# Patient Record
Sex: FEMALE | Race: WHITE | NOT HISPANIC OR LATINO | Employment: UNEMPLOYED | URBAN - NONMETROPOLITAN AREA
[De-identification: names, ages, dates, MRNs, and addresses within clinical notes are randomized per-mention and may not be internally consistent; named-entity substitution may affect disease eponyms.]

---

## 2017-01-08 ENCOUNTER — HOSPITAL ENCOUNTER (EMERGENCY)
Facility: HOSPITAL | Age: 47
Discharge: HOME | End: 2017-01-08
Attending: EMERGENCY MEDICINE | Admitting: EMERGENCY MEDICINE

## 2017-01-08 VITALS
TEMPERATURE: 98.2 F | DIASTOLIC BLOOD PRESSURE: 83 MMHG | RESPIRATION RATE: 16 BRPM | SYSTOLIC BLOOD PRESSURE: 126 MMHG | OXYGEN SATURATION: 96 % | WEIGHT: 160 LBS | HEART RATE: 90 BPM | HEIGHT: 62 IN | BODY MASS INDEX: 29.44 KG/M2

## 2017-01-08 DIAGNOSIS — Z76.0 MEDICATION REFILL: ICD-10-CM

## 2017-01-08 DIAGNOSIS — I10 ESSENTIAL HYPERTENSION: Primary | ICD-10-CM

## 2017-01-08 LAB
ALBUMIN SERPL-MCNC: 4.9 G/DL (ref 3.5–5)
ALBUMIN/GLOB SERPL: 2 G/DL (ref 1.5–2.5)
ALP SERPL-CCNC: 78 U/L (ref 46–116)
ALT SERPL W P-5'-P-CCNC: 41 U/L (ref 10–36)
ANION GAP SERPL CALCULATED.3IONS-SCNC: 10.9 MMOL/L (ref 3.6–11.2)
APTT PPP: 24.4 SECONDS (ref 24.4–31)
AST SERPL-CCNC: 31 U/L (ref 10–30)
BASOPHILS # BLD AUTO: 0.03 10*3/MM3 (ref 0–0.3)
BASOPHILS NFR BLD AUTO: 0.3 % (ref 0–2)
BILIRUB SERPL-MCNC: 0.3 MG/DL (ref 0.2–1.8)
BILIRUB UR QL STRIP: NEGATIVE
BUN BLD-MCNC: 11 MG/DL (ref 7–21)
BUN/CREAT SERPL: 16.2 (ref 7–25)
CALCIUM SPEC-SCNC: 9.9 MG/DL (ref 7.7–10)
CHLORIDE SERPL-SCNC: 107 MMOL/L (ref 99–112)
CK MB SERPL-CCNC: 0.58 NG/ML (ref 0–5)
CK MB SERPL-RTO: 0.9 % (ref 0–3)
CK SERPL-CCNC: 67 U/L (ref 24–173)
CLARITY UR: CLEAR
CO2 SERPL-SCNC: 22.1 MMOL/L (ref 24.3–31.9)
COLOR UR: YELLOW
CREAT BLD-MCNC: 0.68 MG/DL (ref 0.43–1.29)
DEPRECATED RDW RBC AUTO: 41.1 FL (ref 37–54)
EOSINOPHIL # BLD AUTO: 0.23 10*3/MM3 (ref 0–0.7)
EOSINOPHIL NFR BLD AUTO: 2.2 % (ref 0–5)
ERYTHROCYTE [DISTWIDTH] IN BLOOD BY AUTOMATED COUNT: 13.5 % (ref 11.5–14.5)
GFR SERPL CREATININE-BSD FRML MDRD: 93 ML/MIN/1.73
GLOBULIN UR ELPH-MCNC: 2.5 GM/DL
GLUCOSE BLD-MCNC: 258 MG/DL (ref 70–110)
GLUCOSE UR STRIP-MCNC: ABNORMAL MG/DL
HCT VFR BLD AUTO: 43.2 % (ref 37–47)
HGB BLD-MCNC: 14.8 G/DL (ref 12–16)
HGB UR QL STRIP.AUTO: NEGATIVE
IMM GRANULOCYTES # BLD: 0.03 10*3/MM3 (ref 0–0.03)
IMM GRANULOCYTES NFR BLD: 0.3 % (ref 0–0.5)
INR PPP: 0.92 (ref 0.8–1.1)
KETONES UR QL STRIP: ABNORMAL
LEUKOCYTE ESTERASE UR QL STRIP.AUTO: NEGATIVE
LYMPHOCYTES # BLD AUTO: 3.35 10*3/MM3 (ref 1–3)
LYMPHOCYTES NFR BLD AUTO: 32.3 % (ref 21–51)
MCH RBC QN AUTO: 28.8 PG (ref 27–33)
MCHC RBC AUTO-ENTMCNC: 34.3 G/DL (ref 33–37)
MCV RBC AUTO: 84.2 FL (ref 80–94)
MONOCYTES # BLD AUTO: 0.51 10*3/MM3 (ref 0.1–0.9)
MONOCYTES NFR BLD AUTO: 4.9 % (ref 0–10)
MYOGLOBIN SERPL-MCNC: 24 NG/ML (ref 0–109)
NEUTROPHILS # BLD AUTO: 6.23 10*3/MM3 (ref 1.4–6.5)
NEUTROPHILS NFR BLD AUTO: 60 % (ref 30–70)
NITRITE UR QL STRIP: NEGATIVE
OSMOLALITY SERPL CALC.SUM OF ELEC: 287.7 MOSM/KG (ref 273–305)
PH UR STRIP.AUTO: <=5 [PH] (ref 5–8)
PLATELET # BLD AUTO: 288 10*3/MM3 (ref 130–400)
PMV BLD AUTO: 10.4 FL (ref 6–10)
POTASSIUM BLD-SCNC: 3.8 MMOL/L (ref 3.5–5.3)
PROT SERPL-MCNC: 7.4 G/DL (ref 6–8)
PROT UR QL STRIP: NEGATIVE
PROTHROMBIN TIME: 10.4 SECONDS (ref 9.8–11.9)
RBC # BLD AUTO: 5.13 10*6/MM3 (ref 4.2–5.4)
SODIUM BLD-SCNC: 140 MMOL/L (ref 135–153)
SP GR UR STRIP: 1.02 (ref 1–1.03)
TROPONIN I SERPL-MCNC: <0.006 NG/ML
UROBILINOGEN UR QL STRIP: ABNORMAL
WBC NRBC COR # BLD: 10.38 10*3/MM3 (ref 4.5–12.5)

## 2017-01-08 PROCEDURE — 82550 ASSAY OF CK (CPK): CPT | Performed by: EMERGENCY MEDICINE

## 2017-01-08 PROCEDURE — 96375 TX/PRO/DX INJ NEW DRUG ADDON: CPT

## 2017-01-08 PROCEDURE — 85025 COMPLETE CBC W/AUTO DIFF WBC: CPT | Performed by: EMERGENCY MEDICINE

## 2017-01-08 PROCEDURE — 82553 CREATINE MB FRACTION: CPT | Performed by: EMERGENCY MEDICINE

## 2017-01-08 PROCEDURE — 85610 PROTHROMBIN TIME: CPT | Performed by: EMERGENCY MEDICINE

## 2017-01-08 PROCEDURE — 81003 URINALYSIS AUTO W/O SCOPE: CPT | Performed by: EMERGENCY MEDICINE

## 2017-01-08 PROCEDURE — 93005 ELECTROCARDIOGRAM TRACING: CPT | Performed by: EMERGENCY MEDICINE

## 2017-01-08 PROCEDURE — 85730 THROMBOPLASTIN TIME PARTIAL: CPT | Performed by: EMERGENCY MEDICINE

## 2017-01-08 PROCEDURE — 99284 EMERGENCY DEPT VISIT MOD MDM: CPT

## 2017-01-08 PROCEDURE — 96374 THER/PROPH/DIAG INJ IV PUSH: CPT

## 2017-01-08 PROCEDURE — 80053 COMPREHEN METABOLIC PANEL: CPT | Performed by: EMERGENCY MEDICINE

## 2017-01-08 PROCEDURE — 84484 ASSAY OF TROPONIN QUANT: CPT | Performed by: EMERGENCY MEDICINE

## 2017-01-08 PROCEDURE — 83874 ASSAY OF MYOGLOBIN: CPT | Performed by: EMERGENCY MEDICINE

## 2017-01-08 RX ORDER — SODIUM CHLORIDE 0.9 % (FLUSH) 0.9 %
10 SYRINGE (ML) INJECTION AS NEEDED
Status: DISCONTINUED | OUTPATIENT
Start: 2017-01-08 | End: 2017-01-08 | Stop reason: HOSPADM

## 2017-01-08 RX ORDER — METOPROLOL TARTRATE 5 MG/5ML
5 INJECTION INTRAVENOUS ONCE
Status: COMPLETED | OUTPATIENT
Start: 2017-01-08 | End: 2017-01-08

## 2017-01-08 RX ORDER — AMLODIPINE BESYLATE AND BENAZEPRIL HYDROCHLORIDE 10; 20 MG/1; MG/1
1 CAPSULE ORAL DAILY
Qty: 14 CAPSULE | Refills: 0 | Status: ON HOLD | OUTPATIENT
Start: 2017-01-08 | End: 2017-09-23

## 2017-01-08 RX ORDER — METOPROLOL TARTRATE 100 MG/1
100 TABLET ORAL 2 TIMES DAILY
Qty: 28 TABLET | Refills: 0 | Status: SHIPPED | OUTPATIENT
Start: 2017-01-08 | End: 2017-01-22

## 2017-01-08 RX ORDER — ENALAPRILAT 2.5 MG/2ML
2.5 INJECTION INTRAVENOUS ONCE
Status: DISCONTINUED | OUTPATIENT
Start: 2017-01-08 | End: 2017-01-08 | Stop reason: HOSPADM

## 2017-01-08 RX ADMIN — METOPROLOL TARTRATE 5 MG: 5 INJECTION INTRAVENOUS at 02:02

## 2017-01-08 NOTE — ED PROVIDER NOTES
Subjective   HPI Comments: Patient comes in with elevated blood pressure and pulse rate.  She also has had intermittent dizziness.  She ran out of her blood pressure medicine 2 days ago.  She has not had chest pain or shortness of breath.  Patient states that she takes metoprolol and Lotrel.  Dr. Gallego's her primary care physician.    Patient is a 46 y.o. female presenting with hypertension.   History provided by:  Patient and EMS personnel  Hypertension   Severity:  Moderate  Onset quality:  Gradual  Duration:  2 days  Timing:  Constant  Chronicity:  Recurrent  Context: noncompliance    Context: normal sodium, not caffeine, not drug abuse, not herbal remedies, not medication change, not OTC medications used and not stress    Relieved by:  Nothing  Worsened by:  Nothing  Ineffective treatments:  None tried  Associated symptoms: dizziness    Associated symptoms: no abdominal pain, no anxiety, no blurred vision, no chest pain, no confusion, no ear pain, no epistaxis, no fatigue, no fever, no headaches, no hematuria, no hypokalemia, no loss of consciousness, no nausea, no neck pain, no palpitations, no peripheral edema, no shortness of breath, no syncope, no tinnitus, not vomiting and no weakness    Risk factors: diabetes, obesity and tobacco use    Risk factors: no alcohol use, no cardiac disease, no cocaine use, no decongestant use, no kidney disease, no prior aneurysm, no prior stroke and no PVD        Review of Systems   Constitutional: Negative.  Negative for fatigue and fever.   HENT: Negative.  Negative for ear pain, nosebleeds and tinnitus.    Eyes: Negative.  Negative for blurred vision.   Respiratory: Negative.  Negative for shortness of breath.    Cardiovascular: Negative.  Negative for chest pain, palpitations, leg swelling and syncope.   Gastrointestinal: Negative.  Negative for abdominal pain, nausea and vomiting.   Endocrine: Negative.    Genitourinary: Negative.  Negative for hematuria.    Musculoskeletal: Negative.  Negative for neck pain.   Skin: Negative.    Allergic/Immunologic: Negative.    Neurological: Positive for dizziness. Negative for loss of consciousness, weakness and headaches.   Hematological: Negative.    Psychiatric/Behavioral: Negative.  Negative for confusion. The patient is not nervous/anxious.    All other systems reviewed and are negative.      Past Medical History   Diagnosis Date   • Diabetes mellitus    • Hypertension        Allergies   Allergen Reactions   • Hydrocodone-Acetaminophen    • Hydromorphone    • Oxycodone-Acetaminophen    • Penicillins        Past Surgical History   Procedure Laterality Date   •  section     • Hysterectomy     • Cholecystectomy     • Appendectomy         History reviewed. No pertinent family history.    Social History     Social History   • Marital status:      Spouse name: N/A   • Number of children: N/A   • Years of education: N/A     Social History Main Topics   • Smoking status: Current Every Day Smoker     Packs/day: 0.50     Types: Cigarettes   • Smokeless tobacco: None   • Alcohol use No   • Drug use: No   • Sexual activity: Not Asked     Other Topics Concern   • None     Social History Narrative   • None           Objective   Physical Exam   Constitutional: She is oriented to person, place, and time. She appears well-developed and well-nourished. No distress.   HENT:   Head: Normocephalic and atraumatic.   Nose: Nose normal.   Moist mucous membranes.  Oropharynx normal.   Eyes: Conjunctivae and EOM are normal. Pupils are equal, round, and reactive to light. Right eye exhibits no discharge. Left eye exhibits no discharge. No scleral icterus.   No nystagmus.   Neck: Normal range of motion. Neck supple. No JVD present. No tracheal deviation present. No thyromegaly present.   Cardiovascular: Normal rate, regular rhythm, normal heart sounds and intact distal pulses.  Exam reveals no gallop and no friction rub.    No murmur  heard.  Pulmonary/Chest: Effort normal and breath sounds normal. No stridor. No respiratory distress. She has no wheezes. She has no rales. She exhibits no tenderness.   Abdominal: Soft. She exhibits no distension. There is no tenderness. There is no guarding.   Musculoskeletal: Normal range of motion. She exhibits no deformity.   Neurological: She is alert and oriented to person, place, and time. No cranial nerve deficit. She exhibits normal muscle tone. Coordination normal.   Skin: Skin is warm and dry. No pallor.   Psychiatric: She has a normal mood and affect. Her behavior is normal. Judgment and thought content normal.   Nursing note and vitals reviewed.      Procedures         ED Course  ED Course   Value Comment By Time   ECG 12 Lead 12-lead EKG performed at 0138 hrs.  Interpreted by myself at the bedside at 0138 hrs.  Sinus tachycardia.  Ventricular rate 114.  SD interval 161.  QRS duration 71.  .  Pathologic blocks.  No overt dysrhythmia.  No acute ST segment deviation indicative of acute ischemia/infarct/injury Jeovanny Yarbrough MD 01/08 0139    Heart rate and blood pressure easily controlled with medication. Jeovanny Yarbrough MD 01/08 0322      No orders to display     Labs Reviewed   COMPREHENSIVE METABOLIC PANEL - Abnormal; Notable for the following:        Result Value    Glucose 258 (*)     CO2 22.1 (*)     ALT (SGPT) 41 (*)     AST (SGOT) 31 (*)     All other components within normal limits   CBC WITH AUTO DIFFERENTIAL - Abnormal; Notable for the following:     MPV 10.4 (*)     Lymphocytes, Absolute 3.35 (*)     All other components within normal limits   PROTIME-INR - Normal    Narrative:     Patients not on anticoagulant therapy:    INR 0.90-1.10     Suggested INR therapeutic range for stable oral anticoagulant therapy:             Routine therapy                      2.00-3.00           Recurrent MI                         2.50-3.50           Mechanical prosthetic valve           2.50-3.50   APTT - Normal    Narrative:     Heparin protocol:    Therapeutic range 56-80 seconds   TROPONIN (IN-HOUSE) - Normal    Narrative:     Ultra Troponin I Reference Range:         <=0.039 ng/mL: Negative    0.04-0.779 ng/mL: Indeterminate Range. Suspicious of MI.  Clinical correlation required.       >=0.78  ng/mL: Consistent with myocardial injury.  Clinical correlation required.   CK MB - Normal   MYOGLOBIN, SERUM - Normal   CK - Normal   OSMOLALITY, CALCULATED - Normal   CKMB INDEX CALCULATION - Normal   URINALYSIS W/ CULTURE IF INDICATED   CBC AND DIFFERENTIAL    Narrative:     The following orders were created for panel order CBC & Differential.  Procedure                               Abnormality         Status                     ---------                               -----------         ------                     CBC Auto Differential[[id number]]         Abnormal            Final result                 Please view results for these tests on the individual orders.        Medication List      CONTINUE taking these medications          amLODIPine-benazepril 10-20 MG per capsule   Commonly known as:  LOTREL   Take 1 capsule by mouth Daily. (MUST HAVE APPOINTMENT FOR REFILL)       azithromycin 500 MG tablet   Commonly known as:  ZITHROMAX   Take 1 tablet by mouth daily.       JANUMET XR  MG tablet sustained-release 24 hour   Generic drug:  SITagliptin-MetFORMIN HCl ER   Take 1 tablet by mouth Daily. ( Patient must have appointment fo refills)       levoFLOXacin 500 MG tablet   Commonly known as:  LEVAQUIN   Take 1 tablet by mouth every morning.       metoprolol tartrate 100 MG tablet   Commonly known as:  LOPRESSOR   Take 1 tablet by mouth 2 (Two) Times a Day.(MUST HAVE APPOINTMENT FOR   REFILL)       neomycin-polymyxin-hydrocortisone 1 % solution otic solution   Commonly known as:  CORTISPORIN   INSTILL 5 DROPS INTO AFFECTED EAR(S) TWICE DAILY       * omeprazole 40 MG capsule   Commonly known as:   priLOSEC   take 1 capsule by mouth 2 times daily       * omeprazole 40 MG capsule   Commonly known as:  priLOSEC   Take 1 capsule by mouth 2 (Two) Times a Day.(MUST HAVE APPOINTMENT FOR   REFILL)       venlafaxine XR 75 MG 24 hr capsule   Commonly known as:  EFFEXOR-XR   Take 1 capsule by mouth Daily.(MUST HAVE APPOINTMENT FOR REFILL)       * Notice:  This list has 2 medication(s) that are the same as other   medications prescribed for you. Read the directions carefully, and ask   your doctor or other care provider to review them with you.                  MDM  Number of Diagnoses or Management Options  Essential hypertension: established and worsening  Medication refill: new and requires workup     Amount and/or Complexity of Data Reviewed  Clinical lab tests: ordered and reviewed  Tests in the radiology section of CPT®: ordered and reviewed    Risk of Complications, Morbidity, and/or Mortality  Presenting problems: moderate  Diagnostic procedures: moderate  Management options: moderate    Patient Progress  Patient progress: improved      Final diagnoses:   Essential hypertension   Medication refill            Jeovanny Yarbrough MD  01/08/17 0322

## 2017-02-20 ENCOUNTER — HOSPITAL ENCOUNTER (EMERGENCY)
Facility: HOSPITAL | Age: 47
Discharge: HOME | End: 2017-02-20
Admitting: EMERGENCY MEDICINE

## 2017-02-20 VITALS
OXYGEN SATURATION: 99 % | BODY MASS INDEX: 29.44 KG/M2 | HEART RATE: 74 BPM | WEIGHT: 160 LBS | SYSTOLIC BLOOD PRESSURE: 123 MMHG | HEIGHT: 62 IN | DIASTOLIC BLOOD PRESSURE: 79 MMHG | TEMPERATURE: 98 F | RESPIRATION RATE: 18 BRPM

## 2017-02-20 DIAGNOSIS — T78.40XA ALLERGIC REACTION, INITIAL ENCOUNTER: Primary | ICD-10-CM

## 2017-02-20 PROCEDURE — 96372 THER/PROPH/DIAG INJ SC/IM: CPT

## 2017-02-20 PROCEDURE — 99283 EMERGENCY DEPT VISIT LOW MDM: CPT

## 2017-02-20 PROCEDURE — 25010000002 METHYLPREDNISOLONE PER 125 MG: Performed by: NURSE PRACTITIONER

## 2017-02-20 PROCEDURE — 25010000002 DIPHENHYDRAMINE PER 50 MG: Performed by: NURSE PRACTITIONER

## 2017-02-20 RX ORDER — METHYLPREDNISOLONE SODIUM SUCCINATE 125 MG/2ML
125 INJECTION, POWDER, LYOPHILIZED, FOR SOLUTION INTRAMUSCULAR; INTRAVENOUS ONCE
Status: COMPLETED | OUTPATIENT
Start: 2017-02-20 | End: 2017-02-20

## 2017-02-20 RX ORDER — HYDROXYZINE HYDROCHLORIDE 25 MG/1
25 TABLET, FILM COATED ORAL EVERY 6 HOURS PRN
Qty: 20 TABLET | Refills: 0 | Status: ON HOLD | OUTPATIENT
Start: 2017-02-20 | End: 2017-09-23

## 2017-02-20 RX ORDER — DIPHENHYDRAMINE HYDROCHLORIDE 50 MG/ML
50 INJECTION INTRAMUSCULAR; INTRAVENOUS ONCE
Status: COMPLETED | OUTPATIENT
Start: 2017-02-20 | End: 2017-02-20

## 2017-02-20 RX ORDER — PREDNISONE 20 MG/1
40 TABLET ORAL DAILY
Qty: 10 TABLET | Refills: 0 | Status: ON HOLD | OUTPATIENT
Start: 2017-02-20 | End: 2017-09-23

## 2017-02-20 RX ADMIN — METHYLPREDNISOLONE SODIUM SUCCINATE 125 MG: 125 INJECTION, POWDER, FOR SOLUTION INTRAMUSCULAR; INTRAVENOUS at 21:09

## 2017-02-20 RX ADMIN — DIPHENHYDRAMINE HYDROCHLORIDE 50 MG: 50 INJECTION INTRAMUSCULAR; INTRAVENOUS at 21:09

## 2017-02-21 NOTE — ED NOTES
Reports hives and itching post antibiotic ingestion. Lips appear swollen and a diffuse rash over the body. omnicef since Friday for an ear infection. Rash started yesterday.      Kristie Anguiano RN  02/20/17 2026

## 2017-02-21 NOTE — ED PROVIDER NOTES
Subjective   Patient is a 46 y.o. female presenting with allergic reaction.   Allergic Reaction   Presenting symptoms: itching and rash    Itching:     Location:  Full body    Severity:  Moderate    Onset quality:  Sudden  Severity:  Moderate  Duration:  1 day  Prior allergic episodes:  Allergies to medications  Context: medications    Context: not animal exposure, not chemicals, not cosmetics, not dairy/milk products, not food allergies, not grass, not new detergents/soaps and not poison ivy    Relieved by:  None tried  Worsened by:  Nothing  Ineffective treatments:  None tried      Review of Systems   Constitutional: Negative.    HENT: Negative.    Eyes: Negative.    Respiratory: Negative.    Cardiovascular: Negative.    Gastrointestinal: Negative.    Endocrine: Negative.    Genitourinary: Negative.    Musculoskeletal: Negative.    Skin: Positive for itching and rash.   Allergic/Immunologic: Negative.    Neurological: Negative.    Hematological: Negative.    Psychiatric/Behavioral: Negative.        Past Medical History   Diagnosis Date   • Diabetes mellitus    • Hypertension        Allergies   Allergen Reactions   • Hydrocodone-Acetaminophen    • Hydromorphone    • Oxycodone-Acetaminophen    • Penicillins        Past Surgical History   Procedure Laterality Date   •  section     • Hysterectomy     • Cholecystectomy     • Appendectomy         History reviewed. No pertinent family history.    Social History     Social History   • Marital status:      Spouse name: N/A   • Number of children: N/A   • Years of education: N/A     Social History Main Topics   • Smoking status: Current Every Day Smoker     Packs/day: 0.50     Types: Cigarettes   • Smokeless tobacco: None   • Alcohol use No   • Drug use: No   • Sexual activity: Not Asked     Other Topics Concern   • None     Social History Narrative           Objective   Physical Exam   Constitutional: She is oriented to person, place, and time. She appears  well-developed and well-nourished.   HENT:   Head: Normocephalic.   Right Ear: External ear normal.   Left Ear: External ear normal.   Mouth/Throat: Oropharynx is clear and moist.   Eyes: Conjunctivae and EOM are normal. Pupils are equal, round, and reactive to light.   Neck: Neck supple.   Cardiovascular: Normal rate and regular rhythm.    Pulmonary/Chest: Effort normal and breath sounds normal.   Abdominal: Soft. Bowel sounds are normal.   Musculoskeletal: Normal range of motion.   Neurological: She is alert and oriented to person, place, and time. She has normal reflexes.   Nursing note and vitals reviewed.      Procedures         ED Course  ED Course                  MDM    Final diagnoses:   Allergic reaction, initial encounter            Josh Sauer, APRN  02/20/17 7090

## 2017-09-23 ENCOUNTER — APPOINTMENT (OUTPATIENT)
Dept: CT IMAGING | Facility: HOSPITAL | Age: 47
End: 2017-09-23

## 2017-09-23 ENCOUNTER — HOSPITAL ENCOUNTER (INPATIENT)
Facility: HOSPITAL | Age: 47
LOS: 16 days | Discharge: HOME | End: 2017-10-09
Attending: EMERGENCY MEDICINE | Admitting: INTERNAL MEDICINE

## 2017-09-23 DIAGNOSIS — R11.0 NAUSEA: ICD-10-CM

## 2017-09-23 DIAGNOSIS — R10.13 EPIGASTRIC PAIN: Primary | ICD-10-CM

## 2017-09-23 DIAGNOSIS — K85.90 ACUTE PANCREATITIS, UNSPECIFIED COMPLICATION STATUS, UNSPECIFIED PANCREATITIS TYPE: ICD-10-CM

## 2017-09-23 LAB
ALBUMIN SERPL-MCNC: 5 G/DL (ref 3.5–5)
ALBUMIN/GLOB SERPL: 1.9 G/DL (ref 1.5–2.5)
ALP SERPL-CCNC: 88 U/L (ref 35–104)
ALT SERPL W P-5'-P-CCNC: 27 U/L (ref 10–36)
AMYLASE SERPL-CCNC: 2500 U/L (ref 28–100)
ANION GAP SERPL CALCULATED.3IONS-SCNC: 6 MMOL/L (ref 3.6–11.2)
AST SERPL-CCNC: 30 U/L (ref 10–30)
BASOPHILS # BLD AUTO: 0.05 10*3/MM3 (ref 0–0.3)
BASOPHILS NFR BLD AUTO: 0.2 % (ref 0–2)
BILIRUB SERPL-MCNC: 0.5 MG/DL (ref 0.2–1.8)
BUN BLD-MCNC: 11 MG/DL (ref 7–21)
BUN/CREAT SERPL: 13.4 (ref 7–25)
CALCIUM SPEC-SCNC: 10 MG/DL (ref 7.7–10)
CHLORIDE SERPL-SCNC: 108 MMOL/L (ref 99–112)
CO2 SERPL-SCNC: 25 MMOL/L (ref 24.3–31.9)
CREAT BLD-MCNC: 0.82 MG/DL (ref 0.43–1.29)
D-LACTATE SERPL-SCNC: 1.7 MMOL/L (ref 0.5–2)
DEPRECATED RDW RBC AUTO: 42.8 FL (ref 37–54)
EOSINOPHIL # BLD AUTO: 0.18 10*3/MM3 (ref 0–0.7)
EOSINOPHIL NFR BLD AUTO: 0.6 % (ref 0–5)
ERYTHROCYTE [DISTWIDTH] IN BLOOD BY AUTOMATED COUNT: 13.6 % (ref 11.5–14.5)
GFR SERPL CREATININE-BSD FRML MDRD: 75 ML/MIN/1.73
GLOBULIN UR ELPH-MCNC: 2.6 GM/DL
GLUCOSE BLD-MCNC: 289 MG/DL (ref 70–110)
HCT VFR BLD AUTO: 51.1 % (ref 37–47)
HGB BLD-MCNC: 17.3 G/DL (ref 12–16)
IMM GRANULOCYTES # BLD: 0.17 10*3/MM3 (ref 0–0.03)
IMM GRANULOCYTES NFR BLD: 0.5 % (ref 0–0.5)
LIPASE SERPL-CCNC: 3488 U/L (ref 13–60)
LYMPHOCYTES # BLD AUTO: 3.36 10*3/MM3 (ref 1–3)
LYMPHOCYTES NFR BLD AUTO: 10.5 % (ref 21–51)
MCH RBC QN AUTO: 28.9 PG (ref 27–33)
MCHC RBC AUTO-ENTMCNC: 33.9 G/DL (ref 33–37)
MCV RBC AUTO: 85.3 FL (ref 80–94)
MONOCYTES # BLD AUTO: 1.29 10*3/MM3 (ref 0.1–0.9)
MONOCYTES NFR BLD AUTO: 4 % (ref 0–10)
NEUTROPHILS # BLD AUTO: 26.99 10*3/MM3 (ref 1.4–6.5)
NEUTROPHILS NFR BLD AUTO: 84.2 % (ref 30–70)
OSMOLALITY SERPL CALC.SUM OF ELEC: 287.5 MOSM/KG (ref 273–305)
PLATELET # BLD AUTO: 377 10*3/MM3 (ref 130–400)
PMV BLD AUTO: 10.9 FL (ref 6–10)
POTASSIUM BLD-SCNC: 4 MMOL/L (ref 3.5–5.3)
PROT SERPL-MCNC: 7.6 G/DL (ref 6–8)
RBC # BLD AUTO: 5.99 10*6/MM3 (ref 4.2–5.4)
SODIUM BLD-SCNC: 139 MMOL/L (ref 135–153)
WBC NRBC COR # BLD: 32.04 10*3/MM3 (ref 4.5–12.5)

## 2017-09-23 PROCEDURE — 25010000002 MORPHINE PER 10 MG: Performed by: NURSE PRACTITIONER

## 2017-09-23 PROCEDURE — 25010000002 KETOROLAC TROMETHAMINE PER 15 MG: Performed by: NURSE PRACTITIONER

## 2017-09-23 PROCEDURE — 87040 BLOOD CULTURE FOR BACTERIA: CPT | Performed by: NURSE PRACTITIONER

## 2017-09-23 PROCEDURE — 83690 ASSAY OF LIPASE: CPT | Performed by: NURSE PRACTITIONER

## 2017-09-23 PROCEDURE — 74177 CT ABD & PELVIS W/CONTRAST: CPT

## 2017-09-23 PROCEDURE — 36415 COLL VENOUS BLD VENIPUNCTURE: CPT

## 2017-09-23 PROCEDURE — 25010000002 ONDANSETRON PER 1 MG: Performed by: INTERNAL MEDICINE

## 2017-09-23 PROCEDURE — 74177 CT ABD & PELVIS W/CONTRAST: CPT | Performed by: RADIOLOGY

## 2017-09-23 PROCEDURE — 83605 ASSAY OF LACTIC ACID: CPT | Performed by: NURSE PRACTITIONER

## 2017-09-23 PROCEDURE — 25010000002 ENOXAPARIN PER 10 MG: Performed by: INTERNAL MEDICINE

## 2017-09-23 PROCEDURE — 80053 COMPREHEN METABOLIC PANEL: CPT | Performed by: NURSE PRACTITIONER

## 2017-09-23 PROCEDURE — 85025 COMPLETE CBC W/AUTO DIFF WBC: CPT | Performed by: NURSE PRACTITIONER

## 2017-09-23 PROCEDURE — 25010000002 PROMETHAZINE PER 50 MG: Performed by: NURSE PRACTITIONER

## 2017-09-23 PROCEDURE — 99284 EMERGENCY DEPT VISIT MOD MDM: CPT

## 2017-09-23 PROCEDURE — 82150 ASSAY OF AMYLASE: CPT | Performed by: NURSE PRACTITIONER

## 2017-09-23 PROCEDURE — 0 IOPAMIDOL 61 % SOLUTION: Performed by: EMERGENCY MEDICINE

## 2017-09-23 RX ORDER — SODIUM CHLORIDE 9 MG/ML
100 INJECTION, SOLUTION INTRAVENOUS CONTINUOUS
Status: DISCONTINUED | OUTPATIENT
Start: 2017-09-23 | End: 2017-09-27

## 2017-09-23 RX ORDER — PANTOPRAZOLE SODIUM 40 MG/1
40 TABLET, DELAYED RELEASE ORAL EVERY MORNING
Status: CANCELLED | OUTPATIENT
Start: 2017-09-24

## 2017-09-23 RX ORDER — METOPROLOL TARTRATE 100 MG/1
100 TABLET ORAL 2 TIMES DAILY
Status: CANCELLED | OUTPATIENT
Start: 2017-09-23

## 2017-09-23 RX ORDER — PROMETHAZINE HYDROCHLORIDE 25 MG/ML
12.5 INJECTION, SOLUTION INTRAMUSCULAR; INTRAVENOUS ONCE
Status: COMPLETED | OUTPATIENT
Start: 2017-09-23 | End: 2017-09-23

## 2017-09-23 RX ORDER — AMLODIPINE BESYLATE AND BENAZEPRIL HYDROCHLORIDE 10; 20 MG/1; MG/1
1 CAPSULE ORAL DAILY
Status: CANCELLED | OUTPATIENT
Start: 2017-09-24

## 2017-09-23 RX ORDER — METOPROLOL TARTRATE 100 MG/1
100 TABLET ORAL EVERY 12 HOURS SCHEDULED
Status: DISCONTINUED | OUTPATIENT
Start: 2017-09-23 | End: 2017-10-09 | Stop reason: HOSPADM

## 2017-09-23 RX ORDER — PANTOPRAZOLE SODIUM 40 MG/10ML
40 INJECTION, POWDER, LYOPHILIZED, FOR SOLUTION INTRAVENOUS
Status: DISCONTINUED | OUTPATIENT
Start: 2017-09-24 | End: 2017-10-09

## 2017-09-23 RX ORDER — DEXTROSE MONOHYDRATE 25 G/50ML
25 INJECTION, SOLUTION INTRAVENOUS
Status: DISCONTINUED | OUTPATIENT
Start: 2017-09-23 | End: 2017-10-09 | Stop reason: HOSPADM

## 2017-09-23 RX ORDER — MEPERIDINE HYDROCHLORIDE 25 MG/ML
25 INJECTION INTRAMUSCULAR; INTRAVENOUS; SUBCUTANEOUS EVERY 6 HOURS PRN
Status: DISCONTINUED | OUTPATIENT
Start: 2017-09-23 | End: 2017-09-25

## 2017-09-23 RX ORDER — ONDANSETRON 2 MG/ML
4 INJECTION INTRAMUSCULAR; INTRAVENOUS ONCE
Status: DISCONTINUED | OUTPATIENT
Start: 2017-09-23 | End: 2017-09-23

## 2017-09-23 RX ORDER — ONDANSETRON 2 MG/ML
4 INJECTION INTRAMUSCULAR; INTRAVENOUS EVERY 6 HOURS PRN
Status: DISCONTINUED | OUTPATIENT
Start: 2017-09-23 | End: 2017-10-09 | Stop reason: HOSPADM

## 2017-09-23 RX ORDER — NICOTINE POLACRILEX 4 MG
15 LOZENGE BUCCAL
Status: DISCONTINUED | OUTPATIENT
Start: 2017-09-23 | End: 2017-10-09 | Stop reason: HOSPADM

## 2017-09-23 RX ORDER — KETOROLAC TROMETHAMINE 30 MG/ML
60 INJECTION, SOLUTION INTRAMUSCULAR; INTRAVENOUS ONCE
Status: DISCONTINUED | OUTPATIENT
Start: 2017-09-23 | End: 2017-09-23

## 2017-09-23 RX ORDER — MORPHINE SULFATE 2 MG/ML
2 INJECTION, SOLUTION INTRAMUSCULAR; INTRAVENOUS ONCE
Status: DISCONTINUED | OUTPATIENT
Start: 2017-09-23 | End: 2017-09-23

## 2017-09-23 RX ORDER — VENLAFAXINE HYDROCHLORIDE 75 MG/1
75 CAPSULE, EXTENDED RELEASE ORAL DAILY
Status: DISCONTINUED | OUTPATIENT
Start: 2017-09-24 | End: 2017-10-09 | Stop reason: HOSPADM

## 2017-09-23 RX ORDER — MORPHINE SULFATE 2 MG/ML
2 INJECTION, SOLUTION INTRAMUSCULAR; INTRAVENOUS ONCE
Status: COMPLETED | OUTPATIENT
Start: 2017-09-23 | End: 2017-09-23

## 2017-09-23 RX ORDER — KETOROLAC TROMETHAMINE 30 MG/ML
30 INJECTION, SOLUTION INTRAMUSCULAR; INTRAVENOUS ONCE
Status: COMPLETED | OUTPATIENT
Start: 2017-09-23 | End: 2017-09-23

## 2017-09-23 RX ORDER — PANTOPRAZOLE SODIUM 40 MG/10ML
80 INJECTION, POWDER, LYOPHILIZED, FOR SOLUTION INTRAVENOUS ONCE
Status: COMPLETED | OUTPATIENT
Start: 2017-09-23 | End: 2017-09-23

## 2017-09-23 RX ORDER — PANTOPRAZOLE SODIUM 40 MG/10ML
40 INJECTION, POWDER, LYOPHILIZED, FOR SOLUTION INTRAVENOUS EVERY 12 HOURS SCHEDULED
Status: DISCONTINUED | OUTPATIENT
Start: 2017-09-23 | End: 2017-09-23

## 2017-09-23 RX ORDER — VENLAFAXINE HYDROCHLORIDE 75 MG/1
75 CAPSULE, EXTENDED RELEASE ORAL DAILY
Status: CANCELLED | OUTPATIENT
Start: 2017-09-24

## 2017-09-23 RX ADMIN — ENOXAPARIN SODIUM 40 MG: 40 INJECTION SUBCUTANEOUS at 21:30

## 2017-09-23 RX ADMIN — MORPHINE SULFATE 2 MG: 2 INJECTION, SOLUTION INTRAMUSCULAR; INTRAVENOUS at 14:42

## 2017-09-23 RX ADMIN — PANTOPRAZOLE SODIUM 80 MG: 40 INJECTION, POWDER, FOR SOLUTION INTRAVENOUS at 17:50

## 2017-09-23 RX ADMIN — MORPHINE SULFATE 4 MG: 4 INJECTION, SOLUTION INTRAMUSCULAR; INTRAVENOUS at 16:18

## 2017-09-23 RX ADMIN — PROMETHAZINE HYDROCHLORIDE 12.5 MG: 25 INJECTION INTRAMUSCULAR; INTRAVENOUS at 14:45

## 2017-09-23 RX ADMIN — MORPHINE SULFATE 2 MG: 2 INJECTION, SOLUTION INTRAMUSCULAR; INTRAVENOUS at 14:27

## 2017-09-23 RX ADMIN — MORPHINE SULFATE 4 MG: 4 INJECTION, SOLUTION INTRAMUSCULAR; INTRAVENOUS at 15:40

## 2017-09-23 RX ADMIN — SODIUM CHLORIDE 150 ML/HR: 9 INJECTION, SOLUTION INTRAVENOUS at 20:53

## 2017-09-23 RX ADMIN — MORPHINE SULFATE 4 MG: 4 INJECTION, SOLUTION INTRAMUSCULAR; INTRAVENOUS at 17:17

## 2017-09-23 RX ADMIN — PROMETHAZINE HYDROCHLORIDE 12.5 MG: 25 INJECTION, SOLUTION INTRAMUSCULAR; INTRAVENOUS at 14:12

## 2017-09-23 RX ADMIN — MORPHINE SULFATE 4 MG: 4 INJECTION, SOLUTION INTRAMUSCULAR; INTRAVENOUS at 14:12

## 2017-09-23 RX ADMIN — MEPERIDINE HYDROCHLORIDE 25 MG: 25 INJECTION INTRAMUSCULAR; INTRAVENOUS; SUBCUTANEOUS at 21:30

## 2017-09-23 RX ADMIN — METOPROLOL TARTRATE 100 MG: 100 TABLET, FILM COATED ORAL at 22:22

## 2017-09-23 RX ADMIN — IOPAMIDOL 100 ML: 612 INJECTION, SOLUTION INTRAVENOUS at 15:25

## 2017-09-23 RX ADMIN — ONDANSETRON 4 MG: 2 INJECTION INTRAMUSCULAR; INTRAVENOUS at 22:22

## 2017-09-23 RX ADMIN — KETOROLAC TROMETHAMINE 30 MG: 30 INJECTION, SOLUTION INTRAMUSCULAR at 15:41

## 2017-09-23 RX ADMIN — SODIUM CHLORIDE 1000 ML: 9 INJECTION, SOLUTION INTRAVENOUS at 17:47

## 2017-09-24 LAB
ALBUMIN SERPL-MCNC: 4.5 G/DL (ref 3.5–5)
ALBUMIN/GLOB SERPL: 1.8 G/DL (ref 1.5–2.5)
ALP SERPL-CCNC: 84 U/L (ref 35–104)
ALT SERPL W P-5'-P-CCNC: 61 U/L (ref 10–36)
AMYLASE SERPL-CCNC: 670 U/L (ref 28–100)
ANION GAP SERPL CALCULATED.3IONS-SCNC: 10.6 MMOL/L (ref 3.6–11.2)
AST SERPL-CCNC: 85 U/L (ref 10–30)
BACTERIA UR QL AUTO: ABNORMAL /HPF
BASOPHILS # BLD AUTO: 0.02 10*3/MM3 (ref 0–0.3)
BASOPHILS NFR BLD AUTO: 0.1 % (ref 0–2)
BILIRUB SERPL-MCNC: 0.5 MG/DL (ref 0.2–1.8)
BILIRUB UR QL STRIP: NEGATIVE
BUN BLD-MCNC: 14 MG/DL (ref 7–21)
BUN/CREAT SERPL: 17.3 (ref 7–25)
CALCIUM SPEC-SCNC: 8.9 MG/DL (ref 7.7–10)
CHLORIDE SERPL-SCNC: 113 MMOL/L (ref 99–112)
CLARITY UR: ABNORMAL
CO2 SERPL-SCNC: 17.4 MMOL/L (ref 24.3–31.9)
COLOR UR: YELLOW
CREAT BLD-MCNC: 0.81 MG/DL (ref 0.43–1.29)
DEPRECATED RDW RBC AUTO: 44 FL (ref 37–54)
EOSINOPHIL # BLD AUTO: 0.01 10*3/MM3 (ref 0–0.7)
EOSINOPHIL NFR BLD AUTO: 0.1 % (ref 0–5)
ERYTHROCYTE [DISTWIDTH] IN BLOOD BY AUTOMATED COUNT: 14.1 % (ref 11.5–14.5)
GFR SERPL CREATININE-BSD FRML MDRD: 76 ML/MIN/1.73
GLOBULIN UR ELPH-MCNC: 2.5 GM/DL
GLUCOSE BLD-MCNC: 331 MG/DL (ref 70–110)
GLUCOSE BLDC GLUCOMTR-MCNC: 172 MG/DL (ref 70–130)
GLUCOSE BLDC GLUCOMTR-MCNC: 188 MG/DL (ref 70–130)
GLUCOSE BLDC GLUCOMTR-MCNC: 188 MG/DL (ref 70–130)
GLUCOSE BLDC GLUCOMTR-MCNC: 231 MG/DL (ref 70–130)
GLUCOSE BLDC GLUCOMTR-MCNC: 304 MG/DL (ref 70–130)
GLUCOSE UR STRIP-MCNC: ABNORMAL MG/DL
HCT VFR BLD AUTO: 53.4 % (ref 37–47)
HGB BLD-MCNC: 17.5 G/DL (ref 12–16)
HGB UR QL STRIP.AUTO: NEGATIVE
HYALINE CASTS UR QL AUTO: ABNORMAL /LPF
IMM GRANULOCYTES # BLD: 0.05 10*3/MM3 (ref 0–0.03)
IMM GRANULOCYTES NFR BLD: 0.3 % (ref 0–0.5)
KETONES UR QL STRIP: NEGATIVE
LEUKOCYTE ESTERASE UR QL STRIP.AUTO: NEGATIVE
LIPASE SERPL-CCNC: 892 U/L (ref 13–60)
LYMPHOCYTES # BLD AUTO: 1.19 10*3/MM3 (ref 1–3)
LYMPHOCYTES NFR BLD AUTO: 6.8 % (ref 21–51)
MCH RBC QN AUTO: 28.1 PG (ref 27–33)
MCHC RBC AUTO-ENTMCNC: 32.8 G/DL (ref 33–37)
MCV RBC AUTO: 85.7 FL (ref 80–94)
MONOCYTES # BLD AUTO: 0.66 10*3/MM3 (ref 0.1–0.9)
MONOCYTES NFR BLD AUTO: 3.8 % (ref 0–10)
NEUTROPHILS # BLD AUTO: 15.55 10*3/MM3 (ref 1.4–6.5)
NEUTROPHILS NFR BLD AUTO: 88.9 % (ref 30–70)
NITRITE UR QL STRIP: NEGATIVE
OSMOLALITY SERPL CALC.SUM OF ELEC: 294.6 MOSM/KG (ref 273–305)
PH UR STRIP.AUTO: <=5 [PH] (ref 5–8)
PLATELET # BLD AUTO: 311 10*3/MM3 (ref 130–400)
PMV BLD AUTO: 11 FL (ref 6–10)
POTASSIUM BLD-SCNC: 4.2 MMOL/L (ref 3.5–5.3)
PROT SERPL-MCNC: 7 G/DL (ref 6–8)
PROT UR QL STRIP: ABNORMAL
RBC # BLD AUTO: 6.23 10*6/MM3 (ref 4.2–5.4)
RBC # UR: ABNORMAL /HPF
REF LAB TEST METHOD: ABNORMAL
SODIUM BLD-SCNC: 141 MMOL/L (ref 135–153)
SP GR UR STRIP: >1.03 (ref 1–1.03)
SQUAMOUS #/AREA URNS HPF: ABNORMAL /HPF
UROBILINOGEN UR QL STRIP: ABNORMAL
WBC NRBC COR # BLD: 17.48 10*3/MM3 (ref 4.5–12.5)
WBC UR QL AUTO: ABNORMAL /HPF

## 2017-09-24 PROCEDURE — 80053 COMPREHEN METABOLIC PANEL: CPT | Performed by: INTERNAL MEDICINE

## 2017-09-24 PROCEDURE — 25010000002 MORPHINE PER 10 MG: Performed by: INTERNAL MEDICINE

## 2017-09-24 PROCEDURE — 63710000001 INSULIN ASPART PER 5 UNITS: Performed by: INTERNAL MEDICINE

## 2017-09-24 PROCEDURE — 25010000002 PROMETHAZINE PER 50 MG: Performed by: INTERNAL MEDICINE

## 2017-09-24 PROCEDURE — 82962 GLUCOSE BLOOD TEST: CPT

## 2017-09-24 PROCEDURE — 25010000002 ONDANSETRON PER 1 MG: Performed by: INTERNAL MEDICINE

## 2017-09-24 PROCEDURE — 82150 ASSAY OF AMYLASE: CPT | Performed by: INTERNAL MEDICINE

## 2017-09-24 PROCEDURE — 83690 ASSAY OF LIPASE: CPT | Performed by: INTERNAL MEDICINE

## 2017-09-24 PROCEDURE — 85025 COMPLETE CBC W/AUTO DIFF WBC: CPT | Performed by: INTERNAL MEDICINE

## 2017-09-24 PROCEDURE — 81001 URINALYSIS AUTO W/SCOPE: CPT | Performed by: NURSE PRACTITIONER

## 2017-09-24 PROCEDURE — 25010000002 ENOXAPARIN PER 10 MG: Performed by: INTERNAL MEDICINE

## 2017-09-24 RX ORDER — PROMETHAZINE HYDROCHLORIDE 12.5 MG/1
12.5 TABLET ORAL EVERY 6 HOURS PRN
Status: DISCONTINUED | OUTPATIENT
Start: 2017-09-24 | End: 2017-10-09 | Stop reason: HOSPADM

## 2017-09-24 RX ORDER — POLYETHYLENE GLYCOL 3350 17 G/17G
17 POWDER, FOR SOLUTION ORAL DAILY
Status: DISCONTINUED | OUTPATIENT
Start: 2017-09-24 | End: 2017-10-09 | Stop reason: HOSPADM

## 2017-09-24 RX ADMIN — ONDANSETRON 4 MG: 2 INJECTION INTRAMUSCULAR; INTRAVENOUS at 13:46

## 2017-09-24 RX ADMIN — INSULIN ASPART 5 UNITS: 100 INJECTION, SOLUTION INTRAVENOUS; SUBCUTANEOUS at 01:21

## 2017-09-24 RX ADMIN — INSULIN ASPART 3 UNITS: 100 INJECTION, SOLUTION INTRAVENOUS; SUBCUTANEOUS at 05:56

## 2017-09-24 RX ADMIN — METOPROLOL TARTRATE 100 MG: 100 TABLET, FILM COATED ORAL at 08:53

## 2017-09-24 RX ADMIN — PANTOPRAZOLE SODIUM 40 MG: 40 INJECTION, POWDER, FOR SOLUTION INTRAVENOUS at 08:53

## 2017-09-24 RX ADMIN — PANTOPRAZOLE SODIUM 40 MG: 40 INJECTION, POWDER, FOR SOLUTION INTRAVENOUS at 17:13

## 2017-09-24 RX ADMIN — MORPHINE SULFATE 4 MG: 4 INJECTION, SOLUTION INTRAMUSCULAR; INTRAVENOUS at 16:26

## 2017-09-24 RX ADMIN — ONDANSETRON 4 MG: 2 INJECTION INTRAMUSCULAR; INTRAVENOUS at 05:56

## 2017-09-24 RX ADMIN — INSULIN ASPART 2 UNITS: 100 INJECTION, SOLUTION INTRAVENOUS; SUBCUTANEOUS at 23:32

## 2017-09-24 RX ADMIN — MORPHINE SULFATE 4 MG: 4 INJECTION, SOLUTION INTRAMUSCULAR; INTRAVENOUS at 23:23

## 2017-09-24 RX ADMIN — ENOXAPARIN SODIUM 40 MG: 40 INJECTION SUBCUTANEOUS at 20:51

## 2017-09-24 RX ADMIN — SODIUM CHLORIDE 150 ML/HR: 9 INJECTION, SOLUTION INTRAVENOUS at 16:32

## 2017-09-24 RX ADMIN — MORPHINE SULFATE 4 MG: 4 INJECTION, SOLUTION INTRAMUSCULAR; INTRAVENOUS at 11:19

## 2017-09-24 RX ADMIN — VENLAFAXINE HYDROCHLORIDE 75 MG: 75 CAPSULE, EXTENDED RELEASE ORAL at 08:53

## 2017-09-24 RX ADMIN — INSULIN ASPART 2 UNITS: 100 INJECTION, SOLUTION INTRAVENOUS; SUBCUTANEOUS at 17:13

## 2017-09-24 RX ADMIN — SODIUM CHLORIDE 150 ML/HR: 9 INJECTION, SOLUTION INTRAVENOUS at 04:18

## 2017-09-24 RX ADMIN — PROMETHAZINE HYDROCHLORIDE 12.5 MG: 25 INJECTION INTRAMUSCULAR; INTRAVENOUS at 00:36

## 2017-09-24 RX ADMIN — MEPERIDINE HYDROCHLORIDE 25 MG: 25 INJECTION INTRAMUSCULAR; INTRAVENOUS; SUBCUTANEOUS at 03:10

## 2017-09-24 RX ADMIN — SODIUM CHLORIDE 150 ML/HR: 9 INJECTION, SOLUTION INTRAVENOUS at 23:23

## 2017-09-24 RX ADMIN — MORPHINE SULFATE 4 MG: 4 INJECTION, SOLUTION INTRAMUSCULAR; INTRAVENOUS at 06:50

## 2017-09-24 RX ADMIN — MORPHINE SULFATE 4 MG: 4 INJECTION, SOLUTION INTRAMUSCULAR; INTRAVENOUS at 08:57

## 2017-09-24 RX ADMIN — MORPHINE SULFATE 4 MG: 4 INJECTION, SOLUTION INTRAMUSCULAR; INTRAVENOUS at 13:41

## 2017-09-24 RX ADMIN — ONDANSETRON 4 MG: 2 INJECTION INTRAMUSCULAR; INTRAVENOUS at 23:32

## 2017-09-24 RX ADMIN — INSULIN ASPART 2 UNITS: 100 INJECTION, SOLUTION INTRAVENOUS; SUBCUTANEOUS at 12:13

## 2017-09-24 RX ADMIN — PROMETHAZINE HYDROCHLORIDE 12.5 MG: 25 INJECTION INTRAMUSCULAR; INTRAVENOUS at 09:10

## 2017-09-24 RX ADMIN — SODIUM CHLORIDE 150 ML/HR: 9 INJECTION, SOLUTION INTRAVENOUS at 11:14

## 2017-09-24 RX ADMIN — MORPHINE SULFATE 4 MG: 4 INJECTION, SOLUTION INTRAMUSCULAR; INTRAVENOUS at 19:21

## 2017-09-24 RX ADMIN — POLYETHYLENE GLYCOL 3350 17 G: 17 POWDER, FOR SOLUTION ORAL at 08:53

## 2017-09-24 RX ADMIN — METOPROLOL TARTRATE 100 MG: 100 TABLET, FILM COATED ORAL at 20:51

## 2017-09-24 RX ADMIN — PROMETHAZINE HYDROCHLORIDE 12.5 MG: 25 INJECTION INTRAMUSCULAR; INTRAVENOUS at 16:32

## 2017-09-25 LAB
AMYLASE SERPL-CCNC: 1037 U/L (ref 28–100)
GLUCOSE BLDC GLUCOMTR-MCNC: 179 MG/DL (ref 70–130)
GLUCOSE BLDC GLUCOMTR-MCNC: 182 MG/DL (ref 70–130)
GLUCOSE BLDC GLUCOMTR-MCNC: 185 MG/DL (ref 70–130)
LIPASE SERPL-CCNC: 1053 U/L (ref 13–60)

## 2017-09-25 PROCEDURE — 82962 GLUCOSE BLOOD TEST: CPT

## 2017-09-25 PROCEDURE — 25010000002 ENOXAPARIN PER 10 MG: Performed by: INTERNAL MEDICINE

## 2017-09-25 PROCEDURE — 83690 ASSAY OF LIPASE: CPT | Performed by: INTERNAL MEDICINE

## 2017-09-25 PROCEDURE — 25010000002 ONDANSETRON PER 1 MG: Performed by: INTERNAL MEDICINE

## 2017-09-25 PROCEDURE — 63710000001 INSULIN ASPART PER 5 UNITS: Performed by: INTERNAL MEDICINE

## 2017-09-25 PROCEDURE — 25010000002 MORPHINE PER 10 MG: Performed by: INTERNAL MEDICINE

## 2017-09-25 PROCEDURE — 99253 IP/OBS CNSLTJ NEW/EST LOW 45: CPT | Performed by: PHYSICIAN ASSISTANT

## 2017-09-25 PROCEDURE — 25010000002 PROMETHAZINE PER 50 MG: Performed by: INTERNAL MEDICINE

## 2017-09-25 PROCEDURE — 82150 ASSAY OF AMYLASE: CPT | Performed by: INTERNAL MEDICINE

## 2017-09-25 RX ADMIN — MORPHINE SULFATE 4 MG: 4 INJECTION, SOLUTION INTRAMUSCULAR; INTRAVENOUS at 14:25

## 2017-09-25 RX ADMIN — SODIUM CHLORIDE 150 ML/HR: 9 INJECTION, SOLUTION INTRAVENOUS at 05:41

## 2017-09-25 RX ADMIN — PANTOPRAZOLE SODIUM 40 MG: 40 INJECTION, POWDER, FOR SOLUTION INTRAVENOUS at 17:13

## 2017-09-25 RX ADMIN — MORPHINE SULFATE 4 MG: 4 INJECTION, SOLUTION INTRAMUSCULAR; INTRAVENOUS at 05:41

## 2017-09-25 RX ADMIN — SODIUM CHLORIDE 100 ML/HR: 9 INJECTION, SOLUTION INTRAVENOUS at 16:04

## 2017-09-25 RX ADMIN — INSULIN ASPART 2 UNITS: 100 INJECTION, SOLUTION INTRAVENOUS; SUBCUTANEOUS at 17:13

## 2017-09-25 RX ADMIN — ONDANSETRON 4 MG: 2 INJECTION INTRAMUSCULAR; INTRAVENOUS at 05:41

## 2017-09-25 RX ADMIN — METOPROLOL TARTRATE 100 MG: 100 TABLET, FILM COATED ORAL at 20:32

## 2017-09-25 RX ADMIN — PANTOPRAZOLE SODIUM 40 MG: 40 INJECTION, POWDER, FOR SOLUTION INTRAVENOUS at 08:13

## 2017-09-25 RX ADMIN — METOPROLOL TARTRATE 100 MG: 100 TABLET, FILM COATED ORAL at 08:13

## 2017-09-25 RX ADMIN — MORPHINE SULFATE 4 MG: 4 INJECTION, SOLUTION INTRAMUSCULAR; INTRAVENOUS at 20:33

## 2017-09-25 RX ADMIN — ENOXAPARIN SODIUM 40 MG: 40 INJECTION SUBCUTANEOUS at 20:32

## 2017-09-25 RX ADMIN — MORPHINE SULFATE 4 MG: 4 INJECTION, SOLUTION INTRAMUSCULAR; INTRAVENOUS at 22:22

## 2017-09-25 RX ADMIN — POLYETHYLENE GLYCOL 3350 17 G: 17 POWDER, FOR SOLUTION ORAL at 08:13

## 2017-09-25 RX ADMIN — ONDANSETRON 4 MG: 2 INJECTION INTRAMUSCULAR; INTRAVENOUS at 14:27

## 2017-09-25 RX ADMIN — INSULIN ASPART 2 UNITS: 100 INJECTION, SOLUTION INTRAVENOUS; SUBCUTANEOUS at 12:03

## 2017-09-25 RX ADMIN — PROMETHAZINE HYDROCHLORIDE 12.5 MG: 25 INJECTION INTRAMUSCULAR; INTRAVENOUS at 11:03

## 2017-09-25 RX ADMIN — PROMETHAZINE HYDROCHLORIDE 12.5 MG: 25 INJECTION INTRAMUSCULAR; INTRAVENOUS at 18:07

## 2017-09-25 RX ADMIN — MORPHINE SULFATE 4 MG: 4 INJECTION, SOLUTION INTRAMUSCULAR; INTRAVENOUS at 18:07

## 2017-09-25 RX ADMIN — MORPHINE SULFATE 4 MG: 4 INJECTION, SOLUTION INTRAMUSCULAR; INTRAVENOUS at 08:13

## 2017-09-25 RX ADMIN — INSULIN ASPART 2 UNITS: 100 INJECTION, SOLUTION INTRAVENOUS; SUBCUTANEOUS at 05:41

## 2017-09-25 RX ADMIN — MORPHINE SULFATE 4 MG: 4 INJECTION, SOLUTION INTRAMUSCULAR; INTRAVENOUS at 10:45

## 2017-09-25 RX ADMIN — VENLAFAXINE HYDROCHLORIDE 75 MG: 75 CAPSULE, EXTENDED RELEASE ORAL at 08:12

## 2017-09-26 LAB
ALBUMIN SERPL-MCNC: 3.7 G/DL (ref 3.5–5)
ALBUMIN/GLOB SERPL: 1.5 G/DL (ref 1.5–2.5)
ALP SERPL-CCNC: 93 U/L (ref 35–104)
ALT SERPL W P-5'-P-CCNC: 46 U/L (ref 10–36)
AMYLASE SERPL-CCNC: 490 U/L (ref 28–100)
ANION GAP SERPL CALCULATED.3IONS-SCNC: 7.4 MMOL/L (ref 3.6–11.2)
AST SERPL-CCNC: 41 U/L (ref 10–30)
BACTERIA UR QL AUTO: ABNORMAL /HPF
BASOPHILS # BLD AUTO: 0.02 10*3/MM3 (ref 0–0.3)
BASOPHILS NFR BLD AUTO: 0.1 % (ref 0–2)
BILIRUB SERPL-MCNC: 1.3 MG/DL (ref 0.2–1.8)
BILIRUB UR QL STRIP: ABNORMAL
BUN BLD-MCNC: 13 MG/DL (ref 7–21)
BUN/CREAT SERPL: 22.8 (ref 7–25)
CALCIUM SPEC-SCNC: 7.5 MG/DL (ref 7.7–10)
CHLORIDE SERPL-SCNC: 110 MMOL/L (ref 99–112)
CHOLEST SERPL-MCNC: 127 MG/DL (ref 0–200)
CLARITY UR: ABNORMAL
CO2 SERPL-SCNC: 20.6 MMOL/L (ref 24.3–31.9)
COLOR UR: ABNORMAL
CREAT BLD-MCNC: 0.57 MG/DL (ref 0.43–1.29)
CRP SERPL-MCNC: 38.29 MG/DL (ref 0–0.99)
DEPRECATED RDW RBC AUTO: 46.2 FL (ref 37–54)
EOSINOPHIL # BLD AUTO: 0.08 10*3/MM3 (ref 0–0.7)
EOSINOPHIL NFR BLD AUTO: 0.5 % (ref 0–5)
ERYTHROCYTE [DISTWIDTH] IN BLOOD BY AUTOMATED COUNT: 14.3 % (ref 11.5–14.5)
GFR SERPL CREATININE-BSD FRML MDRD: 114 ML/MIN/1.73
GLOBULIN UR ELPH-MCNC: 2.4 GM/DL
GLUCOSE BLD-MCNC: 174 MG/DL (ref 70–110)
GLUCOSE BLDC GLUCOMTR-MCNC: 159 MG/DL (ref 70–130)
GLUCOSE BLDC GLUCOMTR-MCNC: 168 MG/DL (ref 70–130)
GLUCOSE BLDC GLUCOMTR-MCNC: 168 MG/DL (ref 70–130)
GLUCOSE BLDC GLUCOMTR-MCNC: 173 MG/DL (ref 70–130)
GLUCOSE BLDC GLUCOMTR-MCNC: 180 MG/DL (ref 70–130)
GLUCOSE UR STRIP-MCNC: ABNORMAL MG/DL
HBA1C MFR BLD: 8.9 % (ref 4.5–5.7)
HCT VFR BLD AUTO: 43.7 % (ref 37–47)
HDLC SERPL-MCNC: 19 MG/DL (ref 60–100)
HGB BLD-MCNC: 14.2 G/DL (ref 12–16)
HGB UR QL STRIP.AUTO: ABNORMAL
HYALINE CASTS UR QL AUTO: ABNORMAL /LPF
IMM GRANULOCYTES # BLD: 0.05 10*3/MM3 (ref 0–0.03)
IMM GRANULOCYTES NFR BLD: 0.3 % (ref 0–0.5)
KETONES UR QL STRIP: ABNORMAL
LDLC SERPL CALC-MCNC: 72 MG/DL (ref 0–100)
LDLC/HDLC SERPL: 3.78 {RATIO}
LEUKOCYTE ESTERASE UR QL STRIP.AUTO: NEGATIVE
LIPASE SERPL-CCNC: 352 U/L (ref 13–60)
LYMPHOCYTES # BLD AUTO: 1.11 10*3/MM3 (ref 1–3)
LYMPHOCYTES NFR BLD AUTO: 7.4 % (ref 21–51)
MAGNESIUM SERPL-MCNC: 1.9 MG/DL (ref 1.7–2.6)
MCH RBC QN AUTO: 29 PG (ref 27–33)
MCHC RBC AUTO-ENTMCNC: 32.5 G/DL (ref 33–37)
MCV RBC AUTO: 89.4 FL (ref 80–94)
MONOCYTES # BLD AUTO: 0.74 10*3/MM3 (ref 0.1–0.9)
MONOCYTES NFR BLD AUTO: 4.9 % (ref 0–10)
NEUTROPHILS # BLD AUTO: 13.1 10*3/MM3 (ref 1.4–6.5)
NEUTROPHILS NFR BLD AUTO: 86.8 % (ref 30–70)
NITRITE UR QL STRIP: NEGATIVE
OSMOLALITY SERPL CALC.SUM OF ELEC: 280 MOSM/KG (ref 273–305)
PH UR STRIP.AUTO: 5.5 [PH] (ref 5–8)
PHOSPHATE SERPL-MCNC: 1.6 MG/DL (ref 2.7–4.5)
PLATELET # BLD AUTO: 206 10*3/MM3 (ref 130–400)
PMV BLD AUTO: 11.5 FL (ref 6–10)
POTASSIUM BLD-SCNC: 3.3 MMOL/L (ref 3.5–5.3)
POTASSIUM BLD-SCNC: 3.5 MMOL/L (ref 3.5–5.3)
PROT SERPL-MCNC: 6.1 G/DL (ref 6–8)
PROT UR QL STRIP: ABNORMAL
RBC # BLD AUTO: 4.89 10*6/MM3 (ref 4.2–5.4)
RBC # UR: ABNORMAL /HPF
REF LAB TEST METHOD: ABNORMAL
SODIUM BLD-SCNC: 138 MMOL/L (ref 135–153)
SP GR UR STRIP: 1.03 (ref 1–1.03)
SQUAMOUS #/AREA URNS HPF: ABNORMAL /HPF
TRIGL SERPL-MCNC: 181 MG/DL (ref 0–150)
UROBILINOGEN UR QL STRIP: ABNORMAL
VLDLC SERPL-MCNC: 36.2 MG/DL
WBC NRBC COR # BLD: 15.1 10*3/MM3 (ref 4.5–12.5)
WBC UR QL AUTO: ABNORMAL /HPF

## 2017-09-26 PROCEDURE — 86140 C-REACTIVE PROTEIN: CPT | Performed by: INTERNAL MEDICINE

## 2017-09-26 PROCEDURE — 99232 SBSQ HOSP IP/OBS MODERATE 35: CPT | Performed by: PHYSICIAN ASSISTANT

## 2017-09-26 PROCEDURE — 63710000001 INSULIN ASPART PER 5 UNITS: Performed by: INTERNAL MEDICINE

## 2017-09-26 PROCEDURE — 87086 URINE CULTURE/COLONY COUNT: CPT | Performed by: PHYSICIAN ASSISTANT

## 2017-09-26 PROCEDURE — 84132 ASSAY OF SERUM POTASSIUM: CPT | Performed by: INTERNAL MEDICINE

## 2017-09-26 PROCEDURE — 82962 GLUCOSE BLOOD TEST: CPT

## 2017-09-26 PROCEDURE — 83690 ASSAY OF LIPASE: CPT | Performed by: INTERNAL MEDICINE

## 2017-09-26 PROCEDURE — 83036 HEMOGLOBIN GLYCOSYLATED A1C: CPT | Performed by: INTERNAL MEDICINE

## 2017-09-26 PROCEDURE — 25010000002 MORPHINE PER 10 MG: Performed by: INTERNAL MEDICINE

## 2017-09-26 PROCEDURE — 25010000002 ONDANSETRON PER 1 MG: Performed by: INTERNAL MEDICINE

## 2017-09-26 PROCEDURE — 81001 URINALYSIS AUTO W/SCOPE: CPT | Performed by: PHYSICIAN ASSISTANT

## 2017-09-26 PROCEDURE — 83735 ASSAY OF MAGNESIUM: CPT | Performed by: INTERNAL MEDICINE

## 2017-09-26 PROCEDURE — 85025 COMPLETE CBC W/AUTO DIFF WBC: CPT | Performed by: INTERNAL MEDICINE

## 2017-09-26 PROCEDURE — 25010000002 PROMETHAZINE PER 50 MG: Performed by: INTERNAL MEDICINE

## 2017-09-26 PROCEDURE — 80061 LIPID PANEL: CPT | Performed by: INTERNAL MEDICINE

## 2017-09-26 PROCEDURE — 25010000002 ENOXAPARIN PER 10 MG: Performed by: INTERNAL MEDICINE

## 2017-09-26 PROCEDURE — 93010 ELECTROCARDIOGRAM REPORT: CPT | Performed by: INTERNAL MEDICINE

## 2017-09-26 PROCEDURE — 82150 ASSAY OF AMYLASE: CPT | Performed by: INTERNAL MEDICINE

## 2017-09-26 PROCEDURE — 84100 ASSAY OF PHOSPHORUS: CPT | Performed by: INTERNAL MEDICINE

## 2017-09-26 PROCEDURE — 80053 COMPREHEN METABOLIC PANEL: CPT | Performed by: INTERNAL MEDICINE

## 2017-09-26 RX ORDER — MAGNESIUM SULFATE HEPTAHYDRATE 40 MG/ML
2 INJECTION, SOLUTION INTRAVENOUS AS NEEDED
Status: DISCONTINUED | OUTPATIENT
Start: 2017-09-26 | End: 2017-10-09 | Stop reason: HOSPADM

## 2017-09-26 RX ORDER — MAGNESIUM SULFATE HEPTAHYDRATE 40 MG/ML
4 INJECTION, SOLUTION INTRAVENOUS AS NEEDED
Status: DISCONTINUED | OUTPATIENT
Start: 2017-09-26 | End: 2017-10-09 | Stop reason: HOSPADM

## 2017-09-26 RX ORDER — MAGNESIUM SULFATE HEPTAHYDRATE 40 MG/ML
4 INJECTION, SOLUTION INTRAVENOUS ONCE
Status: COMPLETED | OUTPATIENT
Start: 2017-09-26 | End: 2017-09-26

## 2017-09-26 RX ORDER — POTASSIUM CHLORIDE 20 MEQ/1
40 TABLET, EXTENDED RELEASE ORAL EVERY 4 HOURS
Status: COMPLETED | OUTPATIENT
Start: 2017-09-26 | End: 2017-09-26

## 2017-09-26 RX ORDER — POTASSIUM CHLORIDE 750 MG/1
40 CAPSULE, EXTENDED RELEASE ORAL AS NEEDED
Status: DISCONTINUED | OUTPATIENT
Start: 2017-09-26 | End: 2017-10-09 | Stop reason: HOSPADM

## 2017-09-26 RX ORDER — POTASSIUM CHLORIDE 1.5 G/1.77G
40 POWDER, FOR SOLUTION ORAL AS NEEDED
Status: DISCONTINUED | OUTPATIENT
Start: 2017-09-26 | End: 2017-10-09 | Stop reason: HOSPADM

## 2017-09-26 RX ADMIN — INSULIN ASPART 2 UNITS: 100 INJECTION, SOLUTION INTRAVENOUS; SUBCUTANEOUS at 17:18

## 2017-09-26 RX ADMIN — POTASSIUM CHLORIDE 40 MEQ: 1500 TABLET, EXTENDED RELEASE ORAL at 17:17

## 2017-09-26 RX ADMIN — PANTOPRAZOLE SODIUM 40 MG: 40 INJECTION, POWDER, FOR SOLUTION INTRAVENOUS at 17:17

## 2017-09-26 RX ADMIN — POTASSIUM CHLORIDE 40 MEQ: 1500 TABLET, EXTENDED RELEASE ORAL at 12:28

## 2017-09-26 RX ADMIN — ENOXAPARIN SODIUM 40 MG: 40 INJECTION SUBCUTANEOUS at 20:10

## 2017-09-26 RX ADMIN — ONDANSETRON 4 MG: 2 INJECTION INTRAMUSCULAR; INTRAVENOUS at 08:30

## 2017-09-26 RX ADMIN — POTASSIUM & SODIUM PHOSPHATES POWDER PACK 280-160-250 MG 2 PACKET: 280-160-250 PACK at 08:29

## 2017-09-26 RX ADMIN — PROMETHAZINE HYDROCHLORIDE 12.5 MG: 25 INJECTION INTRAMUSCULAR; INTRAVENOUS at 20:08

## 2017-09-26 RX ADMIN — INSULIN ASPART 2 UNITS: 100 INJECTION, SOLUTION INTRAVENOUS; SUBCUTANEOUS at 00:31

## 2017-09-26 RX ADMIN — MORPHINE SULFATE 4 MG: 4 INJECTION, SOLUTION INTRAMUSCULAR; INTRAVENOUS at 20:09

## 2017-09-26 RX ADMIN — ONDANSETRON 4 MG: 2 INJECTION INTRAMUSCULAR; INTRAVENOUS at 23:19

## 2017-09-26 RX ADMIN — MORPHINE SULFATE 4 MG: 4 INJECTION, SOLUTION INTRAMUSCULAR; INTRAVENOUS at 15:48

## 2017-09-26 RX ADMIN — MAGNESIUM SULFATE HEPTAHYDRATE 4 G: 40 INJECTION, SOLUTION INTRAVENOUS at 08:29

## 2017-09-26 RX ADMIN — SODIUM CHLORIDE 100 ML/HR: 9 INJECTION, SOLUTION INTRAVENOUS at 05:27

## 2017-09-26 RX ADMIN — SODIUM CHLORIDE 100 ML/HR: 9 INJECTION, SOLUTION INTRAVENOUS at 20:00

## 2017-09-26 RX ADMIN — MORPHINE SULFATE 4 MG: 4 INJECTION, SOLUTION INTRAMUSCULAR; INTRAVENOUS at 02:30

## 2017-09-26 RX ADMIN — METOPROLOL TARTRATE 100 MG: 100 TABLET, FILM COATED ORAL at 08:28

## 2017-09-26 RX ADMIN — METHYLNALTREXONE BROMIDE 12 MG: 12 INJECTION, SOLUTION SUBCUTANEOUS at 08:30

## 2017-09-26 RX ADMIN — PROMETHAZINE HYDROCHLORIDE 12.5 MG: 25 INJECTION INTRAMUSCULAR; INTRAVENOUS at 02:29

## 2017-09-26 RX ADMIN — MORPHINE SULFATE 4 MG: 4 INJECTION, SOLUTION INTRAMUSCULAR; INTRAVENOUS at 23:08

## 2017-09-26 RX ADMIN — POLYETHYLENE GLYCOL 3350 17 G: 17 POWDER, FOR SOLUTION ORAL at 08:29

## 2017-09-26 RX ADMIN — MORPHINE SULFATE 4 MG: 4 INJECTION, SOLUTION INTRAMUSCULAR; INTRAVENOUS at 00:24

## 2017-09-26 RX ADMIN — MORPHINE SULFATE 4 MG: 4 INJECTION, SOLUTION INTRAMUSCULAR; INTRAVENOUS at 05:27

## 2017-09-26 RX ADMIN — ONDANSETRON 4 MG: 2 INJECTION INTRAMUSCULAR; INTRAVENOUS at 00:36

## 2017-09-26 RX ADMIN — INSULIN ASPART 2 UNITS: 100 INJECTION, SOLUTION INTRAVENOUS; SUBCUTANEOUS at 12:28

## 2017-09-26 RX ADMIN — METOPROLOL TARTRATE 100 MG: 100 TABLET, FILM COATED ORAL at 20:09

## 2017-09-26 RX ADMIN — MORPHINE SULFATE 4 MG: 4 INJECTION, SOLUTION INTRAMUSCULAR; INTRAVENOUS at 08:29

## 2017-09-26 RX ADMIN — INSULIN ASPART 2 UNITS: 100 INJECTION, SOLUTION INTRAVENOUS; SUBCUTANEOUS at 05:27

## 2017-09-26 RX ADMIN — PANTOPRAZOLE SODIUM 40 MG: 40 INJECTION, POWDER, FOR SOLUTION INTRAVENOUS at 08:29

## 2017-09-26 RX ADMIN — VENLAFAXINE HYDROCHLORIDE 75 MG: 75 CAPSULE, EXTENDED RELEASE ORAL at 08:28

## 2017-09-27 ENCOUNTER — APPOINTMENT (OUTPATIENT)
Dept: CT IMAGING | Facility: HOSPITAL | Age: 47
End: 2017-09-27

## 2017-09-27 LAB
A-A DO2: 56.7 MMHG (ref 0–300)
ALBUMIN SERPL-MCNC: 3.6 G/DL (ref 3.5–5)
ALBUMIN/GLOB SERPL: 1.5 G/DL (ref 1.5–2.5)
ALP SERPL-CCNC: 100 U/L (ref 35–104)
ALT SERPL W P-5'-P-CCNC: 31 U/L (ref 10–36)
AMYLASE SERPL-CCNC: 144 U/L (ref 28–100)
ANION GAP SERPL CALCULATED.3IONS-SCNC: 8.8 MMOL/L (ref 3.6–11.2)
ARTERIAL PATENCY WRIST A: ABNORMAL
AST SERPL-CCNC: 23 U/L (ref 10–30)
ATMOSPHERIC PRESS: 726 MMHG
BASE EXCESS BLDA CALC-SCNC: -3 MMOL/L
BASOPHILS # BLD AUTO: 0.02 10*3/MM3 (ref 0–0.3)
BASOPHILS NFR BLD AUTO: 0.2 % (ref 0–2)
BDY SITE: ABNORMAL
BILIRUB SERPL-MCNC: 0.9 MG/DL (ref 0.2–1.8)
BODY TEMPERATURE: 98.6 C
BUN BLD-MCNC: 10 MG/DL (ref 7–21)
BUN/CREAT SERPL: 20 (ref 7–25)
CALCIUM SPEC-SCNC: 7.7 MG/DL (ref 7.7–10)
CHLORIDE SERPL-SCNC: 108 MMOL/L (ref 99–112)
CK MB SERPL-CCNC: <0.18 NG/ML (ref 0–5)
CK MB SERPL-RTO: NORMAL % (ref 0–3)
CK SERPL-CCNC: 39 U/L (ref 24–173)
CO2 SERPL-SCNC: 20.2 MMOL/L (ref 24.3–31.9)
COHGB MFR BLD: 1.7 % (ref 0–5)
CREAT BLD-MCNC: 0.5 MG/DL (ref 0.43–1.29)
CRP SERPL-MCNC: 42.22 MG/DL (ref 0–0.99)
D DIMER PPP FEU-MCNC: 10.08 MCGFEU/ML (ref 0–0.5)
DEPRECATED RDW RBC AUTO: 44.8 FL (ref 37–54)
EOSINOPHIL # BLD AUTO: 0.1 10*3/MM3 (ref 0–0.7)
EOSINOPHIL NFR BLD AUTO: 0.9 % (ref 0–5)
ERYTHROCYTE [DISTWIDTH] IN BLOOD BY AUTOMATED COUNT: 14.3 % (ref 11.5–14.5)
GFR SERPL CREATININE-BSD FRML MDRD: 132 ML/MIN/1.73
GLOBULIN UR ELPH-MCNC: 2.4 GM/DL
GLUCOSE BLD-MCNC: 152 MG/DL (ref 70–110)
GLUCOSE BLDC GLUCOMTR-MCNC: 131 MG/DL (ref 70–130)
GLUCOSE BLDC GLUCOMTR-MCNC: 141 MG/DL (ref 70–130)
GLUCOSE BLDC GLUCOMTR-MCNC: 146 MG/DL (ref 70–130)
GLUCOSE BLDC GLUCOMTR-MCNC: 169 MG/DL (ref 70–130)
HCO3 BLDA-SCNC: 19.2 MMOL/L (ref 22–26)
HCT VFR BLD AUTO: 39.2 % (ref 37–47)
HCT VFR BLD CALC: 40 % (ref 37–47)
HGB BLD-MCNC: 13 G/DL (ref 12–16)
HGB BLDA-MCNC: 13.5 G/DL (ref 12–16)
HOROWITZ INDEX BLD+IHG-RTO: 21 %
IMM GRANULOCYTES # BLD: 0.06 10*3/MM3 (ref 0–0.03)
IMM GRANULOCYTES NFR BLD: 0.5 % (ref 0–0.5)
LIPASE SERPL-CCNC: 90 U/L (ref 13–60)
LYMPHOCYTES # BLD AUTO: 0.87 10*3/MM3 (ref 1–3)
LYMPHOCYTES NFR BLD AUTO: 7.8 % (ref 21–51)
MAGNESIUM SERPL-MCNC: 3 MG/DL (ref 1.7–2.6)
MCH RBC QN AUTO: 28.5 PG (ref 27–33)
MCHC RBC AUTO-ENTMCNC: 33.2 G/DL (ref 33–37)
MCV RBC AUTO: 86 FL (ref 80–94)
METHGB BLD QL: 0.4 % (ref 0–3)
MODALITY: ABNORMAL
MONOCYTES # BLD AUTO: 0.9 10*3/MM3 (ref 0.1–0.9)
MONOCYTES NFR BLD AUTO: 8 % (ref 0–10)
NEUTROPHILS # BLD AUTO: 9.26 10*3/MM3 (ref 1.4–6.5)
NEUTROPHILS NFR BLD AUTO: 82.6 % (ref 30–70)
OSMOLALITY SERPL CALC.SUM OF ELEC: 275.8 MOSM/KG (ref 273–305)
OXYHGB MFR BLDV: 88.1 % (ref 85–100)
PCO2 BLDA: 27 MM HG (ref 35–45)
PH BLDA: 7.47 PH UNITS (ref 7.35–7.45)
PHOSPHATE SERPL-MCNC: 1.7 MG/DL (ref 2.7–4.5)
PLATELET # BLD AUTO: 243 10*3/MM3 (ref 130–400)
PMV BLD AUTO: 11.5 FL (ref 6–10)
PO2 BLDA: 53.6 MM HG (ref 80–100)
POTASSIUM BLD-SCNC: 3.5 MMOL/L (ref 3.5–5.3)
PROT SERPL-MCNC: 6 G/DL (ref 6–8)
RBC # BLD AUTO: 4.56 10*6/MM3 (ref 4.2–5.4)
SAO2 % BLDCOA: 90 % (ref 90–100)
SODIUM BLD-SCNC: 137 MMOL/L (ref 135–153)
TROPONIN I SERPL-MCNC: <0.006 NG/ML
WBC NRBC COR # BLD: 11.21 10*3/MM3 (ref 4.5–12.5)

## 2017-09-27 PROCEDURE — 80053 COMPREHEN METABOLIC PANEL: CPT | Performed by: INTERNAL MEDICINE

## 2017-09-27 PROCEDURE — 82553 CREATINE MB FRACTION: CPT | Performed by: INTERNAL MEDICINE

## 2017-09-27 PROCEDURE — 85379 FIBRIN DEGRADATION QUANT: CPT | Performed by: INTERNAL MEDICINE

## 2017-09-27 PROCEDURE — 0 IOPAMIDOL PER 1 ML: Performed by: INTERNAL MEDICINE

## 2017-09-27 PROCEDURE — 84484 ASSAY OF TROPONIN QUANT: CPT | Performed by: INTERNAL MEDICINE

## 2017-09-27 PROCEDURE — 85025 COMPLETE CBC W/AUTO DIFF WBC: CPT | Performed by: INTERNAL MEDICINE

## 2017-09-27 PROCEDURE — 82550 ASSAY OF CK (CPK): CPT | Performed by: INTERNAL MEDICINE

## 2017-09-27 PROCEDURE — 83050 HGB METHEMOGLOBIN QUAN: CPT | Performed by: INTERNAL MEDICINE

## 2017-09-27 PROCEDURE — 82150 ASSAY OF AMYLASE: CPT | Performed by: INTERNAL MEDICINE

## 2017-09-27 PROCEDURE — 71275 CT ANGIOGRAPHY CHEST: CPT

## 2017-09-27 PROCEDURE — 25010000002 MORPHINE PER 10 MG: Performed by: INTERNAL MEDICINE

## 2017-09-27 PROCEDURE — 82962 GLUCOSE BLOOD TEST: CPT

## 2017-09-27 PROCEDURE — 71275 CT ANGIOGRAPHY CHEST: CPT | Performed by: RADIOLOGY

## 2017-09-27 PROCEDURE — 36600 WITHDRAWAL OF ARTERIAL BLOOD: CPT | Performed by: INTERNAL MEDICINE

## 2017-09-27 PROCEDURE — 94799 UNLISTED PULMONARY SVC/PX: CPT

## 2017-09-27 PROCEDURE — 82375 ASSAY CARBOXYHB QUANT: CPT | Performed by: INTERNAL MEDICINE

## 2017-09-27 PROCEDURE — 99232 SBSQ HOSP IP/OBS MODERATE 35: CPT | Performed by: PHYSICIAN ASSISTANT

## 2017-09-27 PROCEDURE — 84100 ASSAY OF PHOSPHORUS: CPT | Performed by: INTERNAL MEDICINE

## 2017-09-27 PROCEDURE — 86140 C-REACTIVE PROTEIN: CPT | Performed by: INTERNAL MEDICINE

## 2017-09-27 PROCEDURE — 83690 ASSAY OF LIPASE: CPT | Performed by: INTERNAL MEDICINE

## 2017-09-27 PROCEDURE — 94640 AIRWAY INHALATION TREATMENT: CPT

## 2017-09-27 PROCEDURE — 82805 BLOOD GASES W/O2 SATURATION: CPT | Performed by: INTERNAL MEDICINE

## 2017-09-27 PROCEDURE — 93005 ELECTROCARDIOGRAM TRACING: CPT | Performed by: INTERNAL MEDICINE

## 2017-09-27 PROCEDURE — 25010000002 ENOXAPARIN PER 10 MG: Performed by: INTERNAL MEDICINE

## 2017-09-27 PROCEDURE — 83735 ASSAY OF MAGNESIUM: CPT | Performed by: INTERNAL MEDICINE

## 2017-09-27 PROCEDURE — 25010000002 FUROSEMIDE PER 20 MG: Performed by: INTERNAL MEDICINE

## 2017-09-27 PROCEDURE — 25010000002 PROMETHAZINE PER 50 MG: Performed by: INTERNAL MEDICINE

## 2017-09-27 PROCEDURE — 63710000001 INSULIN ASPART PER 5 UNITS: Performed by: INTERNAL MEDICINE

## 2017-09-27 RX ORDER — MORPHINE SULFATE 2 MG/ML
2 INJECTION, SOLUTION INTRAMUSCULAR; INTRAVENOUS
Status: DISPENSED | OUTPATIENT
Start: 2017-09-27 | End: 2017-10-04

## 2017-09-27 RX ORDER — MORPHINE SULFATE 2 MG/ML
2 INJECTION, SOLUTION INTRAMUSCULAR; INTRAVENOUS ONCE AS NEEDED
Status: COMPLETED | OUTPATIENT
Start: 2017-09-27 | End: 2017-09-27

## 2017-09-27 RX ORDER — POTASSIUM CHLORIDE 20 MEQ/1
40 TABLET, EXTENDED RELEASE ORAL EVERY 4 HOURS
Status: COMPLETED | OUTPATIENT
Start: 2017-09-27 | End: 2017-09-27

## 2017-09-27 RX ORDER — FUROSEMIDE 10 MG/ML
20 INJECTION INTRAMUSCULAR; INTRAVENOUS ONCE
Status: COMPLETED | OUTPATIENT
Start: 2017-09-27 | End: 2017-09-27

## 2017-09-27 RX ORDER — BISACODYL 10 MG
10 SUPPOSITORY, RECTAL RECTAL DAILY
Status: DISCONTINUED | OUTPATIENT
Start: 2017-09-27 | End: 2017-10-09 | Stop reason: HOSPADM

## 2017-09-27 RX ORDER — IPRATROPIUM BROMIDE AND ALBUTEROL SULFATE 2.5; .5 MG/3ML; MG/3ML
3 SOLUTION RESPIRATORY (INHALATION)
Status: DISCONTINUED | OUTPATIENT
Start: 2017-09-27 | End: 2017-09-28

## 2017-09-27 RX ADMIN — MORPHINE SULFATE 4 MG: 4 INJECTION, SOLUTION INTRAMUSCULAR; INTRAVENOUS at 08:59

## 2017-09-27 RX ADMIN — VENLAFAXINE HYDROCHLORIDE 75 MG: 75 CAPSULE, EXTENDED RELEASE ORAL at 08:51

## 2017-09-27 RX ADMIN — PANTOPRAZOLE SODIUM 40 MG: 40 INJECTION, POWDER, FOR SOLUTION INTRAVENOUS at 17:29

## 2017-09-27 RX ADMIN — MORPHINE SULFATE 2 MG: 2 INJECTION, SOLUTION INTRAMUSCULAR; INTRAVENOUS at 19:20

## 2017-09-27 RX ADMIN — MORPHINE SULFATE 2 MG: 2 INJECTION, SOLUTION INTRAMUSCULAR; INTRAVENOUS at 22:42

## 2017-09-27 RX ADMIN — MORPHINE SULFATE 2 MG: 2 INJECTION, SOLUTION INTRAMUSCULAR; INTRAVENOUS at 12:29

## 2017-09-27 RX ADMIN — PANTOPRAZOLE SODIUM 40 MG: 40 INJECTION, POWDER, FOR SOLUTION INTRAVENOUS at 08:51

## 2017-09-27 RX ADMIN — MORPHINE SULFATE 2 MG: 2 INJECTION, SOLUTION INTRAMUSCULAR; INTRAVENOUS at 16:43

## 2017-09-27 RX ADMIN — IOPAMIDOL 94 ML: 755 INJECTION, SOLUTION INTRAVENOUS at 20:45

## 2017-09-27 RX ADMIN — ENOXAPARIN SODIUM 40 MG: 40 INJECTION SUBCUTANEOUS at 20:45

## 2017-09-27 RX ADMIN — INSULIN ASPART 2 UNITS: 100 INJECTION, SOLUTION INTRAVENOUS; SUBCUTANEOUS at 12:29

## 2017-09-27 RX ADMIN — METOPROLOL TARTRATE 100 MG: 100 TABLET, FILM COATED ORAL at 20:45

## 2017-09-27 RX ADMIN — PROMETHAZINE HYDROCHLORIDE 12.5 MG: 25 INJECTION INTRAMUSCULAR; INTRAVENOUS at 02:15

## 2017-09-27 RX ADMIN — IPRATROPIUM BROMIDE AND ALBUTEROL SULFATE 3 ML: .5; 3 SOLUTION RESPIRATORY (INHALATION) at 21:01

## 2017-09-27 RX ADMIN — POTASSIUM CHLORIDE 40 MEQ: 1500 TABLET, EXTENDED RELEASE ORAL at 18:10

## 2017-09-27 RX ADMIN — FUROSEMIDE 20 MG: 10 INJECTION, SOLUTION INTRAMUSCULAR; INTRAVENOUS at 20:45

## 2017-09-27 RX ADMIN — METOPROLOL TARTRATE 100 MG: 100 TABLET, FILM COATED ORAL at 08:51

## 2017-09-27 RX ADMIN — PROMETHAZINE HYDROCHLORIDE 12.5 MG: 25 INJECTION INTRAMUSCULAR; INTRAVENOUS at 02:17

## 2017-09-27 RX ADMIN — POTASSIUM CHLORIDE 40 MEQ: 1500 TABLET, EXTENDED RELEASE ORAL at 21:43

## 2017-09-27 RX ADMIN — MORPHINE SULFATE 2 MG: 2 INJECTION, SOLUTION INTRAMUSCULAR; INTRAVENOUS at 20:33

## 2017-09-27 RX ADMIN — METHYLNALTREXONE BROMIDE 8 MG: 12 INJECTION, SOLUTION SUBCUTANEOUS at 12:30

## 2017-09-27 RX ADMIN — MORPHINE SULFATE 4 MG: 4 INJECTION, SOLUTION INTRAMUSCULAR; INTRAVENOUS at 04:38

## 2017-09-28 LAB
ALBUMIN SERPL-MCNC: 3.6 G/DL (ref 3.5–5)
ALBUMIN/GLOB SERPL: 1.4 G/DL (ref 1.5–2.5)
ALP SERPL-CCNC: 113 U/L (ref 35–104)
ALT SERPL W P-5'-P-CCNC: 31 U/L (ref 10–36)
AMYLASE SERPL-CCNC: 91 U/L (ref 28–100)
ANION GAP SERPL CALCULATED.3IONS-SCNC: 9.7 MMOL/L (ref 3.6–11.2)
AST SERPL-CCNC: 30 U/L (ref 10–30)
BACTERIA SPEC AEROBE CULT: NORMAL
BACTERIA SPEC AEROBE CULT: NORMAL
BASOPHILS # BLD AUTO: 0.02 10*3/MM3 (ref 0–0.3)
BASOPHILS NFR BLD AUTO: 0.1 % (ref 0–2)
BILIRUB SERPL-MCNC: 0.8 MG/DL (ref 0.2–1.8)
BUN BLD-MCNC: 8 MG/DL (ref 7–21)
BUN/CREAT SERPL: 17.8 (ref 7–25)
CALCIUM SPEC-SCNC: 8.4 MG/DL (ref 7.7–10)
CHLORIDE SERPL-SCNC: 106 MMOL/L (ref 99–112)
CO2 SERPL-SCNC: 20.3 MMOL/L (ref 24.3–31.9)
CREAT BLD-MCNC: 0.45 MG/DL (ref 0.43–1.29)
CRP SERPL-MCNC: 33.84 MG/DL (ref 0–0.99)
DEPRECATED RDW RBC AUTO: 44.5 FL (ref 37–54)
EOSINOPHIL # BLD AUTO: 0.2 10*3/MM3 (ref 0–0.7)
EOSINOPHIL NFR BLD AUTO: 1.3 % (ref 0–5)
ERYTHROCYTE [DISTWIDTH] IN BLOOD BY AUTOMATED COUNT: 14.1 % (ref 11.5–14.5)
GFR SERPL CREATININE-BSD FRML MDRD: 149 ML/MIN/1.73
GLOBULIN UR ELPH-MCNC: 2.5 GM/DL
GLUCOSE BLD-MCNC: 168 MG/DL (ref 70–110)
GLUCOSE BLDC GLUCOMTR-MCNC: 157 MG/DL (ref 70–130)
GLUCOSE BLDC GLUCOMTR-MCNC: 201 MG/DL (ref 70–130)
GLUCOSE BLDC GLUCOMTR-MCNC: 204 MG/DL (ref 70–130)
HCT VFR BLD AUTO: 38.3 % (ref 37–47)
HGB BLD-MCNC: 12.8 G/DL (ref 12–16)
IMM GRANULOCYTES # BLD: 0.2 10*3/MM3 (ref 0–0.03)
IMM GRANULOCYTES NFR BLD: 1.3 % (ref 0–0.5)
LIPASE SERPL-CCNC: 57 U/L (ref 13–60)
LYMPHOCYTES # BLD AUTO: 1.19 10*3/MM3 (ref 1–3)
LYMPHOCYTES NFR BLD AUTO: 7.9 % (ref 21–51)
MCH RBC QN AUTO: 28.6 PG (ref 27–33)
MCHC RBC AUTO-ENTMCNC: 33.4 G/DL (ref 33–37)
MCV RBC AUTO: 85.7 FL (ref 80–94)
MONOCYTES # BLD AUTO: 1.37 10*3/MM3 (ref 0.1–0.9)
MONOCYTES NFR BLD AUTO: 9.1 % (ref 0–10)
NEUTROPHILS # BLD AUTO: 12.06 10*3/MM3 (ref 1.4–6.5)
NEUTROPHILS NFR BLD AUTO: 80.3 % (ref 30–70)
NRBC BLD MANUAL-RTO: 0 /100 WBC (ref 0–0)
OSMOLALITY SERPL CALC.SUM OF ELEC: 274.2 MOSM/KG (ref 273–305)
PLATELET # BLD AUTO: 251 10*3/MM3 (ref 130–400)
PMV BLD AUTO: 10.9 FL (ref 6–10)
POTASSIUM BLD-SCNC: 3.8 MMOL/L (ref 3.5–5.3)
PROT SERPL-MCNC: 6.1 G/DL (ref 6–8)
RBC # BLD AUTO: 4.47 10*6/MM3 (ref 4.2–5.4)
SODIUM BLD-SCNC: 136 MMOL/L (ref 135–153)
TROPONIN I SERPL-MCNC: <0.006 NG/ML
TROPONIN I SERPL-MCNC: <0.006 NG/ML
WBC NRBC COR # BLD: 15.04 10*3/MM3 (ref 4.5–12.5)

## 2017-09-28 PROCEDURE — 82150 ASSAY OF AMYLASE: CPT | Performed by: INTERNAL MEDICINE

## 2017-09-28 PROCEDURE — 25010000002 ONDANSETRON PER 1 MG: Performed by: INTERNAL MEDICINE

## 2017-09-28 PROCEDURE — 84484 ASSAY OF TROPONIN QUANT: CPT | Performed by: INTERNAL MEDICINE

## 2017-09-28 PROCEDURE — 86140 C-REACTIVE PROTEIN: CPT | Performed by: INTERNAL MEDICINE

## 2017-09-28 PROCEDURE — 94799 UNLISTED PULMONARY SVC/PX: CPT

## 2017-09-28 PROCEDURE — 99231 SBSQ HOSP IP/OBS SF/LOW 25: CPT | Performed by: PHYSICIAN ASSISTANT

## 2017-09-28 PROCEDURE — 85025 COMPLETE CBC W/AUTO DIFF WBC: CPT | Performed by: INTERNAL MEDICINE

## 2017-09-28 PROCEDURE — 83690 ASSAY OF LIPASE: CPT | Performed by: INTERNAL MEDICINE

## 2017-09-28 PROCEDURE — 25010000002 FUROSEMIDE PER 20 MG: Performed by: INTERNAL MEDICINE

## 2017-09-28 PROCEDURE — 25010000002 PROMETHAZINE PER 50 MG: Performed by: INTERNAL MEDICINE

## 2017-09-28 PROCEDURE — 25010000002 MORPHINE PER 10 MG: Performed by: INTERNAL MEDICINE

## 2017-09-28 PROCEDURE — 82962 GLUCOSE BLOOD TEST: CPT

## 2017-09-28 PROCEDURE — 25010000002 ENOXAPARIN PER 10 MG: Performed by: INTERNAL MEDICINE

## 2017-09-28 PROCEDURE — 85007 BL SMEAR W/DIFF WBC COUNT: CPT | Performed by: INTERNAL MEDICINE

## 2017-09-28 PROCEDURE — 80053 COMPREHEN METABOLIC PANEL: CPT | Performed by: INTERNAL MEDICINE

## 2017-09-28 RX ORDER — FUROSEMIDE 10 MG/ML
20 INJECTION INTRAMUSCULAR; INTRAVENOUS ONCE
Status: COMPLETED | OUTPATIENT
Start: 2017-09-28 | End: 2017-09-28

## 2017-09-28 RX ORDER — IPRATROPIUM BROMIDE AND ALBUTEROL SULFATE 2.5; .5 MG/3ML; MG/3ML
3 SOLUTION RESPIRATORY (INHALATION)
Status: DISCONTINUED | OUTPATIENT
Start: 2017-09-28 | End: 2017-10-03

## 2017-09-28 RX ORDER — FUROSEMIDE 10 MG/ML
20 INJECTION INTRAMUSCULAR; INTRAVENOUS ONCE
Status: DISCONTINUED | OUTPATIENT
Start: 2017-09-28 | End: 2017-09-28 | Stop reason: ALTCHOICE

## 2017-09-28 RX ADMIN — ENOXAPARIN SODIUM 40 MG: 40 INJECTION SUBCUTANEOUS at 20:04

## 2017-09-28 RX ADMIN — PROMETHAZINE HYDROCHLORIDE 12.5 MG: 25 INJECTION INTRAMUSCULAR; INTRAVENOUS at 00:28

## 2017-09-28 RX ADMIN — MORPHINE SULFATE 2 MG: 2 INJECTION, SOLUTION INTRAMUSCULAR; INTRAVENOUS at 00:28

## 2017-09-28 RX ADMIN — ONDANSETRON 4 MG: 2 INJECTION INTRAMUSCULAR; INTRAVENOUS at 23:07

## 2017-09-28 RX ADMIN — MORPHINE SULFATE 2 MG: 2 INJECTION, SOLUTION INTRAMUSCULAR; INTRAVENOUS at 14:31

## 2017-09-28 RX ADMIN — IPRATROPIUM BROMIDE AND ALBUTEROL SULFATE 3 ML: .5; 3 SOLUTION RESPIRATORY (INHALATION) at 19:32

## 2017-09-28 RX ADMIN — MORPHINE SULFATE 2 MG: 2 INJECTION, SOLUTION INTRAMUSCULAR; INTRAVENOUS at 02:38

## 2017-09-28 RX ADMIN — MORPHINE SULFATE 2 MG: 2 INJECTION, SOLUTION INTRAMUSCULAR; INTRAVENOUS at 12:35

## 2017-09-28 RX ADMIN — MORPHINE SULFATE 2 MG: 2 INJECTION, SOLUTION INTRAMUSCULAR; INTRAVENOUS at 23:06

## 2017-09-28 RX ADMIN — PROMETHAZINE HYDROCHLORIDE 12.5 MG: 25 INJECTION INTRAMUSCULAR; INTRAVENOUS at 14:30

## 2017-09-28 RX ADMIN — PROMETHAZINE HYDROCHLORIDE 12.5 MG: 25 INJECTION INTRAMUSCULAR; INTRAVENOUS at 00:25

## 2017-09-28 RX ADMIN — PANTOPRAZOLE SODIUM 40 MG: 40 INJECTION, POWDER, FOR SOLUTION INTRAVENOUS at 08:54

## 2017-09-28 RX ADMIN — ONDANSETRON 4 MG: 2 INJECTION INTRAMUSCULAR; INTRAVENOUS at 08:00

## 2017-09-28 RX ADMIN — FUROSEMIDE 20 MG: 10 INJECTION, SOLUTION INTRAMUSCULAR; INTRAVENOUS at 14:15

## 2017-09-28 RX ADMIN — MORPHINE SULFATE 2 MG: 2 INJECTION, SOLUTION INTRAMUSCULAR; INTRAVENOUS at 20:11

## 2017-09-28 RX ADMIN — IPRATROPIUM BROMIDE AND ALBUTEROL SULFATE 3 ML: .5; 3 SOLUTION RESPIRATORY (INHALATION) at 06:44

## 2017-09-28 RX ADMIN — PROMETHAZINE HYDROCHLORIDE 12.5 MG: 25 INJECTION INTRAMUSCULAR; INTRAVENOUS at 20:12

## 2017-09-28 RX ADMIN — POLYETHYLENE GLYCOL 3350 17 G: 17 POWDER, FOR SOLUTION ORAL at 08:55

## 2017-09-28 RX ADMIN — MORPHINE SULFATE 2 MG: 2 INJECTION, SOLUTION INTRAMUSCULAR; INTRAVENOUS at 17:59

## 2017-09-28 RX ADMIN — PANTOPRAZOLE SODIUM 40 MG: 40 INJECTION, POWDER, FOR SOLUTION INTRAVENOUS at 18:06

## 2017-09-28 RX ADMIN — METOPROLOL TARTRATE 100 MG: 100 TABLET, FILM COATED ORAL at 20:04

## 2017-09-28 RX ADMIN — PROMETHAZINE HYDROCHLORIDE 12.5 MG: 25 INJECTION INTRAMUSCULAR; INTRAVENOUS at 02:38

## 2017-09-28 RX ADMIN — PROMETHAZINE HYDROCHLORIDE 12.5 MG: 25 INJECTION INTRAMUSCULAR; INTRAVENOUS at 14:16

## 2017-09-28 RX ADMIN — BISACODYL 10 MG: 10 SUPPOSITORY RECTAL at 08:54

## 2017-09-28 RX ADMIN — MORPHINE SULFATE 2 MG: 2 INJECTION, SOLUTION INTRAMUSCULAR; INTRAVENOUS at 08:00

## 2017-09-28 RX ADMIN — PROMETHAZINE HYDROCHLORIDE 12.5 MG: 25 INJECTION INTRAMUSCULAR; INTRAVENOUS at 21:22

## 2017-09-28 RX ADMIN — METOPROLOL TARTRATE 100 MG: 100 TABLET, FILM COATED ORAL at 08:55

## 2017-09-28 RX ADMIN — VENLAFAXINE HYDROCHLORIDE 75 MG: 75 CAPSULE, EXTENDED RELEASE ORAL at 08:55

## 2017-09-29 ENCOUNTER — APPOINTMENT (OUTPATIENT)
Dept: CT IMAGING | Facility: HOSPITAL | Age: 47
End: 2017-09-29

## 2017-09-29 ENCOUNTER — APPOINTMENT (OUTPATIENT)
Dept: GENERAL RADIOLOGY | Facility: HOSPITAL | Age: 47
End: 2017-09-29

## 2017-09-29 LAB
ALBUMIN SERPL-MCNC: 3.3 G/DL (ref 3.5–5)
ALBUMIN/GLOB SERPL: 1.4 G/DL (ref 1.5–2.5)
ALP SERPL-CCNC: 142 U/L (ref 35–104)
ALT SERPL W P-5'-P-CCNC: 44 U/L (ref 10–36)
AMYLASE SERPL-CCNC: 46 U/L (ref 28–100)
ANION GAP SERPL CALCULATED.3IONS-SCNC: 6.3 MMOL/L (ref 3.6–11.2)
AST SERPL-CCNC: 55 U/L (ref 10–30)
BACTERIA SPEC AEROBE CULT: NORMAL
BASOPHILS # BLD AUTO: 0.05 10*3/MM3 (ref 0–0.3)
BASOPHILS NFR BLD AUTO: 0.3 % (ref 0–2)
BILIRUB SERPL-MCNC: 0.7 MG/DL (ref 0.2–1.8)
BILIRUB UR QL STRIP: NEGATIVE
BUN BLD-MCNC: 8 MG/DL (ref 7–21)
BUN/CREAT SERPL: 21.1 (ref 7–25)
CALCIUM SPEC-SCNC: 8.6 MG/DL (ref 7.7–10)
CHLORIDE SERPL-SCNC: 102 MMOL/L (ref 99–112)
CLARITY UR: CLEAR
CO2 SERPL-SCNC: 24.7 MMOL/L (ref 24.3–31.9)
COLOR UR: ABNORMAL
CREAT BLD-MCNC: 0.38 MG/DL (ref 0.43–1.29)
CRP SERPL-MCNC: 30.48 MG/DL (ref 0–0.99)
DEPRECATED RDW RBC AUTO: 44.4 FL (ref 37–54)
EOSINOPHIL # BLD AUTO: 0.28 10*3/MM3 (ref 0–0.7)
EOSINOPHIL NFR BLD AUTO: 1.5 % (ref 0–5)
ERYTHROCYTE [DISTWIDTH] IN BLOOD BY AUTOMATED COUNT: 14.2 % (ref 11.5–14.5)
GFR SERPL CREATININE-BSD FRML MDRD: >150 ML/MIN/1.73
GLOBULIN UR ELPH-MCNC: 2.4 GM/DL
GLUCOSE BLD-MCNC: 188 MG/DL (ref 70–110)
GLUCOSE BLDC GLUCOMTR-MCNC: 164 MG/DL (ref 70–130)
GLUCOSE BLDC GLUCOMTR-MCNC: 165 MG/DL (ref 70–130)
GLUCOSE BLDC GLUCOMTR-MCNC: 169 MG/DL (ref 70–130)
GLUCOSE UR STRIP-MCNC: ABNORMAL MG/DL
HCT VFR BLD AUTO: 37.8 % (ref 37–47)
HGB BLD-MCNC: 12.6 G/DL (ref 12–16)
HGB UR QL STRIP.AUTO: NEGATIVE
IMM GRANULOCYTES # BLD: 0.38 10*3/MM3 (ref 0–0.03)
IMM GRANULOCYTES NFR BLD: 2 % (ref 0–0.5)
KETONES UR QL STRIP: ABNORMAL
LEUKOCYTE ESTERASE UR QL STRIP.AUTO: ABNORMAL
LIPASE SERPL-CCNC: 35 U/L (ref 13–60)
LYMPHOCYTES # BLD AUTO: 1.28 10*3/MM3 (ref 1–3)
LYMPHOCYTES NFR BLD AUTO: 6.9 % (ref 21–51)
MCH RBC QN AUTO: 28.5 PG (ref 27–33)
MCHC RBC AUTO-ENTMCNC: 33.3 G/DL (ref 33–37)
MCV RBC AUTO: 85.5 FL (ref 80–94)
MONOCYTES # BLD AUTO: 1.71 10*3/MM3 (ref 0.1–0.9)
MONOCYTES NFR BLD AUTO: 9.2 % (ref 0–10)
NEUTROPHILS # BLD AUTO: 14.91 10*3/MM3 (ref 1.4–6.5)
NEUTROPHILS NFR BLD AUTO: 80.1 % (ref 30–70)
NITRITE UR QL STRIP: NEGATIVE
NRBC BLD MANUAL-RTO: 0 /100 WBC (ref 0–0)
OSMOLALITY SERPL CALC.SUM OF ELEC: 269.7 MOSM/KG (ref 273–305)
PH UR STRIP.AUTO: 6 [PH] (ref 5–8)
PLATELET # BLD AUTO: 254 10*3/MM3 (ref 130–400)
PMV BLD AUTO: 11.3 FL (ref 6–10)
POTASSIUM BLD-SCNC: 3.3 MMOL/L (ref 3.5–5.3)
PROT SERPL-MCNC: 5.7 G/DL (ref 6–8)
PROT UR QL STRIP: ABNORMAL
RBC # BLD AUTO: 4.42 10*6/MM3 (ref 4.2–5.4)
SODIUM BLD-SCNC: 133 MMOL/L (ref 135–153)
SP GR UR STRIP: 1.02 (ref 1–1.03)
UROBILINOGEN UR QL STRIP: ABNORMAL
WBC NRBC COR # BLD: 18.61 10*3/MM3 (ref 4.5–12.5)

## 2017-09-29 PROCEDURE — 81003 URINALYSIS AUTO W/O SCOPE: CPT | Performed by: INTERNAL MEDICINE

## 2017-09-29 PROCEDURE — 25010000002 PROMETHAZINE PER 50 MG: Performed by: INTERNAL MEDICINE

## 2017-09-29 PROCEDURE — 82962 GLUCOSE BLOOD TEST: CPT

## 2017-09-29 PROCEDURE — 94799 UNLISTED PULMONARY SVC/PX: CPT

## 2017-09-29 PROCEDURE — 74176 CT ABD & PELVIS W/O CONTRAST: CPT

## 2017-09-29 PROCEDURE — 74176 CT ABD & PELVIS W/O CONTRAST: CPT | Performed by: RADIOLOGY

## 2017-09-29 PROCEDURE — 25010000002 ONDANSETRON PER 1 MG: Performed by: INTERNAL MEDICINE

## 2017-09-29 PROCEDURE — 99231 SBSQ HOSP IP/OBS SF/LOW 25: CPT | Performed by: PHYSICIAN ASSISTANT

## 2017-09-29 PROCEDURE — 82150 ASSAY OF AMYLASE: CPT | Performed by: INTERNAL MEDICINE

## 2017-09-29 PROCEDURE — 25010000002 MORPHINE PER 10 MG: Performed by: INTERNAL MEDICINE

## 2017-09-29 PROCEDURE — 87040 BLOOD CULTURE FOR BACTERIA: CPT | Performed by: INTERNAL MEDICINE

## 2017-09-29 PROCEDURE — 85025 COMPLETE CBC W/AUTO DIFF WBC: CPT | Performed by: INTERNAL MEDICINE

## 2017-09-29 PROCEDURE — 25010000002 ENOXAPARIN PER 10 MG: Performed by: INTERNAL MEDICINE

## 2017-09-29 PROCEDURE — 83690 ASSAY OF LIPASE: CPT | Performed by: INTERNAL MEDICINE

## 2017-09-29 PROCEDURE — 86140 C-REACTIVE PROTEIN: CPT | Performed by: INTERNAL MEDICINE

## 2017-09-29 PROCEDURE — 80053 COMPREHEN METABOLIC PANEL: CPT | Performed by: INTERNAL MEDICINE

## 2017-09-29 PROCEDURE — 63710000001 INSULIN ASPART PER 5 UNITS: Performed by: INTERNAL MEDICINE

## 2017-09-29 PROCEDURE — 71020 XR CHEST PA AND LATERAL: CPT | Performed by: RADIOLOGY

## 2017-09-29 PROCEDURE — 71020 HC CHEST PA AND LATERAL: CPT

## 2017-09-29 RX ADMIN — MORPHINE SULFATE 2 MG: 2 INJECTION, SOLUTION INTRAMUSCULAR; INTRAVENOUS at 19:22

## 2017-09-29 RX ADMIN — POLYETHYLENE GLYCOL 3350 17 G: 17 POWDER, FOR SOLUTION ORAL at 09:24

## 2017-09-29 RX ADMIN — IPRATROPIUM BROMIDE AND ALBUTEROL SULFATE 3 ML: .5; 3 SOLUTION RESPIRATORY (INHALATION) at 14:26

## 2017-09-29 RX ADMIN — MORPHINE SULFATE 2 MG: 2 INJECTION, SOLUTION INTRAMUSCULAR; INTRAVENOUS at 03:26

## 2017-09-29 RX ADMIN — PANTOPRAZOLE SODIUM 40 MG: 40 INJECTION, POWDER, FOR SOLUTION INTRAVENOUS at 09:24

## 2017-09-29 RX ADMIN — BISACODYL 10 MG: 10 SUPPOSITORY RECTAL at 09:24

## 2017-09-29 RX ADMIN — METOPROLOL TARTRATE 100 MG: 100 TABLET, FILM COATED ORAL at 09:24

## 2017-09-29 RX ADMIN — MORPHINE SULFATE 2 MG: 2 INJECTION, SOLUTION INTRAMUSCULAR; INTRAVENOUS at 09:44

## 2017-09-29 RX ADMIN — MORPHINE SULFATE 2 MG: 2 INJECTION, SOLUTION INTRAMUSCULAR; INTRAVENOUS at 01:31

## 2017-09-29 RX ADMIN — PROMETHAZINE HYDROCHLORIDE 12.5 MG: 25 INJECTION INTRAMUSCULAR; INTRAVENOUS at 03:26

## 2017-09-29 RX ADMIN — PROMETHAZINE HYDROCHLORIDE 12.5 MG: 25 INJECTION INTRAMUSCULAR; INTRAVENOUS at 21:51

## 2017-09-29 RX ADMIN — MORPHINE SULFATE 2 MG: 2 INJECTION, SOLUTION INTRAMUSCULAR; INTRAVENOUS at 16:02

## 2017-09-29 RX ADMIN — INSULIN ASPART 2 UNITS: 100 INJECTION, SOLUTION INTRAVENOUS; SUBCUTANEOUS at 13:25

## 2017-09-29 RX ADMIN — INSULIN ASPART 2 UNITS: 100 INJECTION, SOLUTION INTRAVENOUS; SUBCUTANEOUS at 07:59

## 2017-09-29 RX ADMIN — VENLAFAXINE HYDROCHLORIDE 75 MG: 75 CAPSULE, EXTENDED RELEASE ORAL at 09:24

## 2017-09-29 RX ADMIN — IPRATROPIUM BROMIDE AND ALBUTEROL SULFATE 3 ML: .5; 3 SOLUTION RESPIRATORY (INHALATION) at 07:38

## 2017-09-29 RX ADMIN — MORPHINE SULFATE 2 MG: 2 INJECTION, SOLUTION INTRAMUSCULAR; INTRAVENOUS at 21:08

## 2017-09-29 RX ADMIN — MORPHINE SULFATE 2 MG: 2 INJECTION, SOLUTION INTRAMUSCULAR; INTRAVENOUS at 05:54

## 2017-09-29 RX ADMIN — METOPROLOL TARTRATE 100 MG: 100 TABLET, FILM COATED ORAL at 20:17

## 2017-09-29 RX ADMIN — PROMETHAZINE HYDROCHLORIDE 12.5 MG: 25 INJECTION INTRAMUSCULAR; INTRAVENOUS at 03:25

## 2017-09-29 RX ADMIN — PANTOPRAZOLE SODIUM 40 MG: 40 INJECTION, POWDER, FOR SOLUTION INTRAVENOUS at 17:20

## 2017-09-29 RX ADMIN — PROMETHAZINE HYDROCHLORIDE 25 MG: 25 INJECTION INTRAMUSCULAR; INTRAVENOUS at 16:03

## 2017-09-29 RX ADMIN — ENOXAPARIN SODIUM 40 MG: 40 INJECTION SUBCUTANEOUS at 20:16

## 2017-09-29 RX ADMIN — INSULIN ASPART 2 UNITS: 100 INJECTION, SOLUTION INTRAVENOUS; SUBCUTANEOUS at 17:22

## 2017-09-29 RX ADMIN — IPRATROPIUM BROMIDE AND ALBUTEROL SULFATE 3 ML: .5; 3 SOLUTION RESPIRATORY (INHALATION) at 00:35

## 2017-09-29 RX ADMIN — MORPHINE SULFATE 2 MG: 2 INJECTION, SOLUTION INTRAMUSCULAR; INTRAVENOUS at 23:24

## 2017-09-29 RX ADMIN — PROMETHAZINE HYDROCHLORIDE 12.5 MG: 25 INJECTION INTRAMUSCULAR; INTRAVENOUS at 09:43

## 2017-09-29 RX ADMIN — ONDANSETRON 4 MG: 2 INJECTION INTRAMUSCULAR; INTRAVENOUS at 05:54

## 2017-09-30 LAB
ALBUMIN SERPL-MCNC: 3.3 G/DL (ref 3.5–5)
ALBUMIN/GLOB SERPL: 1.4 G/DL (ref 1.5–2.5)
ALP SERPL-CCNC: 155 U/L (ref 35–104)
ALT SERPL W P-5'-P-CCNC: 68 U/L (ref 10–36)
ANION GAP SERPL CALCULATED.3IONS-SCNC: 9.5 MMOL/L (ref 3.6–11.2)
AST SERPL-CCNC: 73 U/L (ref 10–30)
BASOPHILS # BLD AUTO: 0.09 10*3/MM3 (ref 0–0.3)
BASOPHILS NFR BLD AUTO: 0.4 % (ref 0–2)
BILIRUB SERPL-MCNC: 0.7 MG/DL (ref 0.2–1.8)
BUN BLD-MCNC: 8 MG/DL (ref 7–21)
BUN/CREAT SERPL: 22.9 (ref 7–25)
CALCIUM SPEC-SCNC: 8.8 MG/DL (ref 7.7–10)
CHLORIDE SERPL-SCNC: 101 MMOL/L (ref 99–112)
CO2 SERPL-SCNC: 26.5 MMOL/L (ref 24.3–31.9)
CREAT BLD-MCNC: 0.35 MG/DL (ref 0.43–1.29)
CRP SERPL-MCNC: 26.22 MG/DL (ref 0–0.99)
DEPRECATED RDW RBC AUTO: 44.8 FL (ref 37–54)
EOSINOPHIL # BLD AUTO: 0.27 10*3/MM3 (ref 0–0.7)
EOSINOPHIL # BLD MANUAL: 0.22 10*3/MM3 (ref 0–0.7)
EOSINOPHIL NFR BLD AUTO: 1.2 % (ref 0–5)
EOSINOPHIL NFR BLD MANUAL: 1 % (ref 0–5)
ERYTHROCYTE [DISTWIDTH] IN BLOOD BY AUTOMATED COUNT: 14.2 % (ref 11.5–14.5)
GFR SERPL CREATININE-BSD FRML MDRD: >150 ML/MIN/1.73
GLOBULIN UR ELPH-MCNC: 2.4 GM/DL
GLUCOSE BLD-MCNC: 223 MG/DL (ref 70–110)
GLUCOSE BLDC GLUCOMTR-MCNC: 170 MG/DL (ref 70–130)
GLUCOSE BLDC GLUCOMTR-MCNC: 189 MG/DL (ref 70–130)
GLUCOSE BLDC GLUCOMTR-MCNC: 211 MG/DL (ref 70–130)
HCT VFR BLD AUTO: 39.4 % (ref 37–47)
HEMOCCULT STL QL: NEGATIVE
HGB BLD-MCNC: 13.1 G/DL (ref 12–16)
IMM GRANULOCYTES # BLD: 0.55 10*3/MM3 (ref 0–0.03)
IMM GRANULOCYTES NFR BLD: 2.5 % (ref 0–0.5)
LYMPHOCYTES # BLD AUTO: 1.44 10*3/MM3 (ref 1–3)
LYMPHOCYTES # BLD MANUAL: 1.74 10*3/MM3 (ref 1–3)
LYMPHOCYTES NFR BLD AUTO: 6.6 % (ref 21–51)
LYMPHOCYTES NFR BLD MANUAL: 5 % (ref 0–10)
LYMPHOCYTES NFR BLD MANUAL: 8 % (ref 21–51)
MAGNESIUM SERPL-MCNC: 1.8 MG/DL (ref 1.7–2.6)
MCH RBC QN AUTO: 28.7 PG (ref 27–33)
MCHC RBC AUTO-ENTMCNC: 33.2 G/DL (ref 33–37)
MCV RBC AUTO: 86.4 FL (ref 80–94)
MONOCYTES # BLD AUTO: 1.09 10*3/MM3 (ref 0.1–0.9)
MONOCYTES # BLD AUTO: 1.51 10*3/MM3 (ref 0.1–0.9)
MONOCYTES NFR BLD AUTO: 6.9 % (ref 0–10)
NEUTROPHILS # BLD AUTO: 17.89 10*3/MM3 (ref 1.4–6.5)
NEUTROPHILS # BLD AUTO: 18.71 10*3/MM3 (ref 1.4–6.5)
NEUTROPHILS NFR BLD AUTO: 82.4 % (ref 30–70)
NEUTROPHILS NFR BLD MANUAL: 80 % (ref 30–70)
NEUTS BAND NFR BLD MANUAL: 6 % (ref 4–12)
NRBC BLD MANUAL-RTO: 0 /100 WBC (ref 0–0)
OSMOLALITY SERPL CALC.SUM OF ELEC: 279.1 MOSM/KG (ref 273–305)
PHOSPHATE SERPL-MCNC: 3.2 MG/DL (ref 2.7–4.5)
PLAT MORPH BLD: NORMAL
PLATELET # BLD AUTO: 240 10*3/MM3 (ref 130–400)
PMV BLD AUTO: 11.2 FL (ref 6–10)
POTASSIUM BLD-SCNC: 3 MMOL/L (ref 3.5–5.3)
PROT SERPL-MCNC: 5.7 G/DL (ref 6–8)
RBC # BLD AUTO: 4.56 10*6/MM3 (ref 4.2–5.4)
RBC MORPH BLD: NORMAL
SODIUM BLD-SCNC: 137 MMOL/L (ref 135–153)
WBC NRBC COR # BLD: 21.75 10*3/MM3 (ref 4.5–12.5)

## 2017-09-30 PROCEDURE — 84100 ASSAY OF PHOSPHORUS: CPT | Performed by: INTERNAL MEDICINE

## 2017-09-30 PROCEDURE — 25010000002 ENOXAPARIN PER 10 MG: Performed by: INTERNAL MEDICINE

## 2017-09-30 PROCEDURE — 86140 C-REACTIVE PROTEIN: CPT | Performed by: INTERNAL MEDICINE

## 2017-09-30 PROCEDURE — 25010000002 MORPHINE PER 10 MG: Performed by: INTERNAL MEDICINE

## 2017-09-30 PROCEDURE — 85025 COMPLETE CBC W/AUTO DIFF WBC: CPT | Performed by: INTERNAL MEDICINE

## 2017-09-30 PROCEDURE — 94799 UNLISTED PULMONARY SVC/PX: CPT

## 2017-09-30 PROCEDURE — 25010000002 ONDANSETRON PER 1 MG: Performed by: INTERNAL MEDICINE

## 2017-09-30 PROCEDURE — 85007 BL SMEAR W/DIFF WBC COUNT: CPT | Performed by: INTERNAL MEDICINE

## 2017-09-30 PROCEDURE — 82272 OCCULT BLD FECES 1-3 TESTS: CPT | Performed by: INTERNAL MEDICINE

## 2017-09-30 PROCEDURE — 63710000001 PROMETHAZINE PER 12.5 MG: Performed by: INTERNAL MEDICINE

## 2017-09-30 PROCEDURE — 80053 COMPREHEN METABOLIC PANEL: CPT | Performed by: INTERNAL MEDICINE

## 2017-09-30 PROCEDURE — 63710000001 INSULIN ASPART PER 5 UNITS: Performed by: INTERNAL MEDICINE

## 2017-09-30 PROCEDURE — 83735 ASSAY OF MAGNESIUM: CPT | Performed by: INTERNAL MEDICINE

## 2017-09-30 PROCEDURE — 25010000002 PROMETHAZINE PER 50 MG: Performed by: INTERNAL MEDICINE

## 2017-09-30 PROCEDURE — 82962 GLUCOSE BLOOD TEST: CPT

## 2017-09-30 RX ORDER — MAGNESIUM SULFATE HEPTAHYDRATE 40 MG/ML
4 INJECTION, SOLUTION INTRAVENOUS ONCE
Status: COMPLETED | OUTPATIENT
Start: 2017-09-30 | End: 2017-09-30

## 2017-09-30 RX ORDER — POTASSIUM CHLORIDE 20 MEQ/1
40 TABLET, EXTENDED RELEASE ORAL EVERY 4 HOURS
Status: COMPLETED | OUTPATIENT
Start: 2017-09-30 | End: 2017-09-30

## 2017-09-30 RX ADMIN — MAGNESIUM SULFATE HEPTAHYDRATE 4 G: 40 INJECTION, SOLUTION INTRAVENOUS at 05:03

## 2017-09-30 RX ADMIN — PROMETHAZINE HYDROCHLORIDE 12.5 MG: 12.5 TABLET ORAL at 14:20

## 2017-09-30 RX ADMIN — BISACODYL 10 MG: 10 SUPPOSITORY RECTAL at 08:46

## 2017-09-30 RX ADMIN — PANTOPRAZOLE SODIUM 40 MG: 40 INJECTION, POWDER, FOR SOLUTION INTRAVENOUS at 17:01

## 2017-09-30 RX ADMIN — INSULIN ASPART 3 UNITS: 100 INJECTION, SOLUTION INTRAVENOUS; SUBCUTANEOUS at 05:32

## 2017-09-30 RX ADMIN — PANTOPRAZOLE SODIUM 40 MG: 40 INJECTION, POWDER, FOR SOLUTION INTRAVENOUS at 06:17

## 2017-09-30 RX ADMIN — MORPHINE SULFATE 2 MG: 2 INJECTION, SOLUTION INTRAMUSCULAR; INTRAVENOUS at 21:43

## 2017-09-30 RX ADMIN — METOPROLOL TARTRATE 100 MG: 100 TABLET, FILM COATED ORAL at 08:46

## 2017-09-30 RX ADMIN — IPRATROPIUM BROMIDE AND ALBUTEROL SULFATE 3 ML: .5; 3 SOLUTION RESPIRATORY (INHALATION) at 00:48

## 2017-09-30 RX ADMIN — VENLAFAXINE HYDROCHLORIDE 75 MG: 75 CAPSULE, EXTENDED RELEASE ORAL at 08:46

## 2017-09-30 RX ADMIN — IPRATROPIUM BROMIDE AND ALBUTEROL SULFATE 3 ML: .5; 3 SOLUTION RESPIRATORY (INHALATION) at 13:28

## 2017-09-30 RX ADMIN — INSULIN ASPART 2 UNITS: 100 INJECTION, SOLUTION INTRAVENOUS; SUBCUTANEOUS at 11:55

## 2017-09-30 RX ADMIN — INSULIN ASPART 2 UNITS: 100 INJECTION, SOLUTION INTRAVENOUS; SUBCUTANEOUS at 17:05

## 2017-09-30 RX ADMIN — MORPHINE SULFATE 2 MG: 2 INJECTION, SOLUTION INTRAMUSCULAR; INTRAVENOUS at 08:49

## 2017-09-30 RX ADMIN — ENOXAPARIN SODIUM 40 MG: 40 INJECTION SUBCUTANEOUS at 20:24

## 2017-09-30 RX ADMIN — MORPHINE SULFATE 2 MG: 2 INJECTION, SOLUTION INTRAMUSCULAR; INTRAVENOUS at 16:06

## 2017-09-30 RX ADMIN — MORPHINE SULFATE 2 MG: 2 INJECTION, SOLUTION INTRAMUSCULAR; INTRAVENOUS at 01:44

## 2017-09-30 RX ADMIN — ONDANSETRON 4 MG: 2 INJECTION INTRAMUSCULAR; INTRAVENOUS at 02:01

## 2017-09-30 RX ADMIN — POTASSIUM CHLORIDE 40 MEQ: 1500 TABLET, EXTENDED RELEASE ORAL at 05:03

## 2017-09-30 RX ADMIN — MORPHINE SULFATE 2 MG: 2 INJECTION, SOLUTION INTRAMUSCULAR; INTRAVENOUS at 04:42

## 2017-09-30 RX ADMIN — PROMETHAZINE HYDROCHLORIDE 12.5 MG: 25 INJECTION INTRAMUSCULAR; INTRAVENOUS at 04:43

## 2017-09-30 RX ADMIN — POLYETHYLENE GLYCOL 3350 17 G: 17 POWDER, FOR SOLUTION ORAL at 08:46

## 2017-09-30 RX ADMIN — MORPHINE SULFATE 2 MG: 2 INJECTION, SOLUTION INTRAMUSCULAR; INTRAVENOUS at 14:16

## 2017-09-30 RX ADMIN — POTASSIUM CHLORIDE 40 MEQ: 1500 TABLET, EXTENDED RELEASE ORAL at 08:46

## 2017-09-30 RX ADMIN — MORPHINE SULFATE 2 MG: 2 INJECTION, SOLUTION INTRAMUSCULAR; INTRAVENOUS at 18:59

## 2017-09-30 RX ADMIN — POTASSIUM CHLORIDE 40 MEQ: 1500 TABLET, EXTENDED RELEASE ORAL at 12:00

## 2017-09-30 RX ADMIN — PROMETHAZINE HYDROCHLORIDE 12.5 MG: 12.5 TABLET ORAL at 20:24

## 2017-09-30 RX ADMIN — MORPHINE SULFATE 2 MG: 2 INJECTION, SOLUTION INTRAMUSCULAR; INTRAVENOUS at 11:58

## 2017-09-30 RX ADMIN — IPRATROPIUM BROMIDE AND ALBUTEROL SULFATE 3 ML: .5; 3 SOLUTION RESPIRATORY (INHALATION) at 07:19

## 2017-09-30 RX ADMIN — METOPROLOL TARTRATE 100 MG: 100 TABLET, FILM COATED ORAL at 20:24

## 2017-09-30 RX ADMIN — IPRATROPIUM BROMIDE AND ALBUTEROL SULFATE 3 ML: .5; 3 SOLUTION RESPIRATORY (INHALATION) at 19:49

## 2017-10-01 ENCOUNTER — APPOINTMENT (OUTPATIENT)
Dept: ULTRASOUND IMAGING | Facility: HOSPITAL | Age: 47
End: 2017-10-01

## 2017-10-01 LAB
ALBUMIN SERPL-MCNC: 3.2 G/DL (ref 3.5–5)
ALBUMIN/GLOB SERPL: 1.4 G/DL (ref 1.5–2.5)
ALP SERPL-CCNC: 170 U/L (ref 35–104)
ALT SERPL W P-5'-P-CCNC: 80 U/L (ref 10–36)
AMYLASE SERPL-CCNC: 50 U/L (ref 28–100)
ANION GAP SERPL CALCULATED.3IONS-SCNC: 4.7 MMOL/L (ref 3.6–11.2)
AST SERPL-CCNC: 72 U/L (ref 10–30)
BILIRUB SERPL-MCNC: 0.6 MG/DL (ref 0.2–1.8)
BUN BLD-MCNC: 6 MG/DL (ref 7–21)
BUN/CREAT SERPL: 12.8 (ref 7–25)
CALCIUM SPEC-SCNC: 8.7 MG/DL (ref 7.7–10)
CHLORIDE SERPL-SCNC: 100 MMOL/L (ref 99–112)
CO2 SERPL-SCNC: 29.3 MMOL/L (ref 24.3–31.9)
CREAT BLD-MCNC: 0.47 MG/DL (ref 0.43–1.29)
GFR SERPL CREATININE-BSD FRML MDRD: 142 ML/MIN/1.73
GLOBULIN UR ELPH-MCNC: 2.3 GM/DL
GLUCOSE BLD-MCNC: 233 MG/DL (ref 70–110)
GLUCOSE BLDC GLUCOMTR-MCNC: 187 MG/DL (ref 70–130)
GLUCOSE BLDC GLUCOMTR-MCNC: 235 MG/DL (ref 70–130)
GLUCOSE BLDC GLUCOMTR-MCNC: 238 MG/DL (ref 70–130)
GLUCOSE BLDC GLUCOMTR-MCNC: 240 MG/DL (ref 70–130)
MAGNESIUM SERPL-MCNC: 2 MG/DL (ref 1.7–2.6)
OSMOLALITY SERPL CALC.SUM OF ELEC: 273.3 MOSM/KG (ref 273–305)
POTASSIUM BLD-SCNC: 3.5 MMOL/L (ref 3.5–5.3)
PROT SERPL-MCNC: 5.5 G/DL (ref 6–8)
SODIUM BLD-SCNC: 134 MMOL/L (ref 135–153)

## 2017-10-01 PROCEDURE — 80053 COMPREHEN METABOLIC PANEL: CPT | Performed by: INTERNAL MEDICINE

## 2017-10-01 PROCEDURE — 82962 GLUCOSE BLOOD TEST: CPT

## 2017-10-01 PROCEDURE — 25010000002 ENOXAPARIN PER 10 MG: Performed by: INTERNAL MEDICINE

## 2017-10-01 PROCEDURE — 94799 UNLISTED PULMONARY SVC/PX: CPT

## 2017-10-01 PROCEDURE — 76700 US EXAM ABDOM COMPLETE: CPT | Performed by: RADIOLOGY

## 2017-10-01 PROCEDURE — 25010000002 MORPHINE PER 10 MG: Performed by: INTERNAL MEDICINE

## 2017-10-01 PROCEDURE — 63710000001 PROMETHAZINE PER 12.5 MG: Performed by: INTERNAL MEDICINE

## 2017-10-01 PROCEDURE — 76856 US EXAM PELVIC COMPLETE: CPT | Performed by: RADIOLOGY

## 2017-10-01 PROCEDURE — 76856 US EXAM PELVIC COMPLETE: CPT

## 2017-10-01 PROCEDURE — 83735 ASSAY OF MAGNESIUM: CPT | Performed by: INTERNAL MEDICINE

## 2017-10-01 PROCEDURE — 25010000002 ONDANSETRON PER 1 MG: Performed by: INTERNAL MEDICINE

## 2017-10-01 PROCEDURE — 76700 US EXAM ABDOM COMPLETE: CPT

## 2017-10-01 PROCEDURE — 63710000001 INSULIN ASPART PER 5 UNITS: Performed by: INTERNAL MEDICINE

## 2017-10-01 PROCEDURE — 82150 ASSAY OF AMYLASE: CPT | Performed by: INTERNAL MEDICINE

## 2017-10-01 RX ORDER — DIPHENHYDRAMINE, LIDOCAINE, NYSTATIN
5 KIT ORAL 4 TIMES DAILY PRN
Status: DISCONTINUED | OUTPATIENT
Start: 2017-10-01 | End: 2017-10-09 | Stop reason: HOSPADM

## 2017-10-01 RX ADMIN — ENOXAPARIN SODIUM 40 MG: 40 INJECTION SUBCUTANEOUS at 19:57

## 2017-10-01 RX ADMIN — IPRATROPIUM BROMIDE AND ALBUTEROL SULFATE 3 ML: .5; 3 SOLUTION RESPIRATORY (INHALATION) at 19:51

## 2017-10-01 RX ADMIN — MORPHINE SULFATE 2 MG: 2 INJECTION, SOLUTION INTRAMUSCULAR; INTRAVENOUS at 07:54

## 2017-10-01 RX ADMIN — METOPROLOL TARTRATE 100 MG: 100 TABLET, FILM COATED ORAL at 19:56

## 2017-10-01 RX ADMIN — INSULIN ASPART 3 UNITS: 100 INJECTION, SOLUTION INTRAVENOUS; SUBCUTANEOUS at 23:21

## 2017-10-01 RX ADMIN — IPRATROPIUM BROMIDE AND ALBUTEROL SULFATE 3 ML: .5; 3 SOLUTION RESPIRATORY (INHALATION) at 07:20

## 2017-10-01 RX ADMIN — PANTOPRAZOLE SODIUM 40 MG: 40 INJECTION, POWDER, FOR SOLUTION INTRAVENOUS at 08:32

## 2017-10-01 RX ADMIN — METOPROLOL TARTRATE 100 MG: 100 TABLET, FILM COATED ORAL at 08:32

## 2017-10-01 RX ADMIN — MORPHINE SULFATE 2 MG: 2 INJECTION, SOLUTION INTRAMUSCULAR; INTRAVENOUS at 13:08

## 2017-10-01 RX ADMIN — VENLAFAXINE HYDROCHLORIDE 75 MG: 75 CAPSULE, EXTENDED RELEASE ORAL at 08:32

## 2017-10-01 RX ADMIN — PROMETHAZINE HYDROCHLORIDE 12.5 MG: 12.5 TABLET ORAL at 03:13

## 2017-10-01 RX ADMIN — MORPHINE SULFATE 2 MG: 2 INJECTION, SOLUTION INTRAMUSCULAR; INTRAVENOUS at 05:37

## 2017-10-01 RX ADMIN — ONDANSETRON 4 MG: 2 INJECTION INTRAMUSCULAR; INTRAVENOUS at 00:28

## 2017-10-01 RX ADMIN — MORPHINE SULFATE 2 MG: 2 INJECTION, SOLUTION INTRAMUSCULAR; INTRAVENOUS at 00:20

## 2017-10-01 RX ADMIN — BISACODYL 10 MG: 10 SUPPOSITORY RECTAL at 08:32

## 2017-10-01 RX ADMIN — PANTOPRAZOLE SODIUM 40 MG: 40 INJECTION, POWDER, FOR SOLUTION INTRAVENOUS at 17:10

## 2017-10-01 RX ADMIN — MORPHINE SULFATE 2 MG: 2 INJECTION, SOLUTION INTRAMUSCULAR; INTRAVENOUS at 22:32

## 2017-10-01 RX ADMIN — POLYETHYLENE GLYCOL 3350 17 G: 17 POWDER, FOR SOLUTION ORAL at 08:32

## 2017-10-01 RX ADMIN — INSULIN ASPART 0 UNITS: 100 INJECTION, SOLUTION INTRAVENOUS; SUBCUTANEOUS at 05:42

## 2017-10-01 RX ADMIN — MORPHINE SULFATE 2 MG: 2 INJECTION, SOLUTION INTRAMUSCULAR; INTRAVENOUS at 19:54

## 2017-10-01 RX ADMIN — PROMETHAZINE HYDROCHLORIDE 12.5 MG: 12.5 TABLET ORAL at 13:12

## 2017-10-01 RX ADMIN — MORPHINE SULFATE 2 MG: 2 INJECTION, SOLUTION INTRAMUSCULAR; INTRAVENOUS at 03:14

## 2017-10-01 RX ADMIN — MORPHINE SULFATE 2 MG: 2 INJECTION, SOLUTION INTRAMUSCULAR; INTRAVENOUS at 17:37

## 2017-10-01 RX ADMIN — MORPHINE SULFATE 2 MG: 2 INJECTION, SOLUTION INTRAMUSCULAR; INTRAVENOUS at 10:17

## 2017-10-01 RX ADMIN — PROMETHAZINE HYDROCHLORIDE 12.5 MG: 12.5 TABLET ORAL at 22:32

## 2017-10-01 RX ADMIN — IPRATROPIUM BROMIDE AND ALBUTEROL SULFATE 3 ML: .5; 3 SOLUTION RESPIRATORY (INHALATION) at 00:45

## 2017-10-01 RX ADMIN — INSULIN ASPART 3 UNITS: 100 INJECTION, SOLUTION INTRAVENOUS; SUBCUTANEOUS at 00:28

## 2017-10-01 NOTE — PLAN OF CARE
Problem: Patient Care Overview (Adult)  Goal: Plan of Care Review  Outcome: Ongoing (interventions implemented as appropriate)    09/28/17 1513 09/30/17 1900   Coping/Psychosocial Response Interventions   Plan Of Care Reviewed With --  patient   Patient Care Overview   Progress no change --        Goal: Adult Individualization and Mutuality  Outcome: Ongoing (interventions implemented as appropriate)    Problem: Pain, Acute (Adult)  Goal: Identify Related Risk Factors and Signs and Symptoms  Outcome: Ongoing (interventions implemented as appropriate)    09/28/17 1513   Pain, Acute   Related Risk Factors (Acute Pain) disease process;persistent pain;positioning   Signs and Symptoms (Acute Pain) BADLs/IADLs reluctance/inability to perform;verbalization of pain descriptors       Goal: Acceptable Pain Control/Comfort Level  Outcome: Ongoing (interventions implemented as appropriate)    09/28/17 2042   Pain, Acute (Adult)   Acceptable Pain Control/Comfort Level making progress toward outcome         Problem: Pancreatitis, Acute/Chronic (Adult)  Goal: Signs and Symptoms of Listed Potential Problems Will be Absent or Manageable (Pancreatitis, Acute/Chronic)  Outcome: Ongoing (interventions implemented as appropriate)    09/28/17 1513   Pancreatitis, Acute/Chronic   Problems Assessed (Pancreatitis) pain;nausea and vomiting   Problems Present (Pancreatitis) pain         Problem: Fall Risk (Adult)  Goal: Identify Related Risk Factors and Signs and Symptoms  Outcome: Ongoing (interventions implemented as appropriate)    09/28/17 1513   Fall Risk   Fall Risk: Related Risk Factors fatigue/slow reaction   Fall Risk: Signs and Symptoms presence of risk factors       Goal: Absence of Falls  Outcome: Ongoing (interventions implemented as appropriate)    09/28/17 2042   Fall Risk (Adult)   Absence of Falls making progress toward outcome         Problem: Diabetes, Type 2 (Adult)  Goal: Signs and Symptoms of Listed Potential Problems  Will be Absent or Manageable (Diabetes, Type 2)  Outcome: Ongoing (interventions implemented as appropriate)    Problem: Pneumonia (Adult)  Goal: Signs and Symptoms of Listed Potential Problems Will be Absent or Manageable (Pneumonia)  Outcome: Ongoing (interventions implemented as appropriate)

## 2017-10-01 NOTE — PROGRESS NOTES
Subjective     Pt doing about the same eating some had pain in pelvic area and bladder but urine neg CT had some ascites and pelvic fluid and this may be causing some pressure amylase ok but CT with significant pancreatitis    Interval History:       History taken from: patient chart    Review of Systems:       Review of Systems       Objective     Vital Signs  Temp:  [98.7 °F (37.1 °C)-99.2 °F (37.3 °C)] 99 °F (37.2 °C)  Heart Rate:  [] 94  Resp:  [18-21] 18  BP: (137-150)/(80-90) 141/84    Physical Exam:    Physical Exam           Results Review:     I reviewed the patient's new clinical results  : See Below    Medication Review:     Current Facility-Administered Medications:   •  bisacodyl (DULCOLAX) suppository 10 mg, 10 mg, Rectal, Daily, Seven Gallego MD, 10 mg at 09/30/17 0846  •  dextrose (D50W) solution 25 g, 25 g, Intravenous, Q15 Min PRN, Seven Gallego MD  •  dextrose (GLUTOSE) oral gel 15 g, 15 g, Oral, Q15 Min PRN, Seven Gallego MD  •  enoxaparin (LOVENOX) syringe 40 mg, 40 mg, Subcutaneous, Nightly, Seven Gallego MD, 40 mg at 09/30/17 2024  •  glucagon (human recombinant) (GLUCAGEN DIAGNOSTIC) injection 1 mg, 1 mg, Subcutaneous, Q15 Min PRN, Seven Gallego MD  •  insulin aspart (novoLOG) injection 0-7 Units, 0-7 Units, Subcutaneous, Q6H, Seven Gallego MD, 0 Units at 10/01/17 0542  •  ipratropium-albuterol (DUO-NEB) nebulizer solution 3 mL, 3 mL, Nebulization, 4x Daily - RT, Seven Gallego MD, 3 mL at 10/01/17 0720  •  Magnesium Sulfate 2 gram Bolus, followed by 8 gram infusion (total Mg dose 10 grams)- Mg less than or equal to 1mg/dL, 2 g, Intravenous, PRN **OR** Magnesium Sulfate 6 gram Infusion (2 gm x 3) -Mg 1.1 -1.5 mg/dL, 2 g, Intravenous, PRN **OR** magnesium sulfate 4 gram infusion- Mg 1.6-1.9 mg/dL, 4 g, Intravenous, PRN, Seven Gallego MD  •  metoprolol tartrate (LOPRESSOR) tablet 100 mg, 100 mg, Oral, Q12H, Seven Gallego MD, 100 mg at 09/30/17 2024  •   morphine injection 2 mg, 2 mg, Intravenous, Q2H PRN, Seven Gallego MD, 2 mg at 10/01/17 0537  •  ondansetron (ZOFRAN) injection 4 mg, 4 mg, Intravenous, Q6H PRN, Seven Gallego MD, 4 mg at 10/01/17 0028  •  pantoprazole (PROTONIX) injection 40 mg, 40 mg, Intravenous, BID AC, Seven Gallego MD, 40 mg at 09/30/17 1701  •  polyethylene glycol (MIRALAX) powder 17 g, 17 g, Oral, Daily, Seven Gallego MD, 17 g at 09/30/17 0846  •  potassium & sodium phosphates (PHOS-NAK) 280-160-250 MG packet - for Phosphorus less than 1.25 mg/dL, 1 packet, Oral, Q6H PRN **OR** potassium & sodium phosphates (PHOS-NAK) 280-160-250 MG packet - for Phosphorus 1.25 - 2.1 mg/dL, 1 packet, Oral, Once PRN, Seven Gallego MD  •  potassium chloride (KLOR-CON) packet 40 mEq, 40 mEq, Oral, PRN, Seven Gallego MD  •  potassium chloride (MICRO-K) CR capsule 40 mEq, 40 mEq, Oral, PRN, Seven Gallego MD  •  promethazine (PHENERGAN) tablet 12.5 mg, 12.5 mg, Oral, Q6H PRN **OR** promethazine (PHENERGAN) 12.5 mg in sodium chloride 0.9 % 50 mL, 12.5 mg, Intravenous, Q6H PRN, Seven Gallego MD, 12.5 mg at 09/29/17 0943  •  promethazine (PHENERGAN) tablet 12.5 mg, 12.5 mg, Oral, Q6H PRN, 12.5 mg at 10/01/17 0313 **OR** promethazine (PHENERGAN) 12.5 mg in sodium chloride 0.9 % 50 mL, 12.5 mg, Intravenous, Q6H PRN, Seven Gallego MD, 12.5 mg at 09/30/17 0443  •  venlafaxine XR (EFFEXOR-XR) 24 hr capsule 75 mg, 75 mg, Oral, Daily, Seven Gallego MD, 75 mg at 09/30/17 0846    bisacodyl 10 mg Rectal Daily   enoxaparin 40 mg Subcutaneous Nightly   insulin aspart 0-7 Units Subcutaneous Q6H   ipratropium-albuterol 3 mL Nebulization 4x Daily - RT   metoprolol tartrate 100 mg Oral Q12H   pantoprazole 40 mg Intravenous BID AC   polyethylene glycol 17 g Oral Daily   venlafaxine XR 75 mg Oral Daily     dextrose  •  dextrose  •  glucagon (human recombinant)  •  magnesium sulfate **OR** magnesium sulfate **OR** magnesium sulfate  •  Morphine  •   ondansetron  •  potassium & sodium phosphates **OR** potassium & sodium phosphates  •  potassium chloride  •  potassium chloride  •  promethazine **OR** promethazine  •  promethazine **OR** promethazine    Lab Results (last 72 hours)     Procedure Component Value Units Date/Time    POC Glucose Fingerstick [[id number]]  (Abnormal) Collected:  09/28/17 0745    Specimen:  Blood Updated:  09/28/17 0755     Glucose 157 (H) mg/dL     Narrative:       Meter: [id number] : [id number] [place]    POC Glucose Fingerstick [[id number]]  (Abnormal) Collected:  09/28/17 1132    Specimen:  Blood Updated:  09/28/17 1200     Glucose 201 (H) mg/dL     Narrative:       Meter: [id number] : [id number] [place]    Blood Culture [[id number]]  (Normal) Collected:  09/23/17 1523    Specimen:  Blood from Blood, Venous Line Updated:  09/28/17 1601     Blood Culture No growth at 5 days    Blood Culture [[id number]]  (Normal) Collected:  09/23/17 1603    Specimen:  Blood from Blood, Venous Line Updated:  09/28/17 1701     Blood Culture No growth at 5 days    POC Glucose Fingerstick [[id number]]  (Abnormal) Collected:  09/28/17 1945    Specimen:  Blood Updated:  09/28/17 1951     Glucose 204 (H) mg/dL     Narrative:       Meter: [id number] : [id number] king jose    C-reactive Protein [[id number]]  (Abnormal) Collected:  09/29/17 0141    Specimen:  Blood Updated:  09/29/17 0230     C-Reactive Protein 30.48 (H) mg/dL     CBC & Differential [[id number]] Collected:  09/29/17 0337    Specimen:  Blood Updated:  09/29/17 0502    Narrative:       The following orders were created for panel order CBC & Differential.  Procedure                               Abnormality         Status                     ---------                               -----------         ------                     Scan Slide[[id number]]                                                                  CBC Auto Differential[[id number]]        Abnormal             Final result                 Please view results for these tests on the individual orders.    CBC Auto Differential [[id number]]  (Abnormal) Collected:  09/29/17 0337    Specimen:  Blood Updated:  09/29/17 0502     WBC 18.61 (H) 10*3/mm3      RBC 4.42 10*6/mm3      Hemoglobin 12.6 g/dL      Hematocrit 37.8 %      MCV 85.5 fL      MCH 28.5 pg      MCHC 33.3 g/dL      RDW 14.2 %      RDW-SD 44.4 fl      MPV 11.3 (H) fL      Platelets 254 10*3/mm3      Neutrophil % 80.1 (H) %      Lymphocyte % 6.9 (L) %      Monocyte % 9.2 %      Eosinophil % 1.5 %      Basophil % 0.3 %      Immature Grans % 2.0 (H) %      Neutrophils, Absolute 14.91 (H) 10*3/mm3      Lymphocytes, Absolute 1.28 10*3/mm3      Monocytes, Absolute 1.71 (H) 10*3/mm3      Eosinophils, Absolute 0.28 10*3/mm3      Basophils, Absolute 0.05 10*3/mm3      Immature Grans, Absolute 0.38 (H) 10*3/mm3      nRBC 0.0 /100 WBC     Lipase [[id number]]  (Normal) Collected:  09/29/17 0337    Specimen:  Blood Updated:  09/29/17 0523     Lipase 35 U/L     Comprehensive Metabolic Panel [[id number]]  (Abnormal) Collected:  09/29/17 0337    Specimen:  Blood Updated:  09/29/17 0526     Glucose 188 (H) mg/dL      BUN 8 mg/dL      Creatinine 0.38 (L) mg/dL      Sodium 133 (L) mmol/L      Potassium 3.3 (L) mmol/L      Chloride 102 mmol/L      CO2 24.7 mmol/L      Calcium 8.6 mg/dL      Total Protein 5.7 (L) g/dL      Albumin 3.30 (L) g/dL      ALT (SGPT) 44 (H) U/L      AST (SGOT) 55 (H) U/L      Alkaline Phosphatase 142 (H) U/L       Note New Reference Ranges        Total Bilirubin 0.7 mg/dL      eGFR Non African Amer >150 mL/min/1.73      Globulin 2.4 gm/dL      A/G Ratio 1.4 (L) g/dL      BUN/Creatinine Ratio 21.1     Anion Gap 6.3 mmol/L     Amylase [[id number]]  (Normal) Collected:  09/29/17 0337    Specimen:  Blood Updated:  09/29/17 0526     Amylase 46 U/L     Osmolality, Calculated [[id number]]  (Abnormal) Collected:  09/29/17 0337    Specimen:  Blood Updated:  09/29/17 0526      Osmolality Calc 269.7 (L) mOsm/kg     POC Glucose Fingerstick [[id number]]  (Abnormal) Collected:  09/29/17 1324    Specimen:  Blood Updated:  09/29/17 1331     Glucose 165 (H) mg/dL     Narrative:       Meter: [id number] : [id number] Bo Patria    Urine Culture [[id number]] Collected:  09/26/17 1808    Specimen:  Urine from Urine, Clean Catch Updated:  09/29/17 1440     Urine Culture >100,000 CFU/mL Normal Urogenital Magi    POC Glucose Fingerstick [[id number]]  (Abnormal) Collected:  09/29/17 1636    Specimen:  Blood Updated:  09/29/17 1643     Glucose 164 (H) mg/dL     Narrative:       Meter: [id number] : [id number] Rg Russell    Urinalysis - Urine, Clean Catch [[id number]]  (Abnormal) Collected:  09/29/17 2038    Specimen:  Urine from Urine, Clean Catch Updated:  09/29/17 2053     Color, UA Dark Yellow (A)     Appearance, UA Clear     pH, UA 6.0     Specific Gravity, UA 1.021     Glucose,  mg/dL (1+) (A)     Ketones, UA 80 mg/dL (3+) (A)     Bilirubin, UA Negative     Blood, UA Negative     Protein, UA Trace (A)     Leuk Esterase, UA Trace (A)     Nitrite, UA Negative     Urobilinogen, UA 4.0 E.U./dL (A)    POC Glucose Fingerstick [[id number]]  (Abnormal) Collected:  09/29/17 2324    Specimen:  Blood Updated:  09/29/17 2330     Glucose 169 (H) mg/dL     Narrative:       Meter: [id number] : [id number] holly mary    CBC Auto Differential [[id number]]  (Abnormal) Collected:  09/30/17 0233    Specimen:  Blood Updated:  09/30/17 0301     WBC 21.75 (H) 10*3/mm3      RBC 4.56 10*6/mm3      Hemoglobin 13.1 g/dL      Hematocrit 39.4 %      MCV 86.4 fL      MCH 28.7 pg      MCHC 33.2 g/dL      RDW 14.2 %      RDW-SD 44.8 fl      MPV 11.2 (H) fL      Platelets 240 10*3/mm3      Neutrophil % 82.4 (H) %      Lymphocyte % 6.6 (L) %      Monocyte % 6.9 %      Eosinophil % 1.2 %      Basophil % 0.4 %      Immature Grans % 2.5 (H) %      Neutrophils, Absolute 17.89 (H) 10*3/mm3      Lymphocytes, Absolute 1.44  10*3/mm3      Monocytes, Absolute 1.51 (H) 10*3/mm3      Eosinophils, Absolute 0.27 10*3/mm3      Basophils, Absolute 0.09 10*3/mm3      Immature Grans, Absolute 0.55 (H) 10*3/mm3      nRBC 0.0 /100 WBC     Comprehensive Metabolic Panel [[id number]]  (Abnormal) Collected:  09/30/17 0233    Specimen:  Blood Updated:  09/30/17 0313     Glucose 223 (H) mg/dL      BUN 8 mg/dL      Creatinine 0.35 (L) mg/dL      Sodium 137 mmol/L      Potassium 3.0 (L) mmol/L      Chloride 101 mmol/L      CO2 26.5 mmol/L      Calcium 8.8 mg/dL      Total Protein 5.7 (L) g/dL      Albumin 3.30 (L) g/dL      ALT (SGPT) 68 (H) U/L      AST (SGOT) 73 (H) U/L      Alkaline Phosphatase 155 (H) U/L       Note New Reference Ranges        Total Bilirubin 0.7 mg/dL      eGFR Non African Amer >150 mL/min/1.73      Globulin 2.4 gm/dL      A/G Ratio 1.4 (L) g/dL      BUN/Creatinine Ratio 22.9     Anion Gap 9.5 mmol/L     Osmolality, Calculated [[id number]]  (Normal) Collected:  09/30/17 0233    Specimen:  Blood Updated:  09/30/17 0313     Osmolality Calc 279.1 mOsm/kg     Magnesium [[id number]]  (Normal) Collected:  09/30/17 0233    Specimen:  Blood Updated:  09/30/17 0314     Magnesium 1.8 mg/dL     Phosphorus [[id number]]  (Normal) Collected:  09/30/17 0233    Specimen:  Blood Updated:  09/30/17 0314     Phosphorus 3.2 mg/dL     C-reactive Protein [[id number]]  (Abnormal) Collected:  09/30/17 0233    Specimen:  Blood Updated:  09/30/17 0315     C-Reactive Protein 26.22 (H) mg/dL       1+ Hemolysis        Manual Differential [[id number]]  (Abnormal) Collected:  09/30/17 0233    Specimen:  Blood Updated:  09/30/17 0319     Neutrophil % 80.0 (H) %      Lymphocyte % 8.0 (L) %      Monocyte % 5.0 %      Eosinophil % 1.0 %      Bands %  6.0 %      Neutrophils Absolute 18.71 (H) 10*3/mm3      Lymphocytes Absolute 1.74 10*3/mm3      Monocytes Absolute 1.09 (H) 10*3/mm3      Eosinophils Absolute 0.22 10*3/mm3      RBC Morphology Normal     Platelet Morphology  Normal    CBC & Differential [[id number]] Collected:  09/30/17 0233    Specimen:  Blood Updated:  09/30/17 0319    Narrative:       The following orders were created for panel order CBC & Differential.  Procedure                               Abnormality         Status                     ---------                               -----------         ------                     Manual Differential[[id number]]          Abnormal            Final result               Scan Slide[[id number]]                                                                  CBC Auto Differential[[id number]]        Abnormal            Final result                 Please view results for these tests on the individual orders.    POC Glucose Fingerstick [[id number]]  (Abnormal) Collected:  09/30/17 0521    Specimen:  Blood Updated:  09/30/17 0527     Glucose 211 (H) mg/dL     Narrative:       Meter: [id number] : [id number] holly hernandez    Blood Culture - Blood, [[id number]]  (Normal) Collected:  09/29/17 0952    Specimen:  Blood from Arm, Left Updated:  09/30/17 1101     Blood Culture No growth at 24 hours    POC Glucose Fingerstick [[id number]]  (Abnormal) Collected:  09/30/17 1154    Specimen:  Blood Updated:  09/30/17 1200     Glucose 189 (H) mg/dL     Narrative:       Meter: [id number] : [id number] Anupam [place]    Blood Culture - Blood, [[id number]]  (Normal) Collected:  09/29/17 1044    Specimen:  Blood from Arm, Left Updated:  09/30/17 1401     Blood Culture No growth at 24 hours    POC Glucose Fingerstick [[id number]]  (Abnormal) Collected:  09/30/17 1704    Specimen:  Blood Updated:  09/30/17 1711     Glucose 170 (H) mg/dL     Narrative:       Meter: [id number] : [id number] Anupam [place]    Occult Blood X 1, Stool - Stool, Per Rectum [[id number]]  (Normal) Collected:  09/30/17 1825    Specimen:  Stool from Per Rectum Updated:  09/30/17 1838     Fecal Occult Blood Negative    POC Glucose Fingerstick [[id number]]  (Abnormal) Collected:   10/01/17 0019    Specimen:  Blood Updated:  10/01/17 0025     Glucose 235 (H) mg/dL     Narrative:       Meter: [id number] : [id number] holly Eldon    Comprehensive Metabolic Panel [[id number]]  (Abnormal) Collected:  10/01/17 0159    Specimen:  Blood Updated:  10/01/17 0235     Glucose 233 (H) mg/dL      BUN 6 (L) mg/dL      Creatinine 0.47 mg/dL      Sodium 134 (L) mmol/L      Potassium 3.5 mmol/L      Chloride 100 mmol/L      CO2 29.3 mmol/L      Calcium 8.7 mg/dL      Total Protein 5.5 (L) g/dL      Albumin 3.20 (L) g/dL      ALT (SGPT) 80 (H) U/L      AST (SGOT) 72 (H) U/L      Alkaline Phosphatase 170 (H) U/L       Note New Reference Ranges        Total Bilirubin 0.6 mg/dL      eGFR Non African Amer 142 mL/min/1.73      Globulin 2.3 gm/dL      A/G Ratio 1.4 (L) g/dL      BUN/Creatinine Ratio 12.8     Anion Gap 4.7 mmol/L     Magnesium [[id number]]  (Normal) Collected:  10/01/17 0159    Specimen:  Blood Updated:  10/01/17 0235     Magnesium 2.0 mg/dL     Osmolality, Calculated [[id number]]  (Normal) Collected:  10/01/17 0159    Specimen:  Blood Updated:  10/01/17 0235     Osmolality Calc 273.3 mOsm/kg     Amylase [[id number]]  (Normal) Collected:  10/01/17 0159    Specimen:  Blood Updated:  10/01/17 0250     Amylase 50 U/L     POC Glucose Fingerstick [[id number]]  (Abnormal) Collected:  10/01/17 0536    Specimen:  Blood Updated:  10/01/17 0547     Glucose 240 (H) mg/dL     Narrative:       Meter: [id number] : [id number] holly mary          Imaging Results (last 72 hours)     Procedure Component Value Units Date/Time    XR Chest PA & Lateral [[id number]] Collected:  09/29/17 1048     Updated:  09/29/17 1301    Narrative:       EXAMINATION: XR CHEST PA AND LATERAL-      CLINICAL INDICATION:     fever.; K85.90-Acute pancreatitis without  necrosis or infection, unspecified     TECHNIQUE:  XR CHEST PA AND LATERAL-      COMPARISON: 5/1/2014      FINDINGS:   Development of bibasilar opacities may represent  atelectasis or  pneumonia.   Heart and mediastinal contours are unremarkable.   No pneumothorax.   Trace pleural effusions.   No acute osseous findings.            Impression:       1. Bibasilar airspace disease which may represent atelectasis and/or  pneumonia.  2. Trace pleural effusions.     This report was finalized on 9/29/2017 10:49 AM by Dr. Mike Tolbert MD.       CT Abdomen Pelvis Stone Protocol [[id number]] Collected:  09/30/17 0950     Updated:  09/30/17 1028    Narrative:       CT ABDOMEN PELVIS STONE PROTOCOL-     REASON FOR EXAM: lower back pain; K85.90-Acute pancreatitis without  necrosis or infection, unspecified     Today's CT is compared with a previous CT scan from 09/23/2017. Today's  study is done without contrast.     Images that include the bases of the lung show some atelectasis versus  parenchymal infiltrate in both lung bases just above the diaphragm. The  liver and spleen were both enlarged. There is diffuse enlargement of the  pancreas throughout the head and body region. There is stranding in the  fat planes around the pancreas. There is more edema in the mesentery  than on the previous CT. This would be consistent with pancreatitis with  more inflammation than on the previous exam. The bowel shows no evidence  of obstruction. There was ascites in the pelvis.       Impression:       Atelectasis versus pneumonia is developing in both lung  bases. There is hepatosplenomegaly. There are are changes of acute  pancreatitis with more inflammation in the peripancreatic tissues  extending into the mesentery. There is free fluid in the pelvis.     799.934818 mGy.cm  The radiation dose reduction device was utilized for each scan per the  ALARA (as low as reasonably achievable) protocol.     This report was finalized on 9/30/2017 10:26 AM by Dr. Bobo Walsh II, MD.             Assessment/Plan    Continue present meds and check labs      Active Problems:    Acute pancreatitis       See orders  entered.     Nico Fung MD  10/01/17  7:38 AM

## 2017-10-01 NOTE — PLAN OF CARE
Problem: Patient Care Overview (Adult)  Goal: Plan of Care Review  Outcome: Ongoing (interventions implemented as appropriate)  Goal: Adult Individualization and Mutuality  Outcome: Ongoing (interventions implemented as appropriate)  Goal: Discharge Needs Assessment  Outcome: Ongoing (interventions implemented as appropriate)    Problem: Pain, Acute (Adult)  Goal: Identify Related Risk Factors and Signs and Symptoms  Outcome: Ongoing (interventions implemented as appropriate)  Goal: Acceptable Pain Control/Comfort Level  Outcome: Ongoing (interventions implemented as appropriate)    Problem: Pancreatitis, Acute/Chronic (Adult)  Goal: Signs and Symptoms of Listed Potential Problems Will be Absent or Manageable (Pancreatitis, Acute/Chronic)  Outcome: Ongoing (interventions implemented as appropriate)    Problem: Fall Risk (Adult)  Goal: Identify Related Risk Factors and Signs and Symptoms  Outcome: Ongoing (interventions implemented as appropriate)  Goal: Absence of Falls  Outcome: Ongoing (interventions implemented as appropriate)    Problem: Diabetes, Type 2 (Adult)  Goal: Signs and Symptoms of Listed Potential Problems Will be Absent or Manageable (Diabetes, Type 2)  Outcome: Ongoing (interventions implemented as appropriate)    Problem: Pneumonia (Adult)  Goal: Signs and Symptoms of Listed Potential Problems Will be Absent or Manageable (Pneumonia)  Outcome: Ongoing (interventions implemented as appropriate)

## 2017-10-02 PROBLEM — R10.13 EPIGASTRIC PAIN: Status: ACTIVE | Noted: 2017-09-23

## 2017-10-02 PROBLEM — R11.0 NAUSEA: Status: ACTIVE | Noted: 2017-09-23

## 2017-10-02 LAB
ALBUMIN SERPL-MCNC: 3.1 G/DL (ref 3.5–5)
ALBUMIN/GLOB SERPL: 1.3 G/DL (ref 1.5–2.5)
ALP SERPL-CCNC: 171 U/L (ref 35–104)
ALT SERPL W P-5'-P-CCNC: 73 U/L (ref 10–36)
ANION GAP SERPL CALCULATED.3IONS-SCNC: 6.1 MMOL/L (ref 3.6–11.2)
AST SERPL-CCNC: 53 U/L (ref 10–30)
BASOPHILS # BLD AUTO: 0.04 10*3/MM3 (ref 0–0.3)
BASOPHILS NFR BLD AUTO: 0.2 % (ref 0–2)
BILIRUB SERPL-MCNC: 0.5 MG/DL (ref 0.2–1.8)
BUN BLD-MCNC: 5 MG/DL (ref 7–21)
BUN/CREAT SERPL: 12.2 (ref 7–25)
CALCIUM SPEC-SCNC: 8.7 MG/DL (ref 7.7–10)
CHLORIDE SERPL-SCNC: 99 MMOL/L (ref 99–112)
CO2 SERPL-SCNC: 29.9 MMOL/L (ref 24.3–31.9)
CREAT BLD-MCNC: 0.41 MG/DL (ref 0.43–1.29)
DEPRECATED RDW RBC AUTO: 44.4 FL (ref 37–54)
EOSINOPHIL # BLD AUTO: 0.3 10*3/MM3 (ref 0–0.7)
EOSINOPHIL NFR BLD AUTO: 1.5 % (ref 0–5)
ERYTHROCYTE [DISTWIDTH] IN BLOOD BY AUTOMATED COUNT: 14.1 % (ref 11.5–14.5)
GFR SERPL CREATININE-BSD FRML MDRD: >150 ML/MIN/1.73
GLOBULIN UR ELPH-MCNC: 2.3 GM/DL
GLUCOSE BLD-MCNC: 200 MG/DL (ref 70–110)
GLUCOSE BLDC GLUCOMTR-MCNC: 203 MG/DL (ref 70–130)
GLUCOSE BLDC GLUCOMTR-MCNC: 218 MG/DL (ref 70–130)
GLUCOSE BLDC GLUCOMTR-MCNC: 234 MG/DL (ref 70–130)
GLUCOSE BLDC GLUCOMTR-MCNC: 293 MG/DL (ref 70–130)
HCT VFR BLD AUTO: 37.6 % (ref 37–47)
HGB BLD-MCNC: 12.3 G/DL (ref 12–16)
IMM GRANULOCYTES # BLD: 0.4 10*3/MM3 (ref 0–0.03)
IMM GRANULOCYTES NFR BLD: 2.1 % (ref 0–0.5)
LYMPHOCYTES # BLD AUTO: 1.79 10*3/MM3 (ref 1–3)
LYMPHOCYTES NFR BLD AUTO: 9.2 % (ref 21–51)
MCH RBC QN AUTO: 28.5 PG (ref 27–33)
MCHC RBC AUTO-ENTMCNC: 32.7 G/DL (ref 33–37)
MCV RBC AUTO: 87 FL (ref 80–94)
MONOCYTES # BLD AUTO: 1.2 10*3/MM3 (ref 0.1–0.9)
MONOCYTES NFR BLD AUTO: 6.2 % (ref 0–10)
NEUTROPHILS # BLD AUTO: 15.68 10*3/MM3 (ref 1.4–6.5)
NEUTROPHILS NFR BLD AUTO: 80.8 % (ref 30–70)
OSMOLALITY SERPL CALC.SUM OF ELEC: 273 MOSM/KG (ref 273–305)
PLATELET # BLD AUTO: 303 10*3/MM3 (ref 130–400)
PMV BLD AUTO: 11.3 FL (ref 6–10)
POTASSIUM BLD-SCNC: 3.3 MMOL/L (ref 3.5–5.3)
PROT SERPL-MCNC: 5.4 G/DL (ref 6–8)
RBC # BLD AUTO: 4.32 10*6/MM3 (ref 4.2–5.4)
SODIUM BLD-SCNC: 135 MMOL/L (ref 135–153)
WBC NRBC COR # BLD: 19.41 10*3/MM3 (ref 4.5–12.5)

## 2017-10-02 PROCEDURE — 94799 UNLISTED PULMONARY SVC/PX: CPT

## 2017-10-02 PROCEDURE — 85025 COMPLETE CBC W/AUTO DIFF WBC: CPT | Performed by: INTERNAL MEDICINE

## 2017-10-02 PROCEDURE — G8980 MOBILITY D/C STATUS: HCPCS

## 2017-10-02 PROCEDURE — 82962 GLUCOSE BLOOD TEST: CPT

## 2017-10-02 PROCEDURE — 63710000001 PROMETHAZINE PER 12.5 MG: Performed by: INTERNAL MEDICINE

## 2017-10-02 PROCEDURE — 25010000002 MORPHINE PER 10 MG: Performed by: INTERNAL MEDICINE

## 2017-10-02 PROCEDURE — C1751 CATH, INF, PER/CENT/MIDLINE: HCPCS

## 2017-10-02 PROCEDURE — G8979 MOBILITY GOAL STATUS: HCPCS

## 2017-10-02 PROCEDURE — 25010000002 ENOXAPARIN PER 10 MG: Performed by: INTERNAL MEDICINE

## 2017-10-02 PROCEDURE — G8978 MOBILITY CURRENT STATUS: HCPCS

## 2017-10-02 PROCEDURE — 25010000002 ONDANSETRON PER 1 MG: Performed by: INTERNAL MEDICINE

## 2017-10-02 PROCEDURE — 80053 COMPREHEN METABOLIC PANEL: CPT | Performed by: INTERNAL MEDICINE

## 2017-10-02 PROCEDURE — 97163 PT EVAL HIGH COMPLEX 45 MIN: CPT

## 2017-10-02 PROCEDURE — 99232 SBSQ HOSP IP/OBS MODERATE 35: CPT | Performed by: PHYSICIAN ASSISTANT

## 2017-10-02 PROCEDURE — 63710000001 INSULIN ASPART PER 5 UNITS: Performed by: INTERNAL MEDICINE

## 2017-10-02 RX ORDER — LIDOCAINE HYDROCHLORIDE 10 MG/ML
20 INJECTION, SOLUTION INFILTRATION; PERINEURAL ONCE
Status: COMPLETED | OUTPATIENT
Start: 2017-10-02 | End: 2017-10-02

## 2017-10-02 RX ORDER — SODIUM CHLORIDE 0.9 % (FLUSH) 0.9 %
10 SYRINGE (ML) INJECTION EVERY 12 HOURS SCHEDULED
Status: DISCONTINUED | OUTPATIENT
Start: 2017-10-02 | End: 2017-10-09 | Stop reason: HOSPADM

## 2017-10-02 RX ORDER — SODIUM CHLORIDE 0.9 % (FLUSH) 0.9 %
10 SYRINGE (ML) INJECTION AS NEEDED
Status: DISCONTINUED | OUTPATIENT
Start: 2017-10-02 | End: 2017-10-09 | Stop reason: HOSPADM

## 2017-10-02 RX ORDER — POTASSIUM CHLORIDE 20 MEQ/1
40 TABLET, EXTENDED RELEASE ORAL EVERY 4 HOURS
Status: COMPLETED | OUTPATIENT
Start: 2017-10-02 | End: 2017-10-02

## 2017-10-02 RX ADMIN — PROMETHAZINE HYDROCHLORIDE 12.5 MG: 12.5 TABLET ORAL at 06:16

## 2017-10-02 RX ADMIN — IPRATROPIUM BROMIDE AND ALBUTEROL SULFATE 3 ML: .5; 3 SOLUTION RESPIRATORY (INHALATION) at 00:58

## 2017-10-02 RX ADMIN — IPRATROPIUM BROMIDE AND ALBUTEROL SULFATE 3 ML: .5; 3 SOLUTION RESPIRATORY (INHALATION) at 13:19

## 2017-10-02 RX ADMIN — MORPHINE SULFATE 2 MG: 2 INJECTION, SOLUTION INTRAMUSCULAR; INTRAVENOUS at 12:50

## 2017-10-02 RX ADMIN — POTASSIUM CHLORIDE 40 MEQ: 1500 TABLET, EXTENDED RELEASE ORAL at 05:15

## 2017-10-02 RX ADMIN — INSULIN ASPART 2 UNITS: 100 INJECTION, SOLUTION INTRAVENOUS; SUBCUTANEOUS at 23:41

## 2017-10-02 RX ADMIN — MORPHINE SULFATE 2 MG: 2 INJECTION, SOLUTION INTRAMUSCULAR; INTRAVENOUS at 03:08

## 2017-10-02 RX ADMIN — PANTOPRAZOLE SODIUM 40 MG: 40 INJECTION, POWDER, FOR SOLUTION INTRAVENOUS at 06:13

## 2017-10-02 RX ADMIN — METOPROLOL TARTRATE 100 MG: 100 TABLET, FILM COATED ORAL at 20:35

## 2017-10-02 RX ADMIN — Medication 10 ML: at 20:37

## 2017-10-02 RX ADMIN — PROMETHAZINE HYDROCHLORIDE 12.5 MG: 12.5 TABLET ORAL at 12:50

## 2017-10-02 RX ADMIN — Medication 10 ML: at 10:32

## 2017-10-02 RX ADMIN — ONDANSETRON 4 MG: 2 INJECTION INTRAMUSCULAR; INTRAVENOUS at 10:32

## 2017-10-02 RX ADMIN — MORPHINE SULFATE 2 MG: 2 INJECTION, SOLUTION INTRAMUSCULAR; INTRAVENOUS at 10:32

## 2017-10-02 RX ADMIN — PROMETHAZINE HYDROCHLORIDE 12.5 MG: 12.5 TABLET ORAL at 20:35

## 2017-10-02 RX ADMIN — MORPHINE SULFATE 2 MG: 2 INJECTION, SOLUTION INTRAMUSCULAR; INTRAVENOUS at 06:12

## 2017-10-02 RX ADMIN — PANTOPRAZOLE SODIUM 40 MG: 40 INJECTION, POWDER, FOR SOLUTION INTRAVENOUS at 17:24

## 2017-10-02 RX ADMIN — LIDOCAINE HYDROCHLORIDE 0.5 ML: 10 INJECTION, SOLUTION INFILTRATION; PERINEURAL at 10:12

## 2017-10-02 RX ADMIN — MORPHINE SULFATE 2 MG: 2 INJECTION, SOLUTION INTRAMUSCULAR; INTRAVENOUS at 23:32

## 2017-10-02 RX ADMIN — INSULIN ASPART 3 UNITS: 100 INJECTION, SOLUTION INTRAVENOUS; SUBCUTANEOUS at 06:11

## 2017-10-02 RX ADMIN — METOPROLOL TARTRATE 100 MG: 100 TABLET, FILM COATED ORAL at 09:33

## 2017-10-02 RX ADMIN — POLYETHYLENE GLYCOL 3350 17 G: 17 POWDER, FOR SOLUTION ORAL at 09:33

## 2017-10-02 RX ADMIN — BISACODYL 10 MG: 10 SUPPOSITORY RECTAL at 09:33

## 2017-10-02 RX ADMIN — INSULIN ASPART 4 UNITS: 100 INJECTION, SOLUTION INTRAVENOUS; SUBCUTANEOUS at 17:08

## 2017-10-02 RX ADMIN — MORPHINE SULFATE 2 MG: 2 INJECTION, SOLUTION INTRAMUSCULAR; INTRAVENOUS at 20:36

## 2017-10-02 RX ADMIN — MORPHINE SULFATE 2 MG: 2 INJECTION, SOLUTION INTRAMUSCULAR; INTRAVENOUS at 16:59

## 2017-10-02 RX ADMIN — ONDANSETRON 4 MG: 2 INJECTION INTRAMUSCULAR; INTRAVENOUS at 03:08

## 2017-10-02 RX ADMIN — VENLAFAXINE HYDROCHLORIDE 75 MG: 75 CAPSULE, EXTENDED RELEASE ORAL at 09:33

## 2017-10-02 RX ADMIN — ENOXAPARIN SODIUM 40 MG: 40 INJECTION SUBCUTANEOUS at 20:35

## 2017-10-02 RX ADMIN — POTASSIUM CHLORIDE 40 MEQ: 1500 TABLET, EXTENDED RELEASE ORAL at 09:33

## 2017-10-02 RX ADMIN — INSULIN ASPART 3 UNITS: 100 INJECTION, SOLUTION INTRAVENOUS; SUBCUTANEOUS at 11:33

## 2017-10-02 NOTE — PLAN OF CARE
Problem: Patient Care Overview (Adult)  Goal: Plan of Care Review  Outcome: Ongoing (interventions implemented as appropriate)  Goal: Adult Individualization and Mutuality  Outcome: Ongoing (interventions implemented as appropriate)    Problem: Pain, Acute (Adult)  Goal: Identify Related Risk Factors and Signs and Symptoms  Outcome: Ongoing (interventions implemented as appropriate)  Goal: Acceptable Pain Control/Comfort Level  Outcome: Ongoing (interventions implemented as appropriate)    Problem: Pancreatitis, Acute/Chronic (Adult)  Goal: Signs and Symptoms of Listed Potential Problems Will be Absent or Manageable (Pancreatitis, Acute/Chronic)  Outcome: Ongoing (interventions implemented as appropriate)    Problem: Fall Risk (Adult)  Goal: Identify Related Risk Factors and Signs and Symptoms  Outcome: Ongoing (interventions implemented as appropriate)  Goal: Absence of Falls  Outcome: Ongoing (interventions implemented as appropriate)    Problem: Diabetes, Type 2 (Adult)  Goal: Signs and Symptoms of Listed Potential Problems Will be Absent or Manageable (Diabetes, Type 2)  Outcome: Ongoing (interventions implemented as appropriate)    Problem: Pneumonia (Adult)  Goal: Signs and Symptoms of Listed Potential Problems Will be Absent or Manageable (Pneumonia)  Outcome: Ongoing (interventions implemented as appropriate)

## 2017-10-02 NOTE — THERAPY EVALUATION
Acute Care - Physical Therapy Initial Eval/Discharge   [place]     Patient Name: Rossy Boone  : 1970  MRN: [id number]  Today's Date: 10/2/2017   Onset of Illness/Injury or Date of Surgery Date: 17  Date of Referral to PT: 10/02/17  Referring Physician: Julián      Admit Date: 2017    Visit Dx:    ICD-10-CM ICD-9-CM   1. Epigastric pain R10.13 789.06   2. Acute pancreatitis, unspecified complication status, unspecified pancreatitis type K85.90 577.0   3. Nausea R11.0 787.02     Patient Active Problem List   Diagnosis   • Acute pancreatitis   • Nausea   • Epigastric pain     Past Medical History:   Diagnosis Date   • Diabetes mellitus    • Hypertension      Past Surgical History:   Procedure Laterality Date   • APPENDECTOMY     •  SECTION     • CHOLECYSTECTOMY     • HYSTERECTOMY            PT ASSESSMENT (last 72 hours)      PT Evaluation       10/02/17 1702 10/02/17 1521    Rehab Evaluation    Document Type evaluation  -CT     Subjective Information agree to therapy  -CT     Patient Effort, Rehab Treatment good  -CT     Symptoms Noted Comment Pt tolerated evaluation well today and ambualted  ft. Pt has no identified skilled PT needs and would benefit from daily ambualtion from nursing staiff.   -CT     General Information    Patient Profile Review yes  -CT     Onset of Illness/Injury or Date of Surgery Date 17  -CT     Referring Physician Julián  -CT     Pertinent History Of Current Problem Acute pancreatitis  -CT     Prior Level of Function independent:;all household mobility;community mobility  -CT     Equipment Currently Used at Home none  -CT none  -MC    Plans/Goals Discussed With patient;agreed upon  -CT     Risks Reviewed patient:;LOB;nausea/vomiting;dizziness;increased discomfort;change in vital signs;increased drainage;lines disloged  -CT     Benefits Reviewed patient:;improve function;increase independence;increase strength;increase balance;decrease pain;decrease  risk of DVT;improve skin integrity;increase knowledge  -CT     Barriers to Rehab none identified  -CT     Living Environment    Lives With spouse  -CT spouse  -    Living Arrangements house  -CT     Home Accessibility stairs to enter home  -CT     Number of Stairs to Enter Home 4  -CT     Transportation Available  car;family or friend will provide  -    Clinical Impression    Date of Referral to PT 10/02/17  -CT     Functional Level At Time Of Evaluation SUP  -CT     Criteria for Skilled Therapeutic Interventions Met no problems identified which require skilled intervention  -CT     Cognitive Assessment/Intervention    Current Cognitive/Communication Assessment functional  -CT     Orientation Status oriented x 4  -CT     Follows Commands/Answers Questions able to follow multi-step instructions;100% of the time  -CT     Personal Safety WNL/WFL  -CT     Personal Safety Interventions gait belt;nonskid shoes/slippers when out of bed  -CT     ROM (Range of Motion)    General ROM Detail BLE WFL  -CT     MMT (Manual Muscle Testing)    General MMT Assessment Detail not tested d/t reported abdominal pain  -CT     Bed Mobility, Assessment/Treatment    Bed Mobility, Roll Left, [place] independent  -CT     Bed Mobility, Roll Right, [place] independent  -CT     Bed Mobility, Scoot/Bridge, [place] independent  -CT     Bed Mob, Supine to Sit, [place] independent  -CT     Bed Mob, Sit to Supine, [place] independent  -CT     Transfer Assessment/Treatment    Transfers, Sit-Stand [place] supervision required  -CT     Transfers, Stand-Sit [place] supervision required  -CT     Transfers, Sit-Stand-Sit, Assist Device bed rails  -CT     Gait Assessment/Treatment    Gait, [place] Level supervision required;stand by assist  -CT     Gait, Assistive Device --   no AD  -CT     Gait, Distance (Feet) 200  -CT     Gait, Gait Pattern Analysis swing-through gait  -CT     Positioning and Restraints     Pre-Treatment Position in bed  -CT     Post Treatment Position bed  -CT     In Bed supine;call light within reach;encouraged to call for assist  -CT       10/01/17 0800 09/30/17 0800    Muscle Tone Assessment    Muscle Tone Assessment Bilateral Upper Extremities;Bilateral Lower Extremities  - Bilateral Upper Extremities;Bilateral Lower Extremities  -      User Key  (r) = Recorded By, (t) = Taken By, (c) = Cosigned By    Initials Name Provider Type    CT Maricel Liao, REED Physical Therapist    SHIRA Mcclure     SIDDHARTHA Bo, RN Registered Nurse          Physical Therapy Education     Title: PT OT SLP Therapies (Resolved)     Topic: Physical Therapy (Resolved)     Point: Home exercise program (Resolved)    Learning Progress Summary    Learner Readiness Method Response Comment Documented by Status   Patient Acceptance E VU  CT 10/02/17 1708 Done               Point: Body mechanics (Resolved)    Learning Progress Summary    Learner Readiness Method Response Comment Documented by Status   Patient Acceptance E VU  CT 10/02/17 1708 Done                      User Key     Initials Effective Dates Name Provider Type Discipline    CT 03/14/16 -  Maricel Liao PT Physical Therapist PT                PT Recommendation and Plan  Anticipated Discharge Disposition: home  Planned Therapy Interventions:  (evaluation only)  PT Frequency: evaluation only                 Outcome Measures       10/02/17 1700          How much help from another person do you currently need...    Turning from your back to your side while in flat bed without using bedrails? 4  -CT      Moving from lying on back to sitting on the side of a flat bed without bedrails? 4  -CT      Moving to and from a bed to a chair (including a wheelchair)? 4  -CT      Standing up from a chair using your arms (e.g., wheelchair, bedside chair)? 4  -CT      Climbing 3-5 steps with a railing? 3  -CT      To walk in hospital room? 4  -CT       AM-PAC 6 Clicks Score 23  -CT      Functional Assessment    Outcome Measure Options AM-PAC 6 Clicks Basic Mobility (PT)  -CT        User Key  (r) = Recorded By, (t) = Taken By, (c) = Cosigned By    Initials Name Provider Type    CT Maricel Liao, PT Physical Therapist           Time Calculation:         PT Charges       10/02/17 1708          Time Calculation    PT Received On 10/02/17  -CT      Time Calculation- PT    Total Timed Code Minutes- PT 45 minute(s)  -CT        User Key  (r) = Recorded By, (t) = Taken By, (c) = Cosigned By    Initials Name Provider Type    CT Maricel Liao, PT Physical Therapist          Therapy Charges for Today     Code Description Service Date Service Provider Modifiers Qty    05996288389 HC PT MOBILITY CURRENT 10/2/2017 Maricel Liao, PT GP, CI 1    50894791747 HC PT MOBILITY PROJECTED 10/2/2017 Maricel Liao, PT GP, CI 1    06946566507 HC PT MOBILITY DISCHARGE 10/2/2017 Maricel Liao PT GP, CI 1    01014472823 HC PT THER SUPP EA 15 MIN 10/2/2017 Maricel Liao, PT GP 3    67587618990 HC PT EVAL HIGH COMPLEXITY 3 10/2/2017 Maricel Liao, PT GP 1          PT G-Codes  Outcome Measure Options: AM-PAC 6 Clicks Basic Mobility (PT)  Score: 23  Functional Limitation: Mobility: Walking and moving around  Mobility: Walking and Moving Around Current Status (): At least 1 percent but less than 20 percent impaired, limited or restricted  Mobility: Walking and Moving Around Goal Status (): At least 1 percent but less than 20 percent impaired, limited or restricted  Mobility: Walking and Moving Around Discharge Status (): At least 1 percent but less than 20 percent impaired, limited or restricted    PT Discharge Summary  Anticipated Discharge Disposition: home    Maricel Liao PT  10/2/2017

## 2017-10-02 NOTE — PROGRESS NOTES
Discharge Planning Assessment   [place]     Patient Name: Rossy Boone  MRN: [id number]  Today's Date: 10/2/2017    Admit Date: 9/23/2017          Discharge Needs Assessment       10/02/17 1521    Living Environment    Lives With spouse    Transportation Available car;family or friend will provide    Living Environment    Quality Of Family Relationships supportive    Able to Return to Prior Living Arrangements yes    Discharge Needs Assessment    Equipment Currently Used at Home none            Discharge Plan       10/02/17 1519    Case Management/Social Work Plan    Plan SS spoke with pt on this date. Pt does not have insurance at this time. Pt states that she has applied for Medicaid. SS spoke with Milton with [employer] on this date. Milton states that he had completed application and pt was denied due to being above in the income level.  Pt is living at home with her spouse and plans to return home at discharge. Pt does not have a POA or living will at this time. SS will follow and assist as needed.     Patient/Family In Agreement With Plan yes        Discharge Placement     No information found        Expected Discharge Date and Time     Expected Discharge Date Expected Discharge Time    Sep 29, 2017               Demographic Summary       10/02/17 1521    Referral Information    Referral Source nursing    Reason For Consult discharge planning            Functional Status     None            Psychosocial     None            Abuse/Neglect     None            Legal     None            Substance Abuse     None            Patient Forms     None          Sabra Mcclure

## 2017-10-02 NOTE — PROGRESS NOTES
10/02/17      Rossy Boone is a 47 y.o. female who is seen today for follow up of acute pancreatitis    HPI  The patient was seen for a follow-up visit.  Her pancreatic enzymes have been normal for several days.  Alkaline phosphatase is elevated to 171.  ALT is elevated at 73, and AST is elevated at 53.  Patient continues to have persistent nausea and epigastric pain.  She is still on Phenergan.  She reports a burning in her stomach when she eats.  Repeat CT scan of her abdomen was performed on 9/29 which revealed hepatosplenomegaly along with acute pancreatitis with more inflammation in the peripancreatic tissues extending into the mesentery than on previous CT scan.  There is also free fluid in the pelvis.  Patient's white blood count is elevated to 19.41.  The most recent CRP level was 26.22.      Past medical history, social history, and family history remain unchanged since initial consultation.    REVIEW OF SYSTEMS  Burning pain in her epigastric region, nausea; all other systems are negative    CURRENT MEDICATION    Current Facility-Administered Medications:   •  bisacodyl (DULCOLAX) suppository 10 mg, 10 mg, Rectal, Daily, Seven Gallego MD, 10 mg at 10/02/17 0933  •  dextrose (D50W) solution 25 g, 25 g, Intravenous, Q15 Min PRN, Seven Gallego MD  •  dextrose (GLUTOSE) oral gel 15 g, 15 g, Oral, Q15 Min PRN, Seven Gallego MD  •  enoxaparin (LOVENOX) syringe 40 mg, 40 mg, Subcutaneous, Nightly, Seven Gallego MD, 40 mg at 10/01/17 1957  •  glucagon (human recombinant) (GLUCAGEN DIAGNOSTIC) injection 1 mg, 1 mg, Subcutaneous, Q15 Min PRN, Seven Gallego MD  •  insulin aspart (novoLOG) injection 0-7 Units, 0-7 Units, Subcutaneous, Q6H, Seven Gallego MD, 3 Units at 10/02/17 1133  •  ipratropium-albuterol (DUO-NEB) nebulizer solution 3 mL, 3 mL, Nebulization, 4x Daily - RT, Seven Gallego MD, 3 mL at 10/02/17 1319  •  Magnesium Sulfate 2 gram Bolus, followed by 8 gram infusion (total Mg dose 10  grams)- Mg less than or equal to 1mg/dL, 2 g, Intravenous, PRN **OR** Magnesium Sulfate 6 gram Infusion (2 gm x 3) -Mg 1.1 -1.5 mg/dL, 2 g, Intravenous, PRN **OR** magnesium sulfate 4 gram infusion- Mg 1.6-1.9 mg/dL, 4 g, Intravenous, PRN, Seven Gallego MD  •  metoprolol tartrate (LOPRESSOR) tablet 100 mg, 100 mg, Oral, Q12H, Seven Gallego MD, 100 mg at 10/02/17 0933  •  morphine injection 2 mg, 2 mg, Intravenous, Q2H PRN, Seven Gallego MD, 2 mg at 10/02/17 1250  •  nystatin susp + lidocaine viscous (MAGIC MOUTHWASH) oral suspension, 5 mL, Swish & Swallow, 4x Daily PRN, Seven Gallego MD  •  ondansetron (ZOFRAN) injection 4 mg, 4 mg, Intravenous, Q6H PRN, Seven Gallego MD, 4 mg at 10/02/17 1032  •  pantoprazole (PROTONIX) injection 40 mg, 40 mg, Intravenous, BID AC, Seven Gallego MD, 40 mg at 10/02/17 0613  •  polyethylene glycol (MIRALAX) powder 17 g, 17 g, Oral, Daily, Seven Gallego MD, 17 g at 10/02/17 0933  •  potassium & sodium phosphates (PHOS-NAK) 280-160-250 MG packet - for Phosphorus less than 1.25 mg/dL, 1 packet, Oral, Q6H PRN **OR** potassium & sodium phosphates (PHOS-NAK) 280-160-250 MG packet - for Phosphorus 1.25 - 2.1 mg/dL, 1 packet, Oral, Once PRN, Seven Gallego MD  •  potassium chloride (KLOR-CON) packet 40 mEq, 40 mEq, Oral, PRN, Seven Gallego MD  •  potassium chloride (MICRO-K) CR capsule 40 mEq, 40 mEq, Oral, PRN, Seven Gallego MD  •  promethazine (PHENERGAN) tablet 12.5 mg, 12.5 mg, Oral, Q6H PRN **OR** promethazine (PHENERGAN) 12.5 mg in sodium chloride 0.9 % 50 mL, 12.5 mg, Intravenous, Q6H PRN, Seven Gallego MD, 12.5 mg at 09/29/17 0943  •  promethazine (PHENERGAN) tablet 12.5 mg, 12.5 mg, Oral, Q6H PRN, 12.5 mg at 10/02/17 1250 **OR** promethazine (PHENERGAN) 12.5 mg in sodium chloride 0.9 % 50 mL, 12.5 mg, Intravenous, Q6H PRN, Seven Gallego MD, 12.5 mg at 09/30/17 0443  •  sodium chloride 0.9 % flush 10 mL, 10 mL, Intracatheter, Q12H, Seven Gallego,  MD, 10 mL at 10/02/17 1032  •  sodium chloride 0.9 % flush 10 mL, 10 mL, Intracatheter, PRN, Seven Gallego MD  •  venlafaxine XR (EFFEXOR-XR) 24 hr capsule 75 mg, 75 mg, Oral, Daily, Seven Galleog MD, 75 mg at 10/02/17 0933    ALLERGIES  Hydrocodone-acetaminophen; Hydromorphone; Oxycodone-acetaminophen; and Penicillins     VITAL SIGNS  Vital Signs (last 24 hours)       10/01 0700  -  10/02 0659 10/02 0700  -  10/02 1639   Most Recent    Temp (°F) 98.4 -  99.2      98.2     98.2 (36.8)    Heart Rate 67 -  107    77 -  86     84    Resp 18 -  20    16 -  18     18    /71 -  155/90    138/81 -  142/83     142/83    SpO2 (%) 95 -  98    95 -  98     95            PHYSICAL EXAM  General:  Appearance alert, pleasant, interactive and cooperative; In mild distress  Head:  normocephalic, without obvious abnormality and atraumatic  Eyes:  lids and lashes normal, conjunctivae and sclerae normal, no icterus, no pallor, corneas clear and PERRLA  Ears:  ears appear intact with no abnormalities noted  Nose:  nares normal, septum midline, mucosa normal and no drainage  Throat:  no oral lesions, no thrush and oral mucosa moist  Lungs:  clear to auscultation, respirations regular, respirations even and respirations unlabored  Heart:  regular rhythm & normal rate, normal S1, S2, no murmur, no gallop, no rub and no click  Abdomen:  Significant epigastric tenderness with guarding; normal bowel sounds, no masses, no hepatomegaly, no splenomegaly, soft , and no rebound tenderness  Extremities:  moves extremities well, no edema, no cyanosis and no redness  Skin:  no bleeding, bruising or rash, color normal, no lesions noted and temperature normal  Neurologic:  Mental Status orientated to person, place, time and situation, alertness alert, awake and arousable, orientation time, date, person, place, city and president and mood/affect:  normal  Cranial Nerves:  cranial nerves 2 - 12 grossly intact as examined, Speech normal  content and execution, Sensory intact, Motor strength is equal in upper and lower extremities, Reflexes normal and symmetric      LABS   Lab Results (last 24 hours)     Procedure Component Value Units Date/Time    POC Glucose Fingerstick [[id number]]  (Abnormal) Collected:  10/01/17 2237    Specimen:  Blood Updated:  10/01/17 2311     Glucose 238 (H) mg/dL     Narrative:       Meter: [id number] : [id number] holly hernandez    CBC & Differential [[id number]] Collected:  10/02/17 0135    Specimen:  Blood Updated:  10/02/17 0151    Narrative:       The following orders were created for panel order CBC & Differential.  Procedure                               Abnormality         Status                     ---------                               -----------         ------                     CBC Auto Differential[[id number]]        Abnormal            Final result                 Please view results for these tests on the individual orders.    CBC Auto Differential [[id number]]  (Abnormal) Collected:  10/02/17 0135    Specimen:  Blood Updated:  10/02/17 0151     WBC 19.41 (H) 10*3/mm3      RBC 4.32 10*6/mm3      Hemoglobin 12.3 g/dL      Hematocrit 37.6 %      MCV 87.0 fL      MCH 28.5 pg      MCHC 32.7 (L) g/dL      RDW 14.1 %      RDW-SD 44.4 fl      MPV 11.3 (H) fL      Platelets 303 10*3/mm3      Neutrophil % 80.8 (H) %      Lymphocyte % 9.2 (L) %      Monocyte % 6.2 %      Eosinophil % 1.5 %      Basophil % 0.2 %      Immature Grans % 2.1 (H) %      Neutrophils, Absolute 15.68 (H) 10*3/mm3      Lymphocytes, Absolute 1.79 10*3/mm3      Monocytes, Absolute 1.20 (H) 10*3/mm3      Eosinophils, Absolute 0.30 10*3/mm3      Basophils, Absolute 0.04 10*3/mm3      Immature Grans, Absolute 0.40 (H) 10*3/mm3     Comprehensive Metabolic Panel [[id number]]  (Abnormal) Collected:  10/02/17 0135    Specimen:  Blood Updated:  10/02/17 0218     Glucose 200 (H) mg/dL      BUN 5 (L) mg/dL      Creatinine 0.41 (L) mg/dL      Sodium 135  mmol/L      Potassium 3.3 (L) mmol/L      Chloride 99 mmol/L      CO2 29.9 mmol/L      Calcium 8.7 mg/dL      Total Protein 5.4 (L) g/dL      Albumin 3.10 (L) g/dL      ALT (SGPT) 73 (H) U/L      AST (SGOT) 53 (H) U/L      Alkaline Phosphatase 171 (H) U/L       Note New Reference Ranges        Total Bilirubin 0.5 mg/dL      eGFR Non African Amer >150 mL/min/1.73      Globulin 2.3 gm/dL      A/G Ratio 1.3 (L) g/dL      BUN/Creatinine Ratio 12.2     Anion Gap 6.1 mmol/L     Osmolality, Calculated [[id number]]  (Normal) Collected:  10/02/17 0135    Specimen:  Blood Updated:  10/02/17 0218     Osmolality Calc 273.0 mOsm/kg     POC Glucose Fingerstick [[id number]]  (Abnormal) Collected:  10/02/17 0559    Specimen:  Blood Updated:  10/02/17 0607     Glucose 218 (H) mg/dL     Narrative:       Meter: [id number] : [id number] hernandez    POC Glucose Fingerstick [[id number]]  (Abnormal) Collected:  10/02/17 0726    Specimen:  Blood Updated:  10/02/17 0733     Glucose 203 (H) mg/dL     Narrative:       Meter: [id number] : [id number] Bertin Donis    Blood Culture - Blood, [[id number]]  (Normal) Collected:  09/29/17 0952    Specimen:  Blood from Arm, Left Updated:  10/02/17 1101     Blood Culture No growth at 3 days    POC Glucose Fingerstick [[id number]]  (Abnormal) Collected:  10/02/17 1119    Specimen:  Blood Updated:  10/02/17 1133     Glucose 234 (H) mg/dL     Narrative:       Meter: [id number] : [id number] Bertin Donis    Blood Culture - Blood, [[id number]]  (Normal) Collected:  09/29/17 1044    Specimen:  Blood from Arm, Left Updated:  10/02/17 1331     Blood Culture No growth at 3 days          IMAGING  Imaging Results (last 24 hours)     ** No results found for the last 24 hours. **            ASSESSMENT/PLAN    -Acute pancreatitis  -Abdominal pain  -Bilateral flank pain  -Nausea and vomiting  -Diabetes mellitus type 2  -Hyperlipidemia  -Metabolic acidosis  -Elevated liver enzymes  -Fatty  liver  -Hypertension     Diet will be downgraded to clear liquids. Consider empiric treatment with supplemental pancreatic enzymes.  We will continue to follow patient.        Electronically signed by: DORETHA Linton

## 2017-10-02 NOTE — PROGRESS NOTES
LOS: 9 days       Chief Complaint :    Abdominal pain  Subjective     Interval History:     Patient Complaints:  Rossy Boone  remains alert and talkative in no distress at rest.  She continues with some lower abdominal pain and intermittent pelvic pain.  She has no fevers or chills.  Her heart rate is better.  She describes an uneventful weekend except for continued pain.  Her requirement for morphine is slowly improving.       Review of Systems-unchanged since admission history and physical  Objective     Vital Signs  Temp:  [98.4 °F (36.9 °C)-99.2 °F (37.3 °C)] 98.4 °F (36.9 °C)  Heart Rate:  [] 91  Resp:  [18-20] 18  BP: (133-151)/(71-83) 151/74    Physical Exam  Constitutional: She is oriented to person, place, and time. She appears well-developed and well-nourished.   Mild to moderate pain distress.   HENT:   Head: Normocephalic and atraumatic.   Right Ear: External ear normal.   Left Ear: External ear normal.   Nose: Nose normal.   Mouth/Throat: Oropharynx is clear and moist. No oropharyngeal exudate.   Eyes: Conjunctivae and EOM are normal. Pupils are equal, round, and reactive to light. Right eye exhibits no discharge. Left eye exhibits no discharge. No scleral icterus.   Neck: Normal range of motion. Neck supple. No JVD present. No tracheal deviation present. No thyromegaly present.   Cardiovascular: Normal rate, regular rhythm and normal heart sounds.  Exam reveals no gallop and no friction rub.    No murmur heard.  Pulmonary/Chest: Effort normal and breath sounds normal. No stridor. No respiratory distress. She has no wheezes. She has no rales. She exhibits no tenderness.   Abdominal: Soft. Bowel sounds are normal. She exhibits distension. She exhibits no mass. There is tenderness. There is rebound. There is no guarding. No hernia.   Genitourinary:   Genitourinary Comments: No Mosley catheter   Musculoskeletal: Normal range of motion. She exhibits no edema, tenderness or deformity.    Lymphadenopathy:     She has no cervical adenopathy.   Neurological: She is alert and oriented to person, place, and time. She has normal reflexes. She displays normal reflexes. No cranial nerve deficit. She exhibits normal muscle tone. Coordination normal.   Skin: Skin is warm and dry. No rash noted. She is not diaphoretic. No erythema. No pallor.   Psychiatric: She has a normal mood and affect. Her behavior is normal. Judgment and thought content normal.   Nursing note and vitals reviewed     Results Review:     I reviewed the patient's new clinical results  : See Below  MEDICATIONS:    bisacodyl 10 mg Rectal Daily   enoxaparin 40 mg Subcutaneous Nightly   insulin aspart 0-7 Units Subcutaneous Q6H   ipratropium-albuterol 3 mL Nebulization 4x Daily - RT   metoprolol tartrate 100 mg Oral Q12H   pantoprazole 40 mg Intravenous BID AC   polyethylene glycol 17 g Oral Daily   potassium chloride 40 mEq Oral Q4H   venlafaxine XR 75 mg Oral Daily       LABS:    Results from last 7 days  Lab Units 09/28/17  0041 09/27/17  1851   CK TOTAL U/L  --  39   CKMB ng/mL  --  <0.18   TROPONIN I ng/mL <0.006  <0.006 <0.006       Results from last 7 days  Lab Units 10/02/17  0135 09/30/17  0233 09/29/17  0337 09/29/17  0141 09/28/17  0041   CRP mg/dL  --  26.22*  --  30.48* 33.84*   WBC 10*3/mm3 19.41* 21.75* 18.61*  --  15.04*   HEMOGLOBIN g/dL 12.3 13.1 12.6  --  12.8   HEMATOCRIT % 37.6 39.4 37.8  --  38.3   MCV fL 87.0 86.4 85.5  --  85.7   MCHC g/dL 32.7* 33.2 33.3  --  33.4   PLATELETS 10*3/mm3 303 240 254  --  251       Results from last 7 days  Lab Units 09/27/17  1855   PH, ARTERIAL pH units 7.470*   PO2 ART mm Hg 53.6*   PCO2, ARTERIAL mm Hg 27.0*   HCO3 ART mmol/L 19.2*       Results from last 7 days  Lab Units 10/02/17  0135 10/01/17  0159 09/30/17  0233  09/27/17  0427   SODIUM mmol/L 135 134* 137  < > 137   POTASSIUM mmol/L 3.3* 3.5 3.0*  < > 3.5   MAGNESIUM mg/dL  --  2.0 1.8  --  3.0*   CHLORIDE mmol/L 99 100 101  <  > 108   CO2 mmol/L 29.9 29.3 26.5  < > 20.2*   BUN mg/dL 5* 6* 8  < > 10   CREATININE mg/dL 0.41* 0.47 0.35*  < > 0.50   EGFR IF NONAFRICN AM mL/min/1.73 >150 142 >150  < > 132   CALCIUM mg/dL 8.7 8.7 8.8  < > 7.7   GLUCOSE mg/dL 200* 233* 223*  < > 152*   ALBUMIN g/dL 3.10* 3.20* 3.30*  < > 3.60   BILIRUBIN mg/dL 0.5 0.6 0.7  < > 0.9   ALK PHOS U/L 171* 170* 155*  < > 100   AST (SGOT) U/L 53* 72* 73*  < > 23   ALT (SGPT) U/L 73* 80* 68*  < > 31   < > = values in this interval not displayed.Estimated Creatinine Clearance: 164.7 mL/min (by C-G formula based on Cr of 0.41).  No results found for: AMMONIA    Results from last 7 days  Lab Units 09/26/17  0429   CHOLESTEROL mg/dL 127   TRIGLYCERIDES mg/dL 181*   HDL CHOL mg/dL 19*     Blood Culture   Date Value Ref Range Status   09/23/2017 No growth at 24 hours  Preliminary   09/23/2017 No growth at 24 hours  Preliminary              Assessment/Plan      1) abdominal pain  2) pancreatitis-acute-amylase and lipase are improved from the time of admission but remain elevated.  3) diabetes mellitus type 2 uncontrolled  4) hyperlipidemia  5) noncompliance with diabetes control  6) metabolic acidosis secondary to above  7) abnormal liver enzymes  8) leukocytosis secondary to above  9) DVT prophylaxis-subcutaneous Lovenox  10) constipation secondary to pain medications-Relistor  has relieved her symptoms.  11) acute respiratory failure secondary to atelectasis.-Nebulizers and oxygen.  12) free fluid in the pelvis on CT scan of the abdomen on September 29.     See orders entered.     Seven Gallego MD  10/02/17  7:52 AM

## 2017-10-02 NOTE — DISCHARGE PLACEMENT REQUEST
Case Management/Social Work    Patient Name:  Rossy Boone  YOB: 1970  MRN: [id number]  Admit Date:  9/23/2017    Consult received for Swing Bed services.  Patient does not have insurance therefore, does not qualify for Swing Bed services.     Electronically signed by:  Mariela Michel RN  10/02/17 8:31 AM

## 2017-10-02 NOTE — PLAN OF CARE
Problem: Patient Care Overview (Adult)  Goal: Plan of Care Review  Outcome: Ongoing (interventions implemented as appropriate)    09/28/17 1513 10/01/17 1954   Coping/Psychosocial Response Interventions   Plan Of Care Reviewed With --  patient   Patient Care Overview   Progress no change --        Goal: Adult Individualization and Mutuality  Outcome: Ongoing (interventions implemented as appropriate)    Problem: Pain, Acute (Adult)  Goal: Identify Related Risk Factors and Signs and Symptoms  Outcome: Ongoing (interventions implemented as appropriate)  Goal: Acceptable Pain Control/Comfort Level  Outcome: Ongoing (interventions implemented as appropriate)    Problem: Pancreatitis, Acute/Chronic (Adult)  Goal: Signs and Symptoms of Listed Potential Problems Will be Absent or Manageable (Pancreatitis, Acute/Chronic)  Outcome: Ongoing (interventions implemented as appropriate)    Problem: Fall Risk (Adult)  Goal: Identify Related Risk Factors and Signs and Symptoms  Outcome: Ongoing (interventions implemented as appropriate)  Goal: Absence of Falls  Outcome: Ongoing (interventions implemented as appropriate)    09/28/17 2042   Fall Risk (Adult)   Absence of Falls making progress toward outcome         Problem: Diabetes, Type 2 (Adult)  Goal: Signs and Symptoms of Listed Potential Problems Will be Absent or Manageable (Diabetes, Type 2)  Outcome: Ongoing (interventions implemented as appropriate)    Problem: Pneumonia (Adult)  Goal: Signs and Symptoms of Listed Potential Problems Will be Absent or Manageable (Pneumonia)  Outcome: Ongoing (interventions implemented as appropriate)

## 2017-10-03 LAB
ALBUMIN SERPL-MCNC: 3.1 G/DL (ref 3.5–5)
ALBUMIN/GLOB SERPL: 1.3 G/DL (ref 1.5–2.5)
ALP SERPL-CCNC: 173 U/L (ref 35–104)
ALT SERPL W P-5'-P-CCNC: 55 U/L (ref 10–36)
AMYLASE SERPL-CCNC: 63 U/L (ref 28–100)
ANION GAP SERPL CALCULATED.3IONS-SCNC: 5.3 MMOL/L (ref 3.6–11.2)
AST SERPL-CCNC: 35 U/L (ref 10–30)
BASOPHILS # BLD AUTO: 0.04 10*3/MM3 (ref 0–0.3)
BASOPHILS NFR BLD AUTO: 0.2 % (ref 0–2)
BILIRUB SERPL-MCNC: 0.4 MG/DL (ref 0.2–1.8)
BUN BLD-MCNC: 6 MG/DL (ref 7–21)
BUN/CREAT SERPL: 12.2 (ref 7–25)
CALCIUM SPEC-SCNC: 8.7 MG/DL (ref 7.7–10)
CHLORIDE SERPL-SCNC: 100 MMOL/L (ref 99–112)
CO2 SERPL-SCNC: 28.7 MMOL/L (ref 24.3–31.9)
CREAT BLD-MCNC: 0.49 MG/DL (ref 0.43–1.29)
CRP SERPL-MCNC: 11.45 MG/DL (ref 0–0.99)
DEPRECATED RDW RBC AUTO: 45 FL (ref 37–54)
EOSINOPHIL # BLD AUTO: 0.27 10*3/MM3 (ref 0–0.7)
EOSINOPHIL NFR BLD AUTO: 1.6 % (ref 0–5)
ERYTHROCYTE [DISTWIDTH] IN BLOOD BY AUTOMATED COUNT: 14.3 % (ref 11.5–14.5)
GFR SERPL CREATININE-BSD FRML MDRD: 135 ML/MIN/1.73
GLOBULIN UR ELPH-MCNC: 2.4 GM/DL
GLUCOSE BLD-MCNC: 254 MG/DL (ref 70–110)
GLUCOSE BLDC GLUCOMTR-MCNC: 171 MG/DL (ref 70–130)
GLUCOSE BLDC GLUCOMTR-MCNC: 178 MG/DL (ref 70–130)
GLUCOSE BLDC GLUCOMTR-MCNC: 234 MG/DL (ref 70–130)
GLUCOSE BLDC GLUCOMTR-MCNC: 236 MG/DL (ref 70–130)
GLUCOSE BLDC GLUCOMTR-MCNC: 262 MG/DL (ref 70–130)
HCT VFR BLD AUTO: 37.4 % (ref 37–47)
HGB BLD-MCNC: 11.8 G/DL (ref 12–16)
IMM GRANULOCYTES # BLD: 0.36 10*3/MM3 (ref 0–0.03)
IMM GRANULOCYTES NFR BLD: 2.2 % (ref 0–0.5)
LIPASE SERPL-CCNC: 69 U/L (ref 13–60)
LYMPHOCYTES # BLD AUTO: 2.04 10*3/MM3 (ref 1–3)
LYMPHOCYTES NFR BLD AUTO: 12.3 % (ref 21–51)
MCH RBC QN AUTO: 27.6 PG (ref 27–33)
MCHC RBC AUTO-ENTMCNC: 31.6 G/DL (ref 33–37)
MCV RBC AUTO: 87.4 FL (ref 80–94)
MONOCYTES # BLD AUTO: 1.21 10*3/MM3 (ref 0.1–0.9)
MONOCYTES NFR BLD AUTO: 7.3 % (ref 0–10)
NEUTROPHILS # BLD AUTO: 12.6 10*3/MM3 (ref 1.4–6.5)
NEUTROPHILS NFR BLD AUTO: 76.4 % (ref 30–70)
OSMOLALITY SERPL CALC.SUM OF ELEC: 274.5 MOSM/KG (ref 273–305)
PLATELET # BLD AUTO: 324 10*3/MM3 (ref 130–400)
PMV BLD AUTO: 11.4 FL (ref 6–10)
POTASSIUM BLD-SCNC: 4.2 MMOL/L (ref 3.5–5.3)
PROT SERPL-MCNC: 5.5 G/DL (ref 6–8)
RBC # BLD AUTO: 4.28 10*6/MM3 (ref 4.2–5.4)
SODIUM BLD-SCNC: 134 MMOL/L (ref 135–153)
WBC NRBC COR # BLD: 16.52 10*3/MM3 (ref 4.5–12.5)

## 2017-10-03 PROCEDURE — 94799 UNLISTED PULMONARY SVC/PX: CPT

## 2017-10-03 PROCEDURE — 99232 SBSQ HOSP IP/OBS MODERATE 35: CPT | Performed by: PHYSICIAN ASSISTANT

## 2017-10-03 PROCEDURE — 25010000002 MORPHINE PER 10 MG: Performed by: INTERNAL MEDICINE

## 2017-10-03 PROCEDURE — 63710000001 PROMETHAZINE PER 12.5 MG: Performed by: INTERNAL MEDICINE

## 2017-10-03 PROCEDURE — 82962 GLUCOSE BLOOD TEST: CPT

## 2017-10-03 PROCEDURE — 85025 COMPLETE CBC W/AUTO DIFF WBC: CPT | Performed by: INTERNAL MEDICINE

## 2017-10-03 PROCEDURE — 83690 ASSAY OF LIPASE: CPT | Performed by: INTERNAL MEDICINE

## 2017-10-03 PROCEDURE — 82150 ASSAY OF AMYLASE: CPT | Performed by: INTERNAL MEDICINE

## 2017-10-03 PROCEDURE — 63710000001 INSULIN ASPART PER 5 UNITS: Performed by: INTERNAL MEDICINE

## 2017-10-03 PROCEDURE — 86140 C-REACTIVE PROTEIN: CPT | Performed by: INTERNAL MEDICINE

## 2017-10-03 PROCEDURE — 80053 COMPREHEN METABOLIC PANEL: CPT | Performed by: INTERNAL MEDICINE

## 2017-10-03 PROCEDURE — 25010000002 ENOXAPARIN PER 10 MG: Performed by: INTERNAL MEDICINE

## 2017-10-03 RX ORDER — IPRATROPIUM BROMIDE AND ALBUTEROL SULFATE 2.5; .5 MG/3ML; MG/3ML
3 SOLUTION RESPIRATORY (INHALATION) EVERY 6 HOURS PRN
Status: DISCONTINUED | OUTPATIENT
Start: 2017-10-03 | End: 2017-10-09 | Stop reason: HOSPADM

## 2017-10-03 RX ADMIN — METOPROLOL TARTRATE 100 MG: 100 TABLET, FILM COATED ORAL at 20:46

## 2017-10-03 RX ADMIN — Medication 10 ML: at 08:20

## 2017-10-03 RX ADMIN — MORPHINE SULFATE 2 MG: 2 INJECTION, SOLUTION INTRAMUSCULAR; INTRAVENOUS at 02:09

## 2017-10-03 RX ADMIN — POLYETHYLENE GLYCOL 3350 17 G: 17 POWDER, FOR SOLUTION ORAL at 08:19

## 2017-10-03 RX ADMIN — MORPHINE SULFATE 2 MG: 2 INJECTION, SOLUTION INTRAMUSCULAR; INTRAVENOUS at 16:40

## 2017-10-03 RX ADMIN — VENLAFAXINE HYDROCHLORIDE 75 MG: 75 CAPSULE, EXTENDED RELEASE ORAL at 08:19

## 2017-10-03 RX ADMIN — PROMETHAZINE HYDROCHLORIDE 12.5 MG: 12.5 TABLET ORAL at 02:09

## 2017-10-03 RX ADMIN — ENOXAPARIN SODIUM 40 MG: 40 INJECTION SUBCUTANEOUS at 20:46

## 2017-10-03 RX ADMIN — INSULIN ASPART 3 UNITS: 100 INJECTION, SOLUTION INTRAVENOUS; SUBCUTANEOUS at 20:56

## 2017-10-03 RX ADMIN — IPRATROPIUM BROMIDE AND ALBUTEROL SULFATE 3 ML: .5; 3 SOLUTION RESPIRATORY (INHALATION) at 08:10

## 2017-10-03 RX ADMIN — BISACODYL 10 MG: 10 SUPPOSITORY RECTAL at 08:19

## 2017-10-03 RX ADMIN — Medication 10 ML: at 22:37

## 2017-10-03 RX ADMIN — Medication 10 ML: at 23:30

## 2017-10-03 RX ADMIN — INSULIN ASPART 2 UNITS: 100 INJECTION, SOLUTION INTRAVENOUS; SUBCUTANEOUS at 17:23

## 2017-10-03 RX ADMIN — MORPHINE SULFATE 2 MG: 2 INJECTION, SOLUTION INTRAMUSCULAR; INTRAVENOUS at 06:42

## 2017-10-03 RX ADMIN — PANTOPRAZOLE SODIUM 40 MG: 40 INJECTION, POWDER, FOR SOLUTION INTRAVENOUS at 16:41

## 2017-10-03 RX ADMIN — METOPROLOL TARTRATE 100 MG: 100 TABLET, FILM COATED ORAL at 08:19

## 2017-10-03 RX ADMIN — PROMETHAZINE HYDROCHLORIDE 12.5 MG: 12.5 TABLET ORAL at 06:42

## 2017-10-03 RX ADMIN — Medication 10 ML: at 20:47

## 2017-10-03 RX ADMIN — PANCRELIPASE 24000 UNITS OF LIPASE: 60000; 12000; 38000 CAPSULE, DELAYED RELEASE PELLETS ORAL at 17:23

## 2017-10-03 RX ADMIN — INSULIN ASPART 2 UNITS: 100 INJECTION, SOLUTION INTRAVENOUS; SUBCUTANEOUS at 07:01

## 2017-10-03 RX ADMIN — PANCRELIPASE 24000 UNITS OF LIPASE: 60000; 12000; 38000 CAPSULE, DELAYED RELEASE PELLETS ORAL at 11:56

## 2017-10-03 RX ADMIN — INSULIN ASPART 4 UNITS: 100 INJECTION, SOLUTION INTRAVENOUS; SUBCUTANEOUS at 11:56

## 2017-10-03 RX ADMIN — PANTOPRAZOLE SODIUM 40 MG: 40 INJECTION, POWDER, FOR SOLUTION INTRAVENOUS at 08:19

## 2017-10-03 RX ADMIN — MORPHINE SULFATE 2 MG: 2 INJECTION, SOLUTION INTRAMUSCULAR; INTRAVENOUS at 20:46

## 2017-10-03 RX ADMIN — MORPHINE SULFATE 2 MG: 2 INJECTION, SOLUTION INTRAMUSCULAR; INTRAVENOUS at 13:32

## 2017-10-03 RX ADMIN — MORPHINE SULFATE 2 MG: 2 INJECTION, SOLUTION INTRAMUSCULAR; INTRAVENOUS at 23:29

## 2017-10-03 NOTE — PROGRESS NOTES
LOS: 10 days       Chief Complaint :    Abdominal pain  Subjective     Interval History:     Patient Complaints:  Rossy Boone  continues to require some intermittent morphine secondary to abdominal pain.  She describes a slow gradual improvement.  She also continues with some intermittent nausea but no vomiting.  She has very poor appetite.   She has no fevers or chills.  Her heart rate is better.  She describes an uneventful weekend except for continued pain.  Her requirement for morphine is slowly improving.       Review of Systems-unchanged since admission history and physical  Objective     Vital Signs  Temp:  [98.1 °F (36.7 °C)-99.8 °F (37.7 °C)] 98.1 °F (36.7 °C)  Heart Rate:  [] 71  Resp:  [16-18] 18  BP: (142-150)/(77-89) 147/87    Physical Exam  Constitutional: She is oriented to person, place, and time. She appears well-developed and well-nourished.   Mild to moderate pain distress.   HENT:   Head: Normocephalic and atraumatic.   Right Ear: External ear normal.   Left Ear: External ear normal.   Nose: Nose normal.   Mouth/Throat: Oropharynx is clear and moist. No oropharyngeal exudate.   Eyes: Conjunctivae and EOM are normal. Pupils are equal, round, and reactive to light. Right eye exhibits no discharge. Left eye exhibits no discharge. No scleral icterus.   Neck: Normal range of motion. Neck supple. No JVD present. No tracheal deviation present. No thyromegaly present.   Cardiovascular: Normal rate, regular rhythm and normal heart sounds.  Exam reveals no gallop and no friction rub.    No murmur heard.  Pulmonary/Chest: Effort normal and breath sounds normal. No stridor. No respiratory distress. She has no wheezes. She has no rales. She exhibits no tenderness.   Abdominal: Soft. Bowel sounds are normal. She exhibits distension. She exhibits no mass. There is tenderness. There is rebound. There is no guarding. No hernia.   Genitourinary:   Genitourinary Comments: No Mosley catheter    Musculoskeletal: Normal range of motion. She exhibits no edema, tenderness or deformity.   Lymphadenopathy:     She has no cervical adenopathy.   Neurological: She is alert and oriented to person, place, and time. She has normal reflexes. She displays normal reflexes. No cranial nerve deficit. She exhibits normal muscle tone. Coordination normal.   Skin: Skin is warm and dry. No rash noted. She is not diaphoretic. No erythema. No pallor.   Psychiatric: She has a normal mood and affect. Her behavior is normal. Judgment and thought content normal.   Nursing note and vitals reviewed     Results Review:     I reviewed the patient's new clinical results  : See Below  MEDICATIONS:    bisacodyl 10 mg Rectal Daily   enoxaparin 40 mg Subcutaneous Nightly   insulin aspart 0-7 Units Subcutaneous Q6H   ipratropium-albuterol 3 mL Nebulization 4x Daily - RT   metoprolol tartrate 100 mg Oral Q12H   pantoprazole 40 mg Intravenous BID AC   polyethylene glycol 17 g Oral Daily   sodium chloride 10 mL Intracatheter Q12H   venlafaxine XR 75 mg Oral Daily       LABS:    Results from last 7 days  Lab Units 09/28/17  0041 09/27/17  1851   CK TOTAL U/L  --  39   CKMB ng/mL  --  <0.18   TROPONIN I ng/mL <0.006  <0.006 <0.006       Results from last 7 days  Lab Units 10/03/17  0043 10/02/17  0135 09/30/17  0233  09/29/17  0141   CRP mg/dL 11.45*  --  26.22*  --  30.48*   WBC 10*3/mm3 16.52* 19.41* 21.75*  < >  --    HEMOGLOBIN g/dL 11.8* 12.3 13.1  < >  --    HEMATOCRIT % 37.4 37.6 39.4  < >  --    MCV fL 87.4 87.0 86.4  < >  --    MCHC g/dL 31.6* 32.7* 33.2  < >  --    PLATELETS 10*3/mm3 324 303 240  < >  --    < > = values in this interval not displayed.    Results from last 7 days  Lab Units 09/27/17  1855   PH, ARTERIAL pH units 7.470*   PO2 ART mm Hg 53.6*   PCO2, ARTERIAL mm Hg 27.0*   HCO3 ART mmol/L 19.2*       Results from last 7 days  Lab Units 10/03/17  0043 10/02/17  0135 10/01/17  0159 09/30/17  0233  09/27/17  0427   SODIUM  mmol/L 134* 135 134* 137  < > 137   POTASSIUM mmol/L 4.2 3.3* 3.5 3.0*  < > 3.5   MAGNESIUM mg/dL  --   --  2.0 1.8  --  3.0*   CHLORIDE mmol/L 100 99 100 101  < > 108   CO2 mmol/L 28.7 29.9 29.3 26.5  < > 20.2*   BUN mg/dL 6* 5* 6* 8  < > 10   CREATININE mg/dL 0.49 0.41* 0.47 0.35*  < > 0.50   EGFR IF NONAFRICN AM mL/min/1.73 135 >150 142 >150  < > 132   CALCIUM mg/dL 8.7 8.7 8.7 8.8  < > 7.7   GLUCOSE mg/dL 254* 200* 233* 223*  < > 152*   ALBUMIN g/dL 3.10* 3.10* 3.20* 3.30*  < > 3.60   BILIRUBIN mg/dL 0.4 0.5 0.6 0.7  < > 0.9   ALK PHOS U/L 173* 171* 170* 155*  < > 100   AST (SGOT) U/L 35* 53* 72* 73*  < > 23   ALT (SGPT) U/L 55* 73* 80* 68*  < > 31   < > = values in this interval not displayed.Estimated Creatinine Clearance: 137.8 mL/min (by C-G formula based on Cr of 0.49).  No results found for: AMMONIA      Blood Culture   Date Value Ref Range Status   09/23/2017 No growth at 24 hours  Preliminary   09/23/2017 No growth at 24 hours  Preliminary              Assessment/Plan      1) abdominal pain  2) pancreatitis-acute-amylase and lipase are improved from the time of admission but remain elevated.  3) diabetes mellitus type 2 uncontrolled  4) hyperlipidemia  5) noncompliance with diabetes control  6) metabolic acidosis secondary to above  7) abnormal liver enzymes  8) leukocytosis secondary to above  9) DVT prophylaxis-subcutaneous Lovenox  10) constipation secondary to pain medications-Relistor  has relieved her symptoms.  11) acute respiratory failure secondary to atelectasis.-Nebulizers and oxygen.  12) free fluid in the pelvis on CT scan of the abdomen on September 29.     See orders entered.     Seven Gallego MD  10/03/17  7:19 AM

## 2017-10-03 NOTE — PLAN OF CARE
Problem: Patient Care Overview (Adult)  Goal: Plan of Care Review  Outcome: Ongoing (interventions implemented as appropriate)  Goal: Discharge Needs Assessment  Outcome: Ongoing (interventions implemented as appropriate)    Problem: Pain, Acute (Adult)  Goal: Identify Related Risk Factors and Signs and Symptoms  Outcome: Ongoing (interventions implemented as appropriate)  Goal: Acceptable Pain Control/Comfort Level  Outcome: Ongoing (interventions implemented as appropriate)    Problem: Pancreatitis, Acute/Chronic (Adult)  Goal: Signs and Symptoms of Listed Potential Problems Will be Absent or Manageable (Pancreatitis, Acute/Chronic)  Outcome: Ongoing (interventions implemented as appropriate)    Problem: Fall Risk (Adult)  Goal: Identify Related Risk Factors and Signs and Symptoms  Outcome: Ongoing (interventions implemented as appropriate)  Goal: Absence of Falls  Outcome: Ongoing (interventions implemented as appropriate)    Problem: Diabetes, Type 2 (Adult)  Goal: Signs and Symptoms of Listed Potential Problems Will be Absent or Manageable (Diabetes, Type 2)  Outcome: Ongoing (interventions implemented as appropriate)    Problem: Pneumonia (Adult)  Goal: Signs and Symptoms of Listed Potential Problems Will be Absent or Manageable (Pneumonia)  Outcome: Ongoing (interventions implemented as appropriate)

## 2017-10-03 NOTE — PROGRESS NOTES
Discharge Planning Assessment  [facility]     Patient Name: Rossy Boone  MRN: [id number]  Today's Date: 10/3/2017    Admit Date: 9/23/2017       Discharge Plan       10/03/17 1145    Case Management/Social Work Plan    Plan SS spoke to [place] COYNE and pt may need Creon after discharge. Pt does not have insurance. SS contacted [facility] per Roro. SS to follow.              Expected Discharge Date and Time     Expected Discharge Date Expected Discharge Time    Sep 29, 2017           Linnea Russell

## 2017-10-03 NOTE — THERAPY EVALUATION
Response to referral - Occupational Therapy Response to referral  WILMER Ernst     Patient Name: Rossy Boone  : 1970  MRN: [id number]  Today's Date: 10/3/2017  Onset of Illness/Injury or Date of Surgery Date: 17     Referring Physician: Julián    Admit Date: 2017       ICD-10-CM ICD-9-CM   1. Epigastric pain R10.13 789.06   2. Acute pancreatitis, unspecified complication status, unspecified pancreatitis type K85.90 577.0   3. Nausea R11.0 787.02     Patient Active Problem List   Diagnosis   • Acute pancreatitis   • Nausea   • Epigastric pain     Past Medical History:   Diagnosis Date   • Diabetes mellitus    • Hypertension      Past Surgical History:   Procedure Laterality Date   • APPENDECTOMY     •  SECTION     • CHOLECYSTECTOMY     • HYSTERECTOMY            OT ASSESSMENT FLOWSHEET (last 72 hours)      OT Evaluation       10/03/17 0900 10/02/17 2035 10/02/17 1702 10/02/17 1521 10/01/17 0800    Rehab Evaluation    Document Type   evaluation  -CT      Subjective Information   agree to therapy  -CT      Patient Effort, Rehab Treatment   good  -CT      Symptoms Noted Comment Pt. independent with self care/transfers at this time. BUE WFL. No further skilled OT training needed at this time per pt. request.   -KR  Pt tolerated evaluation well today and ambualted  ft. Pt has no identified skilled PT needs and would benefit from daily ambualtion from nursing staiff.   -CT      General Information    Patient Profile Review   yes  -CT      Onset of Illness/Injury or Date of Surgery Date   17  -CT      Referring Physician   Julián  -CT      Pertinent History Of Current Problem   Acute pancreatitis  -CT      Prior Level of Function   independent:;all household mobility;community mobility  -CT      Equipment Currently Used at Home   none  -CT none  -MC     Plans/Goals Discussed With   patient;agreed upon  -CT      Risks Reviewed   patient:;LOB;nausea/vomiting;dizziness;increased  discomfort;change in vital signs;increased drainage;lines disloged  -CT      Benefits Reviewed   patient:;improve function;increase independence;increase strength;increase balance;decrease pain;decrease risk of DVT;improve skin integrity;increase knowledge  -CT      Barriers to Rehab   none identified  -CT      Living Environment    Lives With   spouse  -CT spouse  -     Living Arrangements   house  -CT      Home Accessibility   stairs to enter home  -CT      Number of Stairs to Enter Home   4  -CT      Transportation Available    car;family or friend will provide  -     Cognitive Assessment/Intervention    Current Cognitive/Communication Assessment   functional  -CT      Orientation Status   oriented x 4  -CT      Follows Commands/Answers Questions   able to follow multi-step instructions;100% of the time  -CT      Personal Safety   WNL/WFL  -CT      Personal Safety Interventions   gait belt;nonskid shoes/slippers when out of bed  -CT      ROM (Range of Motion)    General ROM Detail   BLE WFL  -CT      MMT (Manual Muscle Testing)    General MMT Assessment Detail   not tested d/t reported abdominal pain  -CT      Muscle Tone Assessment    Muscle Tone Assessment     Bilateral Upper Extremities;Bilateral Lower Extremities  -JM    Bilateral Upper Extremities Muscle Tone Assessment  --   WNL  -DP       Bilateral Lower Extremities Muscle Tone Assessment  --   WNL  -DP       Bed Mobility, Assessment/Treatment    Bed Mobility, Roll Left, Jeddo   independent  -CT      Bed Mobility, Roll Right, Jeddo   independent  -CT      Bed Mobility, Scoot/Bridge, Jeddo   independent  -CT      Bed Mob, Supine to Sit, Jeddo   independent  -CT      Bed Mob, Sit to Supine, Jeddo   independent  -CT      Transfer Assessment/Treatment    Transfers, Sit-Stand Jeddo   supervision required  -CT      Transfers, Stand-Sit Jeddo   supervision required  -CT      Transfers, Sit-Stand-Sit, Assist  Device   bed rails  -CT      Positioning and Restraints    Pre-Treatment Position   in bed  -CT      Post Treatment Position   bed  -CT      In Bed   supine;call light within reach;encouraged to call for assist  -CT        User Key  (r) = Recorded By, (t) = Taken By, (c) = Cosigned By    Initials Name Effective Dates    DP Jaimee Cabezas, RN 06/16/16 -     KR Mike Looney OT 03/14/16 -     CT Maricel Liao PT 03/14/16 -     SHIRA Mcclure 03/14/16 -     SIDDHARTHA Bo, JASON 07/07/16 -                  OT Recommendation and Plan                    Outcome Measures       10/02/17 1700          How much help from another person do you currently need...    Turning from your back to your side while in flat bed without using bedrails? 4  -CT      Moving from lying on back to sitting on the side of a flat bed without bedrails? 4  -CT      Moving to and from a bed to a chair (including a wheelchair)? 4  -CT      Standing up from a chair using your arms (e.g., wheelchair, bedside chair)? 4  -CT      Climbing 3-5 steps with a railing? 3  -CT      To walk in hospital room? 4  -CT      AM-PAC 6 Clicks Score 23  -CT      Functional Assessment    Outcome Measure Options AM-PAC 6 Clicks Basic Mobility (PT)  -CT        User Key  (r) = Recorded By, (t) = Taken By, (c) = Cosigned By    Initials Name Provider Type    CT Maricel Liao, REED Physical Therapist          Time Calculation:                 Mike Looney OT  10/3/2017

## 2017-10-03 NOTE — PHARMACY PATIENT ASSISTANCE
Pharmacy was asked to look at the cost of Creon for the patient.      The patient does not have Rx insurance and the cash price for Creon is $482.43 in the [facility] for a 1 month supply.      Lacey will speak to the patient about applying for the Focus program for discharge.     Thank you,   Roro Mane RP  10/03/17  10:51 AM      The patient has been approved for Focus at discharge    Roro Mane [facility]  1:30 PM  10/03/17

## 2017-10-04 LAB
ALBUMIN SERPL-MCNC: 3.2 G/DL (ref 3.5–5)
ALBUMIN/GLOB SERPL: 1.1 G/DL (ref 1.5–2.5)
ALP SERPL-CCNC: 184 U/L (ref 35–104)
ALT SERPL W P-5'-P-CCNC: 43 U/L (ref 10–36)
AMYLASE SERPL-CCNC: 65 U/L (ref 28–100)
ANION GAP SERPL CALCULATED.3IONS-SCNC: 6.6 MMOL/L (ref 3.6–11.2)
AST SERPL-CCNC: 39 U/L (ref 10–30)
BACTERIA SPEC AEROBE CULT: NORMAL
BACTERIA SPEC AEROBE CULT: NORMAL
BASOPHILS # BLD AUTO: 0.05 10*3/MM3 (ref 0–0.3)
BASOPHILS NFR BLD AUTO: 0.3 % (ref 0–2)
BILIRUB SERPL-MCNC: 0.3 MG/DL (ref 0.2–1.8)
BUN BLD-MCNC: <5 MG/DL (ref 7–21)
BUN/CREAT SERPL: ABNORMAL (ref 7–25)
CALCIUM SPEC-SCNC: 8.8 MG/DL (ref 7.7–10)
CHLORIDE SERPL-SCNC: 98 MMOL/L (ref 99–112)
CO2 SERPL-SCNC: 27.4 MMOL/L (ref 24.3–31.9)
CREAT BLD-MCNC: 0.6 MG/DL (ref 0.43–1.29)
CRP SERPL-MCNC: 6.79 MG/DL (ref 0–0.99)
DEPRECATED RDW RBC AUTO: 45 FL (ref 37–54)
EOSINOPHIL # BLD AUTO: 0.31 10*3/MM3 (ref 0–0.7)
EOSINOPHIL NFR BLD AUTO: 2.2 % (ref 0–5)
ERYTHROCYTE [DISTWIDTH] IN BLOOD BY AUTOMATED COUNT: 14.2 % (ref 11.5–14.5)
GFR SERPL CREATININE-BSD FRML MDRD: 107 ML/MIN/1.73
GLOBULIN UR ELPH-MCNC: 2.8 GM/DL
GLUCOSE BLD-MCNC: 331 MG/DL (ref 70–110)
GLUCOSE BLDC GLUCOMTR-MCNC: 146 MG/DL (ref 70–130)
GLUCOSE BLDC GLUCOMTR-MCNC: 220 MG/DL (ref 70–130)
GLUCOSE BLDC GLUCOMTR-MCNC: 248 MG/DL (ref 70–130)
GLUCOSE BLDC GLUCOMTR-MCNC: 264 MG/DL (ref 70–130)
HAV IGM SERPL QL IA: NORMAL
HBV CORE IGM SERPL QL IA: NORMAL
HBV SURFACE AG SERPL QL IA: NORMAL
HCT VFR BLD AUTO: 37.9 % (ref 37–47)
HCV AB SER DONR QL: NORMAL
HGB BLD-MCNC: 12.3 G/DL (ref 12–16)
IMM GRANULOCYTES # BLD: 0.26 10*3/MM3 (ref 0–0.03)
IMM GRANULOCYTES NFR BLD: 1.8 % (ref 0–0.5)
LIPASE SERPL-CCNC: 69 U/L (ref 13–60)
LYMPHOCYTES # BLD AUTO: 2.54 10*3/MM3 (ref 1–3)
LYMPHOCYTES NFR BLD AUTO: 17.7 % (ref 21–51)
MAGNESIUM SERPL-MCNC: 1.7 MG/DL (ref 1.7–2.6)
MCH RBC QN AUTO: 28.5 PG (ref 27–33)
MCHC RBC AUTO-ENTMCNC: 32.5 G/DL (ref 33–37)
MCV RBC AUTO: 87.7 FL (ref 80–94)
MONOCYTES # BLD AUTO: 0.87 10*3/MM3 (ref 0.1–0.9)
MONOCYTES NFR BLD AUTO: 6 % (ref 0–10)
NEUTROPHILS # BLD AUTO: 10.36 10*3/MM3 (ref 1.4–6.5)
NEUTROPHILS NFR BLD AUTO: 72 % (ref 30–70)
OSMOLALITY SERPL CALC.SUM OF ELEC: NORMAL MOSM/KG (ref 273–305)
PHOSPHATE SERPL-MCNC: 4.3 MG/DL (ref 2.7–4.5)
PLATELET # BLD AUTO: 361 10*3/MM3 (ref 130–400)
PMV BLD AUTO: 11.8 FL (ref 6–10)
POTASSIUM BLD-SCNC: 5 MMOL/L (ref 3.5–5.3)
PROT SERPL-MCNC: 6 G/DL (ref 6–8)
RBC # BLD AUTO: 4.32 10*6/MM3 (ref 4.2–5.4)
SODIUM BLD-SCNC: 132 MMOL/L (ref 135–153)
WBC NRBC COR # BLD: 14.39 10*3/MM3 (ref 4.5–12.5)

## 2017-10-04 PROCEDURE — 82962 GLUCOSE BLOOD TEST: CPT

## 2017-10-04 PROCEDURE — 85025 COMPLETE CBC W/AUTO DIFF WBC: CPT | Performed by: INTERNAL MEDICINE

## 2017-10-04 PROCEDURE — 83690 ASSAY OF LIPASE: CPT | Performed by: INTERNAL MEDICINE

## 2017-10-04 PROCEDURE — 80074 ACUTE HEPATITIS PANEL: CPT | Performed by: PHYSICIAN ASSISTANT

## 2017-10-04 PROCEDURE — 94799 UNLISTED PULMONARY SVC/PX: CPT

## 2017-10-04 PROCEDURE — 25010000002 PROMETHAZINE PER 50 MG: Performed by: INTERNAL MEDICINE

## 2017-10-04 PROCEDURE — 25010000002 ENOXAPARIN PER 10 MG: Performed by: INTERNAL MEDICINE

## 2017-10-04 PROCEDURE — 83735 ASSAY OF MAGNESIUM: CPT | Performed by: INTERNAL MEDICINE

## 2017-10-04 PROCEDURE — 84100 ASSAY OF PHOSPHORUS: CPT | Performed by: INTERNAL MEDICINE

## 2017-10-04 PROCEDURE — 25010000002 MORPHINE PER 10 MG: Performed by: INTERNAL MEDICINE

## 2017-10-04 PROCEDURE — 86140 C-REACTIVE PROTEIN: CPT | Performed by: INTERNAL MEDICINE

## 2017-10-04 PROCEDURE — 80053 COMPREHEN METABOLIC PANEL: CPT | Performed by: INTERNAL MEDICINE

## 2017-10-04 PROCEDURE — 63710000001 INSULIN ASPART PER 5 UNITS: Performed by: INTERNAL MEDICINE

## 2017-10-04 PROCEDURE — 82150 ASSAY OF AMYLASE: CPT | Performed by: INTERNAL MEDICINE

## 2017-10-04 RX ORDER — MORPHINE SULFATE 2 MG/ML
2 INJECTION, SOLUTION INTRAMUSCULAR; INTRAVENOUS
Status: DISCONTINUED | OUTPATIENT
Start: 2017-10-04 | End: 2017-10-09

## 2017-10-04 RX ADMIN — METOPROLOL TARTRATE 100 MG: 100 TABLET, FILM COATED ORAL at 20:21

## 2017-10-04 RX ADMIN — PANCRELIPASE 24000 UNITS OF LIPASE: 60000; 12000; 38000 CAPSULE, DELAYED RELEASE PELLETS ORAL at 17:03

## 2017-10-04 RX ADMIN — VENLAFAXINE HYDROCHLORIDE 75 MG: 75 CAPSULE, EXTENDED RELEASE ORAL at 08:08

## 2017-10-04 RX ADMIN — PANTOPRAZOLE SODIUM 40 MG: 40 INJECTION, POWDER, FOR SOLUTION INTRAVENOUS at 08:08

## 2017-10-04 RX ADMIN — MORPHINE SULFATE 2 MG: 2 INJECTION, SOLUTION INTRAMUSCULAR; INTRAVENOUS at 01:20

## 2017-10-04 RX ADMIN — INSULIN ASPART 4 UNITS: 100 INJECTION, SOLUTION INTRAVENOUS; SUBCUTANEOUS at 17:03

## 2017-10-04 RX ADMIN — BISACODYL 10 MG: 10 SUPPOSITORY RECTAL at 08:08

## 2017-10-04 RX ADMIN — METOPROLOL TARTRATE 100 MG: 100 TABLET, FILM COATED ORAL at 08:08

## 2017-10-04 RX ADMIN — Medication 10 ML: at 05:39

## 2017-10-04 RX ADMIN — MORPHINE SULFATE 2 MG: 2 INJECTION, SOLUTION INTRAMUSCULAR; INTRAVENOUS at 21:37

## 2017-10-04 RX ADMIN — PANCRELIPASE 24000 UNITS OF LIPASE: 60000; 12000; 38000 CAPSULE, DELAYED RELEASE PELLETS ORAL at 08:08

## 2017-10-04 RX ADMIN — INSULIN ASPART 3 UNITS: 100 INJECTION, SOLUTION INTRAVENOUS; SUBCUTANEOUS at 08:07

## 2017-10-04 RX ADMIN — PANTOPRAZOLE SODIUM 40 MG: 40 INJECTION, POWDER, FOR SOLUTION INTRAVENOUS at 16:59

## 2017-10-04 RX ADMIN — MORPHINE SULFATE 2 MG: 2 INJECTION, SOLUTION INTRAMUSCULAR; INTRAVENOUS at 19:10

## 2017-10-04 RX ADMIN — Medication 10 ML: at 01:20

## 2017-10-04 RX ADMIN — MORPHINE SULFATE 2 MG: 2 INJECTION, SOLUTION INTRAMUSCULAR; INTRAVENOUS at 09:27

## 2017-10-04 RX ADMIN — INSULIN ASPART 4 UNITS: 100 INJECTION, SOLUTION INTRAVENOUS; SUBCUTANEOUS at 23:26

## 2017-10-04 RX ADMIN — ENOXAPARIN SODIUM 40 MG: 40 INJECTION SUBCUTANEOUS at 20:22

## 2017-10-04 RX ADMIN — MORPHINE SULFATE 2 MG: 2 INJECTION, SOLUTION INTRAMUSCULAR; INTRAVENOUS at 16:59

## 2017-10-04 RX ADMIN — Medication 10 ML: at 08:09

## 2017-10-04 RX ADMIN — POLYETHYLENE GLYCOL 3350 17 G: 17 POWDER, FOR SOLUTION ORAL at 08:08

## 2017-10-04 RX ADMIN — MORPHINE SULFATE 2 MG: 2 INJECTION, SOLUTION INTRAMUSCULAR; INTRAVENOUS at 12:46

## 2017-10-04 RX ADMIN — MORPHINE SULFATE 2 MG: 2 INJECTION, SOLUTION INTRAMUSCULAR; INTRAVENOUS at 03:35

## 2017-10-04 RX ADMIN — PROMETHAZINE HYDROCHLORIDE 12.5 MG: 25 INJECTION INTRAMUSCULAR; INTRAVENOUS at 03:35

## 2017-10-04 RX ADMIN — Medication 10 ML: at 20:22

## 2017-10-04 RX ADMIN — PANCRELIPASE 24000 UNITS OF LIPASE: 60000; 12000; 38000 CAPSULE, DELAYED RELEASE PELLETS ORAL at 11:52

## 2017-10-04 NOTE — PLAN OF CARE
Problem: Patient Care Overview (Adult)  Goal: Plan of Care Review  Outcome: Ongoing (interventions implemented as appropriate)    10/03/17 1423   Coping/Psychosocial Response Interventions   Plan Of Care Reviewed With patient   Patient Care Overview   Progress no change       Goal: Adult Individualization and Mutuality  Outcome: Ongoing (interventions implemented as appropriate)    Problem: Pain, Acute (Adult)  Goal: Identify Related Risk Factors and Signs and Symptoms  Outcome: Ongoing (interventions implemented as appropriate)    10/03/17 1423   Pain, Acute   Related Risk Factors (Acute Pain) disease process   Signs and Symptoms (Acute Pain) fatigue/weakness;verbalization of pain descriptors         Problem: Pancreatitis, Acute/Chronic (Adult)  Goal: Signs and Symptoms of Listed Potential Problems Will be Absent or Manageable (Pancreatitis, Acute/Chronic)  Outcome: Ongoing (interventions implemented as appropriate)    10/03/17 1423   Pancreatitis, Acute/Chronic   Problems Assessed (Pancreatitis) all   Problems Present (Pancreatitis) pain;impaired glycemic control         Problem: Fall Risk (Adult)  Goal: Identify Related Risk Factors and Signs and Symptoms  Outcome: Ongoing (interventions implemented as appropriate)    10/03/17 1423   Fall Risk   Fall Risk: Related Risk Factors environment unfamiliar   Fall Risk: Signs and Symptoms presence of risk factors       Goal: Absence of Falls  Outcome: Ongoing (interventions implemented as appropriate)    10/03/17 2352   Fall Risk (Adult)   Absence of Falls making progress toward outcome         Problem: Diabetes, Type 2 (Adult)  Goal: Signs and Symptoms of Listed Potential Problems Will be Absent or Manageable (Diabetes, Type 2)  Outcome: Ongoing (interventions implemented as appropriate)    10/03/17 1423   Diabetes, Type 2   Problems Assessed (Type 2 Diabetes) all   Problems Present (Type 2 Diabetes) impaired glycemic control         Problem: Pneumonia (Adult)  Goal:  Signs and Symptoms of Listed Potential Problems Will be Absent or Manageable (Pneumonia)  Outcome: Ongoing (interventions implemented as appropriate)    10/03/17 1423   Pneumonia   Problems Assessed (Pneumonia) all   Problems Present (Pneumonia) none

## 2017-10-04 NOTE — PROGRESS NOTES
LOS: 11 days       Chief Complaint :    Abdominal pain  Subjective     Interval History:     Patient Complaints:  Rossy Boone  continues with moderate epigastric pain.  She also describes intermittent nausea and generalized weakness.  She does describe a gradual improvement but very slow.  She continues require intermittent pain medications.  She also continues with intermittent chills.   She does not any fevers.  She also describes a gradual improvement in diffuse weakness.  C-reactive protein and leukocytosis are slowly improving.  They seem to correlate well with the patient's symptoms.    Review of Systems-unchanged since admission history and physical  Objective     Vital Signs  Temp:  [98.1 °F (36.7 °C)-99.3 °F (37.4 °C)] 98.9 °F (37.2 °C)  Heart Rate:  [] 96  Resp:  [16-20] 18  BP: (129-157)/(82-90) 152/90    Physical Exam  Constitutional: She is oriented to person, place, and time. She appears well-developed and well-nourished.   Mild to moderate pain distress.   HENT:   Head: Normocephalic and atraumatic.   Right Ear: External ear normal.   Left Ear: External ear normal.   Nose: Nose normal.   Mouth/Throat: Oropharynx is clear and moist. No oropharyngeal exudate.   Eyes: Conjunctivae and EOM are normal. Pupils are equal, round, and reactive to light. Right eye exhibits no discharge. Left eye exhibits no discharge. No scleral icterus.   Neck: Normal range of motion. Neck supple. No JVD present. No tracheal deviation present. No thyromegaly present.   Cardiovascular: Normal rate, regular rhythm and normal heart sounds.  Exam reveals no gallop and no friction rub.    No murmur heard.  Pulmonary/Chest: Effort normal and breath sounds normal. No stridor. No respiratory distress. She has no wheezes. She has no rales. She exhibits no tenderness.   Abdominal: Soft. Bowel sounds are normal. She exhibits distension. She exhibits no mass. There is tenderness. There is rebound. There is no guarding. No  hernia.   Genitourinary:   Genitourinary Comments: No Mosley catheter   Musculoskeletal: Normal range of motion. She exhibits no edema, tenderness or deformity.   Lymphadenopathy:     She has no cervical adenopathy.   Neurological: She is alert and oriented to person, place, and time. She has normal reflexes. She displays normal reflexes. No cranial nerve deficit. She exhibits normal muscle tone. Coordination normal.   Skin: Skin is warm and dry. No rash noted. She is not diaphoretic. No erythema. No pallor.   Psychiatric: She has a normal mood and affect. Her behavior is normal. Judgment and thought content normal.   Nursing note and vitals reviewed     Results Review:     I reviewed the patient's new clinical results  : See Below  MEDICATIONS:    bisacodyl 10 mg Rectal Daily   enoxaparin 40 mg Subcutaneous Nightly   insulin aspart 0-7 Units Subcutaneous Q6H   metoprolol tartrate 100 mg Oral Q12H   pancrelipase (Lip-Prot-Amyl) 24,000 units of lipase Oral TID With Meals   pantoprazole 40 mg Intravenous BID AC   polyethylene glycol 17 g Oral Daily   sodium chloride 10 mL Intracatheter Q12H   venlafaxine XR 75 mg Oral Daily       LABS:    Results from last 7 days  Lab Units 09/28/17  0041 09/27/17  1851   CK TOTAL U/L  --  39   CKMB ng/mL  --  <0.18   TROPONIN I ng/mL <0.006  <0.006 <0.006       Results from last 7 days  Lab Units 10/04/17  0224 10/03/17  0043 10/02/17  0135 09/30/17  0233   CRP mg/dL 6.79* 11.45*  --  26.22*   WBC 10*3/mm3 14.39* 16.52* 19.41* 21.75*   HEMOGLOBIN g/dL 12.3 11.8* 12.3 13.1   HEMATOCRIT % 37.9 37.4 37.6 39.4   MCV fL 87.7 87.4 87.0 86.4   MCHC g/dL 32.5* 31.6* 32.7* 33.2   PLATELETS 10*3/mm3 361 324 303 240       Results from last 7 days  Lab Units 09/27/17  1855   PH, ARTERIAL pH units 7.470*   PO2 ART mm Hg 53.6*   PCO2, ARTERIAL mm Hg 27.0*   HCO3 ART mmol/L 19.2*       Results from last 7 days  Lab Units 10/04/17  0224 10/03/17  0043 10/02/17  0135 10/01/17  0159 09/30/17  0233    SODIUM mmol/L 132* 134* 135 134* 137   POTASSIUM mmol/L 5.0 4.2 3.3* 3.5 3.0*   MAGNESIUM mg/dL 1.7  --   --  2.0 1.8   CHLORIDE mmol/L 98* 100 99 100 101   CO2 mmol/L 27.4 28.7 29.9 29.3 26.5   BUN mg/dL <5* 6* 5* 6* 8   CREATININE mg/dL 0.60 0.49 0.41* 0.47 0.35*   EGFR IF NONAFRICN AM mL/min/1.73 107 135 >150 142 >150   CALCIUM mg/dL 8.8 8.7 8.7 8.7 8.8   GLUCOSE mg/dL 331* 254* 200* 233* 223*   ALBUMIN g/dL 3.20* 3.10* 3.10* 3.20* 3.30*   BILIRUBIN mg/dL 0.3 0.4 0.5 0.6 0.7   ALK PHOS U/L 184* 173* 171* 170* 155*   AST (SGOT) U/L 39* 35* 53* 72* 73*   ALT (SGPT) U/L 43* 55* 73* 80* 68*   Estimated Creatinine Clearance: 112.5 mL/min (by C-G formula based on Cr of 0.6).  No results found for: AMMONIA      Blood Culture   Date Value Ref Range Status   09/23/2017 No growth at 24 hours  Preliminary   09/23/2017 No growth at 24 hours  Preliminary              Assessment/Plan      1) abdominal pain  2) pancreatitis-acute-amylase and lipase are improved from the time of admission but remain elevated.  3) diabetes mellitus type 2 uncontrolled  4) hyperlipidemia  5) noncompliance with diabetes control  6) metabolic acidosis secondary to above  7) abnormal liver enzymes  8) leukocytosis secondary to above  9) DVT prophylaxis-subcutaneous Lovenox  10) constipation secondary to pain medications-Relistor  has relieved her symptoms.  11) acute respiratory failure secondary to atelectasis.-Nebulizers and oxygen.  12) free fluid in the pelvis on CT scan of the abdomen on September 29.     See orders entered.     Seven Gallego MD  10/04/17  7:01 AM

## 2017-10-04 NOTE — PROGRESS NOTES
10/04/17      Rossy Boone is a 47 y.o. female who is seen today for follow up of acute pancreatitis    HPI  The patient was seen for a follow-up visit.  She has continued abdominal pain and nausea.  Patient was started on clear liquids yesterday due to worsening pancreatitis on follow-up CT scan.  Patient's ALT is 55, AST is 35, alkaline phosphatase is 173, and total bilirubin is normal.  Lipase is 69 and amylase is 63.    Past medical history, social history, and family history remain unchanged since initial consultation.    REVIEW OF SYSTEMS  Abdominal pain; nausea; all other systems are negative.    CURRENT MEDICATION    Current Facility-Administered Medications:   •  bisacodyl (DULCOLAX) suppository 10 mg, 10 mg, Rectal, Daily, Seven Gallego MD, 10 mg at 10/04/17 0808  •  dextrose (D50W) solution 25 g, 25 g, Intravenous, Q15 Min PRN, Seven Gallego MD  •  dextrose (GLUTOSE) oral gel 15 g, 15 g, Oral, Q15 Min PRN, Seven Gallego MD  •  enoxaparin (LOVENOX) syringe 40 mg, 40 mg, Subcutaneous, Nightly, Seven Gallego MD, 40 mg at 10/03/17 2046  •  glucagon (human recombinant) (GLUCAGEN DIAGNOSTIC) injection 1 mg, 1 mg, Subcutaneous, Q15 Min PRN, Seven Gallego MD  •  insulin aspart (novoLOG) injection 0-7 Units, 0-7 Units, Subcutaneous, Q6H, Seven Gallego MD, 3 Units at 10/04/17 0807  •  ipratropium-albuterol (DUO-NEB) nebulizer solution 3 mL, 3 mL, Nebulization, Q6H PRN, Seven Gallego MD  •  Magnesium Sulfate 2 gram Bolus, followed by 8 gram infusion (total Mg dose 10 grams)- Mg less than or equal to 1mg/dL, 2 g, Intravenous, PRN **OR** Magnesium Sulfate 6 gram Infusion (2 gm x 3) -Mg 1.1 -1.5 mg/dL, 2 g, Intravenous, PRN **OR** magnesium sulfate 4 gram infusion- Mg 1.6-1.9 mg/dL, 4 g, Intravenous, PRN, Seven Gallego MD  •  metoprolol tartrate (LOPRESSOR) tablet 100 mg, 100 mg, Oral, Q12H, Seven Gallego MD, 100 mg at 10/04/17 0808  •  nystatin susp + lidocaine viscous (MAGIC MOUTHWASH)  oral suspension, 5 mL, Swish & Swallow, 4x Daily PRN, Seven Gallego MD  •  ondansetron (ZOFRAN) injection 4 mg, 4 mg, Intravenous, Q6H PRN, Seven Gallego MD, 4 mg at 10/02/17 1032  •  pancrelipase (Lip-Prot-Amyl) (CREON) capsule 24,000 units of lipase, 24,000 units of lipase, Oral, TID With Meals, DORETHA Linton, 24,000 units of lipase at 10/04/17 0808  •  pantoprazole (PROTONIX) injection 40 mg, 40 mg, Intravenous, BID AC, Seven Gallego MD, 40 mg at 10/04/17 0808  •  polyethylene glycol (MIRALAX) powder 17 g, 17 g, Oral, Daily, Seven Gallego MD, 17 g at 10/04/17 0808  •  potassium & sodium phosphates (PHOS-NAK) 280-160-250 MG packet - for Phosphorus less than 1.25 mg/dL, 1 packet, Oral, Q6H PRN **OR** potassium & sodium phosphates (PHOS-NAK) 280-160-250 MG packet - for Phosphorus 1.25 - 2.1 mg/dL, 1 packet, Oral, Once PRN, Seven Gallego MD  •  potassium chloride (KLOR-CON) packet 40 mEq, 40 mEq, Oral, PRN, Seven Gallego MD  •  potassium chloride (MICRO-K) CR capsule 40 mEq, 40 mEq, Oral, PRN, Seven Gallego MD  •  promethazine (PHENERGAN) tablet 12.5 mg, 12.5 mg, Oral, Q6H PRN **OR** promethazine (PHENERGAN) 12.5 mg in sodium chloride 0.9 % 50 mL, 12.5 mg, Intravenous, Q6H PRN, Seven Gallego MD, 12.5 mg at 09/29/17 0943  •  promethazine (PHENERGAN) tablet 12.5 mg, 12.5 mg, Oral, Q6H PRN, 12.5 mg at 10/03/17 0642 **OR** promethazine (PHENERGAN) 12.5 mg in sodium chloride 0.9 % 50 mL, 12.5 mg, Intravenous, Q6H PRN, Seven Gallego MD, 12.5 mg at 10/04/17 0335  •  sodium chloride 0.9 % flush 10 mL, 10 mL, Intracatheter, Q12H, Seven Gallego MD, 10 mL at 10/04/17 0809  •  sodium chloride 0.9 % flush 10 mL, 10 mL, Intracatheter, PRN, Seven Gallego MD, 10 mL at 10/04/17 0539  •  venlafaxine XR (EFFEXOR-XR) 24 hr capsule 75 mg, 75 mg, Oral, Daily, Seven Gallego MD, 75 mg at 10/04/17 0808    ALLERGIES  Hydrocodone-acetaminophen; Hydromorphone; Oxycodone-acetaminophen; and  Penicillins     VITAL SIGNS  Vital Signs (last 24 hours)       10/03 0700  -  10/04 0659 10/04 0700  -  10/04 0839   Most Recent    Temp (°F) 98.1 -  99.3       98.9 (37.2)    Heart Rate 77 -  105       96    Resp 16 -  20       18    /84 -  157/82       152/90    SpO2 (%) 93 -  98      95     95            PHYSICAL EXAM  General:  Appearance alert, pleasant, interactive and cooperative  Head:  normocephalic, without obvious abnormality and atraumatic  Eyes:  lids and lashes normal, conjunctivae and sclerae normal, no icterus, no pallor, corneas clear and PERRLA  Ears:  ears appear intact with no abnormalities noted  Nose:  nares normal, septum midline, mucosa normal and no drainage  Throat:  no oral lesions, no thrush and oral mucosa moist  Lungs:  clear to auscultation, respirations regular, respirations even and respirations unlabored  Heart:  regular rhythm & normal rate, normal S1, S2, no murmur, no gallop, no rub and no click  Abdomen:  Epigastric tenderness; normal bowel sounds, no masses, no hepatomegaly, no splenomegaly, soft and no rebound tenderness  Extremities:  moves extremities well, no edema, no cyanosis and no redness  Skin:  no bleeding, bruising or rash, color normal, no lesions noted and temperature normal  Neurologic:  Mental Status orientated to person, place, time and situation, alertness alert, awake and arousable, orientation time, date, person, place, city and president and mood/affect:  normal  Cranial Nerves:  cranial nerves 2 - 12 grossly intact as examined, Speech normal content and execution, Sensory intact, Motor strength is equal in upper and lower extremities, Reflexes normal and symmetric      LABS   Lab Results (last 24 hours)     Procedure Component Value Units Date/Time    Blood Culture - Blood, [[id number]]  (Normal) Collected:  09/29/17 0952    Specimen:  Blood from Arm, Left Updated:  10/03/17 1101     Blood Culture No growth at 4 days    POC Glucose Fingerstick  [[id number]]  (Abnormal) Collected:  10/03/17 1111    Specimen:  Blood Updated:  10/03/17 1137     Glucose 262 (H) mg/dL     Narrative:       Meter: [id number] : [id number] Bertin Donis    Blood Culture - Blood, [[id number]]  (Normal) Collected:  09/29/17 1044    Specimen:  Blood from Arm, Left Updated:  10/03/17 1331     Blood Culture No growth at 4 days    POC Glucose Fingerstick [[id number]]  (Abnormal) Collected:  10/03/17 1636    Specimen:  Blood Updated:  10/03/17 1700     Glucose 171 (H) mg/dL     Narrative:       Meter: [id number] : [id number] Bertin Donis    POC Glucose Fingerstick [[id number]]  (Abnormal) Collected:  10/03/17 2052    Specimen:  Blood Updated:  10/03/17 2117     Glucose 234 (H) mg/dL     Narrative:       Meter: [id number] : [id number] king jose    CBC & Differential [[id number]] Collected:  10/04/17 0224    Specimen:  Blood Updated:  10/04/17 0315    Narrative:       The following orders were created for panel order CBC & Differential.  Procedure                               Abnormality         Status                     ---------                               -----------         ------                     CBC Auto Differential[[id number]]        Abnormal            Final result                 Please view results for these tests on the individual orders.    CBC Auto Differential [[id number]]  (Abnormal) Collected:  10/04/17 0224    Specimen:  Blood Updated:  10/04/17 0315     WBC 14.39 (H) 10*3/mm3      RBC 4.32 10*6/mm3      Hemoglobin 12.3 g/dL      Hematocrit 37.9 %      MCV 87.7 fL      MCH 28.5 pg      MCHC 32.5 (L) g/dL      RDW 14.2 %      RDW-SD 45.0 fl      MPV 11.8 (H) fL      Platelets 361 10*3/mm3      Neutrophil % 72.0 (H) %      Lymphocyte % 17.7 (L) %      Monocyte % 6.0 %      Eosinophil % 2.2 %      Basophil % 0.3 %      Immature Grans % 1.8 (H) %      Neutrophils, Absolute 10.36 (H) 10*3/mm3      Lymphocytes, Absolute 2.54 10*3/mm3      Monocytes,  Absolute 0.87 10*3/mm3      Eosinophils, Absolute 0.31 10*3/mm3      Basophils, Absolute 0.05 10*3/mm3      Immature Grans, Absolute 0.26 (H) 10*3/mm3     C-reactive Protein [[id number]]  (Abnormal) Collected:  10/04/17 0224    Specimen:  Blood Updated:  10/04/17 0346     C-Reactive Protein 6.79 (H) mg/dL     Magnesium [[id number]]  (Normal) Collected:  10/04/17 0224    Specimen:  Blood Updated:  10/04/17 0346     Magnesium 1.7 mg/dL     Comprehensive Metabolic Panel [[id number]]  (Abnormal) Collected:  10/04/17 0224    Specimen:  Blood Updated:  10/04/17 0349     Glucose 331 (H) mg/dL      BUN <5 (L) mg/dL      Creatinine 0.60 mg/dL      Sodium 132 (L) mmol/L      Potassium 5.0 mmol/L       2+ Hemolysis          Chloride 98 (L) mmol/L      CO2 27.4 mmol/L      Calcium 8.8 mg/dL      Total Protein 6.0 g/dL      Albumin 3.20 (L) g/dL      ALT (SGPT) 43 (H) U/L      AST (SGOT) 39 (H) U/L      Alkaline Phosphatase 184 (H) U/L       Note New Reference Ranges        Total Bilirubin 0.3 mg/dL      eGFR Non African Amer 107 mL/min/1.73      Globulin 2.8 gm/dL      A/G Ratio 1.1 (L) g/dL      BUN/Creatinine Ratio --      Unable to calculate Bun/Crea Ratio.        Anion Gap 6.6 mmol/L     Amylase [[id number]]  (Normal) Collected:  10/04/17 0224    Specimen:  Blood Updated:  10/04/17 0349     Amylase 65 U/L     Lipase [[id number]]  (Abnormal) Collected:  10/04/17 0224    Specimen:  Blood Updated:  10/04/17 0349     Lipase 69 (H) U/L     Phosphorus [[id number]]  (Normal) Collected:  10/04/17 0224    Specimen:  Blood Updated:  10/04/17 0349     Phosphorus 4.3 mg/dL     Osmolality, Calculated [[id number]] Collected:  10/04/17 0224    Specimen:  Blood Updated:  10/04/17 0349     Osmolality Calc -- mOsm/kg       Unable to calculate.       POC Glucose Fingerstick [[id number]]  (Abnormal) Collected:  10/04/17 0724    Specimen:  Blood Updated:  10/04/17 0751     Glucose 220 (H) mg/dL     Narrative:       Meter: [id number] : [id number]  Raulito Loya          IMAGING  Imaging Results (last 24 hours)     ** No results found for the last 24 hours. **            ASSESSMENT/PLAN    -Acute pancreatitis  -Abdominal pain  -Bilateral flank pain  -Nausea and vomiting  -Diabetes mellitus type 2  -Hyperlipidemia  -Metabolic acidosis  -Elevated liver enzymes  -Fatty liver  -Hypertension     Patient will continue on clear liquids.  If abdominal pain persists we will return her to nothing by mouth until symptoms improve.  A hepatitis panel will also be ordered due to persistent elevated liver enzymes.  We will revisit Thursday.        Electronically signed by: DORETHA Linton

## 2017-10-04 NOTE — PROGRESS NOTES
Discharge Planning Assessment   [facility]     Patient Name: Rossy Boone  MRN: [id number]  Today's Date: 10/4/2017    Admit Date: 9/23/2017    Discharge Plan       10/04/17 1442    Case Management/Social Work Plan    Plan Pt lives at home with her spouse and plans to return home at discharge. Pt does not utilize home health services or DME. SS will continue to follow and assist as needed with discharge planning.    Patient/Family In Agreement With Plan yes     Aditi Gan

## 2017-10-05 LAB
ALBUMIN SERPL-MCNC: 3.4 G/DL (ref 3.5–5)
ALBUMIN/GLOB SERPL: 1.3 G/DL (ref 1.5–2.5)
ALP SERPL-CCNC: 172 U/L (ref 35–104)
ALT SERPL W P-5'-P-CCNC: 35 U/L (ref 10–36)
AMYLASE SERPL-CCNC: 82 U/L (ref 28–100)
ANION GAP SERPL CALCULATED.3IONS-SCNC: 5.4 MMOL/L (ref 3.6–11.2)
AST SERPL-CCNC: 43 U/L (ref 10–30)
BASOPHILS # BLD AUTO: 0.06 10*3/MM3 (ref 0–0.3)
BASOPHILS NFR BLD AUTO: 0.5 % (ref 0–2)
BILIRUB SERPL-MCNC: 0.4 MG/DL (ref 0.2–1.8)
BUN BLD-MCNC: <5 MG/DL (ref 7–21)
BUN/CREAT SERPL: ABNORMAL (ref 7–25)
CALCIUM SPEC-SCNC: 9 MG/DL (ref 7.7–10)
CHLORIDE SERPL-SCNC: 101 MMOL/L (ref 99–112)
CO2 SERPL-SCNC: 29.6 MMOL/L (ref 24.3–31.9)
CREAT BLD-MCNC: 0.55 MG/DL (ref 0.43–1.29)
CRP SERPL-MCNC: 4.98 MG/DL (ref 0–0.99)
DEPRECATED RDW RBC AUTO: 44.9 FL (ref 37–54)
EOSINOPHIL # BLD AUTO: 0.36 10*3/MM3 (ref 0–0.7)
EOSINOPHIL NFR BLD AUTO: 2.8 % (ref 0–5)
ERYTHROCYTE [DISTWIDTH] IN BLOOD BY AUTOMATED COUNT: 14.1 % (ref 11.5–14.5)
GFR SERPL CREATININE-BSD FRML MDRD: 118 ML/MIN/1.73
GLOBULIN UR ELPH-MCNC: 2.7 GM/DL
GLUCOSE BLD-MCNC: 156 MG/DL (ref 70–110)
GLUCOSE BLDC GLUCOMTR-MCNC: 158 MG/DL (ref 70–130)
GLUCOSE BLDC GLUCOMTR-MCNC: 163 MG/DL (ref 70–130)
GLUCOSE BLDC GLUCOMTR-MCNC: 218 MG/DL (ref 70–130)
GLUCOSE BLDC GLUCOMTR-MCNC: 293 MG/DL (ref 70–130)
HCT VFR BLD AUTO: 37.5 % (ref 37–47)
HGB BLD-MCNC: 11.9 G/DL (ref 12–16)
IMM GRANULOCYTES # BLD: 0.19 10*3/MM3 (ref 0–0.03)
IMM GRANULOCYTES NFR BLD: 1.5 % (ref 0–0.5)
LIPASE SERPL-CCNC: 76 U/L (ref 13–60)
LYMPHOCYTES # BLD AUTO: 2.58 10*3/MM3 (ref 1–3)
LYMPHOCYTES NFR BLD AUTO: 20.1 % (ref 21–51)
MAGNESIUM SERPL-MCNC: 1.9 MG/DL (ref 1.7–2.6)
MCH RBC QN AUTO: 28.2 PG (ref 27–33)
MCHC RBC AUTO-ENTMCNC: 31.7 G/DL (ref 33–37)
MCV RBC AUTO: 88.9 FL (ref 80–94)
MONOCYTES # BLD AUTO: 0.99 10*3/MM3 (ref 0.1–0.9)
MONOCYTES NFR BLD AUTO: 7.7 % (ref 0–10)
NEUTROPHILS # BLD AUTO: 8.65 10*3/MM3 (ref 1.4–6.5)
NEUTROPHILS NFR BLD AUTO: 67.4 % (ref 30–70)
OSMOLALITY SERPL CALC.SUM OF ELEC: NORMAL MOSM/KG (ref 273–305)
PHOSPHATE SERPL-MCNC: 4.9 MG/DL (ref 2.7–4.5)
PLATELET # BLD AUTO: 351 10*3/MM3 (ref 130–400)
PMV BLD AUTO: 11.5 FL (ref 6–10)
POTASSIUM BLD-SCNC: 5 MMOL/L (ref 3.5–5.3)
PROT SERPL-MCNC: 6.1 G/DL (ref 6–8)
RBC # BLD AUTO: 4.22 10*6/MM3 (ref 4.2–5.4)
SODIUM BLD-SCNC: 136 MMOL/L (ref 135–153)
WBC NRBC COR # BLD: 12.83 10*3/MM3 (ref 4.5–12.5)

## 2017-10-05 PROCEDURE — 63710000001 INSULIN ASPART PER 5 UNITS: Performed by: INTERNAL MEDICINE

## 2017-10-05 PROCEDURE — 84100 ASSAY OF PHOSPHORUS: CPT | Performed by: INTERNAL MEDICINE

## 2017-10-05 PROCEDURE — 82150 ASSAY OF AMYLASE: CPT | Performed by: INTERNAL MEDICINE

## 2017-10-05 PROCEDURE — 82962 GLUCOSE BLOOD TEST: CPT

## 2017-10-05 PROCEDURE — 25010000002 ENOXAPARIN PER 10 MG: Performed by: INTERNAL MEDICINE

## 2017-10-05 PROCEDURE — 86140 C-REACTIVE PROTEIN: CPT | Performed by: INTERNAL MEDICINE

## 2017-10-05 PROCEDURE — 83735 ASSAY OF MAGNESIUM: CPT | Performed by: INTERNAL MEDICINE

## 2017-10-05 PROCEDURE — 85025 COMPLETE CBC W/AUTO DIFF WBC: CPT | Performed by: INTERNAL MEDICINE

## 2017-10-05 PROCEDURE — 99232 SBSQ HOSP IP/OBS MODERATE 35: CPT | Performed by: PHYSICIAN ASSISTANT

## 2017-10-05 PROCEDURE — 94799 UNLISTED PULMONARY SVC/PX: CPT

## 2017-10-05 PROCEDURE — 80053 COMPREHEN METABOLIC PANEL: CPT | Performed by: INTERNAL MEDICINE

## 2017-10-05 PROCEDURE — 25010000002 MORPHINE PER 10 MG: Performed by: INTERNAL MEDICINE

## 2017-10-05 PROCEDURE — 83690 ASSAY OF LIPASE: CPT | Performed by: INTERNAL MEDICINE

## 2017-10-05 RX ORDER — MAGNESIUM SULFATE HEPTAHYDRATE 40 MG/ML
4 INJECTION, SOLUTION INTRAVENOUS ONCE
Status: COMPLETED | OUTPATIENT
Start: 2017-10-05 | End: 2017-10-05

## 2017-10-05 RX ADMIN — MORPHINE SULFATE 2 MG: 2 INJECTION, SOLUTION INTRAMUSCULAR; INTRAVENOUS at 09:52

## 2017-10-05 RX ADMIN — MORPHINE SULFATE 2 MG: 2 INJECTION, SOLUTION INTRAMUSCULAR; INTRAVENOUS at 21:00

## 2017-10-05 RX ADMIN — ENOXAPARIN SODIUM 40 MG: 40 INJECTION SUBCUTANEOUS at 20:55

## 2017-10-05 RX ADMIN — INSULIN ASPART 4 UNITS: 100 INJECTION, SOLUTION INTRAVENOUS; SUBCUTANEOUS at 17:30

## 2017-10-05 RX ADMIN — MAGNESIUM SULFATE HEPTAHYDRATE 4 G: 40 INJECTION, SOLUTION INTRAVENOUS at 03:40

## 2017-10-05 RX ADMIN — INSULIN ASPART 3 UNITS: 100 INJECTION, SOLUTION INTRAVENOUS; SUBCUTANEOUS at 05:58

## 2017-10-05 RX ADMIN — METOPROLOL TARTRATE 100 MG: 100 TABLET, FILM COATED ORAL at 20:55

## 2017-10-05 RX ADMIN — PANCRELIPASE 24000 UNITS OF LIPASE: 60000; 12000; 38000 CAPSULE, DELAYED RELEASE PELLETS ORAL at 13:16

## 2017-10-05 RX ADMIN — PANCRELIPASE 24000 UNITS OF LIPASE: 60000; 12000; 38000 CAPSULE, DELAYED RELEASE PELLETS ORAL at 08:32

## 2017-10-05 RX ADMIN — Medication 10 ML: at 08:33

## 2017-10-05 RX ADMIN — VENLAFAXINE HYDROCHLORIDE 75 MG: 75 CAPSULE, EXTENDED RELEASE ORAL at 08:32

## 2017-10-05 RX ADMIN — MORPHINE SULFATE 2 MG: 2 INJECTION, SOLUTION INTRAMUSCULAR; INTRAVENOUS at 06:04

## 2017-10-05 RX ADMIN — METOPROLOL TARTRATE 100 MG: 100 TABLET, FILM COATED ORAL at 08:32

## 2017-10-05 RX ADMIN — MORPHINE SULFATE 2 MG: 2 INJECTION, SOLUTION INTRAMUSCULAR; INTRAVENOUS at 12:06

## 2017-10-05 RX ADMIN — INSULIN ASPART 2 UNITS: 100 INJECTION, SOLUTION INTRAVENOUS; SUBCUTANEOUS at 13:16

## 2017-10-05 RX ADMIN — MORPHINE SULFATE 2 MG: 2 INJECTION, SOLUTION INTRAMUSCULAR; INTRAVENOUS at 00:38

## 2017-10-05 RX ADMIN — PANTOPRAZOLE SODIUM 40 MG: 40 INJECTION, POWDER, FOR SOLUTION INTRAVENOUS at 17:30

## 2017-10-05 RX ADMIN — PANCRELIPASE 24000 UNITS OF LIPASE: 60000; 12000; 38000 CAPSULE, DELAYED RELEASE PELLETS ORAL at 17:30

## 2017-10-05 RX ADMIN — POLYETHYLENE GLYCOL 3350 17 G: 17 POWDER, FOR SOLUTION ORAL at 08:33

## 2017-10-05 RX ADMIN — PANTOPRAZOLE SODIUM 40 MG: 40 INJECTION, POWDER, FOR SOLUTION INTRAVENOUS at 08:33

## 2017-10-05 RX ADMIN — Medication 10 ML: at 20:56

## 2017-10-05 RX ADMIN — MORPHINE SULFATE 2 MG: 2 INJECTION, SOLUTION INTRAMUSCULAR; INTRAVENOUS at 03:05

## 2017-10-05 NOTE — PROGRESS NOTES
10/05/17      Rossy Boone is a 47 y.o. female who is seen today for follow up of acute pancreatitis    HPI The patient was seen for a follow-up visit.  The patient states that she is feeling much better.  She continues to have mild abdominal pain but is no longer having nausea.  She explains that her symptoms started eating better yesterday after she was started on the Creon.  Hepatitis panel was negative.  Lipase is 76 and amylase is 82.  ALT is normal, AST is 43, total bilirubin is normal, and alkaline phosphatase is 172.  Past medical history, social history, and family history remain unchanged since initial consultation.    REVIEW OF SYSTEMS  Mild upper abdominal pain; all other systems are negative    CURRENT MEDICATION    Current Facility-Administered Medications:   •  bisacodyl (DULCOLAX) suppository 10 mg, 10 mg, Rectal, Daily, Seven Gallego MD, 10 mg at 10/04/17 0808  •  dextrose (D50W) solution 25 g, 25 g, Intravenous, Q15 Min PRN, Seven Gallego MD  •  dextrose (GLUTOSE) oral gel 15 g, 15 g, Oral, Q15 Min PRN, Seven Gallego MD  •  enoxaparin (LOVENOX) syringe 40 mg, 40 mg, Subcutaneous, Nightly, Seven Gallego MD, 40 mg at 10/04/17 2022  •  glucagon (human recombinant) (GLUCAGEN DIAGNOSTIC) injection 1 mg, 1 mg, Subcutaneous, Q15 Min PRN, Seven Gallego MD  •  insulin aspart (novoLOG) injection 0-7 Units, 0-7 Units, Subcutaneous, Q6H, Seven Gallego MD, 3 Units at 10/05/17 0558  •  ipratropium-albuterol (DUO-NEB) nebulizer solution 3 mL, 3 mL, Nebulization, Q6H PRN, Seven Gallego MD  •  Magnesium Sulfate 2 gram Bolus, followed by 8 gram infusion (total Mg dose 10 grams)- Mg less than or equal to 1mg/dL, 2 g, Intravenous, PRN **OR** Magnesium Sulfate 6 gram Infusion (2 gm x 3) -Mg 1.1 -1.5 mg/dL, 2 g, Intravenous, PRN **OR** magnesium sulfate 4 gram infusion- Mg 1.6-1.9 mg/dL, 4 g, Intravenous, PRN, Seven Gallego MD  •  metoprolol tartrate (LOPRESSOR) tablet 100 mg, 100 mg, Oral,  Q12H, Seven Gallego MD, 100 mg at 10/05/17 0832  •  morphine injection 2 mg, 2 mg, Intravenous, Q2H PRN, Nico Fung MD, 2 mg at 10/05/17 0952  •  nystatin susp + lidocaine viscous (MAGIC MOUTHWASH) oral suspension, 5 mL, Swish & Swallow, 4x Daily PRN, Seven Gallego MD  •  ondansetron (ZOFRAN) injection 4 mg, 4 mg, Intravenous, Q6H PRN, Seven Gallego MD, 4 mg at 10/02/17 1032  •  pancrelipase (Lip-Prot-Amyl) (CREON) capsule 24,000 units of lipase, 24,000 units of lipase, Oral, TID With Meals, DORETHA Linton, 24,000 units of lipase at 10/05/17 0832  •  pantoprazole (PROTONIX) injection 40 mg, 40 mg, Intravenous, BID AC, Seven Gallego MD, 40 mg at 10/05/17 0833  •  polyethylene glycol (MIRALAX) powder 17 g, 17 g, Oral, Daily, Seven Gallego MD, 17 g at 10/05/17 0833  •  potassium & sodium phosphates (PHOS-NAK) 280-160-250 MG packet - for Phosphorus less than 1.25 mg/dL, 1 packet, Oral, Q6H PRN **OR** potassium & sodium phosphates (PHOS-NAK) 280-160-250 MG packet - for Phosphorus 1.25 - 2.1 mg/dL, 1 packet, Oral, Once PRN, Seven Gallego MD  •  potassium chloride (KLOR-CON) packet 40 mEq, 40 mEq, Oral, PRN, Seven Gallego MD  •  potassium chloride (MICRO-K) CR capsule 40 mEq, 40 mEq, Oral, PRN, Seven Gallego MD  •  promethazine (PHENERGAN) tablet 12.5 mg, 12.5 mg, Oral, Q6H PRN **OR** promethazine (PHENERGAN) 12.5 mg in sodium chloride 0.9 % 50 mL, 12.5 mg, Intravenous, Q6H PRN, Seven Gallego MD, 12.5 mg at 09/29/17 0943  •  promethazine (PHENERGAN) tablet 12.5 mg, 12.5 mg, Oral, Q6H PRN, 12.5 mg at 10/03/17 0642 **OR** promethazine (PHENERGAN) 12.5 mg in sodium chloride 0.9 % 50 mL, 12.5 mg, Intravenous, Q6H PRN, Seven Gallego MD, 12.5 mg at 10/04/17 0335  •  sodium chloride 0.9 % flush 10 mL, 10 mL, Intracatheter, Q12H, Seven Gallego MD, 10 mL at 10/05/17 0833  •  sodium chloride 0.9 % flush 10 mL, 10 mL, Intracatheter, PRN, Seven Gallego MD, 10 mL at 10/04/17  0539  •  venlafaxine XR (EFFEXOR-XR) 24 hr capsule 75 mg, 75 mg, Oral, Daily, Seven Gallego MD, 75 mg at 10/05/17 0832    ALLERGIES  Hydrocodone-acetaminophen; Hydromorphone; Oxycodone-acetaminophen; and Penicillins     VITAL SIGNS  Vital Signs (last 24 hours)       10/04 0700  -  10/05 0659 10/05 0700  -  10/05 1139   Most Recent    Temp (°F) 98 -  99.4      99.2     99.2 (37.3)    Heart Rate 75 -  101    73 -  75     73    Resp   18      18     18    /75 -  144/86      136/83     136/83    SpO2 (%)   95      96     96            PHYSICAL EXAM  General:  Appearance alert, pleasant, interactive and cooperative  Head:  normocephalic, without obvious abnormality and atraumatic  Eyes:  lids and lashes normal, conjunctivae and sclerae normal, no icterus, no pallor, corneas clear and PERRLA  Ears:  ears appear intact with no abnormalities noted  Nose:  nares normal, septum midline, mucosa normal and no drainage  Throat:  no oral lesions, no thrush and oral mucosa moist  Lungs:  clear to auscultation, respirations regular, respirations even and respirations unlabored  Heart:  regular rhythm & normal rate, normal S1, S2, no murmur, no gallop, no rub and no click  Abdomen:  Tenderness of upper abdomen; normal bowel sounds, no masses, no hepatomegaly, no splenomegaly, soft , no guarding and no rebound tenderness  Extremities:  moves extremities well, no edema, no cyanosis and no redness  Skin:  no bleeding, bruising or rash, color normal, no lesions noted and temperature normal  Neurologic:  Mental Status orientated to person, place, time and situation, alertness alert, awake and arousable, orientation time, date, person, place, city and president and mood/affect:  normal  Cranial Nerves:  cranial nerves 2 - 12 grossly intact as examined, Speech normal content and execution, Sensory intact, Motor strength is equal in upper and lower extremities, Reflexes normal and symmetric      LABS   Lab Results (last 24 hours)      Procedure Component Value Units Date/Time    POC Glucose Fingerstick [[id number]]  (Abnormal) Collected:  10/04/17 1146    Specimen:  Blood Updated:  10/04/17 1154     Glucose 146 (H) mg/dL     Narrative:       Meter: [id number] : [id number] Bertin Donis    Hepatitis Panel, Acute [[id number]]  (Normal) Collected:  10/04/17 0956    Specimen:  Blood Updated:  10/04/17 1206     Hepatitis B Surface Ag Non-Reactive     Hep A IgM Non-Reactive     Hep B C IgM Non-Reactive     Hepatitis C Ab Non-Reactive    Blood Culture - Blood, [[id number]]  (Normal) Collected:  09/29/17 1044    Specimen:  Blood from Arm, Left Updated:  10/04/17 1331     Blood Culture No growth at 5 days    POC Glucose Fingerstick [[id number]]  (Abnormal) Collected:  10/04/17 1636    Specimen:  Blood Updated:  10/04/17 1643     Glucose 264 (H) mg/dL     Narrative:       Meter: [id number] : [id number] Bertin Donis    POC Glucose Fingerstick [[id number]]  (Abnormal) Collected:  10/04/17 2320    Specimen:  Blood Updated:  10/04/17 2326     Glucose 248 (H) mg/dL     Narrative:       Meter: [id number] : [id number] camden cardona    CBC & Differential [[id number]] Collected:  10/05/17 0200    Specimen:  Blood Updated:  10/05/17 0234    Narrative:       The following orders were created for panel order CBC & Differential.  Procedure                               Abnormality         Status                     ---------                               -----------         ------                     CBC Auto Differential[[id number]]        Abnormal            Final result                 Please view results for these tests on the individual orders.    CBC Auto Differential [[id number]]  (Abnormal) Collected:  10/05/17 0200    Specimen:  Blood Updated:  10/05/17 0234     WBC 12.83 (H) 10*3/mm3      RBC 4.22 10*6/mm3      Hemoglobin 11.9 (L) g/dL      Hematocrit 37.5 %      MCV 88.9 fL      MCH 28.2 pg      MCHC 31.7 (L) g/dL      RDW 14.1 %       RDW-SD 44.9 fl      MPV 11.5 (H) fL      Platelets 351 10*3/mm3      Neutrophil % 67.4 %      Lymphocyte % 20.1 (L) %      Monocyte % 7.7 %      Eosinophil % 2.8 %      Basophil % 0.5 %      Immature Grans % 1.5 (H) %      Neutrophils, Absolute 8.65 (H) 10*3/mm3      Lymphocytes, Absolute 2.58 10*3/mm3      Monocytes, Absolute 0.99 (H) 10*3/mm3      Eosinophils, Absolute 0.36 10*3/mm3      Basophils, Absolute 0.06 10*3/mm3      Immature Grans, Absolute 0.19 (H) 10*3/mm3     Magnesium [[id number]]  (Normal) Collected:  10/05/17 0200    Specimen:  Blood Updated:  10/05/17 0301     Magnesium 1.9 mg/dL     Phosphorus [[id number]]  (Abnormal) Collected:  10/05/17 0200    Specimen:  Blood Updated:  10/05/17 0301     Phosphorus 4.9 (H) mg/dL     C-reactive Protein [[id number]]  (Abnormal) Collected:  10/05/17 0200    Specimen:  Blood Updated:  10/05/17 0302     C-Reactive Protein 4.98 (H) mg/dL       2+ Hemolysis         Amylase [[id number]]  (Normal) Collected:  10/05/17 0200    Specimen:  Blood Updated:  10/05/17 0305     Amylase 82 U/L     Lipase [[id number]]  (Abnormal) Collected:  10/05/17 0200    Specimen:  Blood Updated:  10/05/17 0305     Lipase 76 (H) U/L     Comprehensive Metabolic Panel [[id number]]  (Abnormal) Collected:  10/05/17 0200    Specimen:  Blood Updated:  10/05/17 0319     Glucose 156 (H) mg/dL      BUN <5 (L) mg/dL      Creatinine 0.55 mg/dL      Sodium 136 mmol/L      Potassium 5.0 mmol/L       2+ Hemolysis          Chloride 101 mmol/L      CO2 29.6 mmol/L      Calcium 9.0 mg/dL      Total Protein 6.1 g/dL      Albumin 3.40 (L) g/dL      ALT (SGPT) 35 U/L      AST (SGOT) 43 (H) U/L      Alkaline Phosphatase 172 (H) U/L       Note New Reference Ranges        Total Bilirubin 0.4 mg/dL      eGFR Non African Amer 118 mL/min/1.73      Globulin 2.7 gm/dL      A/G Ratio 1.3 (L) g/dL      BUN/Creatinine Ratio --      Unable to calculate Bun/Crea Ratio.        Anion Gap 5.4 mmol/L     Osmolality, Calculated  [[id number]] Collected:  10/05/17 0200    Specimen:  Blood Updated:  10/05/17 0319     Osmolality Calc -- mOsm/kg       Unable to calculate.       POC Glucose Fingerstick [[id number]]  (Abnormal) Collected:  10/05/17 0552    Specimen:  Blood Updated:  10/05/17 0558     Glucose 218 (H) mg/dL     Narrative:       Meter: [id number] : [id number] camden cardona    POC Glucose Fingerstick [[id number]]  (Abnormal) Collected:  10/05/17 0748    Specimen:  Blood Updated:  10/05/17 0757     Glucose 163 (H) mg/dL     Narrative:       Meter: [id number] : [id number] Rosechino Coopersa    POC Glucose Fingerstick [[id number]]  (Abnormal) Collected:  10/05/17 1127    Specimen:  Blood Updated:  10/05/17 1134     Glucose 158 (H) mg/dL     Narrative:       Meter: [id number] : [id number] tahira nye          IMAGING  Imaging Results (last 24 hours)     ** No results found for the last 24 hours. **            ASSESSMENT/PLAN  -Acute pancreatitis  -Abdominal pain  -Bilateral flank pain  -Nausea and vomiting  -Diabetes mellitus type 2  -Hyperlipidemia  -Metabolic acidosis  -Elevated liver enzymes  -Fatty liver  -Hypertension     Patient's diet will be upgraded to GI soft/bland.  Recommend to continue Creon.  We will continue to follow.             Electronically signed by: DORETHA Linton

## 2017-10-05 NOTE — PROGRESS NOTES
LOS: 12 days       Chief Complaint :    Abdominal pain  Subjective     Interval History:     Patient Complaints:  Rossy Boone  continues with epigastric pain and intermittent nausea.  She continues with a poor appetite.  She continues require intermittent pain medications.  She describes generalized debility and weakness.  She is having some improvement but very slow.  She has no other complaints this morning.  C-reactive protein continues to improve.   Review of Systems-unchanged since admission history and physical  Objective     Vital Signs  Temp:  [98 °F (36.7 °C)-99.4 °F (37.4 °C)] 98 °F (36.7 °C)  Heart Rate:  [] 75  Resp:  [18] 18  BP: (126-144)/(75-92) 126/75    Physical Exam  Constitutional: She is oriented to person, place, and time. She appears well-developed and well-nourished.  No obvious distress at rest  HENT:   Head: Normocephalic and atraumatic.   Right Ear: External ear normal.   Left Ear: External ear normal.   Nose: Nose normal.   Mouth/Throat: Oropharynx is clear and moist. No oropharyngeal exudate.   Eyes: Conjunctivae and EOM are normal. Pupils are equal, round, and reactive to light. Right eye exhibits no discharge. Left eye exhibits no discharge. No scleral icterus.   Neck: Normal range of motion. Neck supple. No JVD present. No tracheal deviation present. No thyromegaly present.   Cardiovascular: Normal rate, regular rhythm and normal heart sounds.  Exam reveals no gallop and no friction rub.    No murmur heard.  Pulmonary/Chest: Effort normal and breath sounds normal. No stridor. No respiratory distress. She has no wheezes. She has no rales. She exhibits no tenderness.   Abdominal: Soft. Bowel sounds are normal.  She exhibits no mass. There is no guarding. No hernia.   Genitourinary:   Genitourinary Comments: No Mosley catheter   Musculoskeletal: Normal range of motion. She exhibits no edema, tenderness or deformity.   Lymphadenopathy:     She has no cervical adenopathy.    Neurological: She is alert and oriented to person, place, and time. She has normal reflexes. She displays normal reflexes. No cranial nerve deficit. She exhibits normal muscle tone. Coordination normal.   Skin: Skin is warm and dry. No rash noted. She is not diaphoretic. No erythema. No pallor.   Psychiatric: She has a normal mood and affect. Her behavior is normal. Judgment and thought content normal.   Nursing note and vitals reviewed     Results Review:     I reviewed the patient's new clinical results  : See Below  MEDICATIONS:    bisacodyl 10 mg Rectal Daily   enoxaparin 40 mg Subcutaneous Nightly   insulin aspart 0-7 Units Subcutaneous Q6H   metoprolol tartrate 100 mg Oral Q12H   pancrelipase (Lip-Prot-Amyl) 24,000 units of lipase Oral TID With Meals   pantoprazole 40 mg Intravenous BID AC   polyethylene glycol 17 g Oral Daily   sodium chloride 10 mL Intracatheter Q12H   venlafaxine XR 75 mg Oral Daily       LABS:        Results from last 7 days  Lab Units 10/05/17  0200 10/04/17  0224 10/03/17  0043   CRP mg/dL 4.98* 6.79* 11.45*   WBC 10*3/mm3 12.83* 14.39* 16.52*   HEMOGLOBIN g/dL 11.9* 12.3 11.8*   HEMATOCRIT % 37.5 37.9 37.4   MCV fL 88.9 87.7 87.4   MCHC g/dL 31.7* 32.5* 31.6*   PLATELETS 10*3/mm3 351 361 324           Results from last 7 days  Lab Units 10/05/17  0200 10/04/17  0224 10/03/17  0043  10/01/17  0159   SODIUM mmol/L 136 132* 134*  < > 134*   POTASSIUM mmol/L 5.0 5.0 4.2  < > 3.5   MAGNESIUM mg/dL 1.9 1.7  --   --  2.0   CHLORIDE mmol/L 101 98* 100  < > 100   CO2 mmol/L 29.6 27.4 28.7  < > 29.3   BUN mg/dL <5* <5* 6*  < > 6*   CREATININE mg/dL 0.55 0.60 0.49  < > 0.47   EGFR IF NONAFRICN AM mL/min/1.73 118 107 135  < > 142   CALCIUM mg/dL 9.0 8.8 8.7  < > 8.7   GLUCOSE mg/dL 156* 331* 254*  < > 233*   ALBUMIN g/dL 3.40* 3.20* 3.10*  < > 3.20*   BILIRUBIN mg/dL 0.4 0.3 0.4  < > 0.6   ALK PHOS U/L 172* 184* 173*  < > 170*   AST (SGOT) U/L 43* 39* 35*  < > 72*   ALT (SGPT) U/L 35 43* 55*  < >  80*   < > = values in this interval not displayed.Estimated Creatinine Clearance: 122.8 mL/min (by C-G formula based on Cr of 0.55).  No results found for: AMMONIA      Blood Culture   Date Value Ref Range Status   09/23/2017 No growth at 24 hours  Preliminary   09/23/2017 No growth at 24 hours  Preliminary              Assessment/Plan      1) abdominal pain  2) pancreatitis-acute-amylase and lipase are improved from the time of admission but remain elevated.  3) diabetes mellitus type 2 uncontrolled  4) hyperlipidemia  5) noncompliance with diabetes control  6) metabolic acidosis secondary to above  7) abnormal liver enzymes  8) leukocytosis secondary to above  9) DVT prophylaxis-subcutaneous Lovenox  10) constipation secondary to pain medications-Relistor  has relieved her symptoms.  11) acute respiratory failure secondary to atelectasis.-Nebulizers and oxygen.  12) free fluid in the pelvis on CT scan of the abdomen on September 29.     See orders entered.     Seven Gallego MD  10/05/17  7:51 AM

## 2017-10-05 NOTE — PLAN OF CARE
Problem: Patient Care Overview (Adult)  Goal: Plan of Care Review  Outcome: Ongoing (interventions implemented as appropriate)    10/05/17 1407   Coping/Psychosocial Response Interventions   Plan Of Care Reviewed With patient   Patient Care Overview   Progress no change         Problem: Pain, Acute (Adult)  Goal: Identify Related Risk Factors and Signs and Symptoms  Outcome: Ongoing (interventions implemented as appropriate)    Problem: Pancreatitis, Acute/Chronic (Adult)  Goal: Signs and Symptoms of Listed Potential Problems Will be Absent or Manageable (Pancreatitis, Acute/Chronic)  Outcome: Ongoing (interventions implemented as appropriate)    10/05/17 0225   Pancreatitis, Acute/Chronic   Problems Assessed (Pancreatitis) all   Problems Present (Pancreatitis) pain;impaired glycemic control         Problem: Fall Risk (Adult)  Goal: Identify Related Risk Factors and Signs and Symptoms  Outcome: Ongoing (interventions implemented as appropriate)    10/05/17 0225   Fall Risk   Fall Risk: Related Risk Factors environment unfamiliar   Fall Risk: Signs and Symptoms presence of risk factors         Problem: Diabetes, Type 2 (Adult)  Goal: Signs and Symptoms of Listed Potential Problems Will be Absent or Manageable (Diabetes, Type 2)  Outcome: Ongoing (interventions implemented as appropriate)    Problem: Pneumonia (Adult)  Goal: Signs and Symptoms of Listed Potential Problems Will be Absent or Manageable (Pneumonia)  Outcome: Ongoing (interventions implemented as appropriate)    10/05/17 0225   Pneumonia   Problems Assessed (Pneumonia) all   Problems Present (Pneumonia) none

## 2017-10-06 LAB
ALBUMIN SERPL-MCNC: 3.6 G/DL (ref 3.5–5)
ALBUMIN/GLOB SERPL: 1.3 G/DL (ref 1.5–2.5)
ALP SERPL-CCNC: 190 U/L (ref 35–104)
ALT SERPL W P-5'-P-CCNC: 31 U/L (ref 10–36)
AMYLASE SERPL-CCNC: 90 U/L (ref 28–100)
ANION GAP SERPL CALCULATED.3IONS-SCNC: 9.2 MMOL/L (ref 3.6–11.2)
AST SERPL-CCNC: 25 U/L (ref 10–30)
BILIRUB SERPL-MCNC: 0.3 MG/DL (ref 0.2–1.8)
BUN BLD-MCNC: 6 MG/DL (ref 7–21)
BUN/CREAT SERPL: 9.4 (ref 7–25)
CALCIUM SPEC-SCNC: 9 MG/DL (ref 7.7–10)
CHLORIDE SERPL-SCNC: 102 MMOL/L (ref 99–112)
CO2 SERPL-SCNC: 24.8 MMOL/L (ref 24.3–31.9)
CREAT BLD-MCNC: 0.64 MG/DL (ref 0.43–1.29)
GFR SERPL CREATININE-BSD FRML MDRD: 99 ML/MIN/1.73
GLOBULIN UR ELPH-MCNC: 2.7 GM/DL
GLUCOSE BLD-MCNC: 272 MG/DL (ref 70–110)
GLUCOSE BLDC GLUCOMTR-MCNC: 251 MG/DL (ref 70–130)
GLUCOSE BLDC GLUCOMTR-MCNC: 251 MG/DL (ref 70–130)
GLUCOSE BLDC GLUCOMTR-MCNC: 265 MG/DL (ref 70–130)
GLUCOSE BLDC GLUCOMTR-MCNC: 269 MG/DL (ref 70–130)
LIPASE SERPL-CCNC: 78 U/L (ref 13–60)
MAGNESIUM SERPL-MCNC: 2.1 MG/DL (ref 1.7–2.6)
OSMOLALITY SERPL CALC.SUM OF ELEC: 279.2 MOSM/KG (ref 273–305)
POTASSIUM BLD-SCNC: 4 MMOL/L (ref 3.5–5.3)
PROT SERPL-MCNC: 6.3 G/DL (ref 6–8)
SODIUM BLD-SCNC: 136 MMOL/L (ref 135–153)

## 2017-10-06 PROCEDURE — 94799 UNLISTED PULMONARY SVC/PX: CPT

## 2017-10-06 PROCEDURE — 83690 ASSAY OF LIPASE: CPT | Performed by: INTERNAL MEDICINE

## 2017-10-06 PROCEDURE — 82962 GLUCOSE BLOOD TEST: CPT

## 2017-10-06 PROCEDURE — 63710000001 INSULIN ASPART PER 5 UNITS: Performed by: INTERNAL MEDICINE

## 2017-10-06 PROCEDURE — 25010000002 ENOXAPARIN PER 10 MG: Performed by: INTERNAL MEDICINE

## 2017-10-06 PROCEDURE — 82150 ASSAY OF AMYLASE: CPT | Performed by: INTERNAL MEDICINE

## 2017-10-06 PROCEDURE — 83735 ASSAY OF MAGNESIUM: CPT | Performed by: INTERNAL MEDICINE

## 2017-10-06 PROCEDURE — 25010000002 MORPHINE PER 10 MG: Performed by: INTERNAL MEDICINE

## 2017-10-06 PROCEDURE — 99231 SBSQ HOSP IP/OBS SF/LOW 25: CPT | Performed by: PHYSICIAN ASSISTANT

## 2017-10-06 PROCEDURE — 80053 COMPREHEN METABOLIC PANEL: CPT | Performed by: INTERNAL MEDICINE

## 2017-10-06 RX ORDER — ALUMINA, MAGNESIA, AND SIMETHICONE 2400; 2400; 240 MG/30ML; MG/30ML; MG/30ML
15 SUSPENSION ORAL EVERY 6 HOURS PRN
Status: DISCONTINUED | OUTPATIENT
Start: 2017-10-06 | End: 2017-10-09 | Stop reason: HOSPADM

## 2017-10-06 RX ADMIN — BISACODYL 10 MG: 10 SUPPOSITORY RECTAL at 08:49

## 2017-10-06 RX ADMIN — MORPHINE SULFATE 2 MG: 2 INJECTION, SOLUTION INTRAMUSCULAR; INTRAVENOUS at 16:08

## 2017-10-06 RX ADMIN — PANTOPRAZOLE SODIUM 40 MG: 40 INJECTION, POWDER, FOR SOLUTION INTRAVENOUS at 17:43

## 2017-10-06 RX ADMIN — INSULIN ASPART 4 UNITS: 100 INJECTION, SOLUTION INTRAVENOUS; SUBCUTANEOUS at 05:31

## 2017-10-06 RX ADMIN — PANCRELIPASE 24000 UNITS OF LIPASE: 60000; 12000; 38000 CAPSULE, DELAYED RELEASE PELLETS ORAL at 08:48

## 2017-10-06 RX ADMIN — PANCRELIPASE 24000 UNITS OF LIPASE: 60000; 12000; 38000 CAPSULE, DELAYED RELEASE PELLETS ORAL at 17:43

## 2017-10-06 RX ADMIN — MORPHINE SULFATE 2 MG: 2 INJECTION, SOLUTION INTRAMUSCULAR; INTRAVENOUS at 12:12

## 2017-10-06 RX ADMIN — INSULIN ASPART 4 UNITS: 100 INJECTION, SOLUTION INTRAVENOUS; SUBCUTANEOUS at 12:13

## 2017-10-06 RX ADMIN — Medication 10 ML: at 08:50

## 2017-10-06 RX ADMIN — METOPROLOL TARTRATE 100 MG: 100 TABLET, FILM COATED ORAL at 22:19

## 2017-10-06 RX ADMIN — ENOXAPARIN SODIUM 40 MG: 40 INJECTION SUBCUTANEOUS at 22:20

## 2017-10-06 RX ADMIN — VENLAFAXINE HYDROCHLORIDE 75 MG: 75 CAPSULE, EXTENDED RELEASE ORAL at 08:48

## 2017-10-06 RX ADMIN — INSULIN ASPART 4 UNITS: 100 INJECTION, SOLUTION INTRAVENOUS; SUBCUTANEOUS at 18:01

## 2017-10-06 RX ADMIN — PANCRELIPASE 24000 UNITS OF LIPASE: 60000; 12000; 38000 CAPSULE, DELAYED RELEASE PELLETS ORAL at 11:57

## 2017-10-06 RX ADMIN — METOPROLOL TARTRATE 100 MG: 100 TABLET, FILM COATED ORAL at 08:49

## 2017-10-06 RX ADMIN — MORPHINE SULFATE 2 MG: 2 INJECTION, SOLUTION INTRAMUSCULAR; INTRAVENOUS at 00:08

## 2017-10-06 RX ADMIN — POLYETHYLENE GLYCOL 3350 17 G: 17 POWDER, FOR SOLUTION ORAL at 08:49

## 2017-10-06 RX ADMIN — MORPHINE SULFATE 2 MG: 2 INJECTION, SOLUTION INTRAMUSCULAR; INTRAVENOUS at 20:31

## 2017-10-06 RX ADMIN — INSULIN ASPART 4 UNITS: 100 INJECTION, SOLUTION INTRAVENOUS; SUBCUTANEOUS at 00:08

## 2017-10-06 RX ADMIN — PANTOPRAZOLE SODIUM 40 MG: 40 INJECTION, POWDER, FOR SOLUTION INTRAVENOUS at 08:48

## 2017-10-06 NOTE — PLAN OF CARE
Problem: Patient Care Overview (Adult)  Goal: Plan of Care Review    10/06/17 0032 10/06/17 0800   Coping/Psychosocial Response Interventions   Plan Of Care Reviewed With --  patient   Patient Care Overview   Progress no change --          Problem: Pain, Acute (Adult)  Goal: Identify Related Risk Factors and Signs and Symptoms    10/06/17 0032   Pain, Acute   Related Risk Factors (Acute Pain) patient perception;persistent pain   Signs and Symptoms (Acute Pain) verbalization of pain descriptors       Goal: Acceptable Pain Control/Comfort Level    10/06/17 0032   Pain, Acute (Adult)   Acceptable Pain Control/Comfort Level making progress toward outcome         Problem: Pancreatitis, Acute/Chronic (Adult)  Goal: Signs and Symptoms of Listed Potential Problems Will be Absent or Manageable (Pancreatitis, Acute/Chronic)    10/06/17 0032   Pancreatitis, Acute/Chronic   Problems Assessed (Pancreatitis) all   Problems Present (Pancreatitis) impaired glycemic control;pain         Problem: Fall Risk (Adult)  Goal: Identify Related Risk Factors and Signs and Symptoms    10/06/17 0032   Fall Risk   Fall Risk: Related Risk Factors environment unfamiliar   Fall Risk: Signs and Symptoms presence of risk factors       Goal: Absence of Falls    10/06/17 0032   Fall Risk (Adult)   Absence of Falls making progress toward outcome         Problem: Diabetes, Type 2 (Adult)  Goal: Signs and Symptoms of Listed Potential Problems Will be Absent or Manageable (Diabetes, Type 2)    10/06/17 0032   Diabetes, Type 2   Problems Assessed (Type 2 Diabetes) all   Problems Present (Type 2 Diabetes) impaired glycemic control         Problem: Pneumonia (Adult)  Goal: Signs and Symptoms of Listed Potential Problems Will be Absent or Manageable (Pneumonia)    10/06/17 0032   Pneumonia   Problems Assessed (Pneumonia) all   Problems Present (Pneumonia) none

## 2017-10-06 NOTE — NURSING NOTE
"Patient is requesting home anxiety medication be added as needed for anxiety on top of her scheduled dose. Educated patient on times medication can be given and that I would try to contact Dr. Gallego, patient agreeable. I tried to contact Dr. Gallego about patient request but was unable to reach him. Patient states she will \"wait until he comes to see me\" to ask Dr. Gallego. Patient is satisfied with plan of care, will continue to monitor.  "

## 2017-10-06 NOTE — PROGRESS NOTES
LOS: 13 days       Chief Complaint :    Abdominal pain  Subjective     Interval History:     Patient Complaints:  Rossy Boone  has tolerated to a GI soft diet.  She denies any fevers, chills, vomiting.  She does describe some intermittent nausea and generalized weakness.  Her abdominal pain is much improved compared to yesterday.  She has no other complaints this morning.  Her generalized weakness is also improving.     Review of Systems-unchanged since admission history and physical  Objective     Vital Signs  Temp:  [98 °F (36.7 °C)-99.2 °F (37.3 °C)] 98.5 °F (36.9 °C)  Heart Rate:  [] 96  Resp:  [18] 18  BP: (126-145)/(75-87) 140/84    Physical Exam  Constitutional: She is oriented to person, place, and time. She appears well-developed and well-nourished.  No obvious distress at rest  HENT:   Head: Normocephalic and atraumatic.   Right Ear: External ear normal.   Left Ear: External ear normal.   Nose: Nose normal.   Mouth/Throat: Oropharynx is clear and moist. No oropharyngeal exudate.   Eyes: Conjunctivae and EOM are normal. Pupils are equal, round, and reactive to light. Right eye exhibits no discharge. Left eye exhibits no discharge. No scleral icterus.   Neck: Normal range of motion. Neck supple. No JVD present. No tracheal deviation present. No thyromegaly present.   Cardiovascular: Normal rate, regular rhythm and normal heart sounds.  Exam reveals no gallop and no friction rub.    No murmur heard.  Pulmonary/Chest: Effort normal and breath sounds normal. No stridor. No respiratory distress. She has no wheezes. She has no rales. She exhibits no tenderness.   Abdominal: Soft. Bowel sounds are normal.  She exhibits no mass. There is no guarding. No hernia.   Genitourinary:   Genitourinary Comments: No Mosley catheter   Musculoskeletal: Normal range of motion. She exhibits no edema, tenderness or deformity.   Lymphadenopathy:     She has no cervical adenopathy.   Neurological: She is alert and  oriented to person, place, and time. She has normal reflexes. She displays normal reflexes. No cranial nerve deficit. She exhibits normal muscle tone. Coordination normal.   Skin: Skin is warm and dry. No rash noted. She is not diaphoretic. No erythema. No pallor.   Psychiatric: She has a normal mood and affect. Her behavior is normal. Judgment and thought content normal.   Nursing note and vitals reviewed     Results Review:     I reviewed the patient's new clinical results  : See Below  MEDICATIONS:    bisacodyl 10 mg Rectal Daily   enoxaparin 40 mg Subcutaneous Nightly   insulin aspart 0-7 Units Subcutaneous Q6H   metoprolol tartrate 100 mg Oral Q12H   pancrelipase (Lip-Prot-Amyl) 24,000 units of lipase Oral TID With Meals   pantoprazole 40 mg Intravenous BID AC   polyethylene glycol 17 g Oral Daily   sodium chloride 10 mL Intracatheter Q12H   venlafaxine XR 75 mg Oral Daily       LABS:        Results from last 7 days  Lab Units 10/05/17  0200 10/04/17  0224 10/03/17  0043   CRP mg/dL 4.98* 6.79* 11.45*   WBC 10*3/mm3 12.83* 14.39* 16.52*   HEMOGLOBIN g/dL 11.9* 12.3 11.8*   HEMATOCRIT % 37.5 37.9 37.4   MCV fL 88.9 87.7 87.4   MCHC g/dL 31.7* 32.5* 31.6*   PLATELETS 10*3/mm3 351 361 324           Results from last 7 days  Lab Units 10/06/17  0349 10/05/17  0200 10/04/17  0224 10/03/17  0043   SODIUM mmol/L  --  136 132* 134*   POTASSIUM mmol/L  --  5.0 5.0 4.2   MAGNESIUM mg/dL 2.1 1.9 1.7  --    CHLORIDE mmol/L  --  101 98* 100   CO2 mmol/L  --  29.6 27.4 28.7   BUN mg/dL  --  <5* <5* 6*   CREATININE mg/dL  --  0.55 0.60 0.49   EGFR IF NONAFRICN AM mL/min/1.73  --  118 107 135   CALCIUM mg/dL  --  9.0 8.8 8.7   GLUCOSE mg/dL  --  156* 331* 254*   ALBUMIN g/dL  --  3.40* 3.20* 3.10*   BILIRUBIN mg/dL  --  0.4 0.3 0.4   ALK PHOS U/L  --  172* 184* 173*   AST (SGOT) U/L  --  43* 39* 35*   ALT (SGPT) U/L  --  35 43* 55*   Estimated Creatinine Clearance: 122.8 mL/min (by C-G formula based on Cr of 0.55).  No results  found for: AMMONIA      Blood Culture   Date Value Ref Range Status   09/23/2017 No growth at 24 hours  Preliminary   09/23/2017 No growth at 24 hours  Preliminary              Assessment/Plan      1) abdominal pain  2) pancreatitis-acute-amylase and lipase are improved from the time of admission but remain elevated.  3) diabetes mellitus type 2 uncontrolled  4) hyperlipidemia  5) noncompliance with diabetes control  6) metabolic acidosis secondary to above  7) abnormal liver enzymes  8) leukocytosis secondary to above  9) DVT prophylaxis-subcutaneous Lovenox  10) constipation secondary to pain medications-Relistor  has relieved her symptoms.  11) acute respiratory failure secondary to atelectasis.-Nebulizers and oxygen.  12) free fluid in the pelvis on CT scan of the abdomen on September 29.     See orders entered.     Seven Gallego MD  10/06/17  6:18 AM

## 2017-10-06 NOTE — PROGRESS NOTES
10/06/17      Rossy Boone is a 47 y.o. female who is seen today for follow up of acute pancreatitis     HPI  The patient was seen for a follow-up visit.  She was upgraded to GI soft diet yesterday and reports that she is tolerating it well.  Patient is having mild abdominal tenderness and minimal nausea.  Her lipase is 78 and amylase is 90.  Patient's AST and ALT are now normal, however, alkaline phosphatase is increased to 190.    Past medical history, social history, and family history remain unchanged since initial consultation.    REVIEW OF SYSTEMS  Mild abdominal tenderness in upper abdomen, minimal nausea; weakness with activity; all other systems are negative.    CURRENT MEDICATION    Current Facility-Administered Medications:   •  bisacodyl (DULCOLAX) suppository 10 mg, 10 mg, Rectal, Daily, Seven Gallego MD, 10 mg at 10/06/17 0849  •  dextrose (D50W) solution 25 g, 25 g, Intravenous, Q15 Min PRN, Seven Gallego MD  •  dextrose (GLUTOSE) oral gel 15 g, 15 g, Oral, Q15 Min PRN, Seven Gallego MD  •  enoxaparin (LOVENOX) syringe 40 mg, 40 mg, Subcutaneous, Nightly, Seven Gallego MD, 40 mg at 10/05/17 2055  •  glucagon (human recombinant) (GLUCAGEN DIAGNOSTIC) injection 1 mg, 1 mg, Subcutaneous, Q15 Min PRN, Seven Gallego MD  •  insulin aspart (novoLOG) injection 0-7 Units, 0-7 Units, Subcutaneous, Q6H, Seven Gallego MD, 4 Units at 10/06/17 1213  •  ipratropium-albuterol (DUO-NEB) nebulizer solution 3 mL, 3 mL, Nebulization, Q6H PRN, Seven Gallego MD  •  Magnesium Sulfate 2 gram Bolus, followed by 8 gram infusion (total Mg dose 10 grams)- Mg less than or equal to 1mg/dL, 2 g, Intravenous, PRN **OR** Magnesium Sulfate 6 gram Infusion (2 gm x 3) -Mg 1.1 -1.5 mg/dL, 2 g, Intravenous, PRN **OR** magnesium sulfate 4 gram infusion- Mg 1.6-1.9 mg/dL, 4 g, Intravenous, PRN, Seven Gallego MD  •  metoprolol tartrate (LOPRESSOR) tablet 100 mg, 100 mg, Oral, Q12H, Seven Gallego MD, 100 mg at  10/06/17 0849  •  morphine injection 2 mg, 2 mg, Intravenous, Q2H PRN, Nico Fung MD, 2 mg at 10/06/17 1212  •  nystatin susp + lidocaine viscous (MAGIC MOUTHWASH) oral suspension, 5 mL, Swish & Swallow, 4x Daily PRN, Seven Gallego MD  •  ondansetron (ZOFRAN) injection 4 mg, 4 mg, Intravenous, Q6H PRN, Seven Gallego MD, 4 mg at 10/02/17 1032  •  pancrelipase (Lip-Prot-Amyl) (CREON) capsule 24,000 units of lipase, 24,000 units of lipase, Oral, TID With Meals, DORETHA Linton, 24,000 units of lipase at 10/06/17 1157  •  pantoprazole (PROTONIX) injection 40 mg, 40 mg, Intravenous, BID AC, Seven Gallego MD, 40 mg at 10/06/17 0848  •  polyethylene glycol (MIRALAX) powder 17 g, 17 g, Oral, Daily, Seven Gallego MD, 17 g at 10/06/17 0849  •  potassium & sodium phosphates (PHOS-NAK) 280-160-250 MG packet - for Phosphorus less than 1.25 mg/dL, 1 packet, Oral, Q6H PRN **OR** potassium & sodium phosphates (PHOS-NAK) 280-160-250 MG packet - for Phosphorus 1.25 - 2.1 mg/dL, 1 packet, Oral, Once PRN, Seven Gallego MD  •  potassium chloride (KLOR-CON) packet 40 mEq, 40 mEq, Oral, PRN, Seven Gallego MD  •  potassium chloride (MICRO-K) CR capsule 40 mEq, 40 mEq, Oral, PRN, Seven Gallego MD  •  promethazine (PHENERGAN) tablet 12.5 mg, 12.5 mg, Oral, Q6H PRN **OR** promethazine (PHENERGAN) 12.5 mg in sodium chloride 0.9 % 50 mL, 12.5 mg, Intravenous, Q6H PRN, Seven Glalego MD, 12.5 mg at 09/29/17 0943  •  promethazine (PHENERGAN) tablet 12.5 mg, 12.5 mg, Oral, Q6H PRN, 12.5 mg at 10/03/17 0642 **OR** promethazine (PHENERGAN) 12.5 mg in sodium chloride 0.9 % 50 mL, 12.5 mg, Intravenous, Q6H PRN, Seven Gallego MD, 12.5 mg at 10/04/17 0335  •  sodium chloride 0.9 % flush 10 mL, 10 mL, Intracatheter, Q12H, Seven Gallego MD, 10 mL at 10/06/17 0850  •  sodium chloride 0.9 % flush 10 mL, 10 mL, Intracatheter, PRN, Seven Gallego MD, 10 mL at 10/04/17 0539  •  venlafaxine XR (EFFEXOR-XR) 24  hr capsule 75 mg, 75 mg, Oral, Daily, Seven Gallego MD, 75 mg at 10/06/17 0848    ALLERGIES  Hydrocodone-acetaminophen; Hydromorphone; Oxycodone-acetaminophen; and Penicillins     VITAL SIGNS  Vital Signs (last 24 hours)       10/05 0700  -  10/06 0659 10/06 0700  -  10/06 1331   Most Recent    Temp (°F) 98.2 -  99.2    98.4 -  98.7     98.4 (36.9)    Heart Rate 73 -  103    80 -  102     80    Resp   18      18     18    /82 -  145/87    151/93 -  163/96     151/93    SpO2 (%)   96      97     97            PHYSICAL EXAM  General:  Appearance alert, pleasant, interactive and cooperative  Head:  normocephalic, without obvious abnormality and atraumatic  Eyes:  lids and lashes normal, conjunctivae and sclerae normal, no icterus, no pallor, corneas clear and PERRLA  Ears:  ears appear intact with no abnormalities noted  Nose:  nares normal, septum midline, mucosa normal and no drainage  Throat:  no oral lesions, no thrush and oral mucosa moist  Lungs:  clear to auscultation, respirations regular, respirations even and respirations unlabored  Heart:  regular rhythm & normal rate, normal S1, S2, no murmur, no gallop, no rub and no click  Abdomen:  Minimal Tenderness of upper abdomen; normal bowel sounds, no masses, no hepatomegaly, no splenomegaly, soft, no guarding and no rebound tenderness  Extremities:  moves extremities well, no edema, no cyanosis and no redness  Skin:  no bleeding, bruising or rash, color normal, no lesions noted and temperature normal  Neurologic:  Mental Status orientated to person, place, time and situation, alertness alert, awake and arousable, orientation time, date, person, place, city and president and mood/affect:  normal  Cranial Nerves:  cranial nerves 2 - 12 grossly intact as examined, Speech normal content and execution, Sensory intact, Motor strength is equal in upper and lower extremities, Reflexes normal and symmetric      LABS   Lab Results (last 24 hours)     Procedure  Component Value Units Date/Time    POC Glucose Fingerstick [[id number]]  (Abnormal) Collected:  10/05/17 1708    Specimen:  Blood Updated:  10/05/17 1714     Glucose 293 (H) mg/dL     Narrative:       Meter: [id number] : [id number] Elijah Gooden    POC Glucose Fingerstick [[id number]]  (Abnormal) Collected:  10/06/17 0001    Specimen:  Blood Updated:  10/06/17 0007     Glucose 269 (H) mg/dL     Narrative:       Meter: [id number] : [id number] camden dulce    Magnesium [[id number]]  (Normal) Collected:  10/06/17 0349    Specimen:  Blood Updated:  10/06/17 0423     Magnesium 2.1 mg/dL     POC Glucose Fingerstick [[id number]]  (Abnormal) Collected:  10/06/17 0525    Specimen:  Blood Updated:  10/06/17 0532     Glucose 265 (H) mg/dL     Narrative:       Meter: [id number] : [id number] camden hugoca    Lipase [[id number]]  (Abnormal) Collected:  10/06/17 0349    Specimen:  Blood Updated:  10/06/17 0642     Lipase 78 (H) U/L     Comprehensive Metabolic Panel [[id number]]  (Abnormal) Collected:  10/06/17 0349    Specimen:  Blood Updated:  10/06/17 0647     Glucose 272 (H) mg/dL      BUN 6 (L) mg/dL      Creatinine 0.64 mg/dL      Sodium 136 mmol/L      Potassium 4.0 mmol/L      Chloride 102 mmol/L      CO2 24.8 mmol/L      Calcium 9.0 mg/dL      Total Protein 6.3 g/dL      Albumin 3.60 g/dL      ALT (SGPT) 31 U/L      AST (SGOT) 25 U/L      Alkaline Phosphatase 190 (H) U/L       Note New Reference Ranges        Total Bilirubin 0.3 mg/dL      eGFR Non African Amer 99 mL/min/1.73      Globulin 2.7 gm/dL      A/G Ratio 1.3 (L) g/dL      BUN/Creatinine Ratio 9.4     Anion Gap 9.2 mmol/L     Amylase [[id number]]  (Normal) Collected:  10/06/17 0349    Specimen:  Blood Updated:  10/06/17 0647     Amylase 90 U/L     Osmolality, Calculated [[id number]]  (Normal) Collected:  10/06/17 0349    Specimen:  Blood Updated:  10/06/17 0647     Osmolality Calc 279.2 mOsm/kg     POC Glucose Fingerstick [[id number]]  (Abnormal)  Collected:  10/06/17 1159    Specimen:  Blood Updated:  10/06/17 1211     Glucose 251 (H) mg/dL     Narrative:       Meter: [id number] : [id number] Anupam Rochela RASHEED          IMAGING  Imaging Results (last 24 hours)     ** No results found for the last 24 hours. **            ASSESSMENT/PLAN    -Acute pancreatitis  -Abdominal pain  -Bilateral flank pain  -Nausea and vomiting  -Diabetes mellitus type 2  -Hyperlipidemia  -Metabolic acidosis  -Elevated liver enzymes  -Fatty liver  -Hypertension     The patient symptoms have significantly improved.  She is now tolerating a GI soft diet.  If patient discharges home this weekend, we would like to see her in our office in 2-3 weeks.        Electronically signed by: DORETHA Linton

## 2017-10-06 NOTE — PROGRESS NOTES
Discharge Planning Assessment   [facility]     Patient Name: Rossy Boone  MRN: [id number]  Today's Date: 10/6/2017    Admit Date: 9/23/2017       Discharge Plan       10/06/17 1621    Case Management/Social Work Plan    Plan SS attempted visit with pt and pt was out of the room. Pt lives with spouse and plans to return home at discharge. Pt does not utilize home health services or DME. SS to follow.              Expected Discharge Date and Time     Expected Discharge Date Expected Discharge Time    Sep 29, 2017                Linnea Russell

## 2017-10-07 ENCOUNTER — APPOINTMENT (OUTPATIENT)
Dept: GENERAL RADIOLOGY | Facility: HOSPITAL | Age: 47
End: 2017-10-07

## 2017-10-07 ENCOUNTER — APPOINTMENT (OUTPATIENT)
Dept: ULTRASOUND IMAGING | Facility: HOSPITAL | Age: 47
End: 2017-10-07

## 2017-10-07 LAB
GLUCOSE BLDC GLUCOMTR-MCNC: 123 MG/DL (ref 70–130)
GLUCOSE BLDC GLUCOMTR-MCNC: 206 MG/DL (ref 70–130)
GLUCOSE BLDC GLUCOMTR-MCNC: 231 MG/DL (ref 70–130)
GLUCOSE BLDC GLUCOMTR-MCNC: 257 MG/DL (ref 70–130)
GLUCOSE BLDC GLUCOMTR-MCNC: 297 MG/DL (ref 70–130)
TSH SERPL DL<=0.05 MIU/L-ACNC: 1.9 MIU/ML (ref 0.55–4.78)

## 2017-10-07 PROCEDURE — 82962 GLUCOSE BLOOD TEST: CPT

## 2017-10-07 PROCEDURE — 71020 HC CHEST PA AND LATERAL: CPT

## 2017-10-07 PROCEDURE — 25010000002 MORPHINE PER 10 MG: Performed by: INTERNAL MEDICINE

## 2017-10-07 PROCEDURE — 84443 ASSAY THYROID STIM HORMONE: CPT | Performed by: INTERNAL MEDICINE

## 2017-10-07 PROCEDURE — 71020 XR CHEST PA AND LATERAL: CPT | Performed by: RADIOLOGY

## 2017-10-07 PROCEDURE — 94799 UNLISTED PULMONARY SVC/PX: CPT

## 2017-10-07 PROCEDURE — 86304 IMMUNOASSAY TUMOR CA 125: CPT | Performed by: INTERNAL MEDICINE

## 2017-10-07 PROCEDURE — 63710000001 PROMETHAZINE PER 12.5 MG: Performed by: INTERNAL MEDICINE

## 2017-10-07 PROCEDURE — 63710000001 INSULIN DETEMIR PER 5 UNITS: Performed by: INTERNAL MEDICINE

## 2017-10-07 PROCEDURE — 93970 EXTREMITY STUDY: CPT

## 2017-10-07 PROCEDURE — 93970 EXTREMITY STUDY: CPT | Performed by: RADIOLOGY

## 2017-10-07 PROCEDURE — 63710000001 INSULIN ASPART PER 5 UNITS: Performed by: INTERNAL MEDICINE

## 2017-10-07 PROCEDURE — 25010000002 ENOXAPARIN PER 10 MG: Performed by: INTERNAL MEDICINE

## 2017-10-07 RX ADMIN — VENLAFAXINE HYDROCHLORIDE 75 MG: 75 CAPSULE, EXTENDED RELEASE ORAL at 09:05

## 2017-10-07 RX ADMIN — MORPHINE SULFATE 2 MG: 2 INJECTION, SOLUTION INTRAMUSCULAR; INTRAVENOUS at 15:51

## 2017-10-07 RX ADMIN — Medication 10 ML: at 21:00

## 2017-10-07 RX ADMIN — MORPHINE SULFATE 2 MG: 2 INJECTION, SOLUTION INTRAMUSCULAR; INTRAVENOUS at 18:14

## 2017-10-07 RX ADMIN — MORPHINE SULFATE 2 MG: 2 INJECTION, SOLUTION INTRAMUSCULAR; INTRAVENOUS at 12:32

## 2017-10-07 RX ADMIN — METOPROLOL TARTRATE 100 MG: 100 TABLET, FILM COATED ORAL at 09:05

## 2017-10-07 RX ADMIN — PANCRELIPASE 24000 UNITS OF LIPASE: 60000; 12000; 38000 CAPSULE, DELAYED RELEASE PELLETS ORAL at 17:07

## 2017-10-07 RX ADMIN — ENOXAPARIN SODIUM 40 MG: 40 INJECTION SUBCUTANEOUS at 20:13

## 2017-10-07 RX ADMIN — PANTOPRAZOLE SODIUM 40 MG: 40 INJECTION, POWDER, FOR SOLUTION INTRAVENOUS at 17:07

## 2017-10-07 RX ADMIN — PANTOPRAZOLE SODIUM 40 MG: 40 INJECTION, POWDER, FOR SOLUTION INTRAVENOUS at 07:45

## 2017-10-07 RX ADMIN — MORPHINE SULFATE 2 MG: 2 INJECTION, SOLUTION INTRAMUSCULAR; INTRAVENOUS at 20:23

## 2017-10-07 RX ADMIN — BISACODYL 10 MG: 10 SUPPOSITORY RECTAL at 09:06

## 2017-10-07 RX ADMIN — POLYETHYLENE GLYCOL 3350 17 G: 17 POWDER, FOR SOLUTION ORAL at 09:05

## 2017-10-07 RX ADMIN — INSULIN ASPART 4 UNITS: 100 INJECTION, SOLUTION INTRAVENOUS; SUBCUTANEOUS at 00:00

## 2017-10-07 RX ADMIN — PANCRELIPASE 24000 UNITS OF LIPASE: 60000; 12000; 38000 CAPSULE, DELAYED RELEASE PELLETS ORAL at 07:45

## 2017-10-07 RX ADMIN — Medication 10 ML: at 09:07

## 2017-10-07 RX ADMIN — PANCRELIPASE 24000 UNITS OF LIPASE: 60000; 12000; 38000 CAPSULE, DELAYED RELEASE PELLETS ORAL at 12:33

## 2017-10-07 RX ADMIN — MORPHINE SULFATE 2 MG: 2 INJECTION, SOLUTION INTRAMUSCULAR; INTRAVENOUS at 00:06

## 2017-10-07 RX ADMIN — METOPROLOL TARTRATE 100 MG: 100 TABLET, FILM COATED ORAL at 20:13

## 2017-10-07 RX ADMIN — INSULIN ASPART 4 UNITS: 100 INJECTION, SOLUTION INTRAVENOUS; SUBCUTANEOUS at 12:31

## 2017-10-07 RX ADMIN — MORPHINE SULFATE 2 MG: 2 INJECTION, SOLUTION INTRAMUSCULAR; INTRAVENOUS at 07:41

## 2017-10-07 RX ADMIN — MORPHINE SULFATE 2 MG: 2 INJECTION, SOLUTION INTRAMUSCULAR; INTRAVENOUS at 05:07

## 2017-10-07 RX ADMIN — INSULIN DETEMIR 15 UNITS: 100 INJECTION, SOLUTION SUBCUTANEOUS at 20:14

## 2017-10-07 RX ADMIN — PROMETHAZINE HYDROCHLORIDE 12.5 MG: 12.5 TABLET ORAL at 20:18

## 2017-10-07 RX ADMIN — INSULIN ASPART 3 UNITS: 100 INJECTION, SOLUTION INTRAVENOUS; SUBCUTANEOUS at 06:43

## 2017-10-07 NOTE — PLAN OF CARE
Problem: Patient Care Overview (Adult)  Goal: Plan of Care Review    10/07/17 0218 10/07/17 0741   Coping/Psychosocial Response Interventions   Plan Of Care Reviewed With --  patient   Patient Care Overview   Progress progress toward functional goals as expected --          Problem: Pain, Acute (Adult)  Intervention: Support/Optimize Psychosocial Response to Acute Pain    10/05/17 2100 10/06/17 2031   Coping Strategies   Trust Relationship/Rapport --  care explained   Diversional Activities --  television   Family/Support System Care caregiver stress acknowledged;support provided;self-care encouraged --    Supportive Measures --  active listening utilized         Goal: Identify Related Risk Factors and Signs and Symptoms    10/06/17 0032   Pain, Acute   Related Risk Factors (Acute Pain) patient perception;persistent pain   Signs and Symptoms (Acute Pain) verbalization of pain descriptors       Goal: Acceptable Pain Control/Comfort Level    10/07/17 0218   Pain, Acute (Adult)   Acceptable Pain Control/Comfort Level making progress toward outcome         Problem: Pancreatitis, Acute/Chronic (Adult)  Goal: Signs and Symptoms of Listed Potential Problems Will be Absent or Manageable (Pancreatitis, Acute/Chronic)    10/07/17 0218   Pancreatitis, Acute/Chronic   Problems Assessed (Pancreatitis) all   Problems Present (Pancreatitis) pain;impaired glycemic control         Problem: Fall Risk (Adult)  Goal: Identify Related Risk Factors and Signs and Symptoms    10/06/17 0032   Fall Risk   Fall Risk: Related Risk Factors environment unfamiliar   Fall Risk: Signs and Symptoms presence of risk factors         Problem: Diabetes, Type 2 (Adult)  Goal: Signs and Symptoms of Listed Potential Problems Will be Absent or Manageable (Diabetes, Type 2)    10/07/17 0218   Diabetes, Type 2   Problems Assessed (Type 2 Diabetes) all   Problems Present (Type 2 Diabetes) impaired glycemic control         Problem: Pneumonia (Adult)  Goal:  Signs and Symptoms of Listed Potential Problems Will be Absent or Manageable (Pneumonia)    10/07/17 0218   Pneumonia   Problems Assessed (Pneumonia) all   Problems Present (Pneumonia) progression of infection

## 2017-10-07 NOTE — PROGRESS NOTES
LOS: 14 days   Interval History: Awake and alert. Still c/o abdominal pain. No nausea or vomiting. Blood sugar elevated still. D-Dimer was marked elevated. CT neg for PE. Will check doppler. No other reported acute events.      History taken from: patient chart    Review of Systems:     Review of Systems - Negative except present illness    Objective     Vital Signs  Temp:  [98.4 °F (36.9 °C)-99.1 °F (37.3 °C)] 98.9 °F (37.2 °C)  Heart Rate:  [80-87] 81  Resp:  [18] 18  BP: (145-162)/(86-95) 151/95    Physical Exam:       HEENT: Neg   Neck: Normal range of motion. Neck supple. No JVD present. No tracheal deviation present. No thyromegaly present.   Cardiovascular: Normal rate, regular rhythm and normal heart sounds.  Exam reveals no gallop and no friction rub.    No murmur heard.  Pulmonary/Chest: Effort normal and breath sounds normal. No stridor. No respiratory distress. She has no wheezes. She has no rales. She exhibits no tenderness.   Abdominal: Soft. Bowel sounds are normal.  She exhibits no mass. There is no guarding. No hernia. Generalized tenderness, sanket in epigastrium  Musculoskeletal: Normal range of motion. She exhibits no edema, tenderness or deformity.            Results Review:     I reviewed the patient's new clinical results  : See Below    Medication Review:     Current Facility-Administered Medications:   •  aluminum-magnesium hydroxide-simethicone (MAALOX MAX) 400-400-40 MG/5ML suspension 15 mL, 15 mL, Oral, Q6H PRN, Seven Gallego MD  •  bisacodyl (DULCOLAX) suppository 10 mg, 10 mg, Rectal, Daily, Seven Gallego MD, 10 mg at 10/06/17 0849  •  dextrose (D50W) solution 25 g, 25 g, Intravenous, Q15 Min PRN, Seven Gallego MD  •  dextrose (GLUTOSE) oral gel 15 g, 15 g, Oral, Q15 Min PRN, Seven Gallego MD  •  enoxaparin (LOVENOX) syringe 40 mg, 40 mg, Subcutaneous, Nightly, Seven Gallego MD, 40 mg at 10/06/17 2220  •  glucagon (human recombinant) (GLUCAGEN DIAGNOSTIC) injection 1  mg, 1 mg, Subcutaneous, Q15 Min PRN, Seven Gallego MD  •  insulin aspart (novoLOG) injection 0-7 Units, 0-7 Units, Subcutaneous, Q6H, Seven Gallego MD, 3 Units at 10/07/17 0643  •  ipratropium-albuterol (DUO-NEB) nebulizer solution 3 mL, 3 mL, Nebulization, Q6H PRN, Seven Gallego MD  •  Magnesium Sulfate 2 gram Bolus, followed by 8 gram infusion (total Mg dose 10 grams)- Mg less than or equal to 1mg/dL, 2 g, Intravenous, PRN **OR** Magnesium Sulfate 6 gram Infusion (2 gm x 3) -Mg 1.1 -1.5 mg/dL, 2 g, Intravenous, PRN **OR** magnesium sulfate 4 gram infusion- Mg 1.6-1.9 mg/dL, 4 g, Intravenous, PRN, Seven Gallego MD  •  metoprolol tartrate (LOPRESSOR) tablet 100 mg, 100 mg, Oral, Q12H, Seven Gallego MD, 100 mg at 10/06/17 2219  •  morphine injection 2 mg, 2 mg, Intravenous, Q2H PRN, Nico Fung MD, 2 mg at 10/07/17 0741  •  nystatin susp + lidocaine viscous (MAGIC MOUTHWASH) oral suspension, 5 mL, Swish & Swallow, 4x Daily PRN, Seven Gallego MD  •  ondansetron (ZOFRAN) injection 4 mg, 4 mg, Intravenous, Q6H PRN, Seven Gallego MD, 4 mg at 10/02/17 1032  •  pancrelipase (Lip-Prot-Amyl) (CREON) capsule 24,000 units of lipase, 24,000 units of lipase, Oral, TID With Meals, DORETHA Linton, 24,000 units of lipase at 10/07/17 0745  •  pantoprazole (PROTONIX) injection 40 mg, 40 mg, Intravenous, BID AC, Seven Gallego MD, 40 mg at 10/07/17 0745  •  polyethylene glycol (MIRALAX) powder 17 g, 17 g, Oral, Daily, Seven Gallego MD, 17 g at 10/06/17 0849  •  potassium & sodium phosphates (PHOS-NAK) 280-160-250 MG packet - for Phosphorus less than 1.25 mg/dL, 1 packet, Oral, Q6H PRN **OR** potassium & sodium phosphates (PHOS-NAK) 280-160-250 MG packet - for Phosphorus 1.25 - 2.1 mg/dL, 1 packet, Oral, Once PRN, Seven Gallego MD  •  potassium chloride (KLOR-CON) packet 40 mEq, 40 mEq, Oral, PRN, Seven Gallego MD  •  potassium chloride (MICRO-K) CR capsule 40 mEq, 40 mEq, Oral, PRN,  Seven Gallego MD  •  promethazine (PHENERGAN) tablet 12.5 mg, 12.5 mg, Oral, Q6H PRN **OR** promethazine (PHENERGAN) 12.5 mg in sodium chloride 0.9 % 50 mL, 12.5 mg, Intravenous, Q6H PRN, Seven Gallgeo MD, 12.5 mg at 09/29/17 0943  •  promethazine (PHENERGAN) tablet 12.5 mg, 12.5 mg, Oral, Q6H PRN, 12.5 mg at 10/03/17 0642 **OR** promethazine (PHENERGAN) 12.5 mg in sodium chloride 0.9 % 50 mL, 12.5 mg, Intravenous, Q6H PRN, Seven Gallego MD, 12.5 mg at 10/04/17 0335  •  sodium chloride 0.9 % flush 10 mL, 10 mL, Intracatheter, Q12H, Seven Gallego MD, 10 mL at 10/06/17 0850  •  sodium chloride 0.9 % flush 10 mL, 10 mL, Intracatheter, PRN, Seven Gallego MD, 10 mL at 10/04/17 0539  •  venlafaxine XR (EFFEXOR-XR) 24 hr capsule 75 mg, 75 mg, Oral, Daily, Seven Gallego MD, 75 mg at 10/06/17 0848    bisacodyl 10 mg Rectal Daily   enoxaparin 40 mg Subcutaneous Nightly   insulin aspart 0-7 Units Subcutaneous Q6H   metoprolol tartrate 100 mg Oral Q12H   pancrelipase (Lip-Prot-Amyl) 24,000 units of lipase Oral TID With Meals   pantoprazole 40 mg Intravenous BID AC   polyethylene glycol 17 g Oral Daily   sodium chloride 10 mL Intracatheter Q12H   venlafaxine XR 75 mg Oral Daily     •  aluminum-magnesium hydroxide-simethicone  •  dextrose  •  dextrose  •  glucagon (human recombinant)  •  ipratropium-albuterol  •  magnesium sulfate **OR** magnesium sulfate **OR** magnesium sulfate  •  Morphine  •  nystatin susp + lidocaine viscous  •  ondansetron  •  potassium & sodium phosphates **OR** potassium & sodium phosphates  •  potassium chloride  •  potassium chloride  •  promethazine **OR** promethazine  •  promethazine **OR** promethazine  •  sodium chloride      Results from last 7 days  Lab Units 10/05/17  0200 10/04/17  0224 10/03/17  0043 10/02/17  0135   WBC 10*3/mm3 12.83* 14.39* 16.52* 19.41*   HEMOGLOBIN g/dL 11.9* 12.3 11.8* 12.3   PLATELETS 10*3/mm3 351 361 324 303       Results from last 7 days  Lab Units  10/05/17  0200 10/04/17  0224 10/03/17  0043   CRP mg/dL 4.98* 6.79* 11.45*       Results from last 7 days  Lab Units 10/06/17  0349 10/05/17  0200 10/04/17  0224 10/03/17  0043 10/02/17  0135 10/01/17  0159   SODIUM mmol/L 136 136 132* 134* 135 134*   POTASSIUM mmol/L 4.0 5.0 5.0 4.2 3.3* 3.5   CHLORIDE mmol/L 102 101 98* 100 99 100   CO2 mmol/L 24.8 29.6 27.4 28.7 29.9 29.3   BUN mg/dL 6* <5* <5* 6* 5* 6*   CREATININE mg/dL 0.64 0.55 0.60 0.49 0.41* 0.47   CALCIUM mg/dL 9.0 9.0 8.8 8.7 8.7 8.7   GLUCOSE mg/dL 272* 156* 331* 254* 200* 233*       Results from last 7 days  Lab Units 10/06/17  0349 10/05/17  0200 10/04/17  0224 10/01/17  0159   MAGNESIUM mg/dL 2.1 1.9 1.7 2.0     No results found for: HGBA1C    Results from last 7 days  Lab Units 10/06/17  0349 10/05/17  0200 10/04/17  0224 10/03/17  0043 10/02/17  0135 10/01/17  0159   BILIRUBIN mg/dL 0.3 0.4 0.3 0.4 0.5 0.6   ALK PHOS U/L 190* 172* 184* 173* 171* 170*   AST (SGOT) U/L 25 43* 39* 35* 53* 72*   ALT (SGPT) U/L 31 35 43* 55* 73* 80*                       Imaging Results (last 72 hours)     ** No results found for the last 72 hours. **          Assessment/Plan    Active Problems:    Acute pancreatitis    Nausea    Epigastric pain         See orders entered.     Kel Becker MD  10/07/17  8:57 AM

## 2017-10-07 NOTE — PLAN OF CARE
Problem: Patient Care Overview (Adult)  Goal: Plan of Care Review  Outcome: Ongoing (interventions implemented as appropriate)    10/07/17 0218   Coping/Psychosocial Response Interventions   Plan Of Care Reviewed With patient   Patient Care Overview   Progress progress toward functional goals as expected       Goal: Adult Individualization and Mutuality  Outcome: Ongoing (interventions implemented as appropriate)    Problem: Pain, Acute (Adult)  Goal: Identify Related Risk Factors and Signs and Symptoms  Outcome: Ongoing (interventions implemented as appropriate)    10/06/17 0032   Pain, Acute   Related Risk Factors (Acute Pain) patient perception;persistent pain   Signs and Symptoms (Acute Pain) verbalization of pain descriptors       Goal: Acceptable Pain Control/Comfort Level  Outcome: Ongoing (interventions implemented as appropriate)    10/07/17 0218   Pain, Acute (Adult)   Acceptable Pain Control/Comfort Level making progress toward outcome         Problem: Pancreatitis, Acute/Chronic (Adult)  Goal: Signs and Symptoms of Listed Potential Problems Will be Absent or Manageable (Pancreatitis, Acute/Chronic)  Outcome: Ongoing (interventions implemented as appropriate)    10/07/17 0218   Pancreatitis, Acute/Chronic   Problems Assessed (Pancreatitis) all   Problems Present (Pancreatitis) pain;impaired glycemic control         Problem: Fall Risk (Adult)  Goal: Identify Related Risk Factors and Signs and Symptoms  Outcome: Ongoing (interventions implemented as appropriate)    10/06/17 0032   Fall Risk   Fall Risk: Related Risk Factors environment unfamiliar   Fall Risk: Signs and Symptoms presence of risk factors       Goal: Absence of Falls  Outcome: Ongoing (interventions implemented as appropriate)    10/07/17 0218   Fall Risk (Adult)   Absence of Falls making progress toward outcome         Problem: Diabetes, Type 2 (Adult)  Goal: Signs and Symptoms of Listed Potential Problems Will be Absent or Manageable  (Diabetes, Type 2)  Outcome: Ongoing (interventions implemented as appropriate)    10/07/17 0218   Diabetes, Type 2   Problems Assessed (Type 2 Diabetes) all   Problems Present (Type 2 Diabetes) impaired glycemic control         Problem: Pneumonia (Adult)  Goal: Signs and Symptoms of Listed Potential Problems Will be Absent or Manageable (Pneumonia)  Outcome: Ongoing (interventions implemented as appropriate)    10/07/17 0218   Pneumonia   Problems Assessed (Pneumonia) all   Problems Present (Pneumonia) progression of infection

## 2017-10-08 LAB
ALBUMIN SERPL-MCNC: 3.7 G/DL (ref 3.5–5)
ALBUMIN/GLOB SERPL: 1.2 G/DL (ref 1.5–2.5)
ALP SERPL-CCNC: 192 U/L (ref 35–104)
ALT SERPL W P-5'-P-CCNC: 21 U/L (ref 10–36)
ANION GAP SERPL CALCULATED.3IONS-SCNC: 5 MMOL/L (ref 3.6–11.2)
AST SERPL-CCNC: 22 U/L (ref 10–30)
BASOPHILS # BLD AUTO: 0.04 10*3/MM3 (ref 0–0.3)
BASOPHILS NFR BLD AUTO: 0.3 % (ref 0–2)
BILIRUB SERPL-MCNC: 0.3 MG/DL (ref 0.2–1.8)
BUN BLD-MCNC: 7 MG/DL (ref 7–21)
BUN/CREAT SERPL: 12.5 (ref 7–25)
CALCIUM SPEC-SCNC: 9.8 MG/DL (ref 7.7–10)
CHLORIDE SERPL-SCNC: 101 MMOL/L (ref 99–112)
CHOLEST SERPL-MCNC: 178 MG/DL (ref 0–200)
CO2 SERPL-SCNC: 31 MMOL/L (ref 24.3–31.9)
CREAT BLD-MCNC: 0.56 MG/DL (ref 0.43–1.29)
CRP SERPL-MCNC: 3.36 MG/DL (ref 0–0.99)
DEPRECATED RDW RBC AUTO: 44 FL (ref 37–54)
EOSINOPHIL # BLD AUTO: 0.38 10*3/MM3 (ref 0–0.7)
EOSINOPHIL NFR BLD AUTO: 2.8 % (ref 0–5)
ERYTHROCYTE [DISTWIDTH] IN BLOOD BY AUTOMATED COUNT: 14 % (ref 11.5–14.5)
GFR SERPL CREATININE-BSD FRML MDRD: 116 ML/MIN/1.73
GLOBULIN UR ELPH-MCNC: 3 GM/DL
GLUCOSE BLD-MCNC: 137 MG/DL (ref 70–110)
GLUCOSE BLDC GLUCOMTR-MCNC: 140 MG/DL (ref 70–130)
GLUCOSE BLDC GLUCOMTR-MCNC: 182 MG/DL (ref 70–130)
GLUCOSE BLDC GLUCOMTR-MCNC: 203 MG/DL (ref 70–130)
GLUCOSE BLDC GLUCOMTR-MCNC: 227 MG/DL (ref 70–130)
HBA1C MFR BLD: 8.6 % (ref 4.5–5.7)
HCT VFR BLD AUTO: 37.7 % (ref 37–47)
HDLC SERPL-MCNC: 26 MG/DL (ref 60–100)
HGB BLD-MCNC: 12.1 G/DL (ref 12–16)
IMM GRANULOCYTES # BLD: 0.08 10*3/MM3 (ref 0–0.03)
IMM GRANULOCYTES NFR BLD: 0.6 % (ref 0–0.5)
LDLC SERPL CALC-MCNC: 101 MG/DL (ref 0–100)
LDLC/HDLC SERPL: 3.88 {RATIO}
LYMPHOCYTES # BLD AUTO: 2.4 10*3/MM3 (ref 1–3)
LYMPHOCYTES NFR BLD AUTO: 17.7 % (ref 21–51)
MAGNESIUM SERPL-MCNC: 1.9 MG/DL (ref 1.7–2.6)
MCH RBC QN AUTO: 28.1 PG (ref 27–33)
MCHC RBC AUTO-ENTMCNC: 32.1 G/DL (ref 33–37)
MCV RBC AUTO: 87.7 FL (ref 80–94)
MONOCYTES # BLD AUTO: 0.77 10*3/MM3 (ref 0.1–0.9)
MONOCYTES NFR BLD AUTO: 5.7 % (ref 0–10)
NEUTROPHILS # BLD AUTO: 9.91 10*3/MM3 (ref 1.4–6.5)
NEUTROPHILS NFR BLD AUTO: 72.9 % (ref 30–70)
OSMOLALITY SERPL CALC.SUM OF ELEC: 273.9 MOSM/KG (ref 273–305)
PHOSPHATE SERPL-MCNC: 5.9 MG/DL (ref 2.7–4.5)
PLATELET # BLD AUTO: 501 10*3/MM3 (ref 130–400)
PMV BLD AUTO: 10.8 FL (ref 6–10)
POTASSIUM BLD-SCNC: 3.8 MMOL/L (ref 3.5–5.3)
PROT SERPL-MCNC: 6.7 G/DL (ref 6–8)
RBC # BLD AUTO: 4.3 10*6/MM3 (ref 4.2–5.4)
SODIUM BLD-SCNC: 137 MMOL/L (ref 135–153)
T4 SERPL-MCNC: 8.2 MCG/DL (ref 4.5–10.9)
TRIGL SERPL-MCNC: 255 MG/DL (ref 0–150)
VLDLC SERPL-MCNC: 51 MG/DL
WBC NRBC COR # BLD: 13.58 10*3/MM3 (ref 4.5–12.5)

## 2017-10-08 PROCEDURE — 80053 COMPREHEN METABOLIC PANEL: CPT | Performed by: INTERNAL MEDICINE

## 2017-10-08 PROCEDURE — 80061 LIPID PANEL: CPT | Performed by: INTERNAL MEDICINE

## 2017-10-08 PROCEDURE — 84100 ASSAY OF PHOSPHORUS: CPT | Performed by: INTERNAL MEDICINE

## 2017-10-08 PROCEDURE — 85025 COMPLETE CBC W/AUTO DIFF WBC: CPT | Performed by: INTERNAL MEDICINE

## 2017-10-08 PROCEDURE — 25010000002 MORPHINE PER 10 MG: Performed by: INTERNAL MEDICINE

## 2017-10-08 PROCEDURE — 83036 HEMOGLOBIN GLYCOSYLATED A1C: CPT | Performed by: INTERNAL MEDICINE

## 2017-10-08 PROCEDURE — 94799 UNLISTED PULMONARY SVC/PX: CPT

## 2017-10-08 PROCEDURE — 86140 C-REACTIVE PROTEIN: CPT | Performed by: INTERNAL MEDICINE

## 2017-10-08 PROCEDURE — 83735 ASSAY OF MAGNESIUM: CPT | Performed by: INTERNAL MEDICINE

## 2017-10-08 PROCEDURE — 63710000001 INSULIN ASPART PER 5 UNITS: Performed by: INTERNAL MEDICINE

## 2017-10-08 PROCEDURE — 84436 ASSAY OF TOTAL THYROXINE: CPT | Performed by: INTERNAL MEDICINE

## 2017-10-08 PROCEDURE — 25010000002 ENOXAPARIN PER 10 MG: Performed by: INTERNAL MEDICINE

## 2017-10-08 PROCEDURE — 82962 GLUCOSE BLOOD TEST: CPT

## 2017-10-08 PROCEDURE — 63710000001 INSULIN DETEMIR PER 5 UNITS: Performed by: INTERNAL MEDICINE

## 2017-10-08 RX ORDER — MAGNESIUM SULFATE HEPTAHYDRATE 40 MG/ML
4 INJECTION, SOLUTION INTRAVENOUS ONCE
Status: COMPLETED | OUTPATIENT
Start: 2017-10-08 | End: 2017-10-08

## 2017-10-08 RX ADMIN — MORPHINE SULFATE 2 MG: 2 INJECTION, SOLUTION INTRAMUSCULAR; INTRAVENOUS at 04:04

## 2017-10-08 RX ADMIN — INSULIN ASPART 3 UNITS: 100 INJECTION, SOLUTION INTRAVENOUS; SUBCUTANEOUS at 17:38

## 2017-10-08 RX ADMIN — MORPHINE SULFATE 2 MG: 2 INJECTION, SOLUTION INTRAMUSCULAR; INTRAVENOUS at 21:25

## 2017-10-08 RX ADMIN — BISACODYL 10 MG: 10 SUPPOSITORY RECTAL at 08:27

## 2017-10-08 RX ADMIN — METOPROLOL TARTRATE 100 MG: 100 TABLET, FILM COATED ORAL at 08:27

## 2017-10-08 RX ADMIN — MAGNESIUM SULFATE HEPTAHYDRATE 4 G: 40 INJECTION, SOLUTION INTRAVENOUS at 08:26

## 2017-10-08 RX ADMIN — POLYETHYLENE GLYCOL 3350 17 G: 17 POWDER, FOR SOLUTION ORAL at 08:26

## 2017-10-08 RX ADMIN — PANCRELIPASE 24000 UNITS OF LIPASE: 60000; 12000; 38000 CAPSULE, DELAYED RELEASE PELLETS ORAL at 07:38

## 2017-10-08 RX ADMIN — Medication 10 ML: at 08:28

## 2017-10-08 RX ADMIN — PANCRELIPASE 24000 UNITS OF LIPASE: 60000; 12000; 38000 CAPSULE, DELAYED RELEASE PELLETS ORAL at 12:34

## 2017-10-08 RX ADMIN — INSULIN ASPART 2 UNITS: 100 INJECTION, SOLUTION INTRAVENOUS; SUBCUTANEOUS at 12:42

## 2017-10-08 RX ADMIN — PANTOPRAZOLE SODIUM 40 MG: 40 INJECTION, POWDER, FOR SOLUTION INTRAVENOUS at 17:38

## 2017-10-08 RX ADMIN — PANTOPRAZOLE SODIUM 40 MG: 40 INJECTION, POWDER, FOR SOLUTION INTRAVENOUS at 07:38

## 2017-10-08 RX ADMIN — PANCRELIPASE 24000 UNITS OF LIPASE: 60000; 12000; 38000 CAPSULE, DELAYED RELEASE PELLETS ORAL at 17:26

## 2017-10-08 RX ADMIN — VENLAFAXINE HYDROCHLORIDE 75 MG: 75 CAPSULE, EXTENDED RELEASE ORAL at 08:26

## 2017-10-08 RX ADMIN — INSULIN DETEMIR 15 UNITS: 100 INJECTION, SOLUTION SUBCUTANEOUS at 21:01

## 2017-10-08 RX ADMIN — METOPROLOL TARTRATE 100 MG: 100 TABLET, FILM COATED ORAL at 21:00

## 2017-10-08 RX ADMIN — ENOXAPARIN SODIUM 40 MG: 40 INJECTION SUBCUTANEOUS at 21:00

## 2017-10-08 RX ADMIN — MORPHINE SULFATE 2 MG: 2 INJECTION, SOLUTION INTRAMUSCULAR; INTRAVENOUS at 13:24

## 2017-10-08 RX ADMIN — Medication 10 ML: at 21:26

## 2017-10-08 RX ADMIN — MORPHINE SULFATE 2 MG: 2 INJECTION, SOLUTION INTRAMUSCULAR; INTRAVENOUS at 08:25

## 2017-10-08 NOTE — PROGRESS NOTES
LOS: 15 days   Interval History: Awake and alert. She continues to c/o of epigastric pain at times. Tolerating diet. CXR has normalized and doppler neg. Blood sugar has improved. Labs have normalized except for persistent elevation of alk phos. May need MRCP.      History taken from: patient chart    Review of Systems:     Review of Systems - Negative except present illness    Objective     Vital Signs  Temp:  [98 °F (36.7 °C)-98.9 °F (37.2 °C)] 98 °F (36.7 °C)  Heart Rate:  [76-87] 76  Resp:  [18] 18  BP: (135-155)/(85-92) 135/87    Physical Exam:     HEENT: Neg   Neck: Normal range of motion. Neck supple. No JVD present. No tracheal deviation present. No thyromegaly present.   Cardiovascular: Normal rate, regular rhythm and normal heart sounds.  Exam reveals no gallop and no friction rub.    No murmur heard.  Pulmonary/Chest: Effort normal and breath sounds normal. No stridor. No respiratory distress. She has no wheezes. She has no rales. She exhibits no tenderness.   Abdominal: Soft. Bowel sounds are normal.  She exhibits no mass. There is no guarding. No hernia. Generalized tenderness, sanket in epigastrium  Musculoskeletal: Normal range of motion. She exhibits no edema, tenderness or deformity.          Results Review:     I reviewed the patient's new clinical results  : See Below    Medication Review:     Current Facility-Administered Medications:   •  aluminum-magnesium hydroxide-simethicone (MAALOX MAX) 400-400-40 MG/5ML suspension 15 mL, 15 mL, Oral, Q6H PRN, Seven Gallego MD  •  bisacodyl (DULCOLAX) suppository 10 mg, 10 mg, Rectal, Daily, Seven Gallego MD, 10 mg at 10/08/17 0827  •  dextrose (D50W) solution 25 g, 25 g, Intravenous, Q15 Min PRN, Seven Gallego MD  •  dextrose (GLUTOSE) oral gel 15 g, 15 g, Oral, Q15 Min PRN, Seven Gallego MD  •  enoxaparin (LOVENOX) syringe 40 mg, 40 mg, Subcutaneous, Nightly, Seven Gallego MD, 40 mg at 10/07/17 2013  •  glucagon (human recombinant)  (GLUCAGEN DIAGNOSTIC) injection 1 mg, 1 mg, Subcutaneous, Q15 Min PRN, Seven Gallego MD  •  insulin aspart (novoLOG) injection 0-7 Units, 0-7 Units, Subcutaneous, Q6H, Seven Gallego MD, 4 Units at 10/07/17 1231  •  insulin detemir (LEVEMIR) injection 15 Units, 15 Units, Subcutaneous, Nightly, Kel Becker MD, 15 Units at 10/07/17 2014  •  ipratropium-albuterol (DUO-NEB) nebulizer solution 3 mL, 3 mL, Nebulization, Q6H PRN, Seven Gallego MD  •  Magnesium Sulfate 2 gram Bolus, followed by 8 gram infusion (total Mg dose 10 grams)- Mg less than or equal to 1mg/dL, 2 g, Intravenous, PRN **OR** Magnesium Sulfate 6 gram Infusion (2 gm x 3) -Mg 1.1 -1.5 mg/dL, 2 g, Intravenous, PRN **OR** magnesium sulfate 4 gram infusion- Mg 1.6-1.9 mg/dL, 4 g, Intravenous, PRN, Seven Gallego MD  •  magnesium sulfate 4g/100mL (PREMIX) infusion, 4 g, Intravenous, Once, Seven Gallego MD, 4 g at 10/08/17 0826  •  metoprolol tartrate (LOPRESSOR) tablet 100 mg, 100 mg, Oral, Q12H, Seven Gallego MD, 100 mg at 10/08/17 0827  •  morphine injection 2 mg, 2 mg, Intravenous, Q2H PRN, Nico Fung MD, 2 mg at 10/08/17 0825  •  nystatin susp + lidocaine viscous (MAGIC MOUTHWASH) oral suspension, 5 mL, Swish & Swallow, 4x Daily PRN, Seven Gallego MD  •  ondansetron (ZOFRAN) injection 4 mg, 4 mg, Intravenous, Q6H PRN, Seven Gallego MD, 4 mg at 10/02/17 1032  •  pancrelipase (Lip-Prot-Amyl) (CREON) capsule 24,000 units of lipase, 24,000 units of lipase, Oral, TID With Meals, Larrietta Susan Elliotte, PA, 24,000 units of lipase at 10/08/17 0738  •  pantoprazole (PROTONIX) injection 40 mg, 40 mg, Intravenous, BID AC, Seven Gallego MD, 40 mg at 10/08/17 0738  •  polyethylene glycol (MIRALAX) powder 17 g, 17 g, Oral, Daily, Seven Gallego MD, 17 g at 10/08/17 0826  •  potassium & sodium phosphates (PHOS-NAK) 280-160-250 MG packet - for Phosphorus less than 1.25 mg/dL, 1 packet, Oral, Q6H PRN **OR** potassium & sodium  phosphates (PHOS-NAK) 280-160-250 MG packet - for Phosphorus 1.25 - 2.1 mg/dL, 1 packet, Oral, Once PRN, Seven Gallego MD  •  potassium chloride (KLOR-CON) packet 40 mEq, 40 mEq, Oral, PRN, Seven Gallego MD  •  potassium chloride (MICRO-K) CR capsule 40 mEq, 40 mEq, Oral, PRN, Seven Gallego MD  •  promethazine (PHENERGAN) tablet 12.5 mg, 12.5 mg, Oral, Q6H PRN **OR** promethazine (PHENERGAN) 12.5 mg in sodium chloride 0.9 % 50 mL, 12.5 mg, Intravenous, Q6H PRN, Seven Gallego MD, 12.5 mg at 09/29/17 0943  •  promethazine (PHENERGAN) tablet 12.5 mg, 12.5 mg, Oral, Q6H PRN, 12.5 mg at 10/07/17 2018 **OR** promethazine (PHENERGAN) 12.5 mg in sodium chloride 0.9 % 50 mL, 12.5 mg, Intravenous, Q6H PRN, Seven Gallego MD, 12.5 mg at 10/04/17 0335  •  sodium chloride 0.9 % flush 10 mL, 10 mL, Intracatheter, Q12H, Seven Gallego MD, 10 mL at 10/08/17 0828  •  sodium chloride 0.9 % flush 10 mL, 10 mL, Intracatheter, PRN, Seven Gallego MD, 10 mL at 10/04/17 0539  •  venlafaxine XR (EFFEXOR-XR) 24 hr capsule 75 mg, 75 mg, Oral, Daily, Seven Gallego MD, 75 mg at 10/08/17 0826    bisacodyl 10 mg Rectal Daily   enoxaparin 40 mg Subcutaneous Nightly   insulin aspart 0-7 Units Subcutaneous Q6H   insulin detemir 15 Units Subcutaneous Nightly   magnesium sulfate 4 g Intravenous Once   metoprolol tartrate 100 mg Oral Q12H   pancrelipase (Lip-Prot-Amyl) 24,000 units of lipase Oral TID With Meals   pantoprazole 40 mg Intravenous BID AC   polyethylene glycol 17 g Oral Daily   sodium chloride 10 mL Intracatheter Q12H   venlafaxine XR 75 mg Oral Daily     •  aluminum-magnesium hydroxide-simethicone  •  dextrose  •  dextrose  •  glucagon (human recombinant)  •  ipratropium-albuterol  •  magnesium sulfate **OR** magnesium sulfate **OR** magnesium sulfate  •  Morphine  •  nystatin susp + lidocaine viscous  •  ondansetron  •  potassium & sodium phosphates **OR** potassium & sodium phosphates  •  potassium chloride  •   potassium chloride  •  promethazine **OR** promethazine  •  promethazine **OR** promethazine  •  sodium chloride      Results from last 7 days  Lab Units 10/08/17  0212 10/05/17  0200 10/04/17  0224 10/03/17  0043 10/02/17  0135   WBC 10*3/mm3 13.58* 12.83* 14.39* 16.52* 19.41*   HEMOGLOBIN g/dL 12.1 11.9* 12.3 11.8* 12.3   PLATELETS 10*3/mm3 501* 351 361 324 303       Results from last 7 days  Lab Units 10/08/17  0212 10/05/17  0200 10/04/17  0224 10/03/17  0043   CRP mg/dL 3.36* 4.98* 6.79* 11.45*       Results from last 7 days  Lab Units 10/08/17  0340 10/06/17  0349 10/05/17  0200 10/04/17  0224 10/03/17  0043 10/02/17  0135   SODIUM mmol/L 137 136 136 132* 134* 135   POTASSIUM mmol/L 3.8 4.0 5.0 5.0 4.2 3.3*   CHLORIDE mmol/L 101 102 101 98* 100 99   CO2 mmol/L 31.0 24.8 29.6 27.4 28.7 29.9   BUN mg/dL 7 6* <5* <5* 6* 5*   CREATININE mg/dL 0.56 0.64 0.55 0.60 0.49 0.41*   CALCIUM mg/dL 9.8 9.0 9.0 8.8 8.7 8.7   GLUCOSE mg/dL 137* 272* 156* 331* 254* 200*       Results from last 7 days  Lab Units 10/08/17  0340 10/06/17  0349 10/05/17  0200 10/04/17  0224   MAGNESIUM mg/dL 1.9 2.1 1.9 1.7     Hemoglobin A1C   Date Value Ref Range Status   10/08/2017 8.60 (H) 4.50 - 5.70 % Final       Results from last 7 days  Lab Units 10/08/17  0340 10/06/17  0349 10/05/17  0200 10/04/17  0224 10/03/17  0043 10/02/17  0135   BILIRUBIN mg/dL 0.3 0.3 0.4 0.3 0.4 0.5   ALK PHOS U/L 192* 190* 172* 184* 173* 171*   AST (SGOT) U/L 22 25 43* 39* 35* 53*   ALT (SGPT) U/L 21 31 35 43* 55* 73*                       Imaging Results (last 72 hours)     Procedure Component Value Units Date/Time    US Venous Doppler Lower Extremity Bilateral (duplex) [[id number]] Collected:  10/08/17 0836     Updated:  10/08/17 0838    Narrative:       EXAMINATION: US VENOUS DOPPLER LOWER EXTREMITY BILATERAL (DUPLEX)-      CLINICAL INDICATION:     Leg Pain and Swelling  dvt; R10.13-Epigastric pain; K85.90-Acute pancreatitis without necrosis  or infection,  unspecified; R11.0-Nausea      TECHNIQUE: Multiplanar gray scale and Doppler vascular sonographic  imaging of the deep veins  without and with compression.      COMPARISON: NONE      FINDINGS: The visualized deep veins demonstrate flow and are  compressible. No evidence of deep venous thrombosis.             Impression:       No DVT.     This report was finalized on 10/8/2017 8:36 AM by Dr. Earnest Wright MD.       XR Chest PA & Lateral [[id number]] Collected:  10/08/17 0836     Updated:  10/08/17 0839    Narrative:       EXAMINATION: XR CHEST PA AND LATERAL-      CLINICAL INDICATION:     pleural effusion; R10.13-Epigastric pain;  K85.90-Acute pancreatitis without necrosis or infection, unspecified;  R11.0-Nausea     TECHNIQUE:  XR CHEST PA AND LATERAL-      COMPARISON: NONE      FINDINGS:   RIGHT LUNG BASE ATELECTASIS.  Heart and mediastinum contours are unremarkable.  No pleural effusion.  No pneumothorax.   Bony and soft tissue structures are unremarkable.       Impression:       No radiographic evidence of acute cardiac or pulmonary  disease.     This report was finalized on 10/8/2017 8:37 AM by Dr. Earnest Wright MD.             Assessment/Plan    Active Problems:    Acute pancreatitis    Nausea    Epigastric pain         See orders entered.     Kel Becker MD  10/08/17  9:45 AM

## 2017-10-08 NOTE — PLAN OF CARE
Problem: Patient Care Overview (Adult)  Goal: Plan of Care Review    10/07/17 0218 10/08/17 0825   Coping/Psychosocial Response Interventions   Plan Of Care Reviewed With --  patient   Patient Care Overview   Progress progress toward functional goals as expected --          Problem: Pain, Acute (Adult)  Goal: Identify Related Risk Factors and Signs and Symptoms    10/06/17 0032   Pain, Acute   Related Risk Factors (Acute Pain) patient perception;persistent pain   Signs and Symptoms (Acute Pain) verbalization of pain descriptors       Goal: Acceptable Pain Control/Comfort Level    10/07/17 0218   Pain, Acute (Adult)   Acceptable Pain Control/Comfort Level making progress toward outcome         Problem: Pancreatitis, Acute/Chronic (Adult)  Goal: Signs and Symptoms of Listed Potential Problems Will be Absent or Manageable (Pancreatitis, Acute/Chronic)    10/07/17 0218   Pancreatitis, Acute/Chronic   Problems Assessed (Pancreatitis) all   Problems Present (Pancreatitis) pain;impaired glycemic control         Problem: Fall Risk (Adult)  Goal: Identify Related Risk Factors and Signs and Symptoms    10/06/17 0032   Fall Risk   Fall Risk: Related Risk Factors environment unfamiliar   Fall Risk: Signs and Symptoms presence of risk factors       Goal: Absence of Falls    10/07/17 0218   Fall Risk (Adult)   Absence of Falls making progress toward outcome         Problem: Diabetes, Type 2 (Adult)  Goal: Signs and Symptoms of Listed Potential Problems Will be Absent or Manageable (Diabetes, Type 2)    10/07/17 0218   Diabetes, Type 2   Problems Assessed (Type 2 Diabetes) all   Problems Present (Type 2 Diabetes) impaired glycemic control         Problem: Pneumonia (Adult)  Goal: Signs and Symptoms of Listed Potential Problems Will be Absent or Manageable (Pneumonia)    10/07/17 0218   Pneumonia   Problems Assessed (Pneumonia) all   Problems Present (Pneumonia) progression of infection

## 2017-10-09 VITALS
TEMPERATURE: 98.8 F | DIASTOLIC BLOOD PRESSURE: 77 MMHG | WEIGHT: 173.6 LBS | BODY MASS INDEX: 31.94 KG/M2 | HEART RATE: 79 BPM | HEIGHT: 62 IN | OXYGEN SATURATION: 97 % | SYSTOLIC BLOOD PRESSURE: 125 MMHG | RESPIRATION RATE: 20 BRPM

## 2017-10-09 LAB
CANCER AG125 SERPL-ACNC: 236.1 U/ML (ref 0–38.1)
GLUCOSE BLDC GLUCOMTR-MCNC: 123 MG/DL (ref 70–130)
GLUCOSE BLDC GLUCOMTR-MCNC: 159 MG/DL (ref 70–130)
GLUCOSE BLDC GLUCOMTR-MCNC: 211 MG/DL (ref 70–130)
GLUCOSE BLDC GLUCOMTR-MCNC: 232 MG/DL (ref 70–130)

## 2017-10-09 PROCEDURE — 82962 GLUCOSE BLOOD TEST: CPT

## 2017-10-09 PROCEDURE — 25010000002 MORPHINE PER 10 MG: Performed by: INTERNAL MEDICINE

## 2017-10-09 PROCEDURE — 63710000001 INSULIN ASPART PER 5 UNITS: Performed by: INTERNAL MEDICINE

## 2017-10-09 PROCEDURE — 99231 SBSQ HOSP IP/OBS SF/LOW 25: CPT | Performed by: PHYSICIAN ASSISTANT

## 2017-10-09 PROCEDURE — 94799 UNLISTED PULMONARY SVC/PX: CPT

## 2017-10-09 RX ORDER — PANTOPRAZOLE SODIUM 40 MG/1
40 TABLET, DELAYED RELEASE ORAL
Status: DISCONTINUED | OUTPATIENT
Start: 2017-10-09 | End: 2017-10-09 | Stop reason: HOSPADM

## 2017-10-09 RX ORDER — PROMETHAZINE HYDROCHLORIDE 12.5 MG/1
12.5 TABLET ORAL EVERY 6 HOURS PRN
Qty: 30 TABLET | Refills: 0 | Status: SHIPPED | OUTPATIENT
Start: 2017-10-09 | End: 2017-10-26 | Stop reason: HOSPADM

## 2017-10-09 RX ADMIN — PANCRELIPASE 24000 UNITS OF LIPASE: 60000; 12000; 38000 CAPSULE, DELAYED RELEASE PELLETS ORAL at 12:58

## 2017-10-09 RX ADMIN — INSULIN ASPART 2 UNITS: 100 INJECTION, SOLUTION INTRAVENOUS; SUBCUTANEOUS at 00:56

## 2017-10-09 RX ADMIN — MORPHINE SULFATE 2 MG: 2 INJECTION, SOLUTION INTRAMUSCULAR; INTRAVENOUS at 06:19

## 2017-10-09 RX ADMIN — PANCRELIPASE 24000 UNITS OF LIPASE: 60000; 12000; 38000 CAPSULE, DELAYED RELEASE PELLETS ORAL at 17:22

## 2017-10-09 RX ADMIN — METOPROLOL TARTRATE 100 MG: 100 TABLET, FILM COATED ORAL at 08:24

## 2017-10-09 RX ADMIN — MORPHINE SULFATE 2 MG: 2 INJECTION, SOLUTION INTRAMUSCULAR; INTRAVENOUS at 03:21

## 2017-10-09 RX ADMIN — PANCRELIPASE 24000 UNITS OF LIPASE: 60000; 12000; 38000 CAPSULE, DELAYED RELEASE PELLETS ORAL at 08:24

## 2017-10-09 RX ADMIN — Medication 10 ML: at 08:25

## 2017-10-09 RX ADMIN — PANTOPRAZOLE SODIUM 40 MG: 40 INJECTION, POWDER, FOR SOLUTION INTRAVENOUS at 08:25

## 2017-10-09 RX ADMIN — INSULIN ASPART 3 UNITS: 100 INJECTION, SOLUTION INTRAVENOUS; SUBCUTANEOUS at 12:58

## 2017-10-09 RX ADMIN — INSULIN ASPART 3 UNITS: 100 INJECTION, SOLUTION INTRAVENOUS; SUBCUTANEOUS at 17:22

## 2017-10-09 RX ADMIN — PANTOPRAZOLE SODIUM 40 MG: 40 TABLET, DELAYED RELEASE ORAL at 17:22

## 2017-10-09 RX ADMIN — VENLAFAXINE HYDROCHLORIDE 75 MG: 75 CAPSULE, EXTENDED RELEASE ORAL at 08:24

## 2017-10-09 RX ADMIN — MORPHINE SULFATE 2 MG: 2 INJECTION, SOLUTION INTRAMUSCULAR; INTRAVENOUS at 00:53

## 2017-10-09 NOTE — DISCHARGE INSTR - APPOINTMENTS
You have a scheduled follow-up appointment with Dr. Gallego on 10/24/17 at 2:15pm. Office Number [phone]. This was as soon as office could make appointment.

## 2017-10-09 NOTE — PROGRESS NOTES
10/09/17      Rossy Boone is a 47 y.o. female who is seen today for follow up of acute pancreatitis    HPI  The patient was seen for a follow-up visit.  She reports that she is doing well with her pancreatitis symptoms.  She does continue to have abdominal pain with meals but is trying oral pain medicine today to see if it will be sufficient.  Patient denies nausea and vomiting.  Her amylase and lipase remain mildly elevated.  Amylase is 90 and lipase is 78.  Patient's liver enzymes continue to be normal however, alkaline phosphatase remains elevated at 192.  Past medical history, social history, and family history remain unchanged since initial consultation.    REVIEW OF SYSTEMS  Abdominal pain with food; all other systems are negative    CURRENT MEDICATION    Current Facility-Administered Medications:   •  aluminum-magnesium hydroxide-simethicone (MAALOX MAX) 400-400-40 MG/5ML suspension 15 mL, 15 mL, Oral, Q6H PRN, Seven Gallego MD  •  bisacodyl (DULCOLAX) suppository 10 mg, 10 mg, Rectal, Daily, Seven Gallego MD, 10 mg at 10/08/17 0827  •  dextrose (D50W) solution 25 g, 25 g, Intravenous, Q15 Min PRN, Seven Gallego MD  •  dextrose (GLUTOSE) oral gel 15 g, 15 g, Oral, Q15 Min PRN, Seven Gallego MD  •  enoxaparin (LOVENOX) syringe 40 mg, 40 mg, Subcutaneous, Nightly, Seven Gallego MD, 40 mg at 10/08/17 2100  •  glucagon (human recombinant) (GLUCAGEN DIAGNOSTIC) injection 1 mg, 1 mg, Subcutaneous, Q15 Min PRN, Seven Gallego MD  •  insulin aspart (novoLOG) injection 0-7 Units, 0-7 Units, Subcutaneous, Q6H, Seven Gallego MD, 3 Units at 10/09/17 1258  •  insulin detemir (LEVEMIR) injection 15 Units, 15 Units, Subcutaneous, Nightly, Kel Becker MD, 15 Units at 10/08/17 2101  •  ipratropium-albuterol (DUO-NEB) nebulizer solution 3 mL, 3 mL, Nebulization, Q6H PRN, Seven Gallego MD  •  Magnesium Sulfate 2 gram Bolus, followed by 8 gram infusion (total Mg dose 10 grams)- Mg less than or equal  to 1mg/dL, 2 g, Intravenous, PRN **OR** Magnesium Sulfate 6 gram Infusion (2 gm x 3) -Mg 1.1 -1.5 mg/dL, 2 g, Intravenous, PRN **OR** magnesium sulfate 4 gram infusion- Mg 1.6-1.9 mg/dL, 4 g, Intravenous, PRN, Seven Gallego MD  •  metoprolol tartrate (LOPRESSOR) tablet 100 mg, 100 mg, Oral, Q12H, Seven Gallego MD, 100 mg at 10/09/17 0824  •  nystatin susp + lidocaine viscous (MAGIC MOUTHWASH) oral suspension, 5 mL, Swish & Swallow, 4x Daily PRN, Seven Gallego MD  •  ondansetron (ZOFRAN) injection 4 mg, 4 mg, Intravenous, Q6H PRN, Seven Gallego MD, 4 mg at 10/02/17 1032  •  pancrelipase (Lip-Prot-Amyl) (CREON) capsule 24,000 units of lipase, 24,000 units of lipase, Oral, TID With Meals, DORETHA Linton, 24,000 units of lipase at 10/09/17 1258  •  pantoprazole (PROTONIX) EC tablet 40 mg, 40 mg, Oral, BID AC, [facility] Venegas [facility]  •  polyethylene glycol (MIRALAX) powder 17 g, 17 g, Oral, Daily, Seven Gallego MD, 17 g at 10/08/17 0826  •  potassium & sodium phosphates (PHOS-NAK) 280-160-250 MG packet - for Phosphorus less than 1.25 mg/dL, 1 packet, Oral, Q6H PRN **OR** potassium & sodium phosphates (PHOS-NAK) 280-160-250 MG packet - for Phosphorus 1.25 - 2.1 mg/dL, 1 packet, Oral, Once PRN, Seven Gallego MD  •  potassium chloride (KLOR-CON) packet 40 mEq, 40 mEq, Oral, PRN, Seven Gallego MD  •  potassium chloride (MICRO-K) CR capsule 40 mEq, 40 mEq, Oral, PRN, Seven Gallego MD  •  promethazine (PHENERGAN) tablet 12.5 mg, 12.5 mg, Oral, Q6H PRN **OR** promethazine (PHENERGAN) 12.5 mg in sodium chloride 0.9 % 50 mL, 12.5 mg, Intravenous, Q6H PRN, Seven Gallego MD, 12.5 mg at 09/29/17 0943  •  promethazine (PHENERGAN) tablet 12.5 mg, 12.5 mg, Oral, Q6H PRN, 12.5 mg at 10/07/17 2018 **OR** promethazine (PHENERGAN) 12.5 mg in sodium chloride 0.9 % 50 mL, 12.5 mg, Intravenous, Q6H PRN, Seven Gallego MD, 12.5 mg at 10/04/17 0335  •  sodium chloride 0.9 % flush 10 mL, 10 mL,  Intracatheter, Q12H, Seven Gallego MD, 10 mL at 10/09/17 0825  •  sodium chloride 0.9 % flush 10 mL, 10 mL, Intracatheter, PRN, Seven Gallego MD, 10 mL at 10/04/17 0539  •  venlafaxine XR (EFFEXOR-XR) 24 hr capsule 75 mg, 75 mg, Oral, Daily, Seven Gallego MD, 75 mg at 10/09/17 0824    ALLERGIES  Hydrocodone-acetaminophen; Hydromorphone; Oxycodone-acetaminophen; and Penicillins     VITAL SIGNS  Vital Signs (last 24 hours)       10/08 0700  -  10/09 0659 10/09 0700  -  10/09 1303   Most Recent    Temp (°F) 98 -  99      98.8     98.8 (37.1)    Heart Rate 76 -  90    72 -  79     79    Resp 18 -  20      20     20    /69 -  138/79      125/77     125/77    SpO2 (%) 94 -  96      97     97            PHYSICAL EXAM  General:  Appearance alert, pleasant, interactive and cooperative  Head:  normocephalic, without obvious abnormality and atraumatic  Eyes:  lids and lashes normal, conjunctivae and sclerae normal, no icterus, no pallor, corneas clear and PERRLA  Ears:  ears appear intact with no abnormalities noted  Nose:  nares normal, septum midline, mucosa normal and no drainage  Throat:  no oral lesions, no thrush and oral mucosa moist  Lungs:  clear to auscultation, respirations regular, respirations even and respirations unlabored  Heart:  regular rhythm & normal rate, normal S1, S2, no murmur, no gallop, no rub and no click  Abdomen:  Tenderness in epigastric region; normal bowel sounds, no masses, no hepatomegaly, no splenomegaly, soft , no guarding and no rebound tenderness  Extremities:  moves extremities well, no edema, no cyanosis and no redness  Skin:  no bleeding, bruising or rash, color normal, no lesions noted and temperature normal  Neurologic:  Mental Status orientated to person, place, time and situation, alertness alert, awake and arousable, orientation time, date, person, place, city and president and mood/affect:  normal  Cranial Nerves:  cranial nerves 2 - 12 grossly intact as examined,  Speech normal content and execution, Sensory intact, Motor strength is equal in upper and lower extremities, Reflexes normal and symmetric      LABS   Lab Results (last 24 hours)     Procedure Component Value Units Date/Time    POC Glucose Fingerstick [[id number]]  (Abnormal) Collected:  10/08/17 1724    Specimen:  Blood Updated:  10/08/17 1744     Glucose 203 (H) mg/dL     Narrative:       Meter: [id number] : [id number] Anupam Marita RASHEED    POC Glucose Fingerstick [[id number]]  (Abnormal) Collected:  10/08/17 2059    Specimen:  Blood Updated:  10/08/17 2106     Glucose 227 (H) mg/dL     Narrative:       Meter: [id number] : [id number] Re.Muo lyric    POC Glucose Fingerstick [[id number]]  (Abnormal) Collected:  10/09/17 0055    Specimen:  Blood Updated:  10/09/17 0112     Glucose 159 (H) mg/dL     Narrative:       Meter: [id number] : [id number] Re.Muo lyric    POC Glucose Fingerstick [[id number]]  (Normal) Collected:  10/09/17 0517    Specimen:  Blood Updated:  10/09/17 0524     Glucose 123 mg/dL     Narrative:       Meter: [id number] : [id number] Re.Muo lyric    POC Glucose Fingerstick [[id number]]  (Abnormal) Collected:  10/09/17 1113    Specimen:  Blood Updated:  10/09/17 1119     Glucose 232 (H) mg/dL     Narrative:       Meter: [id number] : [id number] [facility]          IMAGING  Imaging Results (last 24 hours)     ** No results found for the last 24 hours. **            ASSESSMENT/PLAN  -Acute pancreatitis  -Abdominal pain  -Bilateral flank pain  -Nausea and vomiting  -Diabetes mellitus type 2  -Hyperlipidemia  -Metabolic acidosis  -Elevated liver enzymes  -Fatty liver  -Hypertension    The patient is tolerating her GI symptoms.  If she discharges home, she will need to follow up in our outpatient office in 2 weeks.          Electronically signed by: DORETHA Linton

## 2017-10-09 NOTE — CONSULTS
"Diabetes Education  Assessment/Teaching    Patient Name:  Rossy Boone  YOB: 1970  MRN: [id number]  Admit Date:  9/23/2017      Assessment Date:  10/9/2017       Most Recent Value    General Information      Height  5' 2\" (1.575 m)    Height Method  Stated    Weight  173 lb 9.6 oz (78.7 kg)    Weight Method  Bed scale    Pregnancy Assessment     Diabetes History     What type of diabetes do you have?  Type 2    Length of Diabetes Diagnosis  1 - 5 years    Current DM knowledge  fair    Have you had diabetes education/teaching in the past?  no    Do you test your blood sugar at home?  yes    Frequency of checks  1 time  a day not like I should    When was the last time your doctor checked your feet?  Dr Gallego does    What makes it difficult for you to take care of your diabetes or yourself?       Do you have any diabetes complications?  other (comment) [pancreatitis]    Education Preferences     Nutrition Information     Assessment Topics     Healthy Eating - Assessment  Competent    Being Active - Assessment  Competent    Taking Medication - Assessment  Competent    Problem Solving - Assessment  Competent    Reducing Risk - Assessment  Competent    Healthy Coping - Assessment  Competent    Monitoring - Assessment  Competent    DM Goals                Most Recent Value    DM Education Needs     Meter  Has own    Medication  Oral, Actions, Side effects [discussed SS]    Problem Solving  Hypoglycemia, Hyperglycemia, Sick days, Signs, Symptoms, Treatment    Reducing Risks  A1C testing    Healthy Eating  Other (comment)    Healthy Coping  Appropriate, Other (comment) [cl appeared to be in pain and encouraged her to  call if needs change]    Discharge Plan  Home    Motivation  Moderate    Teaching Method  Explanation, Discussion, Handouts, Teach back    Patient Response  Verbalized understanding            Other Comments:  Clients A1c 8.60 reviewed and client says, \" going home on insulin\" demonstrated " pens with repeat by client, did ok with repeat, discussed concerns related to no insurance with primary nurse, pharmacist & SS consulted per nursing        Electronically signed by:  Aimee Chapa RN  10/09/17 2:34 PM

## 2017-10-09 NOTE — PLAN OF CARE
Problem: Patient Care Overview (Adult)  Goal: Plan of Care Review    10/07/17 0218 10/08/17 2000   Coping/Psychosocial Response Interventions   Plan Of Care Reviewed With --  patient   Patient Care Overview   Progress progress toward functional goals as expected --

## 2017-10-09 NOTE — PROGRESS NOTES
Discharge Planning Assessment   [facility]     Patient Name: Rossy Boone  MRN: [id number]  Today's Date: 10/9/2017    Admit Date: 9/23/2017          Discharge Plan       10/09/17 1602    Final Note    Final Note Pt is being discharged home today. Pt does not have insurance. Pt does not utilize home health services or DME. No other needs identified.              Expected Discharge Date and Time     Expected Discharge Date Expected Discharge Time    Oct 9, 2017         Linnea Russell

## 2017-10-09 NOTE — PROGRESS NOTES
LOS: 16 days       Chief Complaint :    Abdominal pain  Subjective     Interval History:     Patient Complaints:  Rossy Boone  remains alert and talkative and pleasant in no obvious distress.  She describes intermittent moderate pain.  She has some intermittent nausea.  She denies any associated fevers or chills.  She is tolerating GI soft diet.  She has no other complaints except for ongoing weakness.    She has required intravenous pain medications but slowly improving.        Review of Systems-unchanged since admission history and physical  Objective     Vital Signs  Temp:  [98 °F (36.7 °C)-99 °F (37.2 °C)] 99 °F (37.2 °C)  Heart Rate:  [76-90] 89  Resp:  [18-20] 20  BP: (122-138)/(69-87) 128/70    Physical Exam  Constitutional: She is oriented to person, place, and time. She appears well-developed and well-nourished.  No obvious distress at rest  HENT:   Head: Normocephalic and atraumatic.   Right Ear: External ear normal.   Left Ear: External ear normal.   Nose: Nose normal.   Mouth/Throat: Oropharynx is clear and moist. No oropharyngeal exudate.   Eyes: Conjunctivae and EOM are normal. Pupils are equal, round, and reactive to light. Right eye exhibits no discharge. Left eye exhibits no discharge. No scleral icterus.   Neck: Normal range of motion. Neck supple. No JVD present. No tracheal deviation present. No thyromegaly present.   Cardiovascular: Normal rate, regular rhythm and normal heart sounds.  Exam reveals no gallop and no friction rub.    No murmur heard.  Pulmonary/Chest: Effort normal and breath sounds normal. No stridor. No respiratory distress. She has no wheezes. She has no rales. She exhibits no tenderness.   Abdominal: Soft. Bowel sounds are normal.  She exhibits no mass. There is no guarding. No hernia.   Genitourinary:   Genitourinary Comments: No Mosley catheter   Musculoskeletal: Normal range of motion. She exhibits no edema, tenderness or deformity.   Lymphadenopathy:     She has no  cervical adenopathy.   Neurological: She is alert and oriented to person, place, and time. She has normal reflexes. She displays normal reflexes. No cranial nerve deficit. She exhibits normal muscle tone. Coordination normal.   Skin: Skin is warm and dry. No rash noted. She is not diaphoretic. No erythema. No pallor.   Psychiatric: She has a normal mood and affect. Her behavior is normal. Judgment and thought content normal.   Nursing note and vitals reviewed     Results Review:     I reviewed the patient's new clinical results  : See Below  MEDICATIONS:    bisacodyl 10 mg Rectal Daily   enoxaparin 40 mg Subcutaneous Nightly   insulin aspart 0-7 Units Subcutaneous Q6H   insulin detemir 15 Units Subcutaneous Nightly   metoprolol tartrate 100 mg Oral Q12H   pancrelipase (Lip-Prot-Amyl) 24,000 units of lipase Oral TID With Meals   pantoprazole 40 mg Intravenous BID AC   polyethylene glycol 17 g Oral Daily   sodium chloride 10 mL Intracatheter Q12H   venlafaxine XR 75 mg Oral Daily       LABS:        Results from last 7 days  Lab Units 10/08/17  0212 10/05/17  0200 10/04/17  0224   CRP mg/dL 3.36* 4.98* 6.79*   WBC 10*3/mm3 13.58* 12.83* 14.39*   HEMOGLOBIN g/dL 12.1 11.9* 12.3   HEMATOCRIT % 37.7 37.5 37.9   MCV fL 87.7 88.9 87.7   MCHC g/dL 32.1* 31.7* 32.5*   PLATELETS 10*3/mm3 501* 351 361           Results from last 7 days  Lab Units 10/08/17  0340 10/06/17  0349 10/05/17  0200   SODIUM mmol/L 137 136 136   POTASSIUM mmol/L 3.8 4.0 5.0   MAGNESIUM mg/dL 1.9 2.1 1.9   CHLORIDE mmol/L 101 102 101   CO2 mmol/L 31.0 24.8 29.6   BUN mg/dL 7 6* <5*   CREATININE mg/dL 0.56 0.64 0.55   EGFR IF NONAFRICN AM mL/min/1.73 116 99 118   CALCIUM mg/dL 9.8 9.0 9.0   GLUCOSE mg/dL 137* 272* 156*   ALBUMIN g/dL 3.70 3.60 3.40*   BILIRUBIN mg/dL 0.3 0.3 0.4   ALK PHOS U/L 192* 190* 172*   AST (SGOT) U/L 22 25 43*   ALT (SGPT) U/L 21 31 35   Estimated Creatinine Clearance: 120.6 mL/min (by C-G formula based on Cr of 0.56).  No results  found for: AMMONIA    Results from last 7 days  Lab Units 10/08/17  0340   CHOLESTEROL mg/dL 178   TRIGLYCERIDES mg/dL 255*   HDL CHOL mg/dL 26*     Blood Culture   Date Value Ref Range Status   09/23/2017 No growth at 24 hours  Preliminary   09/23/2017 No growth at 24 hours  Preliminary              Assessment/Plan      1) abdominal pain  2) pancreatitis-acute-amylase and lipase are improved from the time of admission but remain elevated.  3) diabetes mellitus type 2 uncontrolled  4) hyperlipidemia  5) noncompliance with diabetes control  6) metabolic acidosis secondary to above  7) abnormal liver enzymes  8) leukocytosis secondary to above  9) DVT prophylaxis-subcutaneous Lovenox  10) constipation secondary to pain medications-Relistor  has relieved her symptoms.  11) acute respiratory failure secondary to atelectasis.-Nebulizers and oxygen.  12) free fluid in the pelvis on CT scan of the abdomen on September 29.     See orders entered.     Seven Gallego MD  10/09/17  7:29 AM

## 2017-10-10 ENCOUNTER — APPOINTMENT (OUTPATIENT)
Dept: GENERAL RADIOLOGY | Facility: HOSPITAL | Age: 47
End: 2017-10-10

## 2017-10-10 ENCOUNTER — HOSPITAL ENCOUNTER (INPATIENT)
Facility: HOSPITAL | Age: 47
LOS: 16 days | Discharge: ANOTHER HOSPITAL | End: 2017-10-26
Attending: EMERGENCY MEDICINE | Admitting: INTERNAL MEDICINE

## 2017-10-10 DIAGNOSIS — K85.90 ACUTE PANCREATITIS, UNSPECIFIED COMPLICATION STATUS, UNSPECIFIED PANCREATITIS TYPE: Primary | ICD-10-CM

## 2017-10-10 DIAGNOSIS — R11.0 NAUSEA: ICD-10-CM

## 2017-10-10 DIAGNOSIS — R10.13 EPIGASTRIC PAIN: ICD-10-CM

## 2017-10-10 LAB
ALBUMIN SERPL-MCNC: 3.9 G/DL (ref 3.5–5)
ALBUMIN SERPL-MCNC: 4.5 G/DL (ref 3.5–5)
ALBUMIN/GLOB SERPL: 1.2 G/DL (ref 1.5–2.5)
ALBUMIN/GLOB SERPL: 1.3 G/DL (ref 1.5–2.5)
ALP SERPL-CCNC: 173 U/L (ref 35–104)
ALP SERPL-CCNC: 221 U/L (ref 35–104)
ALT SERPL W P-5'-P-CCNC: 20 U/L (ref 10–36)
ALT SERPL W P-5'-P-CCNC: 21 U/L (ref 10–36)
AMYLASE SERPL-CCNC: 209 U/L (ref 28–100)
ANION GAP SERPL CALCULATED.3IONS-SCNC: 10.3 MMOL/L (ref 3.6–11.2)
ANION GAP SERPL CALCULATED.3IONS-SCNC: 9.2 MMOL/L (ref 3.6–11.2)
AST SERPL-CCNC: 22 U/L (ref 10–30)
AST SERPL-CCNC: 26 U/L (ref 10–30)
BASOPHILS # BLD AUTO: 0.02 10*3/MM3 (ref 0–0.3)
BASOPHILS # BLD AUTO: 0.08 10*3/MM3 (ref 0–0.3)
BASOPHILS NFR BLD AUTO: 0.1 % (ref 0–2)
BASOPHILS NFR BLD AUTO: 0.5 % (ref 0–2)
BILIRUB SERPL-MCNC: 0.4 MG/DL (ref 0.2–1.8)
BILIRUB SERPL-MCNC: 0.5 MG/DL (ref 0.2–1.8)
BUN BLD-MCNC: 8 MG/DL (ref 7–21)
BUN BLD-MCNC: 9 MG/DL (ref 7–21)
BUN/CREAT SERPL: 10.5 (ref 7–25)
BUN/CREAT SERPL: 11.9 (ref 7–25)
CALCIUM SPEC-SCNC: 10.5 MG/DL (ref 7.7–10)
CALCIUM SPEC-SCNC: 9.5 MG/DL (ref 7.7–10)
CHLORIDE SERPL-SCNC: 104 MMOL/L (ref 99–112)
CHLORIDE SERPL-SCNC: 108 MMOL/L (ref 99–112)
CO2 SERPL-SCNC: 22.8 MMOL/L (ref 24.3–31.9)
CO2 SERPL-SCNC: 26.7 MMOL/L (ref 24.3–31.9)
CREAT BLD-MCNC: 0.67 MG/DL (ref 0.43–1.29)
CREAT BLD-MCNC: 0.86 MG/DL (ref 0.43–1.29)
DEPRECATED RDW RBC AUTO: 43.7 FL (ref 37–54)
DEPRECATED RDW RBC AUTO: 44.6 FL (ref 37–54)
EOSINOPHIL # BLD AUTO: 0.13 10*3/MM3 (ref 0–0.7)
EOSINOPHIL # BLD AUTO: 0.38 10*3/MM3 (ref 0–0.7)
EOSINOPHIL NFR BLD AUTO: 0.7 % (ref 0–5)
EOSINOPHIL NFR BLD AUTO: 2.3 % (ref 0–5)
ERYTHROCYTE [DISTWIDTH] IN BLOOD BY AUTOMATED COUNT: 14.1 % (ref 11.5–14.5)
ERYTHROCYTE [DISTWIDTH] IN BLOOD BY AUTOMATED COUNT: 14.3 % (ref 11.5–14.5)
GFR SERPL CREATININE-BSD FRML MDRD: 71 ML/MIN/1.73
GFR SERPL CREATININE-BSD FRML MDRD: 94 ML/MIN/1.73
GLOBULIN UR ELPH-MCNC: 3.2 GM/DL
GLOBULIN UR ELPH-MCNC: 3.6 GM/DL
GLUCOSE BLD-MCNC: 220 MG/DL (ref 70–110)
GLUCOSE BLD-MCNC: 268 MG/DL (ref 70–110)
GLUCOSE BLDC GLUCOMTR-MCNC: 185 MG/DL (ref 70–130)
HCT VFR BLD AUTO: 43 % (ref 37–47)
HCT VFR BLD AUTO: 45.2 % (ref 37–47)
HGB BLD-MCNC: 13.7 G/DL (ref 12–16)
HGB BLD-MCNC: 14.6 G/DL (ref 12–16)
IMM GRANULOCYTES # BLD: 0.07 10*3/MM3 (ref 0–0.03)
IMM GRANULOCYTES # BLD: 0.08 10*3/MM3 (ref 0–0.03)
IMM GRANULOCYTES NFR BLD: 0.4 % (ref 0–0.5)
IMM GRANULOCYTES NFR BLD: 0.4 % (ref 0–0.5)
LIPASE SERPL-CCNC: 153 U/L (ref 13–60)
LYMPHOCYTES # BLD AUTO: 1.17 10*3/MM3 (ref 1–3)
LYMPHOCYTES # BLD AUTO: 1.33 10*3/MM3 (ref 1–3)
LYMPHOCYTES NFR BLD AUTO: 5.9 % (ref 21–51)
LYMPHOCYTES NFR BLD AUTO: 7.9 % (ref 21–51)
MCH RBC QN AUTO: 27.7 PG (ref 27–33)
MCH RBC QN AUTO: 27.9 PG (ref 27–33)
MCHC RBC AUTO-ENTMCNC: 31.9 G/DL (ref 33–37)
MCHC RBC AUTO-ENTMCNC: 32.3 G/DL (ref 33–37)
MCV RBC AUTO: 86.4 FL (ref 80–94)
MCV RBC AUTO: 86.9 FL (ref 80–94)
MONOCYTES # BLD AUTO: 0.68 10*3/MM3 (ref 0.1–0.9)
MONOCYTES # BLD AUTO: 0.71 10*3/MM3 (ref 0.1–0.9)
MONOCYTES NFR BLD AUTO: 3.5 % (ref 0–10)
MONOCYTES NFR BLD AUTO: 4.2 % (ref 0–10)
NEUTROPHILS # BLD AUTO: 14.3 10*3/MM3 (ref 1.4–6.5)
NEUTROPHILS # BLD AUTO: 17.59 10*3/MM3 (ref 1.4–6.5)
NEUTROPHILS NFR BLD AUTO: 84.7 % (ref 30–70)
NEUTROPHILS NFR BLD AUTO: 89.4 % (ref 30–70)
OSMOLALITY SERPL CALC.SUM OF ELEC: 284.5 MOSM/KG (ref 273–305)
OSMOLALITY SERPL CALC.SUM OF ELEC: 289.4 MOSM/KG (ref 273–305)
PLATELET # BLD AUTO: 645 10*3/MM3 (ref 130–400)
PLATELET # BLD AUTO: 814 10*3/MM3 (ref 130–400)
PMV BLD AUTO: 10.5 FL (ref 6–10)
PMV BLD AUTO: 11 FL (ref 6–10)
POTASSIUM BLD-SCNC: 4 MMOL/L (ref 3.5–5.3)
POTASSIUM BLD-SCNC: 4.3 MMOL/L (ref 3.5–5.3)
PROT SERPL-MCNC: 7.1 G/DL (ref 6–8)
PROT SERPL-MCNC: 8.1 G/DL (ref 6–8)
RBC # BLD AUTO: 4.95 10*6/MM3 (ref 4.2–5.4)
RBC # BLD AUTO: 5.23 10*6/MM3 (ref 4.2–5.4)
SODIUM BLD-SCNC: 140 MMOL/L (ref 135–153)
SODIUM BLD-SCNC: 141 MMOL/L (ref 135–153)
WBC NRBC COR # BLD: 16.87 10*3/MM3 (ref 4.5–12.5)
WBC NRBC COR # BLD: 19.67 10*3/MM3 (ref 4.5–12.5)

## 2017-10-10 PROCEDURE — 25010000002 MORPHINE PER 10 MG: Performed by: EMERGENCY MEDICINE

## 2017-10-10 PROCEDURE — 25010000002 PROMETHAZINE PER 50 MG: Performed by: EMERGENCY MEDICINE

## 2017-10-10 PROCEDURE — 25010000002 ONDANSETRON PER 1 MG: Performed by: INTERNAL MEDICINE

## 2017-10-10 PROCEDURE — 85025 COMPLETE CBC W/AUTO DIFF WBC: CPT | Performed by: EMERGENCY MEDICINE

## 2017-10-10 PROCEDURE — 85025 COMPLETE CBC W/AUTO DIFF WBC: CPT | Performed by: INTERNAL MEDICINE

## 2017-10-10 PROCEDURE — 74022 RADEX COMPL AQT ABD SERIES: CPT

## 2017-10-10 PROCEDURE — 82150 ASSAY OF AMYLASE: CPT | Performed by: EMERGENCY MEDICINE

## 2017-10-10 PROCEDURE — 82962 GLUCOSE BLOOD TEST: CPT

## 2017-10-10 PROCEDURE — 25010000002 ENOXAPARIN PER 10 MG: Performed by: INTERNAL MEDICINE

## 2017-10-10 PROCEDURE — 63710000001 INSULIN ASPART PER 5 UNITS: Performed by: INTERNAL MEDICINE

## 2017-10-10 PROCEDURE — 80053 COMPREHEN METABOLIC PANEL: CPT | Performed by: EMERGENCY MEDICINE

## 2017-10-10 PROCEDURE — 80053 COMPREHEN METABOLIC PANEL: CPT | Performed by: INTERNAL MEDICINE

## 2017-10-10 PROCEDURE — 83690 ASSAY OF LIPASE: CPT | Performed by: EMERGENCY MEDICINE

## 2017-10-10 PROCEDURE — 99284 EMERGENCY DEPT VISIT MOD MDM: CPT

## 2017-10-10 PROCEDURE — 74022 RADEX COMPL AQT ABD SERIES: CPT | Performed by: RADIOLOGY

## 2017-10-10 RX ORDER — MORPHINE SULFATE 2 MG/ML
INJECTION, SOLUTION INTRAMUSCULAR; INTRAVENOUS
Status: DISPENSED
Start: 2017-10-10 | End: 2017-10-11

## 2017-10-10 RX ORDER — VENLAFAXINE HYDROCHLORIDE 75 MG/1
75 CAPSULE, EXTENDED RELEASE ORAL DAILY
Status: CANCELLED | OUTPATIENT
Start: 2017-10-11

## 2017-10-10 RX ORDER — MORPHINE SULFATE 10 MG/ML
6 INJECTION INTRAMUSCULAR; INTRAVENOUS; SUBCUTANEOUS ONCE
Status: COMPLETED | OUTPATIENT
Start: 2017-10-10 | End: 2017-10-10

## 2017-10-10 RX ORDER — ONDANSETRON 2 MG/ML
4 INJECTION INTRAMUSCULAR; INTRAVENOUS ONCE
Status: DISCONTINUED | OUTPATIENT
Start: 2017-10-10 | End: 2017-10-10

## 2017-10-10 RX ORDER — PANTOPRAZOLE SODIUM 40 MG/1
40 TABLET, DELAYED RELEASE ORAL 2 TIMES DAILY
Status: CANCELLED | OUTPATIENT
Start: 2017-10-10

## 2017-10-10 RX ORDER — DEXTROSE MONOHYDRATE 25 G/50ML
25 INJECTION, SOLUTION INTRAVENOUS
Status: DISCONTINUED | OUTPATIENT
Start: 2017-10-10 | End: 2017-10-26 | Stop reason: HOSPADM

## 2017-10-10 RX ORDER — METOPROLOL TARTRATE 100 MG/1
100 TABLET ORAL EVERY 12 HOURS SCHEDULED
Status: DISCONTINUED | OUTPATIENT
Start: 2017-10-10 | End: 2017-10-26 | Stop reason: HOSPADM

## 2017-10-10 RX ORDER — TRAMADOL HYDROCHLORIDE 50 MG/1
50 TABLET ORAL EVERY 6 HOURS PRN
Status: ON HOLD | COMMUNITY
End: 2019-05-07

## 2017-10-10 RX ORDER — TRAMADOL HYDROCHLORIDE 50 MG/1
50 TABLET ORAL EVERY 6 HOURS PRN
Status: CANCELLED | OUTPATIENT
Start: 2017-10-10

## 2017-10-10 RX ORDER — METOPROLOL TARTRATE 100 MG/1
100 TABLET ORAL 2 TIMES DAILY
Status: CANCELLED | OUTPATIENT
Start: 2017-10-10

## 2017-10-10 RX ORDER — MEPERIDINE HYDROCHLORIDE 25 MG/ML
25 INJECTION INTRAMUSCULAR; INTRAVENOUS; SUBCUTANEOUS
Status: DISCONTINUED | OUTPATIENT
Start: 2017-10-10 | End: 2017-10-13

## 2017-10-10 RX ORDER — NICOTINE POLACRILEX 4 MG
15 LOZENGE BUCCAL
Status: DISCONTINUED | OUTPATIENT
Start: 2017-10-10 | End: 2017-10-26 | Stop reason: HOSPADM

## 2017-10-10 RX ORDER — PROMETHAZINE HYDROCHLORIDE 25 MG/ML
12.5 INJECTION, SOLUTION INTRAMUSCULAR; INTRAVENOUS ONCE
Status: COMPLETED | OUTPATIENT
Start: 2017-10-10 | End: 2017-10-10

## 2017-10-10 RX ORDER — PROMETHAZINE HYDROCHLORIDE 12.5 MG/1
12.5 TABLET ORAL EVERY 6 HOURS PRN
Status: CANCELLED | OUTPATIENT
Start: 2017-10-10

## 2017-10-10 RX ORDER — MEPERIDINE HYDROCHLORIDE 25 MG/ML
25 INJECTION INTRAMUSCULAR; INTRAVENOUS; SUBCUTANEOUS
Status: DISCONTINUED | OUTPATIENT
Start: 2017-10-10 | End: 2017-10-11 | Stop reason: SDUPTHER

## 2017-10-10 RX ORDER — PANTOPRAZOLE SODIUM 40 MG/10ML
40 INJECTION, POWDER, LYOPHILIZED, FOR SOLUTION INTRAVENOUS
Status: DISCONTINUED | OUTPATIENT
Start: 2017-10-10 | End: 2017-10-14

## 2017-10-10 RX ORDER — ONDANSETRON 2 MG/ML
4 INJECTION INTRAMUSCULAR; INTRAVENOUS EVERY 6 HOURS PRN
Status: DISCONTINUED | OUTPATIENT
Start: 2017-10-10 | End: 2017-10-11 | Stop reason: SDUPTHER

## 2017-10-10 RX ORDER — ONDANSETRON 2 MG/ML
4 INJECTION INTRAMUSCULAR; INTRAVENOUS EVERY 6 HOURS PRN
Status: DISCONTINUED | OUTPATIENT
Start: 2017-10-10 | End: 2017-10-26 | Stop reason: HOSPADM

## 2017-10-10 RX ORDER — SODIUM CHLORIDE 9 MG/ML
50 INJECTION, SOLUTION INTRAVENOUS CONTINUOUS
Status: DISCONTINUED | OUTPATIENT
Start: 2017-10-10 | End: 2017-10-26

## 2017-10-10 RX ORDER — AMLODIPINE BESYLATE AND BENAZEPRIL HYDROCHLORIDE 10; 20 MG/1; MG/1
1 CAPSULE ORAL DAILY
Status: CANCELLED | OUTPATIENT
Start: 2017-10-11

## 2017-10-10 RX ADMIN — MEPERIDINE HYDROCHLORIDE 25 MG: 25 INJECTION INTRAMUSCULAR; INTRAVENOUS; SUBCUTANEOUS at 20:56

## 2017-10-10 RX ADMIN — METOPROLOL TARTRATE 100 MG: 100 TABLET, FILM COATED ORAL at 20:21

## 2017-10-10 RX ADMIN — MEPERIDINE HYDROCHLORIDE 25 MG: 25 INJECTION INTRAMUSCULAR; INTRAVENOUS; SUBCUTANEOUS at 23:02

## 2017-10-10 RX ADMIN — SODIUM CHLORIDE 200 ML/HR: 9 INJECTION, SOLUTION INTRAVENOUS at 20:21

## 2017-10-10 RX ADMIN — MORPHINE SULFATE 6 MG: 10 INJECTION, SOLUTION INTRAMUSCULAR; INTRAVENOUS at 13:12

## 2017-10-10 RX ADMIN — PROMETHAZINE HYDROCHLORIDE 12.5 MG: 25 INJECTION INTRAMUSCULAR; INTRAVENOUS at 13:17

## 2017-10-10 RX ADMIN — MEPERIDINE HYDROCHLORIDE 25 MG: 25 INJECTION INTRAMUSCULAR; INTRAVENOUS; SUBCUTANEOUS at 18:45

## 2017-10-10 RX ADMIN — ENOXAPARIN SODIUM 40 MG: 40 INJECTION SUBCUTANEOUS at 20:21

## 2017-10-10 RX ADMIN — ONDANSETRON 4 MG: 2 INJECTION INTRAMUSCULAR; INTRAVENOUS at 18:50

## 2017-10-10 RX ADMIN — SODIUM CHLORIDE 1000 ML: 9 INJECTION, SOLUTION INTRAVENOUS at 13:10

## 2017-10-10 RX ADMIN — MORPHINE SULFATE 4 MG: 4 INJECTION, SOLUTION INTRAMUSCULAR; INTRAVENOUS at 14:35

## 2017-10-10 RX ADMIN — MORPHINE SULFATE 4 MG: 4 INJECTION, SOLUTION INTRAMUSCULAR; INTRAVENOUS at 16:58

## 2017-10-10 RX ADMIN — INSULIN ASPART 2 UNITS: 100 INJECTION, SOLUTION INTRAVENOUS; SUBCUTANEOUS at 20:21

## 2017-10-10 RX ADMIN — PROMETHAZINE HYDROCHLORIDE 12.5 MG: 25 INJECTION INTRAMUSCULAR; INTRAVENOUS at 17:08

## 2017-10-10 NOTE — ED PROVIDER NOTES
Subjective   History of Present Illness  47-year-old white female complains of abdominal pain.  She is recently admitted to the hospital for pancreatitis, and discharged yesterday.  Today she complains of severe mid abdominal and epigastric pain.  She complains of nausea, denies diarrhea or constipation.  She states this is the same as the pain she had from her pancreatitis.  Review of Systems   All other systems reviewed and are negative.      Past Medical History:   Diagnosis Date   • Diabetes mellitus    • Hypertension    • Pancreatitis        Allergies   Allergen Reactions   • Hydrocodone-Acetaminophen    • Hydromorphone    • Oxycodone-Acetaminophen    • Penicillins        Past Surgical History:   Procedure Laterality Date   • APPENDECTOMY     •  SECTION     • CHOLECYSTECTOMY     • HYSTERECTOMY         Family History   Problem Relation Age of Onset   • Hypertension Mother    • Cancer Father    • Heart disease Father    • Hypertension Father    • Heart disease Brother    • Cancer Paternal Grandfather        Social History     Social History   • Marital status:      Spouse name: N/A   • Number of children: N/A   • Years of education: N/A     Social History Main Topics   • Smoking status: Current Every Day Smoker     Packs/day: 0.50     Types: Cigarettes   • Smokeless tobacco: None   • Alcohol use No   • Drug use: No   • Sexual activity: Defer     Other Topics Concern   • None     Social History Narrative   • None           Objective   Physical Exam   Constitutional: She is oriented to person, place, and time. She appears well-developed and well-nourished.   Appears in pain.  Writhing in bed.   HENT:   Mouth/Throat: Oropharynx is clear and moist.   Cardiovascular: Normal rate, regular rhythm and normal heart sounds.  Exam reveals no gallop and no friction rub.    No murmur heard.  Pulmonary/Chest: Effort normal and breath sounds normal. No respiratory distress. She has no wheezes. She has no rales.    Abdominal: Soft. Bowel sounds are normal. She exhibits no distension and no mass. There is tenderness (Epigastric and periumbilical). There is no rebound and no guarding.   Musculoskeletal: Normal range of motion. She exhibits no edema.   Neurological: She is alert and oriented to person, place, and time.   Skin: Skin is warm and dry.   Psychiatric:   Crying from pain.       Procedures  Results for orders placed or performed during the hospital encounter of 10/10/17   Comprehensive Metabolic Panel   Result Value Ref Range    Glucose 268 (H) 70 - 110 mg/dL    BUN 9 7 - 21 mg/dL    Creatinine 0.86 0.43 - 1.29 mg/dL    Sodium 141 135 - 153 mmol/L    Potassium 4.3 3.5 - 5.3 mmol/L    Chloride 104 99 - 112 mmol/L    CO2 26.7 24.3 - 31.9 mmol/L    Calcium 10.5 (H) 7.7 - 10.0 mg/dL    Total Protein 8.1 (H) 6.0 - 8.0 g/dL    Albumin 4.50 3.50 - 5.00 g/dL    ALT (SGPT) 20 10 - 36 U/L    AST (SGOT) 26 10 - 30 U/L    Alkaline Phosphatase 221 (H) 35 - 104 U/L    Total Bilirubin 0.5 0.2 - 1.8 mg/dL    eGFR Non African Amer 71 >60 mL/min/1.73    Globulin 3.6 gm/dL    A/G Ratio 1.3 (L) 1.5 - 2.5 g/dL    BUN/Creatinine Ratio 10.5 7.0 - 25.0    Anion Gap 10.3 3.6 - 11.2 mmol/L   Lipase   Result Value Ref Range    Lipase 153 (H) 13 - 60 U/L   Amylase   Result Value Ref Range    Amylase 209 (H) 28 - 100 U/L   CBC Auto Differential   Result Value Ref Range    WBC 16.87 (H) 4.50 - 12.50 10*3/mm3    RBC 5.23 4.20 - 5.40 10*6/mm3    Hemoglobin 14.6 12.0 - 16.0 g/dL    Hematocrit 45.2 37.0 - 47.0 %    MCV 86.4 80.0 - 94.0 fL    MCH 27.9 27.0 - 33.0 pg    MCHC 32.3 (L) 33.0 - 37.0 g/dL    RDW 14.1 11.5 - 14.5 %    RDW-SD 43.7 37.0 - 54.0 fl    MPV 11.0 (H) 6.0 - 10.0 fL    Platelets 814 (H) 130 - 400 10*3/mm3    Neutrophil % 84.7 (H) 30.0 - 70.0 %    Lymphocyte % 7.9 (L) 21.0 - 51.0 %    Monocyte % 4.2 0.0 - 10.0 %    Eosinophil % 2.3 0.0 - 5.0 %    Basophil % 0.5 0.0 - 2.0 %    Immature Grans % 0.4 0.0 - 0.5 %    Neutrophils, Absolute  14.30 (H) 1.40 - 6.50 10*3/mm3    Lymphocytes, Absolute 1.33 1.00 - 3.00 10*3/mm3    Monocytes, Absolute 0.71 0.10 - 0.90 10*3/mm3    Eosinophils, Absolute 0.38 0.00 - 0.70 10*3/mm3    Basophils, Absolute 0.08 0.00 - 0.30 10*3/mm3    Immature Grans, Absolute 0.07 (H) 0.00 - 0.03 10*3/mm3   Osmolality, Calculated   Result Value Ref Range    Osmolality Calc 289.4 273.0 - 305.0 mOsm/kg     Xr Abdomen 2 View With Chest 1 View    Result Date: 10/10/2017  Narrative: XR ABDOMEN 2 VW W CHEST 1 VW-  CLINICAL INDICATION: pain     COMPARISON: None available  FINDINGS: Chest: There is minimal bibasilar atelectasis  Abdomen: Two views of the abdomen show no free air. I do not see abnormal bowel distention to suggest complete obstruction. The bony structures are intact. No abnormal soft tissue masses are seen. No suspicious appearing calcifications are identified on today's exam.      Impression: 1. Surgical clips in the right upper quadrant 2. Minimal bibasilar atelectasis. 3. No evidence of bowel obstruction. 4. No free air.        This report was finalized on 10/10/2017 1:43 PM by Dr. Epifanio Macias MD.      Us Abdomen Complete    Result Date: 10/1/2017  Narrative: ULTRASOUND ABDOMEN COMPLETE-  REASON FOR EXAM:  Ascites; K85.90-Acute pancreatitis without necrosis or infection, unspecified.  FINDINGS:  Multiple real-time images were obtained. The evaluation of the pancreas was very limited due to overlying bowel gas. The previous CT did demonstrate evidence of pancreatitis. Evaluation of the aorta was also limited however. It was normal in caliber on the CT scan done on 09/29/2017. The liver shows increased echogenicity and enlargement. There was color Doppler flow in the hepatic veins and inferior vena cava. Only a trace amount of ascites was seen in the right upper quadrant. The biliary tree was not dilated. The common hepatic portion of the bile duct measured 6.7 cm. The kidneys showed no evidence of obstruction. The spleen  was mildly enlarged measuring 13 cm in longitudinal dimension and approximately 5.7 cm in thickness at the splenic hilum.      Impression: Only a small amount of ascites was demonstrated around the liver. The pancreas was not able to be adequately imaged, previous studies did show pancreatitis. There is mild hepatosplenomegaly.   This report was finalized on 10/1/2017 11:18 AM by Dr. Bobo Walsh II, MD.      Us Pelvis Complete    Result Date: 10/1/2017  Narrative: ULTRASOUND PELVIS COMPLETE-  REASON FOR EXAM:  Ascites; K85.90-Acute pancreatitis without necrosis or infection, unspecified.  FINDINGS:  Transabdominal images were obtained in the pelvis. There was a small amount of free fluid in the pelvis. The urinary bladder was partially filled with urine and was smooth in contour. No adnexal or pelvic masses were identified.      Impression: Small amount of free fluid in the pelvis.   This report was finalized on 10/1/2017 11:18 AM by Dr. Bobo Walsh II, MD.      Ct Abdomen Pelvis With Contrast    Result Date: 9/24/2017  Narrative: CT ABDOMEN AND PELVIS WITH CONTRAST-  CLINICAL INDICATION: Abdominal pain.     COMPARISON: 05/21/2014  TECHNIQUE: Axial images were acquired from the lung bases through the pubic symphysis after IV, but without oral contrast. Reformatted images were created in both the coronal and sagittal planes.  Radiation dose reduction techniques were utilized per ALARA protocol. Automated exposure control was initiated through either or [employer] or DoseRight software packages by  protocol.     FINDINGS: The lung bases are clear. There are no pleural effusions.  The liver is homogeneous, but decreased in attenuation compatible with fatty infiltration of the liver.  The spleen is homogeneous.  The pancreas is edematous. There is peripancreatic stranding and peripancreatic fluid. This appearance is most compatible with acute pancreatitis. Probably secondary inflammation of the anterior  wall of the stomach. No abscess or pseudocyst at this time.  There is no adrenal enlargement.  The kidneys show no evidence of hydronephrosis or hydroureter. I do not see any distal ureteral stones.                  Impression: 1. Appearance most suggestive of acute pancreatitis. 2. Fatty infiltration of the liver. 3. Other findings as above.        This report was finalized on 9/24/2017 12:28 PM by Dr. Epifanio Macias MD.      Ct Chest Pulmonary Embolism With Contrast    Result Date: 9/28/2017  Narrative: EXAMINATION: CT CHEST PULMONARY EMBOLISM W CONTRAST-  Technique: Multiple CT axial images were obtained through the level of pulmonary arteries, following IV contrast administration per CT PE protocol. Volume Rendered 3D or MIP images performed.  Radiation dose reduction techniques were utilized per ALARA protocol. Automated exposure control was initiated through either or [employer] or DoseRight software packages by  protocol.   126.94 mGy.cm   CLINICAL INDICATION:    SOB and Chest Pain  COMPARISON: CT abdomen from 9/23/2017  FINDINGS: No evidence of pulmonary embolism.  Moderate cardiomegaly. No pericardial effusion.  Pulmonary artery enlargement likely in the setting of hypertension.  Bilateral lower lobe atelectasis. Interlobular septal thickening reflecting edema. No consolidations otherwise noted.  Small nonloculated bilateral pleural effusions. No pneumothorax.  No thoracic adenopathy by CT size criteria.  Partial imaging of upper abdomen demonstrate small amounts of perihepatic and perisplenic ascites. Fatty infiltration of liver.  Bone windows demonstrate no acute osseous findings.      Impression:  1. No PE. 2. Likely changes of CHF/edema and/or volume overload. 3. Fairly marked bibasilar atelectasis. 4. Mild pulmonary arterial enlargement probably representing pulmonary hypertension. 5. Mild upper abdominal ascites.  This report was finalized on 9/28/2017 7:36 AM by Dr. Mike Tolbert MD.       Us Venous Doppler Lower Extremity Bilateral (duplex)    Result Date: 10/8/2017  Narrative: EXAMINATION: US VENOUS DOPPLER LOWER EXTREMITY BILATERAL (DUPLEX)-  CLINICAL INDICATION:     Leg Pain and Swelling dvt; R10.13-Epigastric pain; K85.90-Acute pancreatitis without necrosis or infection, unspecified; R11.0-Nausea    TECHNIQUE: Multiplanar gray scale and Doppler vascular sonographic imaging of the deep veins  without and with compression.  COMPARISON: NONE  FINDINGS: The visualized deep veins demonstrate flow and are compressible. No evidence of deep venous thrombosis.         Impression: No DVT.  This report was finalized on 10/8/2017 8:36 AM by Dr. Earnest Wright MD.      Ct Abdomen Pelvis Stone Protocol    Result Date: 9/30/2017  Narrative: CT ABDOMEN PELVIS STONE PROTOCOL-  REASON FOR EXAM: lower back pain; K85.90-Acute pancreatitis without necrosis or infection, unspecified  Today's CT is compared with a previous CT scan from 09/23/2017. Today's study is done without contrast.  Images that include the bases of the lung show some atelectasis versus parenchymal infiltrate in both lung bases just above the diaphragm. The liver and spleen were both enlarged. There is diffuse enlargement of the pancreas throughout the head and body region. There is stranding in the fat planes around the pancreas. There is more edema in the mesentery than on the previous CT. This would be consistent with pancreatitis with more inflammation than on the previous exam. The bowel shows no evidence of obstruction. There was ascites in the pelvis.      Impression: Atelectasis versus pneumonia is developing in both lung bases. There is hepatosplenomegaly. There are are changes of acute pancreatitis with more inflammation in the peripancreatic tissues extending into the mesentery. There is free fluid in the pelvis.  799.674072 mGy.cm The radiation dose reduction device was utilized for each scan per the ALARA (as low as reasonably  achievable) protocol.  This report was finalized on 9/30/2017 10:26 AM by Dr. Bobo Walsh II, MD.      Xr Chest Pa & Lateral    Result Date: 10/8/2017  Narrative: EXAMINATION: XR CHEST PA AND LATERAL-  CLINICAL INDICATION:     pleural effusion; R10.13-Epigastric pain; K85.90-Acute pancreatitis without necrosis or infection, unspecified; R11.0-Nausea  TECHNIQUE:  XR CHEST PA AND LATERAL-  COMPARISON: NONE  FINDINGS: RIGHT LUNG BASE ATELECTASIS. Heart and mediastinum contours are unremarkable. No pleural effusion. No pneumothorax. Bony and soft tissue structures are unremarkable.      Impression: No radiographic evidence of acute cardiac or pulmonary disease.  This report was finalized on 10/8/2017 8:37 AM by Dr. Earnest Wright MD.      Xr Chest Pa & Lateral    Result Date: 9/29/2017  Narrative: EXAMINATION: XR CHEST PA AND LATERAL-  CLINICAL INDICATION:     fever.; K85.90-Acute pancreatitis without necrosis or infection, unspecified  TECHNIQUE:  XR CHEST PA AND LATERAL-  COMPARISON: 5/1/2014  FINDINGS: Development of bibasilar opacities may represent atelectasis or pneumonia. Heart and mediastinal contours are unremarkable. No pneumothorax. Trace pleural effusions. No acute osseous findings.         Impression: 1. Bibasilar airspace disease which may represent atelectasis and/or pneumonia. 2. Trace pleural effusions.  This report was finalized on 9/29/2017 10:49 AM by Dr. Mike Tolbert MD.               ED Course  ED Course                  MDM  Number of Diagnoses or Management Options  Acute pancreatitis, unspecified complication status, unspecified pancreatitis type:      Amount and/or Complexity of Data Reviewed  Clinical lab tests: reviewed  Tests in the radiology section of CPT®: reviewed  Decide to obtain previous medical records or to obtain history from someone other than the patient: yes    Risk of Complications, Morbidity, and/or Mortality  Presenting problems: high  Diagnostic procedures:  moderate  Management options: moderate        Final diagnoses:   Acute pancreatitis, unspecified complication status, unspecified pancreatitis type            Durga Null MD  10/10/17 5580

## 2017-10-11 ENCOUNTER — APPOINTMENT (OUTPATIENT)
Dept: MRI IMAGING | Facility: HOSPITAL | Age: 47
End: 2017-10-11

## 2017-10-11 LAB
AMYLASE SERPL-CCNC: 338 U/L (ref 28–100)
GLUCOSE BLDC GLUCOMTR-MCNC: 114 MG/DL (ref 70–130)
GLUCOSE BLDC GLUCOMTR-MCNC: 118 MG/DL (ref 70–130)
GLUCOSE BLDC GLUCOMTR-MCNC: 169 MG/DL (ref 70–130)
GLUCOSE BLDC GLUCOMTR-MCNC: 85 MG/DL (ref 70–130)
GLUCOSE BLDC GLUCOMTR-MCNC: 88 MG/DL (ref 70–130)
LIPASE SERPL-CCNC: 198 U/L (ref 13–60)

## 2017-10-11 PROCEDURE — 94640 AIRWAY INHALATION TREATMENT: CPT

## 2017-10-11 PROCEDURE — 25010000002 ENOXAPARIN PER 10 MG: Performed by: INTERNAL MEDICINE

## 2017-10-11 PROCEDURE — 74181 MRI ABDOMEN W/O CONTRAST: CPT

## 2017-10-11 PROCEDURE — 25010000002 PROMETHAZINE PER 50 MG: Performed by: INTERNAL MEDICINE

## 2017-10-11 PROCEDURE — 94799 UNLISTED PULMONARY SVC/PX: CPT

## 2017-10-11 PROCEDURE — 25010000002 ONDANSETRON PER 1 MG: Performed by: INTERNAL MEDICINE

## 2017-10-11 PROCEDURE — 82962 GLUCOSE BLOOD TEST: CPT

## 2017-10-11 PROCEDURE — 82150 ASSAY OF AMYLASE: CPT | Performed by: INTERNAL MEDICINE

## 2017-10-11 PROCEDURE — 83690 ASSAY OF LIPASE: CPT | Performed by: INTERNAL MEDICINE

## 2017-10-11 PROCEDURE — 25010000002 LEVOFLOXACIN PER 250 MG: Performed by: INTERNAL MEDICINE

## 2017-10-11 PROCEDURE — 87040 BLOOD CULTURE FOR BACTERIA: CPT | Performed by: INTERNAL MEDICINE

## 2017-10-11 PROCEDURE — 74181 MRI ABDOMEN W/O CONTRAST: CPT | Performed by: RADIOLOGY

## 2017-10-11 PROCEDURE — 63710000001 INSULIN ASPART PER 5 UNITS: Performed by: INTERNAL MEDICINE

## 2017-10-11 PROCEDURE — 99232 SBSQ HOSP IP/OBS MODERATE 35: CPT | Performed by: PHYSICIAN ASSISTANT

## 2017-10-11 RX ORDER — VENLAFAXINE HYDROCHLORIDE 75 MG/1
75 CAPSULE, EXTENDED RELEASE ORAL DAILY
Status: DISCONTINUED | OUTPATIENT
Start: 2017-10-11 | End: 2017-10-26 | Stop reason: HOSPADM

## 2017-10-11 RX ORDER — LEVOFLOXACIN 5 MG/ML
750 INJECTION, SOLUTION INTRAVENOUS
Status: DISCONTINUED | OUTPATIENT
Start: 2017-10-11 | End: 2017-10-26

## 2017-10-11 RX ORDER — TRAMADOL HYDROCHLORIDE 50 MG/1
50 TABLET ORAL EVERY 6 HOURS PRN
Status: DISCONTINUED | OUTPATIENT
Start: 2017-10-11 | End: 2017-10-22 | Stop reason: SDUPTHER

## 2017-10-11 RX ORDER — IPRATROPIUM BROMIDE AND ALBUTEROL SULFATE 2.5; .5 MG/3ML; MG/3ML
3 SOLUTION RESPIRATORY (INHALATION) EVERY 4 HOURS PRN
Status: DISCONTINUED | OUTPATIENT
Start: 2017-10-11 | End: 2017-10-21

## 2017-10-11 RX ORDER — AMLODIPINE BESYLATE 10 MG/1
10 TABLET ORAL
Status: DISCONTINUED | OUTPATIENT
Start: 2017-10-11 | End: 2017-10-26 | Stop reason: HOSPADM

## 2017-10-11 RX ORDER — LISINOPRIL 10 MG/1
20 TABLET ORAL
Status: DISCONTINUED | OUTPATIENT
Start: 2017-10-11 | End: 2017-10-26 | Stop reason: HOSPADM

## 2017-10-11 RX ADMIN — MEPERIDINE HYDROCHLORIDE 25 MG: 25 INJECTION INTRAMUSCULAR; INTRAVENOUS; SUBCUTANEOUS at 06:57

## 2017-10-11 RX ADMIN — METOPROLOL TARTRATE 100 MG: 100 TABLET, FILM COATED ORAL at 09:00

## 2017-10-11 RX ADMIN — LISINOPRIL 20 MG: 10 TABLET ORAL at 09:00

## 2017-10-11 RX ADMIN — MEPERIDINE HYDROCHLORIDE 25 MG: 25 INJECTION INTRAMUSCULAR; INTRAVENOUS; SUBCUTANEOUS at 14:09

## 2017-10-11 RX ADMIN — MEPERIDINE HYDROCHLORIDE 25 MG: 25 INJECTION INTRAMUSCULAR; INTRAVENOUS; SUBCUTANEOUS at 03:01

## 2017-10-11 RX ADMIN — PANCRELIPASE 24000 UNITS OF LIPASE: 60000; 12000; 38000 CAPSULE, DELAYED RELEASE PELLETS ORAL at 17:34

## 2017-10-11 RX ADMIN — LEVOFLOXACIN 750 MG: 5 INJECTION, SOLUTION INTRAVENOUS at 09:01

## 2017-10-11 RX ADMIN — SODIUM CHLORIDE 100 ML/HR: 9 INJECTION, SOLUTION INTRAVENOUS at 14:09

## 2017-10-11 RX ADMIN — PANCRELIPASE 24000 UNITS OF LIPASE: 60000; 12000; 38000 CAPSULE, DELAYED RELEASE PELLETS ORAL at 13:16

## 2017-10-11 RX ADMIN — INSULIN ASPART 2 UNITS: 100 INJECTION, SOLUTION INTRAVENOUS; SUBCUTANEOUS at 09:01

## 2017-10-11 RX ADMIN — PANCRELIPASE 24000 UNITS OF LIPASE: 60000; 12000; 38000 CAPSULE, DELAYED RELEASE PELLETS ORAL at 09:00

## 2017-10-11 RX ADMIN — VENLAFAXINE HYDROCHLORIDE 75 MG: 75 CAPSULE, EXTENDED RELEASE ORAL at 09:01

## 2017-10-11 RX ADMIN — MEPERIDINE HYDROCHLORIDE 25 MG: 25 INJECTION INTRAMUSCULAR; INTRAVENOUS; SUBCUTANEOUS at 01:10

## 2017-10-11 RX ADMIN — METRONIDAZOLE 500 MG: 500 INJECTION, SOLUTION INTRAVENOUS at 23:14

## 2017-10-11 RX ADMIN — MEPERIDINE HYDROCHLORIDE 25 MG: 25 INJECTION INTRAMUSCULAR; INTRAVENOUS; SUBCUTANEOUS at 09:00

## 2017-10-11 RX ADMIN — MEPERIDINE HYDROCHLORIDE 25 MG: 25 INJECTION INTRAMUSCULAR; INTRAVENOUS; SUBCUTANEOUS at 22:57

## 2017-10-11 RX ADMIN — METRONIDAZOLE 500 MG: 500 INJECTION, SOLUTION INTRAVENOUS at 09:01

## 2017-10-11 RX ADMIN — MEPERIDINE HYDROCHLORIDE 25 MG: 25 INJECTION INTRAMUSCULAR; INTRAVENOUS; SUBCUTANEOUS at 05:00

## 2017-10-11 RX ADMIN — MEPERIDINE HYDROCHLORIDE 25 MG: 25 INJECTION INTRAMUSCULAR; INTRAVENOUS; SUBCUTANEOUS at 20:33

## 2017-10-11 RX ADMIN — ENOXAPARIN SODIUM 40 MG: 40 INJECTION SUBCUTANEOUS at 20:33

## 2017-10-11 RX ADMIN — ONDANSETRON 4 MG: 2 INJECTION INTRAMUSCULAR; INTRAVENOUS at 13:16

## 2017-10-11 RX ADMIN — PANTOPRAZOLE SODIUM 40 MG: 40 INJECTION, POWDER, FOR SOLUTION INTRAVENOUS at 05:00

## 2017-10-11 RX ADMIN — MEPERIDINE HYDROCHLORIDE 25 MG: 25 INJECTION INTRAMUSCULAR; INTRAVENOUS; SUBCUTANEOUS at 16:43

## 2017-10-11 RX ADMIN — SODIUM CHLORIDE 200 ML/HR: 9 INJECTION, SOLUTION INTRAVENOUS at 03:02

## 2017-10-11 RX ADMIN — AMLODIPINE BESYLATE 10 MG: 10 TABLET ORAL at 09:00

## 2017-10-11 RX ADMIN — METRONIDAZOLE 500 MG: 500 INJECTION, SOLUTION INTRAVENOUS at 16:43

## 2017-10-11 RX ADMIN — PROMETHAZINE HYDROCHLORIDE 12.5 MG: 25 INJECTION INTRAMUSCULAR; INTRAVENOUS at 18:03

## 2017-10-11 RX ADMIN — IPRATROPIUM BROMIDE AND ALBUTEROL SULFATE 3 ML: .5; 3 SOLUTION RESPIRATORY (INHALATION) at 23:00

## 2017-10-11 NOTE — CONSULTS
"Diabetes Education  Assessment/Teaching    Patient Name:  Rossy Boone  YOB: 1970  MRN: [id number]  Admit Date:  10/10/2017      Assessment Date:  10/11/2017       Most Recent Value    General Information      Height  5' 2\" (1.575 m)    Height Method  Stated    Weight  160 lb (72.6 kg)    Weight Method  Stated    Pregnancy Assessment     Diabetes History     What type of diabetes do you have?  Type 2    Length of Diabetes Diagnosis  Newly diagnosed <6 months [A1c 8.6 last week]    Education Preferences     Nutrition Information     Assessment Topics     DM Goals                 Other Comments:  Saw client last week will see closer to discharge        Electronically signed by:  Aimee Chapa RN  10/11/17 2:00 PM  "

## 2017-10-11 NOTE — PROGRESS NOTES
Discharge Planning Assessment  [facility]     Patient Name: Rossy Boone  MRN: [id number]  Today's Date: 10/11/2017    Admit Date: 10/10/2017          Discharge Needs Assessment       10/11/17 1649    Living Environment    Lives With spouse   Pt lives with spouse.     Transportation Available family or friend will provide    Living Environment    Quality Of Family Relationships supportive    Able to Return to Prior Living Arrangements yes    Discharge Needs Assessment    Readmission Within The Last 30 Days --   Pt was discharged from [facility] on 10-9-17.     Outpatient/Agency/Support Group Needs --   Pt does not utilize home health services.     Equipment Currently Used at Home none    Equipment Needed After Discharge none    Discharge Disposition home or self-care            Discharge Plan       10/11/17 1650    Case Management/Social Work Plan    Plan Pt lives at home with spouse and plans to return home at discharge. Pt does not have insurance. [employer] has screened pt and pt does not qualify for Medicaid. Pt's spouse plans to apply for Medicaid at the Medicaid office. Pt does not utilize home health services or DME. SS to follow and assist as needed with discharge planning.     Patient/Family In Agreement With Plan yes                Demographic Summary       10/11/17 3444    Referral Information    Referral Source nursing    Reason For Consult --   SS received consult readmission in 24 hrs. SS spoke to pt.     Primary Care Physician Information    Name Dr. Gallego       Legal       10/11/17 5973    Legal    Legal Comments Pt does not have a POA or advance directive.      Linnea Russell

## 2017-10-11 NOTE — PLAN OF CARE
Problem: Patient Care Overview (Adult)  Goal: Discharge Needs Assessment  Outcome: Ongoing (interventions implemented as appropriate)  Discharge Planning Assessment  [facility]     Patient Name: Rossy Boone                      MRN: [id number]  Today's Date: 10/11/2017                   Admit Date: 10/10/2017             Discharge Needs Assessment        10/11/17 1641     Living Environment     Lives With spouse   Pt lives with spouse.      Transportation Available family or friend will provide     Living Environment     Quality Of Family Relationships supportive     Able to Return to Prior Living Arrangements yes     Discharge Needs Assessment     Readmission Within The Last 30 Days --   Pt was discharged from [facility] on 10-9-17.      Outpatient/Agency/Support Group Needs --   Pt does not utilize home health services.      Equipment Currently Used at Home none     Equipment Needed After Discharge none     Discharge Disposition home or self-care       [facility]       Discharge Plan        10/11/17 1651     Case Management/Social Work Plan     Plan Pt lives at home with spouse and plans to return home at discharge. Pt does not have insurance. [employer] has screened pt and pt does not qualify for Medicaid. Pt's spouse plans to apply for Medicaid at the Medicaid office. Pt does not utilize home health services or DME. SS to follow and assist as needed with discharge planning.      Patient/Family In Agreement With Plan yes       [facility]       Demographic Summary        10/11/17 1648     Referral Information     Referral [facility] nursing     Reason For Consult --   SS received consult readmission in 24 hrs. SS spoke to pt.      Primary Care Physician Information     Name Dr. Gallego        chanok       Legal        10/11/17 1646     Legal     Legal Comments Pt does not have a POA or advance directive.        [email or web address]   Linnea Russell

## 2017-10-11 NOTE — PLAN OF CARE
Problem: Patient Care Overview (Adult)  Goal: Plan of Care Review  Outcome: Ongoing (interventions implemented as appropriate)  Goal: Discharge Needs Assessment  Outcome: Ongoing (interventions implemented as appropriate)    Problem: Pancreatitis, Acute/Chronic (Adult)  Goal: Signs and Symptoms of Listed Potential Problems Will be Absent or Manageable (Pancreatitis, Acute/Chronic)  Outcome: Ongoing (interventions implemented as appropriate)

## 2017-10-11 NOTE — H&P
Chief complaint : Abdominal pain    Subjective     Patient is a 47 y.o. female who was discharged from [facility] after a prolonged stay for pancreatitis.  She was discharged on October 9.  After discharge she notes she went home and began/continued her diabetic low-fat diet.  Unfortunately, she had recurrence of abdominal pain.  She notes recurrent abdominal pain was identical to previous pancreatitis.  He was associated with epigastric pain which radiated to her back.  She had associated nausea and vomiting.  She was unable to eat or drink and sought attention in the emergency room.  In the emergency room her amylase and lipase values were elevated compared to the time of discharge.  She was seen and evaluated and referred for admission.  This morning she is alert and talking.  She continues with epigastric pain which radiates to her back.  She notes associated nausea and vomiting did continue.  She has had relief with her pain with Demerol.  She has no other complaints this morning    Review of Systems   Constitutional: Positive for activity change, appetite change and fatigue. Negative for chills, diaphoresis, fever and unexpected weight change.   HENT: Negative for congestion, ear discharge, facial swelling, mouth sores, postnasal drip, sore throat, trouble swallowing and voice change.    Eyes: Negative for photophobia, pain, discharge and itching.   Respiratory: Negative for apnea, cough, chest tightness and wheezing.    Cardiovascular: Negative for chest pain, palpitations and leg swelling.   Gastrointestinal: Positive for abdominal distention, abdominal pain, nausea and vomiting. Negative for constipation and diarrhea.   Endocrine: Negative for cold intolerance, heat intolerance, polydipsia, polyphagia and polyuria.   Genitourinary: Negative for decreased urine volume, difficulty urinating, dysuria, hematuria, menstrual problem and urgency.   Musculoskeletal: Negative for arthralgias, back  "pain, gait problem and myalgias.   Skin: Negative for color change, pallor and wound.   Allergic/Immunologic: Negative for environmental allergies, food allergies and immunocompromised state.   Neurological: Negative for dizziness, tremors, facial asymmetry and numbness.   Hematological: Negative for adenopathy. Does not bruise/bleed easily.   Psychiatric/Behavioral: Negative for agitation.        Past Medical History  Past Medical History:   Diagnosis Date   • Diabetes mellitus    • Hypertension    • Pancreatitis      Surgical History  Past Surgical History:   Procedure Laterality Date   • APPENDECTOMY     •  SECTION     • CHOLECYSTECTOMY     • HYSTERECTOMY       Family History  Family History   Problem Relation Age of Onset   • Hypertension Mother    • Cancer Father    • Heart disease Father    • Hypertension Father    • Heart disease Brother    • Cancer Paternal Grandfather      Social History  Social History   Substance Use Topics   • Smoking status: Current Every Day Smoker     Packs/day: 0.50     Types: Cigarettes   • Smokeless tobacco: None   • Alcohol use No     Home Medications  Prescriptions Prior to Admission   Medication Sig Dispense Refill Last Dose   • amLODIPine-benazepril (LOTREL) 10-20 MG per capsule Take 1 capsule by mouth Daily. 90 capsule 5 10/10/2017 at 0900   • insulin detemir (LEVEMIR) 100 UNIT/ML injection Inject 15 Units under the skin Every Night. 10 mL 12 10/9/2017 at Unknown time   • Insulin Syringe-Needle U-100 (BD INSULIN SYRINGE ULTRAFINE) 31G X 5/16\" 0.3 ML misc Use as directed with Levemir. 100 each 0 10/10/2017 at Unknown time   • metoprolol tartrate (LOPRESSOR) 100 MG tablet Take 1 tablet by mouth 2 (Two) Times a Day. 180 tablet 4 10/10/2017 at 0900   • omeprazole (PriLOSEC) 40 MG capsule take 1 capsule by mouth 2 times daily (Patient taking differently: Take 40 mg by mouth 2 (Two) Times a Day.) 60 capsule 1 10/10/2017 at 0900   • ONETOUCH DELICA LANCETS 33G misc Use to " test blood sugar. 100 each 0 10/10/2017 at Unknown time   • pancrelipase, Lip-Prot-Amyl, (CREON) 62966 units capsule delayed-release particles capsule Take 2 capsules by mouth 3 (Three) Times a Day With Meals. 100 capsule 3 10/10/2017 at 0900   • promethazine (PHENERGAN) 12.5 MG tablet Take 1 tablet by mouth Every 6 (Six) Hours As Needed for Nausea or Vomiting. 30 tablet 0 10/10/2017 at Unknown time   • SITagliptin-MetFORMIN HCl ER (JANUMET XR)  MG tablet sustained-release 24 hour Take 1 tablet by mouth Daily. 90 tablet 4 10/10/2017 at 0800   • traMADol (ULTRAM) 50 MG tablet Take 50 mg by mouth Every 6 (Six) Hours As Needed for Moderate Pain .   10/10/2017 at Unknown time   • venlafaxine XR (EFFEXOR XR) 75 MG 24 hr capsule Take 1 capsule by mouth Daily. 90 capsule 6 10/10/2017 at 0800         Allergies:  Hydrocodone-acetaminophen; Hydromorphone; Oxycodone-acetaminophen; and Penicillins    Objective     Vital Signs  Temp:  [98 °F (36.7 °C)-99.1 °F (37.3 °C)] 99.1 °F (37.3 °C)  Heart Rate:  [] 106  Resp:  [18-20] 20  BP: (108-129)/(74-82) 123/77    Physical Exam:    Physical Exam   Constitutional: She is oriented to person, place, and time. She appears well-developed and well-nourished. She appears distressed.   HENT:   Head: Normocephalic and atraumatic.   Right Ear: External ear normal.   Left Ear: External ear normal.   Mouth/Throat: Oropharynx is clear and moist. No oropharyngeal exudate.   Eyes: Conjunctivae and EOM are normal. Pupils are equal, round, and reactive to light. Right eye exhibits no discharge. Left eye exhibits no discharge. No scleral icterus.   Neck: Neck supple. No JVD present. No tracheal deviation present. No thyromegaly present.   Cardiovascular: Normal rate, regular rhythm and intact distal pulses.  Exam reveals no gallop and no friction rub.    No murmur heard.  Pulmonary/Chest: Effort normal and breath sounds normal. No stridor. No respiratory distress. She has no wheezes. She  exhibits no tenderness.   Abdominal: Bowel sounds are normal. She exhibits distension. She exhibits no mass. There is tenderness. There is no rebound and no guarding. No hernia.   Genitourinary:   Genitourinary Comments: No Mosley catheter   Musculoskeletal: She exhibits no edema, tenderness or deformity.   Lymphadenopathy:     She has no cervical adenopathy.   Neurological: She is alert and oriented to person, place, and time. She has normal reflexes. She displays normal reflexes. No cranial nerve deficit. She exhibits normal muscle tone. Coordination normal.   Skin: Skin is warm and dry. She is not diaphoretic. No erythema. No pallor.   Psychiatric: She has a normal mood and affect. Her behavior is normal. Judgment and thought content normal.   Nursing note and vitals reviewed.      Results Review:    I reviewed the patient's clinical results from admission:  Imaging Results (last 72 hours)     Procedure Component Value Units Date/Time    XR Abdomen 2 View With Chest 1 View [[id number]] Collected:  10/10/17 1342     Updated:  10/10/17 1345    Narrative:       XR ABDOMEN 2 VW W CHEST 1 VW-     CLINICAL INDICATION: pain          COMPARISON: None available      FINDINGS:  Chest:  There is minimal bibasilar atelectasis     Abdomen:  Two views of the abdomen show no free air. I do not see abnormal bowel  distention to suggest complete obstruction. The bony structures are  intact. No abnormal soft tissue masses are seen. No suspicious appearing  calcifications are identified on today's exam.       Impression:       1. Surgical clips in the right upper quadrant  2. Minimal bibasilar atelectasis.  3. No evidence of bowel obstruction.  4. No free air.               This report was finalized on 10/10/2017 1:43 PM by Dr. Epifanio Macias MD.             LABS:    Lab Results   Component Value Date    GLUCOSE 220 (H) 10/10/2017    GLUCOSE 268 (H) 10/10/2017    GLUCOSE 137 (H) 10/08/2017    BUN 8 10/10/2017    CREATININE 0.67  10/10/2017    EGFRIFNONA 94 10/10/2017    BCR 11.9 10/10/2017    CO2 22.8 (L) 10/10/2017    CALCIUM 9.5 10/10/2017    ALBUMIN 3.90 10/10/2017    LABIL2 1.2 (L) 10/10/2017    AST 22 10/10/2017    ALT 21 10/10/2017    WBC 19.67 (H) 10/10/2017    WBC 16.87 (H) 10/10/2017    WBC 13.58 (H) 10/08/2017    HGB 13.7 10/10/2017    HCT 43.0 10/10/2017    MCV 86.9 10/10/2017     (H) 10/10/2017     10/10/2017     10/10/2017     10/08/2017    K 4.0 10/10/2017    K 4.3 10/10/2017    K 3.8 10/08/2017     10/10/2017    ANIONGAP 9.2 10/10/2017       Lab Results   Component Value Date    INR 0.92 01/08/2017    PROTIME 10.4 01/08/2017                 Assessment/Plan     1) abdominal pain  2) recurrent pancreatitis  3) intractable nausea and vomiting-anti-emetics  4) diabetes mellitus type 2  5) DVT prophylaxis.  - Subcutaneous Lovenox  6) leukocytosis  7) colitis/enteritis      I discussed the patients findings and my recommendations with patient, family and nursing staff.     Seven Gallego MD  10/11/17  6:38 AM    Time: 33 minutes with history and physical and coordination of care

## 2017-10-11 NOTE — DISCHARGE SUMMARY
Date of Admission:   9/23/2017  1:22 PM    Date of Discharge:    10/9/2017    Discharge Diagnosis:   Active Problems:     1) abdominal pain  2) pancreatitis-acute  3) diabetes mellitus type 2 uncontrolled  4) hyperlipidemia  5) noncompliance with diabetes control  6) metabolic acidosis secondary to above  7) abnormal liver enzymes  8) leukocytosis secondary to above  9) DVT prophylaxis-subcutaneous Lovenox  10) constipation secondary to pain medications-Relistor  has relieved her symptoms.  11) acute respiratory failure secondary to atelectasis.-Nebulizers and oxygen.  12) free fluid in the pelvis on CT scan of the abdomen on September 29.           Presenting Problem/History of Present Illness  See History and Physical for Presenting Problems / Illnesses.       Hospital Course  Patient is a 47 y.o. female presented with abdominal pain.  She was diagnosed with acute pancreatitis.  During her hospital course she was also seen by gastroenterology.  A CT scan and a liter CT scan revealed no evidence of bile duct obstruction.  She also had no evidence of pseudocyst.  There is also no evidence of hemorrhage.  During her lengthy hospital stay she did have gradual improvement in her symptoms.  Her diet was slowly advanced to a GI soft mechanical.  On October 9 she was eating and having no nausea or vomiting and felt  stable for discharge.  Amylase and lipase had significant improvement during her hospital course.  They have not yet returned to normal.    Also during the patient's hospital course she did have exacerbation of uncontrolled diabetes.  She was placed on insulin.  She will follow-up with me in the office in one week.        Procedures Performed  Procedure(s):  ESOPHAGOGASTRODUODENOSCOPY       Consults:   Consults     Date and Time Order Name Status Description    9/25/2017 0738 Inpatient Consult to Gastroenterology Completed           Condition on Discharge:   Fair    Vital Signs       Physical  Exam:     Constitutional: She is oriented to person, place, and time. She appears well-developed and well-nourished.  No obvious distress at rest  HENT:   Head: Normocephalic and atraumatic.   Right Ear: External ear normal.   Left Ear: External ear normal.   Nose: Nose normal.   Mouth/Throat: Oropharynx is clear and moist. No oropharyngeal exudate.   Eyes: Conjunctivae and EOM are normal. Pupils are equal, round, and reactive to light. Right eye exhibits no discharge. Left eye exhibits no discharge. No scleral icterus.   Neck: Normal range of motion. Neck supple. No JVD present. No tracheal deviation present. No thyromegaly present.   Cardiovascular: Normal rate, regular rhythm and normal heart sounds.  Exam reveals no gallop and no friction rub.    No murmur heard.  Pulmonary/Chest: Effort normal and breath sounds normal. No stridor. No respiratory distress. She has no wheezes. She has no rales. She exhibits no tenderness.   Abdominal: Soft. Bowel sounds are normal.  She exhibits no mass. There is no guarding. No hernia.   Genitourinary:   Genitourinary Comments: No Mosley catheter   Musculoskeletal: Normal range of motion. She exhibits no edema, tenderness or deformity.   Lymphadenopathy:     She has no cervical adenopathy.   Neurological: She is alert and oriented to person, place, and time. She has normal reflexes. She displays normal reflexes. No cranial nerve deficit. She exhibits normal muscle tone. Coordination normal.   Skin: Skin is warm and dry. No rash noted. She is not diaphoretic. No erythema. No pallor.   Psychiatric: She has a normal mood and affect. Her behavior is normal. Judgment and thought content normal.   Nursing note and vitals reviewed      Discharge Disposition  Home or Self Care    Discharge Medications   Rossy Boone   Home Medication Instructions CORINE:[id number]    Printed on:10/11/17 6024   Medication Information                      amLODIPine-benazepril (LOTREL) 10-20 MG per  "capsule  Take 1 capsule by mouth Daily.             insulin detemir (LEVEMIR) 100 UNIT/ML injection  Inject 15 Units under the skin Every Night.             Insulin Syringe-Needle U-100 (BD INSULIN SYRINGE ULTRAFINE) 31G X 5/16\" 0.3 ML misc  Use as directed with Levemir.             metoprolol tartrate (LOPRESSOR) 100 MG tablet  Take 1 tablet by mouth 2 (Two) Times a Day.             omeprazole (PriLOSEC) 40 MG capsule  take 1 capsule by mouth 2 times daily             ONETOUCH DELICA LANCETS 33G misc  Use to test blood sugar.             pancrelipase, Lip-Prot-Amyl, (CREON) 30617 units capsule delayed-release particles capsule  Take 2 capsules by mouth 3 (Three) Times a Day With Meals.             promethazine (PHENERGAN) 12.5 MG tablet  Take 1 tablet by mouth Every 6 (Six) Hours As Needed for Nausea or Vomiting.             SITagliptin-MetFORMIN HCl ER (JANUMET XR)  MG tablet sustained-release 24 hour  Take 1 tablet by mouth Daily.             venlafaxine XR (EFFEXOR XR) 75 MG 24 hr capsule  Take 1 capsule by mouth Daily.                 Discharge Diet:   Diet Instructions     Diet: Specialty Diet; Thin Liquids, No Restrictions; Low Fat       Discharge Diet:  Specialty Diet   Fluid Consistency:  Thin Liquids, No Restrictions   Specialty Diets:  Low Fat                 Activity at Discharge:   Activity Instructions     Activity as Tolerated                     Follow-up Appointments  Additional Instructions for the Follow-ups that You Need to Schedule     Discharge Follow-up with PCP    As directed    Follow Up Details:  Dr. Gallego   Has the patient’s follow-up appointment been scheduled and documented in the discharge navigator?:  Yes, documented in the appointment section             Follow-up Information     Follow up with Seven Gallego MD .    Specialty:  Internal Medicine    Why:  Dr. Gallego    Contact information:    [address] [facility] [address] [phone]             Seven MCLAIN" MD Julián  10/11/17  8:58 AM    Time: Discharge 32 min

## 2017-10-11 NOTE — PLAN OF CARE
Problem: Patient Care Overview (Adult)  Goal: Plan of Care Review  Outcome: Ongoing (interventions implemented as appropriate)    Problem: Pancreatitis, Acute/Chronic (Adult)  Goal: Signs and Symptoms of Listed Potential Problems Will be Absent or Manageable (Pancreatitis, Acute/Chronic)  Outcome: Ongoing (interventions implemented as appropriate)    Problem: Diabetes, Type 2 (Adult)  Goal: Signs and Symptoms of Listed Potential Problems Will be Absent or Manageable (Diabetes, Type 2)  Outcome: Ongoing (interventions implemented as appropriate)

## 2017-10-11 NOTE — CONSULTS
10/11/17  Chief Complaint   Patient presents with   • Abdominal Pain     Rossy Boone is a 47 y.o. female who presents today at the request of Dr. Gallego for recurrent pancreatitis    HPI  The patient was referred for a GI evaluation due to recurrent pancreatitis.  She was hospitalized for pancreatitis and was discharged home two days ago.  Patient's symptoms were significantly improved at that time.  Her pancreatic enzymes were normal.  The patient explains that she returned home for one night and then began having nausea, vomiting, and significant abdominal pain.  She came to the ED and was admitted.  Patient had an MRI of her abdomen which revealed pancreatitis.  Her amylase was elevated to 338 and her lipase was elevated to 198.  Her white blood count is elevated to 19.67.  Liver enzymes are OK but alkaline phosphatase is elevated to 173.  Medical, surgical, social, and family histories were reviewed and are listed below.      Review of Systems  History obtained from the patient  General ROS: negative for - chills, fatigue, fever, malaise, weight gain or weight loss  Psychological ROS: negative for - anxiety, behavioral disorder, depression, disorientation, irritability, memory difficulties or suicidal ideation  ENT ROS: negative for - epistaxis, headaches, hearing change, nasal congestion, nasal discharge, oral lesions, sinus pain, sneezing, sore throat, tinnitus, vertigo, visual changes or vocal changes  Hematological and Lymphatic ROS: negative for - bleeding problems, blood clots, blood transfusions, bruising, fatigue, jaundice, night sweats, pallor, swollen lymph nodes or weight loss  Endocrine ROS: negative for - breast changes, hot flashes, malaise/lethargy, palpitations, polydipsia/polyuria, temperature intolerance or unexpected weight changes  Respiratory ROS: negative for - cough, hemoptysis, orthopnea, pleuritic pain, shortness of breath, tachypnea or wheezing  Cardiovascular ROS: negative for -  chest pain, dyspnea on exertion, edema, irregular heartbeat, loss of consciousness, murmur, orthopnea, palpitations, paroxysmal nocturnal dyspnea, rapid heart rate or shortness of breath  Gastrointestinal ROS: positive for-nausea, vomiting, abdominal pain; negative for -  appetite loss, blood in stools, change in bowel habits, change in stools, constipation, diarrhea, gas/bloating, heartburn, hematemesis, melena, stool incontinence or swallowing difficulty/pain  Genito-Urinary ROS: negative for - change in urinary stream, incontinence, nocturia, pelvic pain, scrotal mass/pain, urinary frequency/urgency or vulvar/vaginal symptoms  Musculoskeletal ROS: negative for - gait disturbance, joint pain, joint stiffness, joint swelling, muscle pain or muscular weakness  Neurological ROS: negative for - behavioral changes, bowel and bladder control changes, confusion, dizziness, gait disturbance, headaches, impaired coordination/balance, memory loss, numbness/tingling, seizures, speech problems, tremors, visual changes or weakness  Dermatological ROS: negative for lumps, nail changes, pruritus, rash and skin lesion changes    ACTIVE PROBLEMS:   Specialty Problems     None          PAST MEDICAL HISTORY:  Past Medical History:   Diagnosis Date   • Diabetes mellitus    • Hypertension    • Pancreatitis        SURGICAL HISTORY:  Past Surgical History:   Procedure Laterality Date   • APPENDECTOMY     •  SECTION     • CHOLECYSTECTOMY     • HYSTERECTOMY         FAMILY HISTORY:  Family History   Problem Relation Age of Onset   • Hypertension Mother    • Cancer Father    • Heart disease Father    • Hypertension Father    • Heart disease Brother    • Cancer Paternal Grandfather        SOCIAL HISTORY:  Social History   Substance Use Topics   • Smoking status: Current Every Day Smoker     Packs/day: 0.50     Types: Cigarettes   • Smokeless tobacco: Not on file   • Alcohol use No       CURRENT MEDICATION:    Current  Facility-Administered Medications:   •  amLODIPine (NORVASC) tablet 10 mg, 10 mg, Oral, Q24H, Seven Gallego MD, 10 mg at 10/11/17 0900  •  dextrose (D50W) solution 25 g, 25 g, Intravenous, Q15 Min PRN, Kel Becker MD  •  dextrose (GLUTOSE) oral gel 15 g, 15 g, Oral, Q15 Min PRN, Kel Becker MD  •  enoxaparin (LOVENOX) syringe 40 mg, 40 mg, Subcutaneous, Nightly, Kel Becker MD, 40 mg at 10/10/17 2021  •  glucagon (human recombinant) (GLUCAGEN DIAGNOSTIC) injection 1 mg, 1 mg, Subcutaneous, Q15 Min PRN, Kel Becker MD  •  insulin aspart (novoLOG) injection 0-7 Units, 0-7 Units, Subcutaneous, 4x Daily AC & at Bedtime, Kel Becker MD, 2 Units at 10/11/17 0901  •  insulin detemir (LEVEMIR) injection 15 Units, 15 Units, Subcutaneous, Nightly, Seven Gallego MD  •  levoFLOXacin (LEVAQUIN) 750 mg/150 mL D5W (premix) (LEVAQUIN) 750 mg, 750 mg, Intravenous, Q24H, Seven Gallego MD, 750 mg at 10/11/17 0901  •  lisinopril (PRINIVIL,ZESTRIL) tablet 20 mg, 20 mg, Oral, Q24H, Seven Gallego MD, 20 mg at 10/11/17 0900  •  meperidine (DEMEROL) injection 25 mg, 25 mg, Intravenous, Q2H PRN, Kel Becker MD, 25 mg at 10/11/17 1409  •  metoprolol tartrate (LOPRESSOR) tablet 100 mg, 100 mg, Oral, Q12H, Seven Gallego MD, 100 mg at 10/11/17 0900  •  metroNIDAZOLE (FLAGYL) IVPB 500 mg, 500 mg, Intravenous, Q8H, Seven Gallego MD, 500 mg at 10/11/17 0901  •  ondansetron (ZOFRAN) injection 4 mg, 4 mg, Intravenous, Q6H PRN, Kel Becker MD, 4 mg at 10/11/17 1316  •  pancrelipase (Lip-Prot-Amyl) (CREON) capsule 24,000 units of lipase, 24,000 units of lipase, Oral, TID With Meals, Seven Gallego MD, 24,000 units of lipase at 10/11/17 1316  •  pantoprazole (PROTONIX) injection 40 mg, 40 mg, Intravenous, Q AM, Kel Becker MD, 40 mg at 10/11/17 0500  •  sodium chloride 0.9 % infusion, 100 mL/hr, Intravenous, Continuous, Seven Gallego MD, Last Rate: 100 mL/hr at 10/11/17 1409, 100 mL/hr at 10/11/17  1409  •  traMADol (ULTRAM) tablet 50 mg, 50 mg, Oral, Q6H PRN, Seven Gallego MD  •  venlafaxine XR (EFFEXOR-XR) 24 hr capsule 75 mg, 75 mg, Oral, Daily, Seven Gallego MD, 75 mg at 10/11/17 0901    ALLERGIES:  Hydrocodone-acetaminophen; Hydromorphone; Oxycodone-acetaminophen; and Penicillins     VITAL SIGNS:  Vital Signs (last 24 hours)       10/10 0700  -  10/11 0659 10/11 0700  -  10/11 1618   Most Recent    Temp (°F) 98 -  99.1    98.5 -  99.5     98.9 (37.2)    Heart Rate 80 -  106    75 -  78     75    Resp 18 -  20    18 -  20     18    /78 -  129/74    98/65 -  110/75     110/75    SpO2 (%) 97 -  100       100          PHYSICAL EXAMINATION    General Appearance:    Alert, cooperative, in no acute distress   Head:    Normocephalic, without obvious abnormality, atraumatic   Eyes:            Lids and lashes normal, conjunctivae and sclerae normal, no   icterus, no pallor, corneas clear, PERRLA   Ears:    Ears appear intact with no abnormalities noted   Throat:   No oral lesions, no thrush, oral mucosa moist   Neck:   No adenopathy, supple, trachea midline, no thyromegaly, no   carotid bruit, no JVD   Back:     No kyphosis present, no scoliosis present, no skin lesions,      erythema or scars, no tenderness to percussion or                   palpation,   range of motion normal   Lungs:     Clear to auscultation,respirations regular, even and                  unlabored    Heart:    Regular rhythm and normal rate, normal S1 and S2, no            murmur, no gallop, no rub, no click   Chest Wall:    No abnormalities observed   Abdomen:     Significant tenderness across upper abdomen; Normal bowel sounds, no masses, no organomegaly, soft        non-distended,  no rebound                tenderness   Rectal:     Deferred   Extremities:   Moves all extremities well, no edema, no cyanosis, no             redness   Pulses:   Pulses palpable and equal bilaterally   Skin:   No bleeding, bruising or rash   Lymph  nodes:   No palpable adenopathy   Neurologic:   Cranial nerves 2 - 12 grossly intact, sensation intact, DTR       present and equal bilaterally       LABS  Lab Results (last 24 hours)     Procedure Component Value Units Date/Time    CBC & Differential [[id number]] Collected:  10/10/17 1900    Specimen:  Blood Updated:  10/10/17 1908    Narrative:       The following orders were created for panel order CBC & Differential.  Procedure                               Abnormality         Status                     ---------                               -----------         ------                     CBC Auto Differential[[id number]]        Abnormal            Final result                 Please view results for these tests on the individual orders.    CBC Auto Differential [[id number]]  (Abnormal) Collected:  10/10/17 1900    Specimen:  Blood Updated:  10/10/17 1908     WBC 19.67 (H) 10*3/mm3      RBC 4.95 10*6/mm3      Hemoglobin 13.7 g/dL      Hematocrit 43.0 %      MCV 86.9 fL      MCH 27.7 pg      MCHC 31.9 (L) g/dL      RDW 14.3 %      RDW-SD 44.6 fl      MPV 10.5 (H) fL      Platelets 645 (H) 10*3/mm3      Neutrophil % 89.4 (H) %      Lymphocyte % 5.9 (L) %      Monocyte % 3.5 %      Eosinophil % 0.7 %      Basophil % 0.1 %      Immature Grans % 0.4 %      Neutrophils, Absolute 17.59 (H) 10*3/mm3      Lymphocytes, Absolute 1.17 10*3/mm3      Monocytes, Absolute 0.68 10*3/mm3      Eosinophils, Absolute 0.13 10*3/mm3      Basophils, Absolute 0.02 10*3/mm3      Immature Grans, Absolute 0.08 (H) 10*3/mm3     Comprehensive Metabolic Panel [[id number]]  (Abnormal) Collected:  10/10/17 1900    Specimen:  Blood Updated:  10/10/17 1931     Glucose 220 (H) mg/dL      BUN 8 mg/dL      Creatinine 0.67 mg/dL      Sodium 140 mmol/L      Potassium 4.0 mmol/L      Chloride 108 mmol/L      CO2 22.8 (L) mmol/L      Calcium 9.5 mg/dL      Total Protein 7.1 g/dL      Albumin 3.90 g/dL      ALT (SGPT) 21 U/L      AST (SGOT) 22 U/L       Alkaline Phosphatase 173 (H) U/L       Note New Reference Ranges        Total Bilirubin 0.4 mg/dL      eGFR Non African Amer 94 mL/min/1.73      Globulin 3.2 gm/dL      A/G Ratio 1.2 (L) g/dL      BUN/Creatinine Ratio 11.9     Anion Gap 9.2 mmol/L     Osmolality, Calculated [[id number]]  (Normal) Collected:  10/10/17 1900    Specimen:  Blood Updated:  10/10/17 1931     Osmolality Calc 284.5 mOsm/kg     POC Glucose Fingerstick [[id number]]  (Abnormal) Collected:  10/10/17 2010    Specimen:  Blood Updated:  10/10/17 2016     Glucose 185 (H) mg/dL     Narrative:       Meter: [id number] : [id number] leo brown    Lipase [[id number]]  (Abnormal) Collected:  10/11/17 0146    Specimen:  Blood Updated:  10/11/17 0250     Lipase 198 (H) U/L     Amylase [[id number]]  (Abnormal) Collected:  10/11/17 0146    Specimen:  Blood Updated:  10/11/17 0251     Amylase 338 (H) U/L     POC Glucose Fingerstick [[id number]]  (Abnormal) Collected:  10/11/17 0708    Specimen:  Blood Updated:  10/11/17 0715     Glucose 169 (H) mg/dL     Narrative:       Meter: [id number] : [id number] olgan cardozo    Blood Culture - Blood, [[id number]] Collected:  10/11/17 0808    Specimen:  Blood from Hand, Right Updated:  10/11/17 0909    Blood Culture - Blood, [[id number]] Collected:  10/11/17 0818    Specimen:  Blood from Hand, Left Updated:  10/11/17 0909    POC Glucose Fingerstick [[id number]]  (Normal) Collected:  10/11/17 1134    Specimen:  Blood Updated:  10/11/17 1142     Glucose 118 mg/dL     Narrative:       Meter: [id number] : [id number] logan cardozo          IMAGING  Imaging Results (last 72 hours)     Procedure Component Value Units Date/Time    XR Abdomen 2 View With Chest 1 View [[id number]] Collected:  10/10/17 1342     Updated:  10/10/17 1345    Narrative:       XR ABDOMEN 2 VW W CHEST 1 VW-     CLINICAL INDICATION: pain          COMPARISON: None available      FINDINGS:  Chest:  There is minimal bibasilar atelectasis      Abdomen:  Two views of the abdomen show no free air. I do not see abnormal bowel  distention to suggest complete obstruction. The bony structures are  intact. No abnormal soft tissue masses are seen. No suspicious appearing  calcifications are identified on today's exam.       Impression:       1. Surgical clips in the right upper quadrant  2. Minimal bibasilar atelectasis.  3. No evidence of bowel obstruction.  4. No free air.               This report was finalized on 10/10/2017 1:43 PM by Dr. Epifanio Macias MD.       MRI abdomen wo contrast mrcp [[id number]] Collected:  10/11/17 1334     Updated:  10/11/17 1340    Narrative:       EXAMINATION: MRI ABDOMEN WO CONTRAST MRCP-      INDICATION:   nonresolving pancreatitis.; K85.90-Acute pancreatitis without necrosis  or infection, unspecified   COMPARISON: CT performed on 09/29/2017.     TECNIQUE: multiple axial gradient-echo T1 and T2-weighted images were  obtained. Oblique coronal T2-weighted images were  acquired to evaluate  the bile ducts. Axial T1-weighted images were also acquired after  injection of Magnevist.     The common bile duct and common hepatic duct and main intrahepatic bile  ducts are well-demonstrated and appear within normal limits. No  gallstones are identified and there are no strictures. There is no bile  duct dilatation. The pancreatic duct is not well evaluated on this  study. Still extensive stranding and fluid around the pancreas  suggestive of pancreatitis.           Impression:       Unremarkable MRCP imaging of the abdomen.The pancreatic duct  is not well evaluated on this study. Still extensive stranding and fluid  around the pancreas suggestive of pancreatitis.         This report was finalized on 10/11/2017 1:38 PM by Dr. Earnest Wright MD.             Assessment/Plan   -recurrent pancreatitis  -nausea and vomiting  -DM type 2  -leukocytosis    Recommend to keep patient NPO.  We will follow.  Thank you for allowing us to participate in  the care of your patient.Patient seen and evaluated by Dr. Benitez including history of present illness, physical examination, assessment, treatment plan.  The note above was reviewed by Dr. Benitez--agree with all findings and recommendations.      Electronically signed by: DORETHA Linton     CC:  Dr. Seven Gallego

## 2017-10-12 ENCOUNTER — APPOINTMENT (OUTPATIENT)
Dept: GENERAL RADIOLOGY | Facility: HOSPITAL | Age: 47
End: 2017-10-12

## 2017-10-12 LAB
ALBUMIN SERPL-MCNC: 3.3 G/DL (ref 3.5–5)
ALBUMIN/GLOB SERPL: 1.2 G/DL (ref 1.5–2.5)
ALP SERPL-CCNC: 123 U/L (ref 35–104)
ALT SERPL W P-5'-P-CCNC: 16 U/L (ref 10–36)
AMYLASE SERPL-CCNC: 198 U/L (ref 28–100)
ANION GAP SERPL CALCULATED.3IONS-SCNC: 6.4 MMOL/L (ref 3.6–11.2)
AST SERPL-CCNC: 13 U/L (ref 10–30)
BASOPHILS # BLD AUTO: 0.03 10*3/MM3 (ref 0–0.3)
BASOPHILS NFR BLD AUTO: 0.2 % (ref 0–2)
BILIRUB SERPL-MCNC: 0.4 MG/DL (ref 0.2–1.8)
BUN BLD-MCNC: 8 MG/DL (ref 7–21)
BUN/CREAT SERPL: 13.3 (ref 7–25)
CALCIUM SPEC-SCNC: 8.9 MG/DL (ref 7.7–10)
CHLORIDE SERPL-SCNC: 103 MMOL/L (ref 99–112)
CO2 SERPL-SCNC: 24.6 MMOL/L (ref 24.3–31.9)
CREAT BLD-MCNC: 0.6 MG/DL (ref 0.43–1.29)
DEPRECATED RDW RBC AUTO: 45.2 FL (ref 37–54)
EOSINOPHIL # BLD AUTO: 0.2 10*3/MM3 (ref 0–0.7)
EOSINOPHIL NFR BLD AUTO: 1.5 % (ref 0–5)
ERYTHROCYTE [DISTWIDTH] IN BLOOD BY AUTOMATED COUNT: 14.1 % (ref 11.5–14.5)
GFR SERPL CREATININE-BSD FRML MDRD: 107 ML/MIN/1.73
GLOBULIN UR ELPH-MCNC: 2.7 GM/DL
GLUCOSE BLD-MCNC: 96 MG/DL (ref 70–110)
GLUCOSE BLDC GLUCOMTR-MCNC: 77 MG/DL (ref 70–130)
GLUCOSE BLDC GLUCOMTR-MCNC: 96 MG/DL (ref 70–130)
GLUCOSE BLDC GLUCOMTR-MCNC: 96 MG/DL (ref 70–130)
GLUCOSE BLDC GLUCOMTR-MCNC: 99 MG/DL (ref 70–130)
HCT VFR BLD AUTO: 35.9 % (ref 37–47)
HGB BLD-MCNC: 11.3 G/DL (ref 12–16)
IMM GRANULOCYTES # BLD: 0.03 10*3/MM3 (ref 0–0.03)
IMM GRANULOCYTES NFR BLD: 0.2 % (ref 0–0.5)
LIPASE SERPL-CCNC: 62 U/L (ref 13–60)
LYMPHOCYTES # BLD AUTO: 1.93 10*3/MM3 (ref 1–3)
LYMPHOCYTES NFR BLD AUTO: 14.4 % (ref 21–51)
MAGNESIUM SERPL-MCNC: 1.6 MG/DL (ref 1.7–2.6)
MCH RBC QN AUTO: 28.3 PG (ref 27–33)
MCHC RBC AUTO-ENTMCNC: 31.5 G/DL (ref 33–37)
MCV RBC AUTO: 90 FL (ref 80–94)
MONOCYTES # BLD AUTO: 0.38 10*3/MM3 (ref 0.1–0.9)
MONOCYTES NFR BLD AUTO: 2.8 % (ref 0–10)
NEUTROPHILS # BLD AUTO: 10.85 10*3/MM3 (ref 1.4–6.5)
NEUTROPHILS NFR BLD AUTO: 80.9 % (ref 30–70)
OSMOLALITY SERPL CALC.SUM OF ELEC: 266.4 MOSM/KG (ref 273–305)
PHOSPHATE SERPL-MCNC: 3.5 MG/DL (ref 2.7–4.5)
PLATELET # BLD AUTO: 513 10*3/MM3 (ref 130–400)
PMV BLD AUTO: 10.7 FL (ref 6–10)
POTASSIUM BLD-SCNC: 3.7 MMOL/L (ref 3.5–5.3)
PROT SERPL-MCNC: 6 G/DL (ref 6–8)
RBC # BLD AUTO: 3.99 10*6/MM3 (ref 4.2–5.4)
SODIUM BLD-SCNC: 134 MMOL/L (ref 135–153)
TRIGL SERPL-MCNC: 113 MG/DL (ref 0–150)
WBC NRBC COR # BLD: 13.42 10*3/MM3 (ref 4.5–12.5)

## 2017-10-12 PROCEDURE — 83690 ASSAY OF LIPASE: CPT | Performed by: INTERNAL MEDICINE

## 2017-10-12 PROCEDURE — 25010000002 PROMETHAZINE PER 50 MG: Performed by: INTERNAL MEDICINE

## 2017-10-12 PROCEDURE — 25010000002 ENOXAPARIN PER 10 MG: Performed by: INTERNAL MEDICINE

## 2017-10-12 PROCEDURE — 85025 COMPLETE CBC W/AUTO DIFF WBC: CPT | Performed by: INTERNAL MEDICINE

## 2017-10-12 PROCEDURE — 82962 GLUCOSE BLOOD TEST: CPT

## 2017-10-12 PROCEDURE — 83735 ASSAY OF MAGNESIUM: CPT | Performed by: INTERNAL MEDICINE

## 2017-10-12 PROCEDURE — 94799 UNLISTED PULMONARY SVC/PX: CPT

## 2017-10-12 PROCEDURE — C1751 CATH, INF, PER/CENT/MIDLINE: HCPCS

## 2017-10-12 PROCEDURE — 71010 HC CHEST PA OR AP: CPT

## 2017-10-12 PROCEDURE — 71010 XR CHEST 1 VW: CPT | Performed by: RADIOLOGY

## 2017-10-12 PROCEDURE — 25010000002 LEVOFLOXACIN PER 250 MG: Performed by: INTERNAL MEDICINE

## 2017-10-12 PROCEDURE — 84100 ASSAY OF PHOSPHORUS: CPT | Performed by: INTERNAL MEDICINE

## 2017-10-12 PROCEDURE — 84478 ASSAY OF TRIGLYCERIDES: CPT | Performed by: INTERNAL MEDICINE

## 2017-10-12 PROCEDURE — 82150 ASSAY OF AMYLASE: CPT | Performed by: INTERNAL MEDICINE

## 2017-10-12 PROCEDURE — 80053 COMPREHEN METABOLIC PANEL: CPT | Performed by: INTERNAL MEDICINE

## 2017-10-12 PROCEDURE — 99231 SBSQ HOSP IP/OBS SF/LOW 25: CPT | Performed by: PHYSICIAN ASSISTANT

## 2017-10-12 RX ORDER — LEUCINE, PHENYLALANINE, LYSINE, METHIONINE, ISOLEUCINE, VALINE, HISTIDINE, THREONINE, TRYPTOPHAN, ALANINE, GLYCINE, ARGININE, PROLINE, SERINE, TYROSINE, SODIUM ACETATE, DIBASIC POTASSIUM PHOSPHATE, MAGNESIUM CHLORIDE, SODIUM CHLORIDE, CALCIUM CHLORIDE, DEXTROSE 365; 280; 290; 200; 300; 290; 240; 210; 90; 1035; 515; 575; 340; 250; 20; 340; 261; 51; 59; 33; 20 MG/100ML; MG/100ML; MG/100ML; MG/100ML; MG/100ML; MG/100ML; MG/100ML; MG/100ML; MG/100ML; MG/100ML; MG/100ML; MG/100ML; MG/100ML; MG/100ML; MG/100ML; MG/100ML; MG/100ML; MG/100ML; MG/100ML; MG/100ML; G/100ML
2000 INJECTION INTRAVENOUS ONCE
Status: COMPLETED | OUTPATIENT
Start: 2017-10-12 | End: 2017-10-12

## 2017-10-12 RX ORDER — SODIUM CHLORIDE 0.9 % (FLUSH) 0.9 %
10 SYRINGE (ML) INJECTION AS NEEDED
Status: DISCONTINUED | OUTPATIENT
Start: 2017-10-12 | End: 2017-10-26 | Stop reason: HOSPADM

## 2017-10-12 RX ORDER — MAGNESIUM SULFATE HEPTAHYDRATE 40 MG/ML
2 INJECTION, SOLUTION INTRAVENOUS AS NEEDED
Status: DISCONTINUED | OUTPATIENT
Start: 2017-10-12 | End: 2017-10-26 | Stop reason: HOSPADM

## 2017-10-12 RX ORDER — SODIUM CHLORIDE 0.9 % (FLUSH) 0.9 %
10 SYRINGE (ML) INJECTION EVERY 12 HOURS SCHEDULED
Status: DISCONTINUED | OUTPATIENT
Start: 2017-10-12 | End: 2017-10-26 | Stop reason: HOSPADM

## 2017-10-12 RX ORDER — MAGNESIUM SULFATE HEPTAHYDRATE 40 MG/ML
4 INJECTION, SOLUTION INTRAVENOUS AS NEEDED
Status: DISCONTINUED | OUTPATIENT
Start: 2017-10-12 | End: 2017-10-26 | Stop reason: HOSPADM

## 2017-10-12 RX ORDER — POTASSIUM CHLORIDE 7.45 MG/ML
10 INJECTION INTRAVENOUS
Status: DISCONTINUED | OUTPATIENT
Start: 2017-10-12 | End: 2017-10-13 | Stop reason: SDUPTHER

## 2017-10-12 RX ORDER — MAGNESIUM SULFATE HEPTAHYDRATE 40 MG/ML
4 INJECTION, SOLUTION INTRAVENOUS ONCE
Status: COMPLETED | OUTPATIENT
Start: 2017-10-12 | End: 2017-10-12

## 2017-10-12 RX ORDER — L.ACID,CASEI/B.ANIMAL/S.THERMO 16B CELL
1 CAPSULE ORAL DAILY
Status: DISCONTINUED | OUTPATIENT
Start: 2017-10-12 | End: 2017-10-26 | Stop reason: HOSPADM

## 2017-10-12 RX ADMIN — PROMETHAZINE HYDROCHLORIDE 12.5 MG: 25 INJECTION INTRAMUSCULAR; INTRAVENOUS at 20:12

## 2017-10-12 RX ADMIN — PANTOPRAZOLE SODIUM 40 MG: 40 INJECTION, POWDER, FOR SOLUTION INTRAVENOUS at 06:25

## 2017-10-12 RX ADMIN — METRONIDAZOLE 500 MG: 500 INJECTION, SOLUTION INTRAVENOUS at 16:15

## 2017-10-12 RX ADMIN — MEPERIDINE HYDROCHLORIDE 25 MG: 25 INJECTION INTRAMUSCULAR; INTRAVENOUS; SUBCUTANEOUS at 16:11

## 2017-10-12 RX ADMIN — SODIUM CHLORIDE 100 ML/HR: 9 INJECTION, SOLUTION INTRAVENOUS at 01:27

## 2017-10-12 RX ADMIN — MAGNESIUM SULFATE HEPTAHYDRATE 4 G: 40 INJECTION, SOLUTION INTRAVENOUS at 10:04

## 2017-10-12 RX ADMIN — PANCRELIPASE 24000 UNITS OF LIPASE: 60000; 12000; 38000 CAPSULE, DELAYED RELEASE PELLETS ORAL at 11:37

## 2017-10-12 RX ADMIN — MEPERIDINE HYDROCHLORIDE 25 MG: 25 INJECTION INTRAMUSCULAR; INTRAVENOUS; SUBCUTANEOUS at 20:11

## 2017-10-12 RX ADMIN — MEPERIDINE HYDROCHLORIDE 25 MG: 25 INJECTION INTRAMUSCULAR; INTRAVENOUS; SUBCUTANEOUS at 14:08

## 2017-10-12 RX ADMIN — PROMETHAZINE HYDROCHLORIDE 12.5 MG: 25 INJECTION INTRAMUSCULAR; INTRAVENOUS at 14:08

## 2017-10-12 RX ADMIN — MEPERIDINE HYDROCHLORIDE 25 MG: 25 INJECTION INTRAMUSCULAR; INTRAVENOUS; SUBCUTANEOUS at 09:18

## 2017-10-12 RX ADMIN — PANCRELIPASE 24000 UNITS OF LIPASE: 60000; 12000; 38000 CAPSULE, DELAYED RELEASE PELLETS ORAL at 08:55

## 2017-10-12 RX ADMIN — LEVOFLOXACIN 750 MG: 5 INJECTION, SOLUTION INTRAVENOUS at 08:54

## 2017-10-12 RX ADMIN — MEPERIDINE HYDROCHLORIDE 25 MG: 25 INJECTION INTRAMUSCULAR; INTRAVENOUS; SUBCUTANEOUS at 11:37

## 2017-10-12 RX ADMIN — PANCRELIPASE 24000 UNITS OF LIPASE: 60000; 12000; 38000 CAPSULE, DELAYED RELEASE PELLETS ORAL at 18:13

## 2017-10-12 RX ADMIN — MEPERIDINE HYDROCHLORIDE 25 MG: 25 INJECTION INTRAMUSCULAR; INTRAVENOUS; SUBCUTANEOUS at 18:23

## 2017-10-12 RX ADMIN — MEPERIDINE HYDROCHLORIDE 25 MG: 25 INJECTION INTRAMUSCULAR; INTRAVENOUS; SUBCUTANEOUS at 01:27

## 2017-10-12 RX ADMIN — Medication 10 ML: at 20:27

## 2017-10-12 RX ADMIN — MEPERIDINE HYDROCHLORIDE 25 MG: 25 INJECTION INTRAMUSCULAR; INTRAVENOUS; SUBCUTANEOUS at 22:31

## 2017-10-12 RX ADMIN — Medication 1 CAPSULE: at 14:11

## 2017-10-12 RX ADMIN — VENLAFAXINE HYDROCHLORIDE 75 MG: 75 CAPSULE, EXTENDED RELEASE ORAL at 08:56

## 2017-10-12 RX ADMIN — ENOXAPARIN SODIUM 40 MG: 40 INJECTION SUBCUTANEOUS at 20:25

## 2017-10-12 RX ADMIN — MEPERIDINE HYDROCHLORIDE 25 MG: 25 INJECTION INTRAMUSCULAR; INTRAVENOUS; SUBCUTANEOUS at 06:25

## 2017-10-12 RX ADMIN — LEUCINE, PHENYLALANINE, LYSINE, METHIONINE, ISOLEUCINE, VALINE, HISTIDINE, THREONINE, TRYPTOPHAN, ALANINE, GLYCINE, ARGININE, PROLINE, SERINE, TYROSINE, SODIUM ACETATE, DIBASIC POTASSIUM PHOSPHATE, MAGNESIUM CHLORIDE, SODIUM CHLORIDE, CALCIUM CHLORIDE, DEXTROSE 2000 ML
365; 280; 290; 200; 300; 290; 240; 210; 90; 1035; 515; 575; 340; 250; 20; 340; 261; 51; 59; 33; 20 INJECTION INTRAVENOUS at 18:12

## 2017-10-12 RX ADMIN — MEPERIDINE HYDROCHLORIDE 25 MG: 25 INJECTION INTRAMUSCULAR; INTRAVENOUS; SUBCUTANEOUS at 04:21

## 2017-10-12 RX ADMIN — METRONIDAZOLE 500 MG: 500 INJECTION, SOLUTION INTRAVENOUS at 08:56

## 2017-10-12 NOTE — PROGRESS NOTES
10/12/17      Rossy Boone is a 47 y.o. female who is seen today for follow up of acute pancreatitis    HPI  The patient was seen for a follow-up visit.  She reports abdominal tenderness and nausea.  Her pancreatic and liver enzymes have improved.  Liver enzymes are normal.  Alkaline phosphatase has improved to 123.  Amylase has decreased to 198 and lipase 262.  Patient is receiving Creon and is currently nothing by mouth.    Past medical history, social history, and family history remain unchanged since initial consultation.    REVIEW OF SYSTEMS  Abdominal pain, nausea, fatigue; all other systems are negative.    CURRENT MEDICATION    Current Facility-Administered Medications:   •  amLODIPine (NORVASC) tablet 10 mg, 10 mg, Oral, Q24H, Seven Gallego MD, 10 mg at 10/11/17 0900  •  dextrose (D50W) solution 25 g, 25 g, Intravenous, Q15 Min PRN, Kel Becker MD  •  dextrose (GLUTOSE) oral gel 15 g, 15 g, Oral, Q15 Min PRN, Kel Becker MD  •  enoxaparin (LOVENOX) syringe 40 mg, 40 mg, Subcutaneous, Nightly, Kel Becker MD, 40 mg at 10/11/17 2033  •  glucagon (human recombinant) (GLUCAGEN DIAGNOSTIC) injection 1 mg, 1 mg, Subcutaneous, Q15 Min PRN, Kel Becker MD  •  insulin aspart (novoLOG) injection 0-7 Units, 0-7 Units, Subcutaneous, 4x Daily AC & at Bedtime, Kel Becker MD, 2 Units at 10/11/17 0901  •  insulin detemir (LEVEMIR) injection 15 Units, 15 Units, Subcutaneous, Nightly, Seven Gallego MD  •  ipratropium-albuterol (DUO-NEB) nebulizer solution 3 mL, 3 mL, Nebulization, Q4H PRN, Seven Gallego MD, 3 mL at 10/11/17 2300  •  levoFLOXacin (LEVAQUIN) 750 mg/150 mL D5W (premix) (LEVAQUIN) 750 mg, 750 mg, Intravenous, Q24H, Seven Gallego MD, Last Rate: 0 mL/hr at 10/11/17 1000, 750 mg at 10/12/17 0854  •  lisinopril (PRINIVIL,ZESTRIL) tablet 20 mg, 20 mg, Oral, Q24H, Seven Gallego MD, 20 mg at 10/11/17 0900  •  Magnesium Sulfate 2 gram Bolus, followed by 8 gram infusion (total Mg  dose 10 grams)- Mg less than or equal to 1mg/dL, 2 g, Intravenous, PRN **OR** Magnesium Sulfate 6 gram Infusion (2 gm x 3) -Mg 1.1 -1.5 mg/dL, 2 g, Intravenous, PRN **OR** magnesium sulfate 4 gram infusion- Mg 1.6-1.9 mg/dL, 4 g, Intravenous, PRN, Seven Gallego MD  •  magnesium sulfate 4 gram infusion- Mg 1.6-1.9 mg/dL, 4 g, Intravenous, Once, Seven Gallego MD, Last Rate: 25 mL/hr at 10/12/17 1004, 4 g at 10/12/17 1004  •  meperidine (DEMEROL) injection 25 mg, 25 mg, Intravenous, Q2H PRN, Kel Becker MD, 25 mg at 10/12/17 0918  •  metoprolol tartrate (LOPRESSOR) tablet 100 mg, 100 mg, Oral, Q12H, Seven Gallego MD, 100 mg at 10/11/17 0900  •  metroNIDAZOLE (FLAGYL) IVPB 500 mg, 500 mg, Intravenous, Q8H, Seven Gallego MD, Last Rate: 0 mL/hr at 10/12/17 0234, 500 mg at 10/12/17 0856  •  ondansetron (ZOFRAN) injection 4 mg, 4 mg, Intravenous, Q6H PRN, Kel Becker MD, 4 mg at 10/11/17 1316  •  pancrelipase (Lip-Prot-Amyl) (CREON) capsule 24,000 units of lipase, 24,000 units of lipase, Oral, TID With Meals, Seven Gallego MD, 24,000 units of lipase at 10/12/17 0855  •  pantoprazole (PROTONIX) injection 40 mg, 40 mg, Intravenous, Q AM, Kel Becker MD, 40 mg at 10/12/17 0625  •  Pharmacy to Dose TPN, , Does not apply, Continuous PRN, Seven Gallego MD  •  potassium chloride 10 mEq in 100 mL IVPB, 10 mEq, Intravenous, Q1H PRN, Seven Gallego MD  •  promethazine (PHENERGAN) 12.5 mg in sodium chloride 0.9 % 50 mL, 12.5 mg, Intravenous, Q6H PRN, Seven Gallego MD, 12.5 mg at 10/11/17 1803  •  sodium chloride 0.9 % infusion, 100 mL/hr, Intravenous, Continuous, Seven Gallego MD, Last Rate: 100 mL/hr at 10/12/17 0127, 100 mL/hr at 10/12/17 0127  •  traMADol (ULTRAM) tablet 50 mg, 50 mg, Oral, Q6H PRN, Seven Gallego MD  •  venlafaxine XR (EFFEXOR-XR) 24 hr capsule 75 mg, 75 mg, Oral, Daily, Seven Gallego MD, 75 mg at 10/12/17 0856    ALLERGIES  Hydrocodone-acetaminophen; Hydromorphone;  Oxycodone-acetaminophen; and Penicillins     VITAL SIGNS  Vital Signs (last 24 hours)       10/11 0700  -  10/12 0659 10/12 0700  -  10/12 1055   Most Recent    Temp (°F) 98.5 -  99.5      98.5     98.5 (36.9)    Heart Rate 75 -  81    98 -  102     98    Resp 18 -  20      18     18    BP 98/65 -  110/75      108/71     108/71    SpO2 (%) 95 -  97      96     96            PHYSICAL EXAM  General:  Appearance alert, pleasant, interactive and cooperative; mildly ill-appearing  Head:  normocephalic, without obvious abnormality and atraumatic  Eyes:  lids and lashes normal, conjunctivae and sclerae normal, no icterus, no pallor, corneas clear and PERRLA  Ears:  ears appear intact with no abnormalities noted  Nose:  nares normal, septum midline, mucosa normal and no drainage  Throat:  no oral lesions, no thrush and oral mucosa moist  Lungs:  clear to auscultation, respirations regular, respirations even and respirations unlabored  Heart:  regular rhythm & normal rate, normal S1, S2, no murmur, no gallop, no rub and no click  Abdomen:  Her abdominal tenderness; normal bowel sounds, no masses, no hepatomegaly, no splenomegaly, soft non-tender, no guarding and no rebound tenderness  Extremities:  moves extremities well, no edema, no cyanosis and no redness  Skin:  no bleeding, bruising or rash, color normal, no lesions noted and temperature normal  Neurologic:  Mental Status orientated to person, place, time and situation, alertness alert, awake and arousable, orientation time, date, person, place, city and president and mood/affect:  normal  Cranial Nerves:  cranial nerves 2 - 12 grossly intact as examined, Speech normal content and execution, Sensory intact, Motor strength is equal in upper and lower extremities, Reflexes normal and symmetric      LABS   Lab Results (last 24 hours)     Procedure Component Value Units Date/Time    POC Glucose Fingerstick [[id number]]  (Normal) Collected:  10/11/17 1134    Specimen:  Blood  Updated:  10/11/17 1142     Glucose 118 mg/dL     Narrative:       Meter: [id number] : [id number] [employer]    POC Glucose Fingerstick [[id number]]  (Normal) Collected:  10/11/17 1648    Specimen:  Blood Updated:  10/11/17 1722     Glucose 114 mg/dL     Narrative:       Meter: [id number] : [id number] logna juliane    POC Glucose Fingerstick [[id number]]  (Normal) Collected:  10/11/17 2027    Specimen:  Blood Updated:  10/11/17 2035     Glucose 85 mg/dL     Narrative:       Meter: [id number] : [id number] [employer] [facility] ([facility])    POC Glucose Fingerstick [[id number]]  (Normal) Collected:  10/11/17 2311    Specimen:  Blood Updated:  10/11/17 2317     Glucose 88 mg/dL     Narrative:       Meter: [id number] : [id number] Atile[employer]tha    CBC & Differential [[id number]] Collected:  10/12/17 0104    Specimen:  Blood Updated:  10/12/17 0204    Narrative:       The following orders were created for panel order CBC & Differential.  Procedure                               Abnormality         Status                     ---------                               -----------         ------                     CBC Auto Differential[[id number]]        Abnormal            Final result                 Please view results for these tests on the individual orders.    CBC Auto Differential [[id number]]  (Abnormal) Collected:  10/12/17 0104    Specimen:  Blood Updated:  10/12/17 0204     WBC 13.42 (H) 10*3/mm3      RBC 3.99 (L) 10*6/mm3      Hemoglobin 11.3 (L) g/dL      Hematocrit 35.9 (L) %      MCV 90.0 fL      MCH 28.3 pg      MCHC 31.5 (L) g/dL      RDW 14.1 %      RDW-SD 45.2 fl      MPV 10.7 (H) fL      Platelets 513 (H) 10*3/mm3      Neutrophil % 80.9 (H) %      Lymphocyte % 14.4 (L) %      Monocyte % 2.8 %      Eosinophil % 1.5 %      Basophil % 0.2 %      Immature Grans % 0.2 %      Neutrophils, Absolute 10.85 (H) 10*3/mm3      Lymphocytes, Absolute 1.93 10*3/mm3      Monocytes, Absolute 0.38 10*3/mm3       Eosinophils, Absolute 0.20 10*3/mm3      Basophils, Absolute 0.03 10*3/mm3      Immature Grans, Absolute 0.03 10*3/mm3     Magnesium [[id number]]  (Abnormal) Collected:  10/12/17 0104    Specimen:  Blood Updated:  10/12/17 0228     Magnesium 1.6 (L) mg/dL     Lipase [[id number]]  (Abnormal) Collected:  10/12/17 0104    Specimen:  Blood Updated:  10/12/17 0228     Lipase 62 (H) U/L     Comprehensive Metabolic Panel [[id number]]  (Abnormal) Collected:  10/12/17 0104    Specimen:  Blood Updated:  10/12/17 0231     Glucose 96 mg/dL      BUN 8 mg/dL      Creatinine 0.60 mg/dL      Sodium 134 (L) mmol/L      Potassium 3.7 mmol/L      Chloride 103 mmol/L      CO2 24.6 mmol/L      Calcium 8.9 mg/dL      Total Protein 6.0 g/dL      Albumin 3.30 (L) g/dL      ALT (SGPT) 16 U/L      AST (SGOT) 13 U/L      Alkaline Phosphatase 123 (H) U/L       Note New Reference Ranges        Total Bilirubin 0.4 mg/dL      eGFR Non African Amer 107 mL/min/1.73      Globulin 2.7 gm/dL      A/G Ratio 1.2 (L) g/dL      BUN/Creatinine Ratio 13.3     Anion Gap 6.4 mmol/L     Amylase [[id number]]  (Abnormal) Collected:  10/12/17 0104    Specimen:  Blood Updated:  10/12/17 0231     Amylase 198 (H) U/L     Phosphorus [[id number]]  (Normal) Collected:  10/12/17 0104    Specimen:  Blood Updated:  10/12/17 0231     Phosphorus 3.5 mg/dL     Osmolality, Calculated [[id number]]  (Abnormal) Collected:  10/12/17 0104    Specimen:  Blood Updated:  10/12/17 0231     Osmolality Calc 266.4 (L) mOsm/kg     POC Glucose Fingerstick [[id number]]  (Normal) Collected:  10/12/17 0712    Specimen:  Blood Updated:  10/12/17 0720     Glucose 96 mg/dL     Narrative:       Meter: [id number] : [id number] Raulito Loya    Triglycerides [[id number]]  (Normal) Collected:  10/12/17 0104    Specimen:  Blood Updated:  10/12/17 0834     Triglycerides 113 mg/dL     Narrative:       Triglyceride Reference Ranges:    Normal           less than 150 mg/dL  Borderline       150-199  mg/dL  High             200-499 mg/dL  Very High        greater than 499 mg/dL    Blood Culture - Blood, [[id number]]  (Normal) Collected:  10/11/17 0808    Specimen:  Blood from Hand, Right Updated:  10/12/17 0916     Blood Culture No growth at 24 hours    Blood Culture - Blood, [[id number]]  (Normal) Collected:  10/11/17 0818    Specimen:  Blood from Hand, Left Updated:  10/12/17 0916     Blood Culture No growth at 24 hours    POC Glucose Fingerstick [[id number]]  (Normal) Collected:  10/12/17 1036    Specimen:  Blood Updated:  10/12/17 1042     Glucose 99 mg/dL     Narrative:       Meter: [id number] : [id number] Raulito Loya          IMAGING  Imaging Results (last 24 hours)     Procedure Component Value Units Date/Time    MRI abdomen wo contrast mrcp [[id number]] Collected:  10/11/17 1334     Updated:  10/11/17 1340    Narrative:       EXAMINATION: MRI ABDOMEN WO CONTRAST MRCP-      INDICATION:   nonresolving pancreatitis.; K85.90-Acute pancreatitis without necrosis  or infection, unspecified   COMPARISON: CT performed on 09/29/2017.     TECNIQUE: multiple axial gradient-echo T1 and T2-weighted images were  obtained. Oblique coronal T2-weighted images were  acquired to evaluate  the bile ducts. Axial T1-weighted images were also acquired after  injection of Magnevist.     The common bile duct and common hepatic duct and main intrahepatic bile  ducts are well-demonstrated and appear within normal limits. No  gallstones are identified and there are no strictures. There is no bile  duct dilatation. The pancreatic duct is not well evaluated on this  study. Still extensive stranding and fluid around the pancreas  suggestive of pancreatitis.           Impression:       Unremarkable MRCP imaging of the abdomen.The pancreatic duct  is not well evaluated on this study. Still extensive stranding and fluid  around the pancreas suggestive of pancreatitis.         This report was finalized on 10/11/2017 1:38 PM by   Earnest Wright MD.               ASSESSMENT/PLAN  -Acute pancreatitis  -Abdominal pain  -Bilateral flank pain  -Nausea and vomiting  -Diabetes mellitus type 2  -Hyperlipidemia  -Metabolic acidosis  -Elevated liver enzymes  -Fatty liver  -Hypertension    Recommend to continue nothing by mouth.  Also recommend to continue Creon.  Agree with TPN and lipids to start today.  We will revisit Monday.          Electronically signed by: DORETHA Linton

## 2017-10-12 NOTE — CONSULTS
"Diabetes Education  Assessment/Teaching    Patient Name:  Rossy Boone  YOB: 1970  MRN: [id number]  Admit Date:  10/10/2017      Assessment Date:  10/12/2017       Most Recent Value    General Information      Height  5' 2\" (1.575 m)    Height Method  Stated    Weight  160 lb (72.6 kg)    Weight Method  Stated    Pregnancy Assessment     Diabetes History     What type of diabetes do you have?  Type 2    Length of Diabetes Diagnosis  Newly diagnosed <6 months [A1c 8.6 last week]    Education Preferences     Nutrition Information     Assessment Topics     DM Goals                 Other Comments:  Client says I was only home for about 4 hours think I went home too fast, will follow up closer to discharge        Electronically signed by:  Aimee Chapa RN  10/12/17 5:08 PM  "

## 2017-10-12 NOTE — DISCHARGE PLACEMENT REQUEST
Case Management/Social Work    Patient Name:  Rossy Boone  YOB: 1970  MRN: [id number]  Admit Date:  10/10/2017    Consult received for Swing Bed services.  Patient does not have insurance, therefore she does not qualify for services.     Electronically signed by:  Mariela Michel RN  10/12/17 10:35 AM

## 2017-10-12 NOTE — CONSULTS
Nutrition Services    Patient Name:  Rossy Boone  YOB: 1970  MRN: [id number]  Admit Date:  10/10/2017     Consult for TPN:  Kcal needs=1575 kcal/d, 87 g pro/d.  Will recommend:  Clinimix (20% cho, 5% AA) at goal rate of 65 ml/hr w/ lipids (20%- 250 ml) three times a week to provide: avg kcal 1587, 78 g pro (89% needs).  RD will cont. To follow.  Thank you    Electronically signed by:  Oneida Montesinos RD  10/12/17 9:58 AM

## 2017-10-12 NOTE — PLAN OF CARE
A (Guidewire Htorq Bal Mdwt Unv 2 J .014in 190cm Vasc) guidewire was introduced.  Problem: Patient Care Overview (Adult)  Goal: Plan of Care Review  Outcome: Ongoing (interventions implemented as appropriate)  Goal: Discharge Needs Assessment  Outcome: Ongoing (interventions implemented as appropriate)    Problem: Pancreatitis, Acute/Chronic (Adult)  Goal: Signs and Symptoms of Listed Potential Problems Will be Absent or Manageable (Pancreatitis, Acute/Chronic)  Outcome: Ongoing (interventions implemented as appropriate)    Problem: Diabetes, Type 2 (Adult)  Goal: Signs and Symptoms of Listed Potential Problems Will be Absent or Manageable (Diabetes, Type 2)  Outcome: Ongoing (interventions implemented as appropriate)

## 2017-10-12 NOTE — PROGRESS NOTES
LOS: 2 days       Chief Complaint :   Non-resolving pancreatitis   Subjective     Interval History:     Patient Complaints:  Rossy Boone  has been admitted with non-resolving pancreatitis.  She did have an MRCP October 11.  No obvious pancreatic obstruction or cysts were noted.  She continues with abdominal pain.  She also continues with nausea and vomiting.  She describes no other complaints except for abdominal pain, nausea and vomiting.      Review of Systems-unchanged from admission history and physical  Objective     Vital Signs  Temp:  [98.5 °F (36.9 °C)-99.5 °F (37.5 °C)] 98.5 °F (36.9 °C)  Heart Rate:  [] 102  Resp:  [18-20] 18  BP: ()/(64-75) 108/71    Physical Exam                           Physical Exam   Constitutional: She is oriented to person, place, and time. She appears well-developed and well-nourished.   HENT:   Head: Normocephalic and atraumatic.   Right Ear: External ear normal.   Left Ear: External ear normal.   Mouth/Throat: Oropharynx is clear and moist. No oropharyngeal exudate.   Eyes: Conjunctivae and EOM are normal. Pupils are equal, round, and reactive to light. Right eye exhibits no discharge. Left eye exhibits no discharge. No scleral icterus.   Neck: Neck supple. No JVD present. No tracheal deviation present. No thyromegaly present.   Cardiovascular: Normal rate, regular rhythm and intact distal pulses.  Exam reveals no gallop and no friction rub.    No murmur heard.  Pulmonary/Chest: Effort normal and breath sounds normal. No stridor. No respiratory distress. She has no wheezes. She exhibits no tenderness.   Abdominal: Bowel sounds are normal. She exhibits distension. She exhibits no mass. There is tenderness. There is no rebound and no guarding. No hernia.   Genitourinary:   Genitourinary Comments: No Mosley catheter   Musculoskeletal: She exhibits no edema, tenderness or deformity.   Lymphadenopathy:     She has no cervical adenopathy.   Neurological: She is  alert and oriented to person, place, and time. She has normal reflexes. She displays normal reflexes. No cranial nerve deficit. She exhibits normal muscle tone. Coordination normal.   Skin: Skin is warm and dry. She is not diaphoretic. No erythema. No pallor.   Psychiatric: She has a normal mood and affect. Her behavior is normal. Judgment and thought content normal.   Nursing note and vitals reviewed.   Results Review:     I reviewed the patient's new clinical results  : See Below  MEDICATIONS:    amLODIPine 10 mg Oral Q24H   enoxaparin 40 mg Subcutaneous Nightly   insulin aspart 0-7 Units Subcutaneous 4x Daily AC & at Bedtime   insulin detemir 15 Units Subcutaneous Nightly   levoFLOXacin 750 mg Intravenous Q24H   lisinopril 20 mg Oral Q24H   metoprolol tartrate 100 mg Oral Q12H   metroNIDAZOLE 500 mg Intravenous Q8H   pancrelipase (Lip-Prot-Amyl) 24,000 units of lipase Oral TID With Meals   pantoprazole 40 mg Intravenous Q AM   venlafaxine XR 75 mg Oral Daily       LABS:        Results from last 7 days  Lab Units 10/12/17  0104 10/10/17  1900 10/10/17  1305 10/08/17  0212   CRP mg/dL  --   --   --  3.36*   WBC 10*3/mm3 13.42* 19.67* 16.87* 13.58*   HEMOGLOBIN g/dL 11.3* 13.7 14.6 12.1   HEMATOCRIT % 35.9* 43.0 45.2 37.7   MCV fL 90.0 86.9 86.4 87.7   MCHC g/dL 31.5* 31.9* 32.3* 32.1*   PLATELETS 10*3/mm3 513* 645* 814* 501*           Results from last 7 days  Lab Units 10/12/17  0104 10/10/17  1900 10/10/17  1305 10/08/17  0340 10/06/17  0349   SODIUM mmol/L 134* 140 141 137 136   POTASSIUM mmol/L 3.7 4.0 4.3 3.8 4.0   MAGNESIUM mg/dL 1.6*  --   --  1.9 2.1   CHLORIDE mmol/L 103 108 104 101 102   CO2 mmol/L 24.6 22.8* 26.7 31.0 24.8   BUN mg/dL 8 8 9 7 6*   CREATININE mg/dL 0.60 0.67 0.86 0.56 0.64   EGFR IF NONAFRICN AM mL/min/1.73 107 94 71 116 99   CALCIUM mg/dL 8.9 9.5 10.5* 9.8 9.0   GLUCOSE mg/dL 96 220* 268* 137* 272*   ALBUMIN g/dL 3.30* 3.90 4.50 3.70 3.60   BILIRUBIN mg/dL 0.4 0.4 0.5 0.3 0.3   ALK PHOS  U/L 123* 173* 221* 192* 190*   AST (SGOT) U/L 13 22 26 22 25   ALT (SGPT) U/L 16 21 20 21 31   Estimated Creatinine Clearance: 108.1 mL/min (by C-G formula based on Cr of 0.6).  No results found for: AMMONIA    Results from last 7 days  Lab Units 10/08/17  0340   CHOLESTEROL mg/dL 178   TRIGLYCERIDES mg/dL 255*   HDL CHOL mg/dL 26*     Blood Culture   Date Value Ref Range Status   10/11/2017 No growth at less than 24 hours  Preliminary   10/11/2017 No growth at less than 24 hours  Preliminary                  Assessment/Plan      1) abdominal pain  2) recurrent pancreatitis-MRCP October 11 revealed no obvious cyst or pancreatic duct obstructions.  3) intractable nausea and vomiting-anti-emetics  4) diabetes mellitus type 2  5) DVT prophylaxis.  - Subcutaneous Lovenox  6) leukocytosis  7) colitis/enteritis  8) hypomagnesemia-replace as needed.           See orders entered.     Seven Gallego MD  10/12/17  7:41 AM

## 2017-10-13 LAB
ALBUMIN SERPL-MCNC: 3.5 G/DL (ref 3.5–5)
ALBUMIN/GLOB SERPL: 1.2 G/DL (ref 1.5–2.5)
ALP SERPL-CCNC: 125 U/L (ref 35–104)
ALT SERPL W P-5'-P-CCNC: 15 U/L (ref 10–36)
AMYLASE SERPL-CCNC: 185 U/L (ref 28–100)
ANION GAP SERPL CALCULATED.3IONS-SCNC: 10.5 MMOL/L (ref 3.6–11.2)
AST SERPL-CCNC: 16 U/L (ref 10–30)
BASOPHILS # BLD AUTO: 0.02 10*3/MM3 (ref 0–0.3)
BASOPHILS NFR BLD AUTO: 0.2 % (ref 0–2)
BILIRUB SERPL-MCNC: 0.2 MG/DL (ref 0.2–1.8)
BUN BLD-MCNC: 5 MG/DL (ref 7–21)
BUN/CREAT SERPL: 10.6 (ref 7–25)
CALCIUM SPEC-SCNC: 8.7 MG/DL (ref 7.7–10)
CHLORIDE SERPL-SCNC: 101 MMOL/L (ref 99–112)
CO2 SERPL-SCNC: 23.5 MMOL/L (ref 24.3–31.9)
CREAT BLD-MCNC: 0.47 MG/DL (ref 0.43–1.29)
CRP SERPL-MCNC: 21.14 MG/DL (ref 0–0.99)
DEPRECATED RDW RBC AUTO: 43.2 FL (ref 37–54)
EOSINOPHIL # BLD AUTO: 0.17 10*3/MM3 (ref 0–0.7)
EOSINOPHIL NFR BLD AUTO: 1.7 % (ref 0–5)
ERYTHROCYTE [DISTWIDTH] IN BLOOD BY AUTOMATED COUNT: 13.6 % (ref 11.5–14.5)
GFR SERPL CREATININE-BSD FRML MDRD: 142 ML/MIN/1.73
GLOBULIN UR ELPH-MCNC: 2.9 GM/DL
GLUCOSE BLD-MCNC: 155 MG/DL (ref 70–110)
GLUCOSE BLDC GLUCOMTR-MCNC: 162 MG/DL (ref 70–130)
GLUCOSE BLDC GLUCOMTR-MCNC: 172 MG/DL (ref 70–130)
GLUCOSE BLDC GLUCOMTR-MCNC: 194 MG/DL (ref 70–130)
GLUCOSE BLDC GLUCOMTR-MCNC: 195 MG/DL (ref 70–130)
GLUCOSE BLDC GLUCOMTR-MCNC: 251 MG/DL (ref 70–130)
HCT VFR BLD AUTO: 35.7 % (ref 37–47)
HGB BLD-MCNC: 11.4 G/DL (ref 12–16)
IMM GRANULOCYTES # BLD: 0.05 10*3/MM3 (ref 0–0.03)
IMM GRANULOCYTES NFR BLD: 0.5 % (ref 0–0.5)
LIPASE SERPL-CCNC: 58 U/L (ref 13–60)
LYMPHOCYTES # BLD AUTO: 1.49 10*3/MM3 (ref 1–3)
LYMPHOCYTES NFR BLD AUTO: 14.6 % (ref 21–51)
MAGNESIUM SERPL-MCNC: 2.3 MG/DL (ref 1.7–2.6)
MCH RBC QN AUTO: 28.1 PG (ref 27–33)
MCHC RBC AUTO-ENTMCNC: 31.9 G/DL (ref 33–37)
MCV RBC AUTO: 87.9 FL (ref 80–94)
MONOCYTES # BLD AUTO: 0.46 10*3/MM3 (ref 0.1–0.9)
MONOCYTES NFR BLD AUTO: 4.5 % (ref 0–10)
NEUTROPHILS # BLD AUTO: 8.03 10*3/MM3 (ref 1.4–6.5)
NEUTROPHILS NFR BLD AUTO: 78.5 % (ref 30–70)
OSMOLALITY SERPL CALC.SUM OF ELEC: 270.5 MOSM/KG (ref 273–305)
PHOSPHATE SERPL-MCNC: 3.7 MG/DL (ref 2.7–4.5)
PLATELET # BLD AUTO: 466 10*3/MM3 (ref 130–400)
PMV BLD AUTO: 10.8 FL (ref 6–10)
POTASSIUM BLD-SCNC: 3.2 MMOL/L (ref 3.5–5.3)
POTASSIUM BLD-SCNC: 3.5 MMOL/L (ref 3.5–5.3)
PROT SERPL-MCNC: 6.4 G/DL (ref 6–8)
RBC # BLD AUTO: 4.06 10*6/MM3 (ref 4.2–5.4)
SODIUM BLD-SCNC: 135 MMOL/L (ref 135–153)
WBC NRBC COR # BLD: 10.22 10*3/MM3 (ref 4.5–12.5)

## 2017-10-13 PROCEDURE — 86140 C-REACTIVE PROTEIN: CPT | Performed by: INTERNAL MEDICINE

## 2017-10-13 PROCEDURE — 82962 GLUCOSE BLOOD TEST: CPT

## 2017-10-13 PROCEDURE — 25010000002 MORPHINE (PF) 10 MG/ML SOLUTION

## 2017-10-13 PROCEDURE — 85025 COMPLETE CBC W/AUTO DIFF WBC: CPT | Performed by: INTERNAL MEDICINE

## 2017-10-13 PROCEDURE — 84132 ASSAY OF SERUM POTASSIUM: CPT | Performed by: INTERNAL MEDICINE

## 2017-10-13 PROCEDURE — 83690 ASSAY OF LIPASE: CPT | Performed by: INTERNAL MEDICINE

## 2017-10-13 PROCEDURE — 94799 UNLISTED PULMONARY SVC/PX: CPT

## 2017-10-13 PROCEDURE — 82150 ASSAY OF AMYLASE: CPT | Performed by: INTERNAL MEDICINE

## 2017-10-13 PROCEDURE — 25010000002 PROMETHAZINE PER 50 MG: Performed by: INTERNAL MEDICINE

## 2017-10-13 PROCEDURE — 83735 ASSAY OF MAGNESIUM: CPT | Performed by: INTERNAL MEDICINE

## 2017-10-13 PROCEDURE — 25010000002 ENOXAPARIN PER 10 MG: Performed by: INTERNAL MEDICINE

## 2017-10-13 PROCEDURE — 80053 COMPREHEN METABOLIC PANEL: CPT | Performed by: INTERNAL MEDICINE

## 2017-10-13 PROCEDURE — 63710000001 INSULIN DETEMIR PER 5 UNITS: Performed by: INTERNAL MEDICINE

## 2017-10-13 PROCEDURE — 25010000003 POTASSIUM CHLORIDE 10 MEQ/100ML SOLUTION: Performed by: INTERNAL MEDICINE

## 2017-10-13 PROCEDURE — 84100 ASSAY OF PHOSPHORUS: CPT

## 2017-10-13 PROCEDURE — 25010000002 ONDANSETRON PER 1 MG: Performed by: INTERNAL MEDICINE

## 2017-10-13 PROCEDURE — 25010000002 LEVOFLOXACIN PER 250 MG: Performed by: INTERNAL MEDICINE

## 2017-10-13 PROCEDURE — 63710000001 INSULIN ASPART PER 5 UNITS

## 2017-10-13 RX ORDER — MORPHINE SULFATE 10 MG/ML
INJECTION, SOLUTION INTRAMUSCULAR; INTRAVENOUS
Status: COMPLETED
Start: 2017-10-13 | End: 2017-10-13

## 2017-10-13 RX ORDER — POTASSIUM CHLORIDE 7.45 MG/ML
10 INJECTION INTRAVENOUS
Status: DISPENSED | OUTPATIENT
Start: 2017-10-13 | End: 2017-10-13

## 2017-10-13 RX ORDER — POTASSIUM CHLORIDE 7.45 MG/ML
10 INJECTION INTRAVENOUS
Status: DISCONTINUED | OUTPATIENT
Start: 2017-10-13 | End: 2017-10-26 | Stop reason: HOSPADM

## 2017-10-13 RX ORDER — MORPHINE SULFATE 2 MG/ML
2 INJECTION, SOLUTION INTRAMUSCULAR; INTRAVENOUS EVERY 4 HOURS PRN
Status: DISCONTINUED | OUTPATIENT
Start: 2017-10-13 | End: 2017-10-14

## 2017-10-13 RX ADMIN — VENLAFAXINE HYDROCHLORIDE 75 MG: 75 CAPSULE, EXTENDED RELEASE ORAL at 08:47

## 2017-10-13 RX ADMIN — LEUCINE, PHENYLALANINE, LYSINE, METHIONINE, ISOLEUCINE, VALINE, HISTIDINE, THREONINE, TRYPTOPHAN, ALANINE, GLYCINE, ARGININE, PROLINE, SERINE, TYROSINE, SODIUM ACETATE, DIBASIC POTASSIUM PHOSPHATE, MAGNESIUM CHLORIDE, SODIUM CHLORIDE, CALCIUM CHLORIDE, DEXTROSE
365; 280; 290; 200; 300; 290; 240; 210; 90; 1035; 515; 575; 340; 250; 20; 340; 261; 51; 59; 33; 20 INJECTION INTRAVENOUS at 18:49

## 2017-10-13 RX ADMIN — PROMETHAZINE HYDROCHLORIDE 12.5 MG: 25 INJECTION INTRAMUSCULAR; INTRAVENOUS at 02:01

## 2017-10-13 RX ADMIN — POTASSIUM CHLORIDE 10 MEQ: 7.46 INJECTION, SOLUTION INTRAVENOUS at 10:04

## 2017-10-13 RX ADMIN — METOPROLOL TARTRATE 100 MG: 100 TABLET, FILM COATED ORAL at 20:07

## 2017-10-13 RX ADMIN — MEPERIDINE HYDROCHLORIDE 25 MG: 25 INJECTION INTRAMUSCULAR; INTRAVENOUS; SUBCUTANEOUS at 06:35

## 2017-10-13 RX ADMIN — TRAMADOL HYDROCHLORIDE 50 MG: 50 TABLET, COATED ORAL at 08:47

## 2017-10-13 RX ADMIN — INSULIN DETEMIR 15 UNITS: 100 INJECTION, SOLUTION SUBCUTANEOUS at 20:07

## 2017-10-13 RX ADMIN — MORPHINE SULFATE 2 MG: 10 INJECTION, SOLUTION INTRAMUSCULAR; INTRAVENOUS at 10:04

## 2017-10-13 RX ADMIN — Medication 10 ML: at 20:24

## 2017-10-13 RX ADMIN — PANTOPRAZOLE SODIUM 40 MG: 40 INJECTION, POWDER, FOR SOLUTION INTRAVENOUS at 06:35

## 2017-10-13 RX ADMIN — MEPERIDINE HYDROCHLORIDE 25 MG: 25 INJECTION INTRAMUSCULAR; INTRAVENOUS; SUBCUTANEOUS at 03:57

## 2017-10-13 RX ADMIN — POTASSIUM CHLORIDE 10 MEQ: 7.46 INJECTION, SOLUTION INTRAVENOUS at 12:32

## 2017-10-13 RX ADMIN — INSULIN ASPART 2 UNITS: 100 INJECTION, SOLUTION INTRAVENOUS; SUBCUTANEOUS at 11:51

## 2017-10-13 RX ADMIN — MORPHINE SULFATE 10 MG: 10 INJECTION, SOLUTION INTRAMUSCULAR; INTRAVENOUS at 20:21

## 2017-10-13 RX ADMIN — AMLODIPINE BESYLATE 10 MG: 10 TABLET ORAL at 08:47

## 2017-10-13 RX ADMIN — INSULIN ASPART 2 UNITS: 100 INJECTION, SOLUTION INTRAVENOUS; SUBCUTANEOUS at 17:56

## 2017-10-13 RX ADMIN — ONDANSETRON 4 MG: 2 INJECTION INTRAMUSCULAR; INTRAVENOUS at 06:35

## 2017-10-13 RX ADMIN — METRONIDAZOLE 500 MG: 500 INJECTION, SOLUTION INTRAVENOUS at 01:00

## 2017-10-13 RX ADMIN — POTASSIUM CHLORIDE 10 MEQ: 7.46 INJECTION, SOLUTION INTRAVENOUS at 11:15

## 2017-10-13 RX ADMIN — Medication 10 ML: at 08:48

## 2017-10-13 RX ADMIN — TRAMADOL HYDROCHLORIDE 50 MG: 50 TABLET, COATED ORAL at 17:04

## 2017-10-13 RX ADMIN — ASCORBIC ACID, VITAMIN A PALMITATE, CHOLECALCIFEROL, THIAMINE HYDROCHLORIDE, RIBOFLAVIN-5 PHOSPHATE SODIUM, PYRIDOXINE HYDROCHLORIDE, NIACINAMIDE, DEXPANTHENOL, ALPHA-TOCOPHEROL ACETATE, VITAMIN K1, FOLIC ACID, BIOTIN, CYANOCOBALAMIN: 200; 3300; 200; 6; 3.6; 6; 40; 15; 10; 150; 600; 60; 5 INJECTION, SOLUTION INTRAVENOUS at 11:52

## 2017-10-13 RX ADMIN — PROMETHAZINE HYDROCHLORIDE 12.5 MG: 25 INJECTION INTRAMUSCULAR; INTRAVENOUS at 08:47

## 2017-10-13 RX ADMIN — ENOXAPARIN SODIUM 40 MG: 40 INJECTION SUBCUTANEOUS at 20:07

## 2017-10-13 RX ADMIN — MORPHINE SULFATE 10 MG: 10 INJECTION, SOLUTION INTRAMUSCULAR; INTRAVENOUS at 14:42

## 2017-10-13 RX ADMIN — METRONIDAZOLE 500 MG: 500 INJECTION, SOLUTION INTRAVENOUS at 16:50

## 2017-10-13 RX ADMIN — ONDANSETRON 4 MG: 2 INJECTION INTRAMUSCULAR; INTRAVENOUS at 20:21

## 2017-10-13 RX ADMIN — METOPROLOL TARTRATE 100 MG: 100 TABLET, FILM COATED ORAL at 08:47

## 2017-10-13 RX ADMIN — POTASSIUM CHLORIDE 10 MEQ: 7.46 INJECTION, SOLUTION INTRAVENOUS at 14:42

## 2017-10-13 RX ADMIN — INSULIN ASPART 4 UNITS: 100 INJECTION, SOLUTION INTRAVENOUS; SUBCUTANEOUS at 08:47

## 2017-10-13 RX ADMIN — METRONIDAZOLE 500 MG: 500 INJECTION, SOLUTION INTRAVENOUS at 08:47

## 2017-10-13 RX ADMIN — Medication 1 CAPSULE: at 08:47

## 2017-10-13 RX ADMIN — METRONIDAZOLE 500 MG: 500 INJECTION, SOLUTION INTRAVENOUS at 23:57

## 2017-10-13 RX ADMIN — LISINOPRIL 20 MG: 10 TABLET ORAL at 08:47

## 2017-10-13 RX ADMIN — I.V. FAT EMULSION 50 G: 20 EMULSION INTRAVENOUS at 18:49

## 2017-10-13 RX ADMIN — MEPERIDINE HYDROCHLORIDE 25 MG: 25 INJECTION INTRAMUSCULAR; INTRAVENOUS; SUBCUTANEOUS at 02:00

## 2017-10-13 RX ADMIN — LEVOFLOXACIN 750 MG: 5 INJECTION, SOLUTION INTRAVENOUS at 10:05

## 2017-10-13 RX ADMIN — PROMETHAZINE HYDROCHLORIDE 12.5 MG: 25 INJECTION INTRAMUSCULAR; INTRAVENOUS at 16:49

## 2017-10-13 NOTE — PROGRESS NOTES
Case Management/Social Work    Patient Name:  Rossy Boone  YOB: 1970  MRN: [id number]  Admit Date:  10/10/2017        SS spoke with pt on this date. Pt states that her  is going on this date to check on her insurance status. SS explained to pt why she does not meet for swing bed services at this time. SS contacted [facility] per Miriam who states they do not accept pt's that do not have insurance. SS will follow and assist as needed.       Electronically signed by:  Sabra Mcclure  10/13/17 10:37 AM

## 2017-10-13 NOTE — PROGRESS NOTES
LOS: 3 days       Chief Complaint :   Non-resolving pancreatitis   Subjective     Interval History:     Patient Complaints:  Rossy Boone  continues with abdominal pain.  She also describes increasing nausea.  She denies any associated fevers or chills.  She has complaints of generalized weakness and malaise.  She denies any hematemesis or rectal bleeding.   She notes her pain is not controlled with Demerol and is requesting to change back to morphine.  She describes no other complaints except for abdominal pain, nausea and vomiting.      Review of Systems-unchanged from admission history and physical  Objective     Vital Signs  Temp:  [98.2 °F (36.8 °C)-99 °F (37.2 °C)] 98.2 °F (36.8 °C)  Heart Rate:  [105-111] 105  Resp:  [18-20] 18  BP: (123-129)/(73-80) 129/80    Physical Exam                           Physical Exam   Constitutional: She is oriented to person, place, and time. She appears well-developed and well-nourished.   HENT:   Head: Normocephalic and atraumatic.   Right Ear: External ear normal.   Left Ear: External ear normal.   Mouth/Throat: Oropharynx is clear and moist. No oropharyngeal exudate.   Eyes: Conjunctivae and EOM are normal. Pupils are equal, round, and reactive to light. Right eye exhibits no discharge. Left eye exhibits no discharge. No scleral icterus.   Neck: Neck supple. No JVD present. No tracheal deviation present. No thyromegaly present.   Cardiovascular: Normal rate, regular rhythm and intact distal pulses.  Exam reveals no gallop and no friction rub.    No murmur heard.  Pulmonary/Chest: Effort normal and breath sounds normal. No stridor. No respiratory distress. She has no wheezes. She exhibits no tenderness.   Abdominal: Bowel sounds are normal. She exhibits distension. She exhibits no mass. There is tenderness. There is no rebound and no guarding. No hernia.   Genitourinary:   Genitourinary Comments: No Mosley catheter   Musculoskeletal: She exhibits no edema, tenderness  or deformity.   Lymphadenopathy:     She has no cervical adenopathy.   Neurological: She is alert and oriented to person, place, and time. She has normal reflexes. She displays normal reflexes. No cranial nerve deficit. She exhibits normal muscle tone. Coordination normal.   Skin: Skin is warm and dry. She is not diaphoretic. No erythema. No pallor.   Psychiatric: She has a normal mood and affect. Her behavior is normal. Judgment and thought content normal.   Nursing note and vitals reviewed.   Results Review:     I reviewed the patient's new clinical results  : See Below  MEDICATIONS:    amLODIPine 10 mg Oral Q24H   enoxaparin 40 mg Subcutaneous Nightly   fat emulsion 250 mL Intravenous Once per day on Mon Wed Fri   And      trace minerals + multivitamin IVPB  Intravenous Once per day on Mon Wed Fri   insulin aspart 0-7 Units Subcutaneous Q6H   insulin detemir 15 Units Subcutaneous Nightly   levoFLOXacin 750 mg Intravenous Q24H   lisinopril 20 mg Oral Q24H   metoprolol tartrate 100 mg Oral Q12H   metroNIDAZOLE 500 mg Intravenous Q8H   pancrelipase (Lip-Prot-Amyl) 24,000 units of lipase Oral TID With Meals   pantoprazole 40 mg Intravenous Q AM   Risaquad-2 1 capsule Oral Daily   sodium chloride 10 mL Intracatheter Q12H   sodium chloride 10 mL Intracatheter Q12H   venlafaxine XR 75 mg Oral Daily       LABS:        Results from last 7 days  Lab Units 10/13/17  0106 10/12/17  0104 10/10/17  1900  10/08/17  0212   CRP mg/dL 21.14*  --   --   --  3.36*   WBC 10*3/mm3 10.22 13.42* 19.67*  < > 13.58*   HEMOGLOBIN g/dL 11.4* 11.3* 13.7  < > 12.1   HEMATOCRIT % 35.7* 35.9* 43.0  < > 37.7   MCV fL 87.9 90.0 86.9  < > 87.7   MCHC g/dL 31.9* 31.5* 31.9*  < > 32.1*   PLATELETS 10*3/mm3 466* 513* 645*  < > 501*   < > = values in this interval not displayed.        Results from last 7 days  Lab Units 10/13/17  0106 10/12/17  0104 10/10/17  1900  10/08/17  0340   SODIUM mmol/L 135 134* 140  < > 137   POTASSIUM mmol/L 3.2* 3.7 4.0   < > 3.8   MAGNESIUM mg/dL 2.3 1.6*  --   --  1.9   CHLORIDE mmol/L 101 103 108  < > 101   CO2 mmol/L 23.5* 24.6 22.8*  < > 31.0   BUN mg/dL 5* 8 8  < > 7   CREATININE mg/dL 0.47 0.60 0.67  < > 0.56   EGFR IF NONAFRICN AM mL/min/1.73 142 107 94  < > 116   CALCIUM mg/dL 8.7 8.9 9.5  < > 9.8   GLUCOSE mg/dL 155* 96 220*  < > 137*   ALBUMIN g/dL 3.50 3.30* 3.90  < > 3.70   BILIRUBIN mg/dL 0.2 0.4 0.4  < > 0.3   ALK PHOS U/L 125* 123* 173*  < > 192*   AST (SGOT) U/L 16 13 22  < > 22   ALT (SGPT) U/L 15 16 21  < > 21   < > = values in this interval not displayed.Estimated Creatinine Clearance: 138.1 mL/min (by C-G formula based on Cr of 0.47).  No results found for: AMMONIA    Results from last 7 days  Lab Units 10/12/17  0104 10/08/17  0340   CHOLESTEROL mg/dL  --  178   TRIGLYCERIDES mg/dL 113 255*   HDL CHOL mg/dL  --  26*     Blood Culture   Date Value Ref Range Status   10/11/2017 No growth at less than 24 hours  Preliminary   10/11/2017 No growth at less than 24 hours  Preliminary                  Assessment/Plan      1) abdominal pain  2) recurrent pancreatitis-MRCP October 11 revealed no obvious cyst or pancreatic duct obstructions.  3) intractable nausea and vomiting-anti-emetics  4) diabetes mellitus type 2  5) DVT prophylaxis.  - Subcutaneous Lovenox  6) leukocytosis  7) colitis/enteritis  8) hypomagnesemia-replace as needed.   9) hypokalemia-replace as needed   10) intractable pain-changed to morphine on October 13.         See orders entered.     Seven Gallego MD  10/13/17  7:36 AM

## 2017-10-13 NOTE — NURSING NOTE
Spoke with Dr Julián ceja rpt request for more pain medication and he states he is not increasing her pain medication

## 2017-10-13 NOTE — NURSING NOTE
Dr Gallego made aware of potassium of 3.2. He states he will look at it. No new orders taken at this time.

## 2017-10-14 LAB
ALBUMIN SERPL-MCNC: 3.4 G/DL (ref 3.5–5)
ALBUMIN/GLOB SERPL: 1.3 G/DL (ref 1.5–2.5)
ALP SERPL-CCNC: 112 U/L (ref 35–104)
ALT SERPL W P-5'-P-CCNC: 12 U/L (ref 10–36)
AMYLASE SERPL-CCNC: 213 U/L (ref 28–100)
ANION GAP SERPL CALCULATED.3IONS-SCNC: 6.6 MMOL/L (ref 3.6–11.2)
AST SERPL-CCNC: 16 U/L (ref 10–30)
BASOPHILS # BLD AUTO: 0.01 10*3/MM3 (ref 0–0.3)
BASOPHILS NFR BLD AUTO: 0.1 % (ref 0–2)
BILIRUB SERPL-MCNC: 0.1 MG/DL (ref 0.2–1.8)
BUN BLD-MCNC: 5 MG/DL (ref 7–21)
BUN/CREAT SERPL: 11.1 (ref 7–25)
CALCIUM SPEC-SCNC: 8.9 MG/DL (ref 7.7–10)
CHLORIDE SERPL-SCNC: 104 MMOL/L (ref 99–112)
CO2 SERPL-SCNC: 25.4 MMOL/L (ref 24.3–31.9)
CREAT BLD-MCNC: 0.45 MG/DL (ref 0.43–1.29)
DEPRECATED RDW RBC AUTO: 43 FL (ref 37–54)
EOSINOPHIL # BLD AUTO: 0.19 10*3/MM3 (ref 0–0.7)
EOSINOPHIL NFR BLD AUTO: 2.5 % (ref 0–5)
ERYTHROCYTE [DISTWIDTH] IN BLOOD BY AUTOMATED COUNT: 13.6 % (ref 11.5–14.5)
GFR SERPL CREATININE-BSD FRML MDRD: 149 ML/MIN/1.73
GLOBULIN UR ELPH-MCNC: 2.7 GM/DL
GLUCOSE BLD-MCNC: 253 MG/DL (ref 70–110)
GLUCOSE BLDC GLUCOMTR-MCNC: 208 MG/DL (ref 70–130)
GLUCOSE BLDC GLUCOMTR-MCNC: 226 MG/DL (ref 70–130)
GLUCOSE BLDC GLUCOMTR-MCNC: 242 MG/DL (ref 70–130)
GLUCOSE BLDC GLUCOMTR-MCNC: 256 MG/DL (ref 70–130)
GLUCOSE BLDC GLUCOMTR-MCNC: 273 MG/DL (ref 70–130)
GLUCOSE BLDC GLUCOMTR-MCNC: 287 MG/DL (ref 70–130)
HCT VFR BLD AUTO: 34.8 % (ref 37–47)
HGB BLD-MCNC: 11.1 G/DL (ref 12–16)
IMM GRANULOCYTES # BLD: 0.03 10*3/MM3 (ref 0–0.03)
IMM GRANULOCYTES NFR BLD: 0.4 % (ref 0–0.5)
LIPASE SERPL-CCNC: 71 U/L (ref 13–60)
LYMPHOCYTES # BLD AUTO: 1.57 10*3/MM3 (ref 1–3)
LYMPHOCYTES NFR BLD AUTO: 20.5 % (ref 21–51)
MCH RBC QN AUTO: 28.1 PG (ref 27–33)
MCHC RBC AUTO-ENTMCNC: 31.9 G/DL (ref 33–37)
MCV RBC AUTO: 88.1 FL (ref 80–94)
MONOCYTES # BLD AUTO: 0.42 10*3/MM3 (ref 0.1–0.9)
MONOCYTES NFR BLD AUTO: 5.5 % (ref 0–10)
NEUTROPHILS # BLD AUTO: 5.45 10*3/MM3 (ref 1.4–6.5)
NEUTROPHILS NFR BLD AUTO: 71 % (ref 30–70)
OSMOLALITY SERPL CALC.SUM OF ELEC: 277.8 MOSM/KG (ref 273–305)
PLATELET # BLD AUTO: 417 10*3/MM3 (ref 130–400)
PMV BLD AUTO: 10.9 FL (ref 6–10)
POTASSIUM BLD-SCNC: 3.2 MMOL/L (ref 3.5–5.3)
POTASSIUM BLD-SCNC: 4 MMOL/L (ref 3.5–5.3)
PROT SERPL-MCNC: 6.1 G/DL (ref 6–8)
RBC # BLD AUTO: 3.95 10*6/MM3 (ref 4.2–5.4)
SODIUM BLD-SCNC: 136 MMOL/L (ref 135–153)
WBC NRBC COR # BLD: 7.67 10*3/MM3 (ref 4.5–12.5)

## 2017-10-14 PROCEDURE — 25010000002 PROMETHAZINE PER 50 MG: Performed by: INTERNAL MEDICINE

## 2017-10-14 PROCEDURE — 25010000002 LEVOFLOXACIN PER 250 MG: Performed by: INTERNAL MEDICINE

## 2017-10-14 PROCEDURE — 25010000002 ENOXAPARIN PER 10 MG: Performed by: INTERNAL MEDICINE

## 2017-10-14 PROCEDURE — 63710000001 INSULIN DETEMIR PER 5 UNITS: Performed by: INTERNAL MEDICINE

## 2017-10-14 PROCEDURE — 82150 ASSAY OF AMYLASE: CPT | Performed by: INTERNAL MEDICINE

## 2017-10-14 PROCEDURE — 25010000003 POTASSIUM CHLORIDE 10 MEQ/100ML SOLUTION: Performed by: INTERNAL MEDICINE

## 2017-10-14 PROCEDURE — 80053 COMPREHEN METABOLIC PANEL: CPT | Performed by: INTERNAL MEDICINE

## 2017-10-14 PROCEDURE — 25010000002 MORPHINE (PF) 10 MG/ML SOLUTION

## 2017-10-14 PROCEDURE — 25010000002 ONDANSETRON PER 1 MG: Performed by: INTERNAL MEDICINE

## 2017-10-14 PROCEDURE — 82962 GLUCOSE BLOOD TEST: CPT

## 2017-10-14 PROCEDURE — 63710000001 INSULIN ASPART PER 5 UNITS

## 2017-10-14 PROCEDURE — 25010000002 MORPHINE PER 10 MG: Performed by: INTERNAL MEDICINE

## 2017-10-14 PROCEDURE — 85025 COMPLETE CBC W/AUTO DIFF WBC: CPT | Performed by: INTERNAL MEDICINE

## 2017-10-14 PROCEDURE — 94799 UNLISTED PULMONARY SVC/PX: CPT

## 2017-10-14 PROCEDURE — 84132 ASSAY OF SERUM POTASSIUM: CPT | Performed by: INTERNAL MEDICINE

## 2017-10-14 PROCEDURE — 83690 ASSAY OF LIPASE: CPT | Performed by: INTERNAL MEDICINE

## 2017-10-14 RX ORDER — MORPHINE SULFATE 10 MG/ML
INJECTION, SOLUTION INTRAMUSCULAR; INTRAVENOUS
Status: COMPLETED
Start: 2017-10-14 | End: 2017-10-14

## 2017-10-14 RX ORDER — MORPHINE SULFATE 10 MG/ML
2 INJECTION INTRAMUSCULAR; INTRAVENOUS; SUBCUTANEOUS
Status: DISCONTINUED | OUTPATIENT
Start: 2017-10-14 | End: 2017-10-20 | Stop reason: ALTCHOICE

## 2017-10-14 RX ORDER — POTASSIUM CHLORIDE 7.45 MG/ML
10 INJECTION INTRAVENOUS
Status: COMPLETED | OUTPATIENT
Start: 2017-10-14 | End: 2017-10-14

## 2017-10-14 RX ORDER — MORPHINE SULFATE 2 MG/ML
2 INJECTION, SOLUTION INTRAMUSCULAR; INTRAVENOUS DAILY PRN
Status: DISCONTINUED | OUTPATIENT
Start: 2017-10-14 | End: 2017-10-26 | Stop reason: HOSPADM

## 2017-10-14 RX ORDER — PANTOPRAZOLE SODIUM 40 MG/10ML
40 INJECTION, POWDER, LYOPHILIZED, FOR SOLUTION INTRAVENOUS EVERY 12 HOURS SCHEDULED
Status: DISCONTINUED | OUTPATIENT
Start: 2017-10-14 | End: 2017-10-26 | Stop reason: HOSPADM

## 2017-10-14 RX ADMIN — METRONIDAZOLE 500 MG: 500 INJECTION, SOLUTION INTRAVENOUS at 23:56

## 2017-10-14 RX ADMIN — POTASSIUM CHLORIDE 10 MEQ: 10 INJECTION, SOLUTION INTRAVENOUS at 06:58

## 2017-10-14 RX ADMIN — METRONIDAZOLE 500 MG: 500 INJECTION, SOLUTION INTRAVENOUS at 10:47

## 2017-10-14 RX ADMIN — PROMETHAZINE HYDROCHLORIDE 12.5 MG: 25 INJECTION INTRAMUSCULAR; INTRAVENOUS at 06:31

## 2017-10-14 RX ADMIN — MORPHINE SULFATE 2 MG: 10 INJECTION, SOLUTION INTRAMUSCULAR; INTRAVENOUS at 08:19

## 2017-10-14 RX ADMIN — Medication 1 CAPSULE: at 09:20

## 2017-10-14 RX ADMIN — VENLAFAXINE HYDROCHLORIDE 75 MG: 75 CAPSULE, EXTENDED RELEASE ORAL at 09:20

## 2017-10-14 RX ADMIN — LISINOPRIL 20 MG: 10 TABLET ORAL at 09:20

## 2017-10-14 RX ADMIN — POTASSIUM CHLORIDE 10 MEQ: 10 INJECTION, SOLUTION INTRAVENOUS at 05:14

## 2017-10-14 RX ADMIN — ENOXAPARIN SODIUM 40 MG: 40 INJECTION SUBCUTANEOUS at 21:19

## 2017-10-14 RX ADMIN — MORPHINE SULFATE 2 MG: 10 INJECTION, SOLUTION INTRAMUSCULAR; INTRAVENOUS at 13:14

## 2017-10-14 RX ADMIN — METRONIDAZOLE 500 MG: 500 INJECTION, SOLUTION INTRAVENOUS at 17:35

## 2017-10-14 RX ADMIN — INSULIN DETEMIR 15 UNITS: 100 INJECTION, SOLUTION SUBCUTANEOUS at 20:31

## 2017-10-14 RX ADMIN — AMLODIPINE BESYLATE 10 MG: 10 TABLET ORAL at 09:20

## 2017-10-14 RX ADMIN — MORPHINE SULFATE 2 MG: 10 INJECTION, SOLUTION INTRAMUSCULAR; INTRAVENOUS at 10:44

## 2017-10-14 RX ADMIN — MORPHINE SULFATE 2 MG: 10 INJECTION, SOLUTION INTRAMUSCULAR; INTRAVENOUS at 18:40

## 2017-10-14 RX ADMIN — INSULIN ASPART 4 UNITS: 100 INJECTION, SOLUTION INTRAVENOUS; SUBCUTANEOUS at 00:21

## 2017-10-14 RX ADMIN — METOPROLOL TARTRATE 100 MG: 100 TABLET, FILM COATED ORAL at 21:00

## 2017-10-14 RX ADMIN — ONDANSETRON 4 MG: 2 INJECTION INTRAMUSCULAR; INTRAVENOUS at 11:46

## 2017-10-14 RX ADMIN — METOPROLOL TARTRATE 100 MG: 100 TABLET, FILM COATED ORAL at 09:20

## 2017-10-14 RX ADMIN — MORPHINE SULFATE 10 MG: 10 INJECTION, SOLUTION INTRAMUSCULAR; INTRAVENOUS at 05:03

## 2017-10-14 RX ADMIN — PANTOPRAZOLE SODIUM 40 MG: 40 INJECTION, POWDER, FOR SOLUTION INTRAVENOUS at 05:15

## 2017-10-14 RX ADMIN — Medication 10 ML: at 09:21

## 2017-10-14 RX ADMIN — POTASSIUM CHLORIDE 10 MEQ: 10 INJECTION, SOLUTION INTRAVENOUS at 08:19

## 2017-10-14 RX ADMIN — Medication 10 ML: at 21:19

## 2017-10-14 RX ADMIN — Medication 10 ML: at 06:31

## 2017-10-14 RX ADMIN — POTASSIUM CHLORIDE 10 MEQ: 10 INJECTION, SOLUTION INTRAVENOUS at 09:18

## 2017-10-14 RX ADMIN — INSULIN ASPART 4 UNITS: 100 INJECTION, SOLUTION INTRAVENOUS; SUBCUTANEOUS at 05:28

## 2017-10-14 RX ADMIN — ONDANSETRON 4 MG: 2 INJECTION INTRAMUSCULAR; INTRAVENOUS at 05:03

## 2017-10-14 RX ADMIN — MORPHINE SULFATE 10 MG: 10 INJECTION, SOLUTION INTRAMUSCULAR; INTRAVENOUS at 00:20

## 2017-10-14 RX ADMIN — LEVOFLOXACIN 750 MG: 5 INJECTION, SOLUTION INTRAVENOUS at 08:26

## 2017-10-14 RX ADMIN — INSULIN ASPART 4 UNITS: 100 INJECTION, SOLUTION INTRAVENOUS; SUBCUTANEOUS at 23:57

## 2017-10-14 RX ADMIN — PROMETHAZINE HYDROCHLORIDE 12.5 MG: 25 INJECTION INTRAMUSCULAR; INTRAVENOUS at 00:20

## 2017-10-14 RX ADMIN — MORPHINE SULFATE 2 MG: 10 INJECTION, SOLUTION INTRAMUSCULAR; INTRAVENOUS at 21:19

## 2017-10-14 RX ADMIN — PROMETHAZINE HYDROCHLORIDE 12.5 MG: 25 INJECTION INTRAMUSCULAR; INTRAVENOUS at 15:40

## 2017-10-14 RX ADMIN — MORPHINE SULFATE 2 MG: 10 INJECTION, SOLUTION INTRAMUSCULAR; INTRAVENOUS at 15:41

## 2017-10-14 RX ADMIN — LEUCINE, PHENYLALANINE, LYSINE, METHIONINE, ISOLEUCINE, VALINE, HISTIDINE, THREONINE, TRYPTOPHAN, ALANINE, GLYCINE, ARGININE, PROLINE, SERINE, TYROSINE, SODIUM ACETATE, DIBASIC POTASSIUM PHOSPHATE, MAGNESIUM CHLORIDE, SODIUM CHLORIDE, CALCIUM CHLORIDE, DEXTROSE
365; 280; 290; 200; 300; 290; 240; 210; 90; 1035; 515; 575; 340; 250; 20; 340; 261; 51; 59; 33; 20 INJECTION INTRAVENOUS at 19:39

## 2017-10-14 RX ADMIN — INSULIN ASPART 3 UNITS: 100 INJECTION, SOLUTION INTRAVENOUS; SUBCUTANEOUS at 17:35

## 2017-10-14 RX ADMIN — PANTOPRAZOLE SODIUM 40 MG: 40 INJECTION, POWDER, FOR SOLUTION INTRAVENOUS at 21:20

## 2017-10-14 RX ADMIN — INSULIN ASPART 3 UNITS: 100 INJECTION, SOLUTION INTRAVENOUS; SUBCUTANEOUS at 11:47

## 2017-10-14 NOTE — PROGRESS NOTES
Subjective     Pt complaining of severe pain this AM not relieved with present dose of morphine  Just recently changed from demerol  No new problems  Interval History:       History taken from: patient chart    Review of Systems:       Review of Systems   Constitutional: Negative.    HENT: Negative.    Eyes: Negative.    Respiratory: Negative.    Cardiovascular: Negative.    Gastrointestinal: Positive for abdominal pain and nausea.   Endocrine: Negative.    Genitourinary: Negative.    Musculoskeletal: Positive for arthralgias and back pain.   Skin: Negative.    Neurological: Negative.    Hematological: Negative.    Psychiatric/Behavioral: Negative.           Objective     Vital Signs  Temp:  [98.2 °F (36.8 °C)-99.5 °F (37.5 °C)] 98.2 °F (36.8 °C)  Heart Rate:  [] 114  Resp:  [18-20] 20  BP: (128-163)/(74-80) 163/80    Physical Exam:    Physical Exam   Constitutional: She is oriented to person, place, and time. She appears well-developed and well-nourished.   HENT:   Head: Normocephalic and atraumatic.   Eyes: EOM are normal. Pupils are equal, round, and reactive to light.   Neck: Normal range of motion. Neck supple.   Cardiovascular: Normal rate and regular rhythm.    Pulmonary/Chest: Effort normal and breath sounds normal.   Abdominal: Soft. Bowel sounds are normal. There is tenderness.   Musculoskeletal: Normal range of motion.   Neurological: She is alert and oriented to person, place, and time.   Skin: Skin is warm and dry.   Nursing note and vitals reviewed.             Results Review:     I reviewed the patient's new clinical results  : See Below    Medication Review:     Current Facility-Administered Medications:   •  Adult [facility] [employer] TPN, , Intravenous, Q24H (TPN), Last Rate: 65 mL/hr at 10/13/17 1849 **AND** fat emulsion (INTRALIPID,LIPOSYN) 20 % infusion 50 g, 250 mL, Intravenous, Once per day on Mon Wed Fri, Last Rate: 20.8 mL/hr at 10/13/17 1849, 50 g at 10/13/17 1849 **AND** multiple  vitamin 10 mL, trace elements Cr-Cu-Mn-Zn 5 mL in sodium chloride 0.9 % 500 mL IVPB, , Intravenous, Once per day on Mon Wed Fri, [facility], Last Rate: 128.8 mL/hr at 10/13/17 1152  •  amLODIPine (NORVASC) tablet 10 mg, 10 mg, Oral, Q24H, Seven Gallego MD, 10 mg at 10/13/17 0847  •  dextrose (D50W) solution 25 g, 25 g, Intravenous, Q15 Min PRN, Kel Becker MD  •  dextrose (GLUTOSE) oral gel 15 g, 15 g, Oral, Q15 Min PRN, Kel Becker MD  •  enoxaparin (LOVENOX) syringe 40 mg, 40 mg, Subcutaneous, Nightly, Kel Becker MD, 40 mg at 10/13/17 2007  •  glucagon (human recombinant) (GLUCAGEN DIAGNOSTIC) injection 1 mg, 1 mg, Subcutaneous, Q15 Min PRN, Kel Becker MD  •  insulin aspart (novoLOG) injection 0-7 Units, 0-7 Units, Subcutaneous, Q6H, Kait Rao, [facility], 4 Units at 10/14/17 0528  •  insulin detemir (LEVEMIR) injection 15 Units, 15 Units, Subcutaneous, Nightly, Seven Gallego MD, 15 Units at 10/13/17 2007  •  ipratropium-albuterol (DUO-NEB) nebulizer solution 3 mL, 3 mL, Nebulization, Q4H PRN, Seven Gallego MD, 3 mL at 10/11/17 2300  •  levoFLOXacin (LEVAQUIN) 750 mg/150 mL D5W (premix) (LEVAQUIN) 750 mg, 750 mg, Intravenous, Q24H, Seven Gallego MD, Last Rate: 0 mL/hr at 10/11/17 1000, 750 mg at 10/14/17 0826  •  lisinopril (PRINIVIL,ZESTRIL) tablet 20 mg, 20 mg, Oral, Q24H, Seven Gallego MD, 20 mg at 10/13/17 0847  •  Magnesium Sulfate 2 gram Bolus, followed by 8 gram infusion (total Mg dose 10 grams)- Mg less than or equal to 1mg/dL, 2 g, Intravenous, PRN **OR** Magnesium Sulfate 6 gram Infusion (2 gm x 3) -Mg 1.1 -1.5 mg/dL, 2 g, Intravenous, PRN **OR** magnesium sulfate 4 gram infusion- Mg 1.6-1.9 mg/dL, 4 g, Intravenous, PRN, Seven Gallego MD  •  metoprolol tartrate (LOPRESSOR) tablet 100 mg, 100 mg, Oral, Q12H, Seven Gallego MD, 100 mg at 10/13/17 2007  •  metroNIDAZOLE (FLAGYL) IVPB 500 mg, 500 mg, Intravenous, Q8H, Seven Gallego MD, Last Rate: 0  mL/hr at 10/12/17 0234, 500 mg at 10/13/17 2357  •  morphine injection 2 mg, 2 mg, Intravenous, Q4H PRN, Seven Gallego MD  •  morphine injection 2 mg, 2 mg, Intravenous, Daily PRN, Nico Fung MD  •  ondansetron (ZOFRAN) injection 4 mg, 4 mg, Intravenous, Q6H PRN, Kel Becker MD, 4 mg at 10/14/17 0503  •  pancrelipase (Lip-Prot-Amyl) (CREON) capsule 24,000 units of lipase, 24,000 units of lipase, Oral, TID With Meals, Seven Gallego MD, 24,000 units of lipase at 10/12/17 1813  •  pantoprazole (PROTONIX) injection 40 mg, 40 mg, Intravenous, Q AM, Kel Becker MD, 40 mg at 10/14/17 0515  •  Pharmacy to Dose TPN, , Does not apply, Continuous PRN, Seven Gallego MD, 65 mg at 10/13/17 1148  •  potassium chloride 10 mEq in 100 mL IVPB, 10 mEq, Intravenous, Q1H PRN, Seven Gallego MD, Last Rate: 100 mL/hr at 10/13/17 1442, 10 mEq at 10/13/17 1442  •  potassium chloride 10 mEq in 100 mL IVPB, 10 mEq, Intravenous, Q1H, Seven Gallego MD, Last Rate: 100 mL/hr at 10/14/17 0819, 10 mEq at 10/14/17 0819  •  promethazine (PHENERGAN) 12.5 mg in sodium chloride 0.9 % 50 mL, 12.5 mg, Intravenous, Q6H PRN, Seven Gallego MD, 12.5 mg at 10/14/17 0631  •  Risaquad-2 capsule 1 capsule, 1 capsule, Oral, Daily, Kait Rao [facility], 1 capsule at 10/13/17 0847  •  sodium chloride 0.9 % flush 10 mL, 10 mL, Intracatheter, Q12H, Seven Gallego MD, Stopped at 10/13/17 2024  •  sodium chloride 0.9 % flush 10 mL, 10 mL, Intracatheter, PRN, Seven Gallego MD, 10 mL at 10/14/17 0631  •  sodium chloride 0.9 % flush 10 mL, 10 mL, Intracatheter, Q12H, Seven Gallego MD, 10 mL at 10/13/17 2024  •  sodium chloride 0.9 % flush 10 mL, 10 mL, Intracatheter, PRN, Seven Gallego MD  •  sodium chloride 0.9 % infusion, 100 mL/hr, Intravenous, Continuous, Seven Gallego MD, Last Rate: 100 mL/hr at 10/12/17 0127, 100 mL/hr at 10/12/17 0127  •  traMADol (ULTRAM) tablet 50 mg, 50 mg, Oral, Q6H PRN, Seven Gallego MD, 50 mg  at 10/13/17 1704  •  venlafaxine XR (EFFEXOR-XR) 24 hr capsule 75 mg, 75 mg, Oral, Daily, Seven Gallego MD, 75 mg at 10/13/17 0847    amLODIPine 10 mg Oral Q24H   enoxaparin 40 mg Subcutaneous Nightly   fat emulsion 250 mL Intravenous Once per day on Mon Wed Fri   And      trace minerals + multivitamin IVPB  Intravenous Once per day on Mon Wed Fri   insulin aspart 0-7 Units Subcutaneous Q6H   insulin detemir 15 Units Subcutaneous Nightly   levoFLOXacin 750 mg Intravenous Q24H   lisinopril 20 mg Oral Q24H   metoprolol tartrate 100 mg Oral Q12H   metroNIDAZOLE 500 mg Intravenous Q8H   pancrelipase (Lip-Prot-Amyl) 24,000 units of lipase Oral TID With Meals   pantoprazole 40 mg Intravenous Q AM   potassium chloride 10 mEq Intravenous Q1H   Risaquad-2 1 capsule Oral Daily   sodium chloride 10 mL Intracatheter Q12H   sodium chloride 10 mL Intracatheter Q12H   venlafaxine XR 75 mg Oral Daily     dextrose  •  dextrose  •  glucagon (human recombinant)  •  ipratropium-albuterol  •  magnesium sulfate **OR** magnesium sulfate **OR** magnesium sulfate  •  Morphine  •  Morphine  •  ondansetron  •  Pharmacy to Dose TPN  •  potassium chloride  •  promethazine (PHENERGAN) in NS 50 mL  •  sodium chloride  •  sodium chloride  •  traMADol    Lab Results (last 72 hours)     Procedure Component Value Units Date/Time    POC Glucose Fingerstick [[id number]]  (Normal) Collected:  10/11/17 1134    Specimen:  Blood Updated:  10/11/17 1142     Glucose 118 mg/dL     Narrative:       Meter: [id number] : [id number] [employer]    POC Glucose Fingerstick [[id number]]  (Normal) Collected:  10/11/17 1648    Specimen:  Blood Updated:  10/11/17 1722     Glucose 114 mg/dL     Narrative:       Meter: [id number] : [id number] [employer]    POC Glucose Fingerstick [[id number]]  (Normal) Collected:  10/11/17 2027    Specimen:  Blood Updated:  10/11/17 2035     Glucose 85 mg/dL     Narrative:       Meter: [id number] : [id number]  [facility]    POC Glucose Fingerstick [[id number]]  (Normal) Collected:  10/11/17 2311    Specimen:  Blood Updated:  10/11/17 2317     Glucose 88 mg/dL     Narrative:       Meter: [id number] : [id number] leo brown    CBC & Differential [[id number]] Collected:  10/12/17 0104    Specimen:  Blood Updated:  10/12/17 0204    Narrative:       The following orders were created for panel order CBC & Differential.  Procedure                               Abnormality         Status                     ---------                               -----------         ------                     CBC Auto Differential[[id number]]        Abnormal            Final result                 Please view results for these tests on the individual orders.    CBC Auto Differential [[id number]]  (Abnormal) Collected:  10/12/17 0104    Specimen:  Blood Updated:  10/12/17 0204     WBC 13.42 (H) 10*3/mm3      RBC 3.99 (L) 10*6/mm3      Hemoglobin 11.3 (L) g/dL      Hematocrit 35.9 (L) %      MCV 90.0 fL      MCH 28.3 pg      MCHC 31.5 (L) g/dL      RDW 14.1 %      RDW-SD 45.2 fl      MPV 10.7 (H) fL      Platelets 513 (H) 10*3/mm3      Neutrophil % 80.9 (H) %      Lymphocyte % 14.4 (L) %      Monocyte % 2.8 %      Eosinophil % 1.5 %      Basophil % 0.2 %      Immature Grans % 0.2 %      Neutrophils, Absolute 10.85 (H) 10*3/mm3      Lymphocytes, Absolute 1.93 10*3/mm3      Monocytes, Absolute 0.38 10*3/mm3      Eosinophils, Absolute 0.20 10*3/mm3      Basophils, Absolute 0.03 10*3/mm3      Immature Grans, Absolute 0.03 10*3/mm3     Magnesium [[id number]]  (Abnormal) Collected:  10/12/17 0104    Specimen:  Blood Updated:  10/12/17 0228     Magnesium 1.6 (L) mg/dL     Lipase [[id number]]  (Abnormal) Collected:  10/12/17 0104    Specimen:  Blood Updated:  10/12/17 0228     Lipase 62 (H) U/L     Comprehensive Metabolic Panel [[id number]]  (Abnormal) Collected:  10/12/17 0104    Specimen:  Blood Updated:  10/12/17 0231     Glucose 96 mg/dL       BUN 8 mg/dL      Creatinine 0.60 mg/dL      Sodium 134 (L) mmol/L      Potassium 3.7 mmol/L      Chloride 103 mmol/L      CO2 24.6 mmol/L      Calcium 8.9 mg/dL      Total Protein 6.0 g/dL      Albumin 3.30 (L) g/dL      ALT (SGPT) 16 U/L      AST (SGOT) 13 U/L      Alkaline Phosphatase 123 (H) U/L       Note New Reference Ranges        Total Bilirubin 0.4 mg/dL      eGFR Non African Amer 107 mL/min/1.73      Globulin 2.7 gm/dL      A/G Ratio 1.2 (L) g/dL      BUN/Creatinine Ratio 13.3     Anion Gap 6.4 mmol/L     Amylase [[id number]]  (Abnormal) Collected:  10/12/17 0104    Specimen:  Blood Updated:  10/12/17 0231     Amylase 198 (H) U/L     Phosphorus [[id number]]  (Normal) Collected:  10/12/17 0104    Specimen:  Blood Updated:  10/12/17 0231     Phosphorus 3.5 mg/dL     Osmolality, Calculated [[id number]]  (Abnormal) Collected:  10/12/17 0104    Specimen:  Blood Updated:  10/12/17 0231     Osmolality Calc 266.4 (L) mOsm/kg     POC Glucose Fingerstick [[id number]]  (Normal) Collected:  10/12/17 0712    Specimen:  Blood Updated:  10/12/17 0720     Glucose 96 mg/dL     Narrative:       Meter: [id number] : [id number] Raulito Loya    Triglycerides [[id number]]  (Normal) Collected:  10/12/17 0104    Specimen:  Blood Updated:  10/12/17 0834     Triglycerides 113 mg/dL     Narrative:       Triglyceride Reference Ranges:    Normal           less than 150 mg/dL  Borderline       150-199 mg/dL  High             200-499 mg/dL  Very High        greater than 499 mg/dL    POC Glucose Fingerstick [[id number]]  (Normal) Collected:  10/12/17 1036    Specimen:  Blood Updated:  10/12/17 1042     Glucose 99 mg/dL     Narrative:       Meter: [id number] : [id number] Raulito Loya    POC Glucose Fingerstick [[id number]]  (Normal) Collected:  10/12/17 1811    Specimen:  Blood Updated:  10/12/17 1836     Glucose 77 mg/dL     Narrative:       Meter: [id number] : [id number] logan cardozo    POC Glucose Fingerstick [[id number]]   (Normal) Collected:  10/12/17 2023    Specimen:  Blood Updated:  10/12/17 2048     Glucose 96 mg/dL     Narrative:       Meter: [id number] : [id number] Janelle Russell    CBC & Differential [[id number]] Collected:  10/13/17 0106    Specimen:  Blood Updated:  10/13/17 0210    Narrative:       The following orders were created for panel order CBC & Differential.  Procedure                               Abnormality         Status                     ---------                               -----------         ------                     CBC Auto Differential[[id number]]        Abnormal            Final result                 Please view results for these tests on the individual orders.    CBC Auto Differential [[id number]]  (Abnormal) Collected:  10/13/17 0106    Specimen:  Blood Updated:  10/13/17 0210     WBC 10.22 10*3/mm3      RBC 4.06 (L) 10*6/mm3      Hemoglobin 11.4 (L) g/dL      Hematocrit 35.7 (L) %      MCV 87.9 fL      MCH 28.1 pg      MCHC 31.9 (L) g/dL      RDW 13.6 %      RDW-SD 43.2 fl      MPV 10.8 (H) fL      Platelets 466 (H) 10*3/mm3      Neutrophil % 78.5 (H) %      Lymphocyte % 14.6 (L) %      Monocyte % 4.5 %      Eosinophil % 1.7 %      Basophil % 0.2 %      Immature Grans % 0.5 %      Neutrophils, Absolute 8.03 (H) 10*3/mm3      Lymphocytes, Absolute 1.49 10*3/mm3      Monocytes, Absolute 0.46 10*3/mm3      Eosinophils, Absolute 0.17 10*3/mm3      Basophils, Absolute 0.02 10*3/mm3      Immature Grans, Absolute 0.05 (H) 10*3/mm3     Phosphorus [[id number]]  (Normal) Collected:  10/13/17 0106    Specimen:  Blood Updated:  10/13/17 0234     Phosphorus 3.7 mg/dL     Lipase [[id number]]  (Normal) Collected:  10/13/17 0106    Specimen:  Blood Updated:  10/13/17 0234     Lipase 58 U/L     Amylase [[id number]]  (Abnormal) Collected:  10/13/17 0106    Specimen:  Blood Updated:  10/13/17 0236     Amylase 185 (H) U/L     Comprehensive Metabolic Panel [[id number]]  (Abnormal) Collected:  10/13/17 0106    Specimen:   Blood Updated:  10/13/17 0236     Glucose 155 (H) mg/dL      BUN 5 (L) mg/dL      Creatinine 0.47 mg/dL      Sodium 135 mmol/L      Potassium 3.2 (L) mmol/L      Chloride 101 mmol/L      CO2 23.5 (L) mmol/L      Calcium 8.7 mg/dL      Total Protein 6.4 g/dL      Albumin 3.50 g/dL      ALT (SGPT) 15 U/L      AST (SGOT) 16 U/L      Alkaline Phosphatase 125 (H) U/L       Note New Reference Ranges        Total Bilirubin 0.2 mg/dL      eGFR Non African Amer 142 mL/min/1.73      Globulin 2.9 gm/dL      A/G Ratio 1.2 (L) g/dL      BUN/Creatinine Ratio 10.6     Anion Gap 10.5 mmol/L     Magnesium [[id number]]  (Normal) Collected:  10/13/17 0106    Specimen:  Blood Updated:  10/13/17 0236     Magnesium 2.3 mg/dL     C-reactive Protein [[id number]]  (Abnormal) Collected:  10/13/17 0106    Specimen:  Blood Updated:  10/13/17 0239     C-Reactive Protein 21.14 (H) mg/dL     Osmolality, Calculated [[id number]]  (Abnormal) Collected:  10/13/17 0106    Specimen:  Blood Updated:  10/13/17 0240     Osmolality Calc 270.5 (L) mOsm/kg     POC Glucose Fingerstick [[id number]]  (Abnormal) Collected:  10/13/17 0314    Specimen:  Blood Updated:  10/13/17 0326     Glucose 162 (H) mg/dL     Narrative:       Meter: [id number] : [id number] Janelle Russell    POC Glucose Fingerstick [[id number]]  (Abnormal) Collected:  10/13/17 0732    Specimen:  Blood Updated:  10/13/17 0739     Glucose 251 (H) mg/dL     Narrative:       Meter: [id number] : [id number] tahira nye    Blood Culture - Blood, [[id number]]  (Normal) Collected:  10/11/17 0808    Specimen:  Blood from Hand, Right Updated:  10/13/17 0916     Blood Culture No growth at 2 days    Blood Culture - Blood, [[id number]]  (Normal) Collected:  10/11/17 0818    Specimen:  Blood from Hand, Left Updated:  10/13/17 0916     Blood Culture No growth at 2 days    POC Glucose Fingerstick [[id number]]  (Abnormal) Collected:  10/13/17 1127    Specimen:  Blood Updated:  10/13/17 1134     Glucose 194 (H)  mg/dL     Narrative:       Meter: [id number] : [id number] Delmi Mccabe    POC Glucose Fingerstick [[id number]]  (Abnormal) Collected:  10/13/17 1717    Specimen:  Blood Updated:  10/13/17 1728     Glucose 172 (H) mg/dL     Narrative:       Meter: [id number] : [id number] Delmi Mccabe    Potassium [[id number]]  (Normal) Collected:  10/13/17 1936    Specimen:  Blood Updated:  10/13/17 2018     Potassium 3.5 mmol/L     POC Glucose Fingerstick [[id number]]  (Abnormal) Collected:  10/13/17 2013    Specimen:  Blood Updated:  10/13/17 2053     Glucose 195 (H) mg/dL     Narrative:       Meter: [id number] : [id number] WILD QUANG    POC Glucose Fingerstick [[id number]]  (Abnormal) Collected:  10/14/17 0006    Specimen:  Blood Updated:  10/14/17 0015     Glucose 273 (H) mg/dL     Narrative:       Meter: [id number] : [id number] WILD QUANG    CBC & Differential [[id number]] Collected:  10/14/17 0129    Specimen:  Blood Updated:  10/14/17 0253    Narrative:       The following orders were created for panel order CBC & Differential.  Procedure                               Abnormality         Status                     ---------                               -----------         ------                     CBC Auto Differential[[id number]]        Abnormal            Final result                 Please view results for these tests on the individual orders.    CBC Auto Differential [[id number]]  (Abnormal) Collected:  10/14/17 0129    Specimen:  Blood Updated:  10/14/17 0253     WBC 7.67 10*3/mm3      RBC 3.95 (L) 10*6/mm3      Hemoglobin 11.1 (L) g/dL      Hematocrit 34.8 (L) %      MCV 88.1 fL      MCH 28.1 pg      MCHC 31.9 (L) g/dL      RDW 13.6 %      RDW-SD 43.0 fl      MPV 10.9 (H) fL      Platelets 417 (H) 10*3/mm3      Neutrophil % 71.0 (H) %      Lymphocyte % 20.5 (L) %      Monocyte % 5.5 %      Eosinophil % 2.5 %      Basophil % 0.1 %      Immature Grans % 0.4 %      Neutrophils, Absolute 5.45 10*3/mm3       Lymphocytes, Absolute 1.57 10*3/mm3      Monocytes, Absolute 0.42 10*3/mm3      Eosinophils, Absolute 0.19 10*3/mm3      Basophils, Absolute 0.01 10*3/mm3      Immature Grans, Absolute 0.03 10*3/mm3     Lipase [[id number]]  (Abnormal) Collected:  10/14/17 0129    Specimen:  Blood Updated:  10/14/17 0309     Lipase 71 (H) U/L     Comprehensive Metabolic Panel [[id number]]  (Abnormal) Collected:  10/14/17 0129    Specimen:  Blood Updated:  10/14/17 0310     Glucose 253 (H) mg/dL      BUN 5 (L) mg/dL      Creatinine 0.45 mg/dL      Sodium 136 mmol/L      Potassium 3.2 (L) mmol/L      Chloride 104 mmol/L      CO2 25.4 mmol/L      Calcium 8.9 mg/dL      Total Protein 6.1 g/dL      Albumin 3.40 (L) g/dL      ALT (SGPT) 12 U/L      AST (SGOT) 16 U/L      Alkaline Phosphatase 112 (H) U/L       Note New Reference Ranges        Total Bilirubin 0.1 (L) mg/dL      eGFR Non African Amer 149 mL/min/1.73      Globulin 2.7 gm/dL      A/G Ratio 1.3 (L) g/dL      BUN/Creatinine Ratio 11.1     Anion Gap 6.6 mmol/L     Amylase [[id number]]  (Abnormal) Collected:  10/14/17 0129    Specimen:  Blood Updated:  10/14/17 0311     Amylase 213 (H) U/L     Osmolality, Calculated [[id number]]  (Normal) Collected:  10/14/17 0129    Specimen:  Blood Updated:  10/14/17 0311     Osmolality Calc 277.8 mOsm/kg     POC Glucose Fingerstick [[id number]]  (Abnormal) Collected:  10/14/17 0526    Specimen:  Blood Updated:  10/14/17 0533     Glucose 256 (H) mg/dL     Narrative:       Meter: [id number] : [id number] WILD DEL RIO    POC Glucose Fingerstick [[id number]]  (Abnormal) Collected:  10/14/17 0736    Specimen:  Blood Updated:  10/14/17 0745     Glucose 287 (H) mg/dL     Narrative:       Meter: [id number] : [id number] Delmi Mccabe          Imaging Results (last 72 hours)     Procedure Component Value Units Date/Time    MRI abdomen wo contrast mrcp [[id number]] Collected:  10/11/17 1334     Updated:  10/11/17 1340    Narrative:        EXAMINATION: MRI ABDOMEN WO CONTRAST MRCP-      INDICATION:   nonresolving pancreatitis.; K85.90-Acute pancreatitis without necrosis  or infection, unspecified   COMPARISON: CT performed on 09/29/2017.     TECNIQUE: multiple axial gradient-echo T1 and T2-weighted images were  obtained. Oblique coronal T2-weighted images were  acquired to evaluate  the bile ducts. Axial T1-weighted images were also acquired after  injection of Magnevist.     The common bile duct and common hepatic duct and main intrahepatic bile  ducts are well-demonstrated and appear within normal limits. No  gallstones are identified and there are no strictures. There is no bile  duct dilatation. The pancreatic duct is not well evaluated on this  study. Still extensive stranding and fluid around the pancreas  suggestive of pancreatitis.           Impression:       Unremarkable MRCP imaging of the abdomen.The pancreatic duct  is not well evaluated on this study. Still extensive stranding and fluid  around the pancreas suggestive of pancreatitis.         This report was finalized on 10/11/2017 1:38 PM by Dr. Earnest Wright MD.       XR Chest 1 View [[id number]] Collected:  10/13/17 0746     Updated:  10/13/17 0804    Narrative:       XR CHEST 1 VIEW-     CLINICAL INDICATION: PICC line placement; K85.90-Acute pancreatitis  without necrosis or infection, unspecified.          COMPARISON: 10/10/2017.      TECHNIQUE: Single frontal view of the chest.     FINDINGS:     PICC line tip at the cavoatrial junction.  Trace bibasilar effusions.  There is no evidence of an acute osseous abnormality.   There are no suspicious-appearing parenchymal soft tissue nodules.            Impression:       PICC line at cavoatrial junction.  Trace bibasilar effusions.         This report was finalized on 10/13/2017 8:02 AM by Dr. Epifanio Macias MD.             Assessment/Plan    Continue present meds and pain control as possible      Active Problems:    Acute pancreatitis    Chronic pain       See orders entered.     Nico Fung MD  10/14/17  9:13 AM

## 2017-10-14 NOTE — PLAN OF CARE
Problem: Patient Care Overview (Adult)  Goal: Plan of Care Review  Outcome: Ongoing (interventions implemented as appropriate)    10/14/17 0500   Coping/Psychosocial Response Interventions   Plan Of Care Reviewed With patient   Patient Care Overview   Progress progress toward functional goals as expected       Goal: Adult Individualization and Mutuality  Outcome: Ongoing (interventions implemented as appropriate)    Problem: Pancreatitis, Acute/Chronic (Adult)  Goal: Signs and Symptoms of Listed Potential Problems Will be Absent or Manageable (Pancreatitis, Acute/Chronic)  Outcome: Ongoing (interventions implemented as appropriate)    10/14/17 0500   Pancreatitis, Acute/Chronic   Problems Assessed (Pancreatitis) all   Problems Present (Pancreatitis) impaired glycemic control;infection;nausea and vomiting         Problem: Diabetes, Type 2 (Adult)  Goal: Signs and Symptoms of Listed Potential Problems Will be Absent or Manageable (Diabetes, Type 2)  Outcome: Ongoing (interventions implemented as appropriate)    10/14/17 0500   Diabetes, Type 2   Problems Assessed (Type 2 Diabetes) all   Problems Present (Type 2 Diabetes) impaired glycemic control

## 2017-10-14 NOTE — NURSING NOTE
On the MAR it shows patient received 10 mg morphine at 0503. This was an override pull due to being out of the morphine 2 mg. Writer erroneously did not change the dose on the MAR from 10 to 2mg when administered, but patient did in fact receive 2mg. The waste of 8 mg can be found in the Accudose.

## 2017-10-15 LAB
ANION GAP SERPL CALCULATED.3IONS-SCNC: 3.8 MMOL/L (ref 3.6–11.2)
BUN BLD-MCNC: 8 MG/DL (ref 7–21)
BUN/CREAT SERPL: 14 (ref 7–25)
CALCIUM SPEC-SCNC: 8.9 MG/DL (ref 7.7–10)
CHLORIDE SERPL-SCNC: 101 MMOL/L (ref 99–112)
CO2 SERPL-SCNC: 28.2 MMOL/L (ref 24.3–31.9)
CREAT BLD-MCNC: 0.57 MG/DL (ref 0.43–1.29)
GFR SERPL CREATININE-BSD FRML MDRD: 114 ML/MIN/1.73
GLUCOSE BLD-MCNC: 318 MG/DL (ref 70–110)
GLUCOSE BLDC GLUCOMTR-MCNC: 242 MG/DL (ref 70–130)
GLUCOSE BLDC GLUCOMTR-MCNC: 258 MG/DL (ref 70–130)
GLUCOSE BLDC GLUCOMTR-MCNC: 268 MG/DL (ref 70–130)
GLUCOSE BLDC GLUCOMTR-MCNC: 273 MG/DL (ref 70–130)
GLUCOSE BLDC GLUCOMTR-MCNC: 277 MG/DL (ref 70–130)
GLUCOSE BLDC GLUCOMTR-MCNC: 299 MG/DL (ref 70–130)
GLUCOSE BLDC GLUCOMTR-MCNC: 308 MG/DL (ref 70–130)
MAGNESIUM SERPL-MCNC: 1.6 MG/DL (ref 1.7–2.6)
MAGNESIUM SERPL-MCNC: 1.8 MG/DL (ref 1.7–2.6)
OSMOLALITY SERPL CALC.SUM OF ELEC: 276.9 MOSM/KG (ref 273–305)
PHOSPHATE SERPL-MCNC: 4.2 MG/DL (ref 2.7–4.5)
POTASSIUM BLD-SCNC: 4 MMOL/L (ref 3.5–5.3)
SODIUM BLD-SCNC: 133 MMOL/L (ref 135–153)

## 2017-10-15 PROCEDURE — 25010000002 ENOXAPARIN PER 10 MG: Performed by: INTERNAL MEDICINE

## 2017-10-15 PROCEDURE — 25010000002 LEVOFLOXACIN PER 250 MG: Performed by: INTERNAL MEDICINE

## 2017-10-15 PROCEDURE — 25010000002 MORPHINE PER 10 MG: Performed by: INTERNAL MEDICINE

## 2017-10-15 PROCEDURE — 63710000001 INSULIN DETEMIR PER 5 UNITS: Performed by: INTERNAL MEDICINE

## 2017-10-15 PROCEDURE — 63710000001 INSULIN ASPART PER 5 UNITS

## 2017-10-15 PROCEDURE — 25010000002 PROMETHAZINE PER 50 MG: Performed by: INTERNAL MEDICINE

## 2017-10-15 PROCEDURE — 82962 GLUCOSE BLOOD TEST: CPT

## 2017-10-15 PROCEDURE — 84100 ASSAY OF PHOSPHORUS: CPT | Performed by: INTERNAL MEDICINE

## 2017-10-15 PROCEDURE — 94799 UNLISTED PULMONARY SVC/PX: CPT

## 2017-10-15 PROCEDURE — 25010000002 ONDANSETRON PER 1 MG: Performed by: INTERNAL MEDICINE

## 2017-10-15 PROCEDURE — 80048 BASIC METABOLIC PNL TOTAL CA: CPT | Performed by: INTERNAL MEDICINE

## 2017-10-15 PROCEDURE — 25010000002 MORPHINE (PF) 10 MG/ML SOLUTION

## 2017-10-15 PROCEDURE — 83735 ASSAY OF MAGNESIUM: CPT | Performed by: INTERNAL MEDICINE

## 2017-10-15 RX ORDER — MAGNESIUM SULFATE HEPTAHYDRATE 40 MG/ML
4 INJECTION, SOLUTION INTRAVENOUS ONCE
Status: COMPLETED | OUTPATIENT
Start: 2017-10-15 | End: 2017-10-16

## 2017-10-15 RX ORDER — FLUCONAZOLE 200 MG/1
200 TABLET ORAL ONCE
Status: COMPLETED | OUTPATIENT
Start: 2017-10-15 | End: 2017-10-15

## 2017-10-15 RX ORDER — MORPHINE SULFATE 10 MG/ML
INJECTION, SOLUTION INTRAMUSCULAR; INTRAVENOUS
Status: COMPLETED
Start: 2017-10-15 | End: 2017-10-15

## 2017-10-15 RX ORDER — MAGNESIUM SULFATE HEPTAHYDRATE 40 MG/ML
4 INJECTION, SOLUTION INTRAVENOUS ONCE
Status: COMPLETED | OUTPATIENT
Start: 2017-10-15 | End: 2017-10-15

## 2017-10-15 RX ADMIN — INSULIN ASPART 4 UNITS: 100 INJECTION, SOLUTION INTRAVENOUS; SUBCUTANEOUS at 23:53

## 2017-10-15 RX ADMIN — INSULIN ASPART 4 UNITS: 100 INJECTION, SOLUTION INTRAVENOUS; SUBCUTANEOUS at 17:08

## 2017-10-15 RX ADMIN — ONDANSETRON 4 MG: 2 INJECTION INTRAMUSCULAR; INTRAVENOUS at 05:50

## 2017-10-15 RX ADMIN — PANTOPRAZOLE SODIUM 40 MG: 40 INJECTION, POWDER, FOR SOLUTION INTRAVENOUS at 05:42

## 2017-10-15 RX ADMIN — MORPHINE SULFATE 2 MG: 10 INJECTION, SOLUTION INTRAMUSCULAR; INTRAVENOUS at 04:12

## 2017-10-15 RX ADMIN — PANTOPRAZOLE SODIUM 40 MG: 40 INJECTION, POWDER, FOR SOLUTION INTRAVENOUS at 20:13

## 2017-10-15 RX ADMIN — MAGNESIUM SULFATE HEPTAHYDRATE 4 G: 40 INJECTION, SOLUTION INTRAVENOUS at 20:06

## 2017-10-15 RX ADMIN — PROMETHAZINE HYDROCHLORIDE 12.5 MG: 25 INJECTION INTRAMUSCULAR; INTRAVENOUS at 13:18

## 2017-10-15 RX ADMIN — MORPHINE SULFATE 2 MG: 10 INJECTION, SOLUTION INTRAMUSCULAR; INTRAVENOUS at 00:29

## 2017-10-15 RX ADMIN — METRONIDAZOLE 500 MG: 500 INJECTION, SOLUTION INTRAVENOUS at 23:51

## 2017-10-15 RX ADMIN — METRONIDAZOLE 500 MG: 500 INJECTION, SOLUTION INTRAVENOUS at 09:14

## 2017-10-15 RX ADMIN — ENOXAPARIN SODIUM 40 MG: 40 INJECTION SUBCUTANEOUS at 20:13

## 2017-10-15 RX ADMIN — MORPHINE SULFATE 2 MG: 10 INJECTION, SOLUTION INTRAMUSCULAR; INTRAVENOUS at 10:38

## 2017-10-15 RX ADMIN — INSULIN ASPART 4 UNITS: 100 INJECTION, SOLUTION INTRAVENOUS; SUBCUTANEOUS at 12:03

## 2017-10-15 RX ADMIN — MORPHINE SULFATE 2 MG: 10 INJECTION, SOLUTION INTRAMUSCULAR; INTRAVENOUS at 17:55

## 2017-10-15 RX ADMIN — PROMETHAZINE HYDROCHLORIDE 12.5 MG: 25 INJECTION INTRAMUSCULAR; INTRAVENOUS at 00:09

## 2017-10-15 RX ADMIN — INSULIN ASPART 5 UNITS: 100 INJECTION, SOLUTION INTRAVENOUS; SUBCUTANEOUS at 05:41

## 2017-10-15 RX ADMIN — MAGNESIUM SULFATE HEPTAHYDRATE 4 G: 40 INJECTION, SOLUTION INTRAVENOUS at 05:43

## 2017-10-15 RX ADMIN — LEVOFLOXACIN 750 MG: 5 INJECTION, SOLUTION INTRAVENOUS at 10:29

## 2017-10-15 RX ADMIN — INSULIN DETEMIR 15 UNITS: 100 INJECTION, SOLUTION SUBCUTANEOUS at 20:14

## 2017-10-15 RX ADMIN — MORPHINE SULFATE 2 MG: 10 INJECTION, SOLUTION INTRAMUSCULAR; INTRAVENOUS at 06:16

## 2017-10-15 RX ADMIN — VENLAFAXINE HYDROCHLORIDE 75 MG: 75 CAPSULE, EXTENDED RELEASE ORAL at 09:14

## 2017-10-15 RX ADMIN — MORPHINE SULFATE 2 MG: 10 INJECTION, SOLUTION INTRAMUSCULAR; INTRAVENOUS at 20:07

## 2017-10-15 RX ADMIN — AMLODIPINE BESYLATE 10 MG: 10 TABLET ORAL at 09:14

## 2017-10-15 RX ADMIN — PANTOPRAZOLE SODIUM 40 MG: 40 INJECTION, POWDER, FOR SOLUTION INTRAVENOUS at 09:14

## 2017-10-15 RX ADMIN — Medication 1 CAPSULE: at 09:14

## 2017-10-15 RX ADMIN — FLUCONAZOLE 200 MG: 200 TABLET ORAL at 12:03

## 2017-10-15 RX ADMIN — LEUCINE, PHENYLALANINE, LYSINE, METHIONINE, ISOLEUCINE, VALINE, HISTIDINE, THREONINE, TRYPTOPHAN, ALANINE, GLYCINE, ARGININE, PROLINE, SERINE, TYROSINE, SODIUM ACETATE, DIBASIC POTASSIUM PHOSPHATE, MAGNESIUM CHLORIDE, SODIUM CHLORIDE, CALCIUM CHLORIDE, DEXTROSE
365; 280; 290; 200; 300; 290; 240; 210; 90; 1035; 515; 575; 340; 250; 20; 340; 261; 51; 59; 33; 20 INJECTION INTRAVENOUS at 18:40

## 2017-10-15 RX ADMIN — LISINOPRIL 20 MG: 10 TABLET ORAL at 09:14

## 2017-10-15 RX ADMIN — PROMETHAZINE HYDROCHLORIDE 12.5 MG: 25 INJECTION INTRAMUSCULAR; INTRAVENOUS at 17:56

## 2017-10-15 RX ADMIN — MORPHINE SULFATE 2 MG: 10 INJECTION, SOLUTION INTRAMUSCULAR; INTRAVENOUS at 13:17

## 2017-10-15 RX ADMIN — Medication 10 ML: at 09:14

## 2017-10-15 RX ADMIN — METOPROLOL TARTRATE 100 MG: 100 TABLET, FILM COATED ORAL at 20:13

## 2017-10-15 RX ADMIN — METRONIDAZOLE 500 MG: 500 INJECTION, SOLUTION INTRAVENOUS at 16:13

## 2017-10-15 NOTE — PROGRESS NOTES
Subjective     Pt still with abd pain and nausea but some better after increasing meds again was dry heaving before mag a little low    Interval History:       History taken from: patient chart    Review of Systems:       Review of Systems   Constitutional: Negative.    HENT: Negative.    Eyes: Negative.    Respiratory: Negative.    Cardiovascular: Negative.    Gastrointestinal: Positive for abdominal pain and nausea.   Endocrine: Negative.    Genitourinary: Negative.    Musculoskeletal: Positive for arthralgias and back pain.   Skin: Negative.    Neurological: Negative.    Hematological: Negative.    Psychiatric/Behavioral: Negative.           Objective     Vital Signs  Temp:  [98.4 °F (36.9 °C)-100.1 °F (37.8 °C)] 98.6 °F (37 °C)  Heart Rate:  [76-95] 76  Resp:  [16-20] 16  BP: (104-131)/(67-74) 111/70    Physical Exam:    Physical Exam   Constitutional: She is oriented to person, place, and time. She appears well-developed and well-nourished.   HENT:   Head: Normocephalic and atraumatic.   Eyes: EOM are normal. Pupils are equal, round, and reactive to light.   Neck: Normal range of motion. Neck supple.   Cardiovascular: Normal rate and regular rhythm.    Pulmonary/Chest: Effort normal and breath sounds normal.   Abdominal: Soft. Bowel sounds are normal. There is tenderness.   Musculoskeletal: Normal range of motion.   Neurological: She is alert and oriented to person, place, and time.   Skin: Skin is warm and dry.   Nursing note and vitals reviewed.             Results Review:     I reviewed the patient's new clinical results  : See Below    Medication Review:     Current Facility-Administered Medications:   •  Adult [facility] [employer] TPN, , Intravenous, Q24H (TPN), Last Rate: 65 mL/hr at 10/14/17 1939 **AND** [DISCONTINUED] fat emulsion (INTRALIPID,LIPOSYN) 20 % infusion 50 g, 250 mL, Intravenous, Once per day on Mon Wed Fri, Last Rate: 20.8 mL/hr at 10/13/17 1849, 50 g at 10/13/17 1849 **AND** multiple  vitamin 10 mL, trace elements Cr-Cu-Mn-Zn 5 mL in sodium chloride 0.9 % 500 mL IVPB, , Intravenous, Once per day on Mon Wed Fri, Kait Rao, [facility], Last Rate: 128.8 mL/hr at 10/13/17 1152  •  amLODIPine (NORVASC) tablet 10 mg, 10 mg, Oral, Q24H, Seven Gallego MD, 10 mg at 10/14/17 0920  •  dextrose (D50W) solution 25 g, 25 g, Intravenous, Q15 Min PRN, Kel Becker MD  •  dextrose (GLUTOSE) oral gel 15 g, 15 g, Oral, Q15 Min PRN, Kel Becker MD  •  enoxaparin (LOVENOX) syringe 40 mg, 40 mg, Subcutaneous, Nightly, Kel Becker MD, 40 mg at 10/14/17 2119  •  glucagon (human recombinant) (GLUCAGEN DIAGNOSTIC) injection 1 mg, 1 mg, Subcutaneous, Q15 Min PRN, Kel Becker MD  •  insulin aspart (novoLOG) injection 0-7 Units, 0-7 Units, Subcutaneous, Q6H, Kait Rao, [facility], 5 Units at 10/15/17 0541  •  insulin detemir (LEVEMIR) injection 15 Units, 15 Units, Subcutaneous, Nightly, Seven Gallego MD, 15 Units at 10/14/17 2031  •  ipratropium-albuterol (DUO-NEB) nebulizer solution 3 mL, 3 mL, Nebulization, Q4H PRN, Seven Gallego MD, 3 mL at 10/11/17 2300  •  levoFLOXacin (LEVAQUIN) 750 mg/150 mL D5W (premix) (LEVAQUIN) 750 mg, 750 mg, Intravenous, Q24H, Seven Gallego MD, Last Rate: 0 mL/hr at 10/11/17 1000, 750 mg at 10/14/17 0826  •  lisinopril (PRINIVIL,ZESTRIL) tablet 20 mg, 20 mg, Oral, Q24H, Seven Gallego MD, 20 mg at 10/14/17 0920  •  Magnesium Sulfate 2 gram Bolus, followed by 8 gram infusion (total Mg dose 10 grams)- Mg less than or equal to 1mg/dL, 2 g, Intravenous, PRN **OR** Magnesium Sulfate 6 gram Infusion (2 gm x 3) -Mg 1.1 -1.5 mg/dL, 2 g, Intravenous, PRN **OR** magnesium sulfate 4 gram infusion- Mg 1.6-1.9 mg/dL, 4 g, Intravenous, PRN, Seven Gallego MD  •  magnesium sulfate 4 gram infusion- Mg 1.6-1.9 mg/dL, 4 g, Intravenous, Once, Seven Gallego MD, Last Rate: 25 mL/hr at 10/15/17 0543, 4 g at 10/15/17 0543  •  metoprolol tartrate (LOPRESSOR) tablet 100 mg, 100 mg,  Oral, Q12H, Seven Gallego MD, 100 mg at 10/14/17 2100  •  metroNIDAZOLE (FLAGYL) IVPB 500 mg, 500 mg, Intravenous, Q8H, Seven Gallego MD, Last Rate: 0 mL/hr at 10/12/17 0234, 500 mg at 10/14/17 2356  •  morphine injection 2 mg, 2 mg, Intravenous, Daily PRN, Nico Fung MD  •  morphine injection 2 mg, 2 mg, Intravenous, Q2H PRN, Nico Fung MD, 2 mg at 10/15/17 0616  •  ondansetron (ZOFRAN) injection 4 mg, 4 mg, Intravenous, Q6H PRN, Kel Becker MD, 4 mg at 10/15/17 0550  •  pancrelipase (Lip-Prot-Amyl) (CREON) capsule 24,000 units of lipase, 24,000 units of lipase, Oral, TID With Meals, Seven Gallego MD, 24,000 units of lipase at 10/12/17 1813  •  pantoprazole (PROTONIX) injection 40 mg, 40 mg, Intravenous, Q12H, Nico Fung MD, 40 mg at 10/15/17 0542  •  Pharmacy to Dose TPN, , Does not apply, Continuous PRN, Seven Gallego MD, 65 mg at 10/13/17 1148  •  potassium chloride 10 mEq in 100 mL IVPB, 10 mEq, Intravenous, Q1H PRN, Seven Gallego MD, Last Rate: 100 mL/hr at 10/13/17 1442, 10 mEq at 10/13/17 1442  •  promethazine (PHENERGAN) 12.5 mg in sodium chloride 0.9 % 50 mL, 12.5 mg, Intravenous, Q4H PRN, Nico Fung MD, 12.5 mg at 10/15/17 0009  •  Risaquad-2 capsule 1 capsule, 1 capsule, Oral, Daily, Kait Rao [facility], 1 capsule at 10/14/17 0920  •  sodium chloride 0.9 % flush 10 mL, 10 mL, Intracatheter, Q12H, Seven Gallego MD, 10 mL at 10/14/17 2119  •  sodium chloride 0.9 % flush 10 mL, 10 mL, Intracatheter, PRN, Seven Gallego MD, 10 mL at 10/14/17 0631  •  sodium chloride 0.9 % flush 10 mL, 10 mL, Intracatheter, Q12H, Seven Gallego MD, 10 mL at 10/14/17 0921  •  sodium chloride 0.9 % flush 10 mL, 10 mL, Intracatheter, PRN, Seven Gallego MD  •  sodium chloride 0.9 % infusion, 100 mL/hr, Intravenous, Continuous, Seven Gallego MD, Last Rate: 100 mL/hr at 10/12/17 0127, 100 mL/hr at 10/12/17 0127  •  traMADol (ULTRAM) tablet 50 mg, 50 mg, Oral, Q6H PRN,  Seven Gallego MD, 50 mg at 10/13/17 1704  •  venlafaxine XR (EFFEXOR-XR) 24 hr capsule 75 mg, 75 mg, Oral, Daily, Seven Gallego MD, 75 mg at 10/14/17 0920    amLODIPine 10 mg Oral Q24H   enoxaparin 40 mg Subcutaneous Nightly   insulin aspart 0-7 Units Subcutaneous Q6H   insulin detemir 15 Units Subcutaneous Nightly   levoFLOXacin 750 mg Intravenous Q24H   lisinopril 20 mg Oral Q24H   magnesium sulfate 4 g Intravenous Once   metoprolol tartrate 100 mg Oral Q12H   metroNIDAZOLE 500 mg Intravenous Q8H   trace minerals + multivitamin IVPB  Intravenous Once per day on Mon Wed Fri   pancrelipase (Lip-Prot-Amyl) 24,000 units of lipase Oral TID With Meals   pantoprazole 40 mg Intravenous Q12H   Risaquad-2 1 capsule Oral Daily   sodium chloride 10 mL Intracatheter Q12H   sodium chloride 10 mL Intracatheter Q12H   venlafaxine XR 75 mg Oral Daily     dextrose  •  dextrose  •  glucagon (human recombinant)  •  ipratropium-albuterol  •  magnesium sulfate **OR** magnesium sulfate **OR** magnesium sulfate  •  Morphine  •  Morphine  •  ondansetron  •  Pharmacy to Dose TPN  •  potassium chloride  •  promethazine (PHENERGAN) in NS 50 mL  •  sodium chloride  •  sodium chloride  •  traMADol    Lab Results (last 72 hours)     Procedure Component Value Units Date/Time    Triglycerides [[id number]]  (Normal) Collected:  10/12/17 0104    Specimen:  Blood Updated:  10/12/17 0834     Triglycerides 113 mg/dL     Narrative:       Triglyceride Reference Ranges:    Normal           less than 150 mg/dL  Borderline       150-199 mg/dL  High             200-499 mg/dL  Very High        greater than 499 mg/dL    POC Glucose Fingerstick [[id number]]  (Normal) Collected:  10/12/17 1036    Specimen:  Blood Updated:  10/12/17 1042     Glucose 99 mg/dL     Narrative:       Meter: [id number] : [id number] Raulito Loya    POC Glucose Fingerstick [[id number]]  (Normal) Collected:  10/12/17 1811    Specimen:  Blood Updated:  10/12/17 1836     Glucose  77 mg/dL     Narrative:       Meter: [id number] : [id number] logan cardozo    POC Glucose Fingerstick [[id number]]  (Normal) Collected:  10/12/17 2023    Specimen:  Blood Updated:  10/12/17 2048     Glucose 96 mg/dL     Narrative:       Meter: [id number] : [id number] Janelle Russell    CBC & Differential [[id number]] Collected:  10/13/17 0106    Specimen:  Blood Updated:  10/13/17 0210    Narrative:       The following orders were created for panel order CBC & Differential.  Procedure                               Abnormality         Status                     ---------                               -----------         ------                     CBC Auto Differential[[id number]]        Abnormal            Final result                 Please view results for these tests on the individual orders.    CBC Auto Differential [[id number]]  (Abnormal) Collected:  10/13/17 0106    Specimen:  Blood Updated:  10/13/17 0210     WBC 10.22 10*3/mm3      RBC 4.06 (L) 10*6/mm3      Hemoglobin 11.4 (L) g/dL      Hematocrit 35.7 (L) %      MCV 87.9 fL      MCH 28.1 pg      MCHC 31.9 (L) g/dL      RDW 13.6 %      RDW-SD 43.2 fl      MPV 10.8 (H) fL      Platelets 466 (H) 10*3/mm3      Neutrophil % 78.5 (H) %      Lymphocyte % 14.6 (L) %      Monocyte % 4.5 %      Eosinophil % 1.7 %      Basophil % 0.2 %      Immature Grans % 0.5 %      Neutrophils, Absolute 8.03 (H) 10*3/mm3      Lymphocytes, Absolute 1.49 10*3/mm3      Monocytes, Absolute 0.46 10*3/mm3      Eosinophils, Absolute 0.17 10*3/mm3      Basophils, Absolute 0.02 10*3/mm3      Immature Grans, Absolute 0.05 (H) 10*3/mm3     Phosphorus [[id number]]  (Normal) Collected:  10/13/17 0106    Specimen:  Blood Updated:  10/13/17 0234     Phosphorus 3.7 mg/dL     Lipase [[id number]]  (Normal) Collected:  10/13/17 0106    Specimen:  Blood Updated:  10/13/17 0234     Lipase 58 U/L     Amylase [[id number]]  (Abnormal) Collected:  10/13/17 0106    Specimen:  Blood Updated:  10/13/17 0236      Amylase 185 (H) U/L     Comprehensive Metabolic Panel [[id number]]  (Abnormal) Collected:  10/13/17 0106    Specimen:  Blood Updated:  10/13/17 0236     Glucose 155 (H) mg/dL      BUN 5 (L) mg/dL      Creatinine 0.47 mg/dL      Sodium 135 mmol/L      Potassium 3.2 (L) mmol/L      Chloride 101 mmol/L      CO2 23.5 (L) mmol/L      Calcium 8.7 mg/dL      Total Protein 6.4 g/dL      Albumin 3.50 g/dL      ALT (SGPT) 15 U/L      AST (SGOT) 16 U/L      Alkaline Phosphatase 125 (H) U/L       Note New Reference Ranges        Total Bilirubin 0.2 mg/dL      eGFR Non African Amer 142 mL/min/1.73      Globulin 2.9 gm/dL      A/G Ratio 1.2 (L) g/dL      BUN/Creatinine Ratio 10.6     Anion Gap 10.5 mmol/L     Magnesium [[id number]]  (Normal) Collected:  10/13/17 0106    Specimen:  Blood Updated:  10/13/17 0236     Magnesium 2.3 mg/dL     C-reactive Protein [[id number]]  (Abnormal) Collected:  10/13/17 0106    Specimen:  Blood Updated:  10/13/17 0239     C-Reactive Protein 21.14 (H) mg/dL     Osmolality, Calculated [[id number]]  (Abnormal) Collected:  10/13/17 0106    Specimen:  Blood Updated:  10/13/17 0240     Osmolality Calc 270.5 (L) mOsm/kg     POC Glucose Fingerstick [[id number]]  (Abnormal) Collected:  10/13/17 0314    Specimen:  Blood Updated:  10/13/17 0326     Glucose 162 (H) mg/dL     Narrative:       Meter: [id number] : [id number] Janelle Russell    POC Glucose Fingerstick [[id number]]  (Abnormal) Collected:  10/13/17 0732    Specimen:  Blood Updated:  10/13/17 0739     Glucose 251 (H) mg/dL     Narrative:       Meter: [id number] : [id number] tahira nye    POC Glucose Fingerstick [[id number]]  (Abnormal) Collected:  10/13/17 1127    Specimen:  Blood Updated:  10/13/17 1134     Glucose 194 (H) mg/dL     Narrative:       Meter: [id number] : [id number] Delmi Mccabe    POC Glucose Fingerstick [[id number]]  (Abnormal) Collected:  10/13/17 1717    Specimen:  Blood Updated:  10/13/17 1728     Glucose 172 (H)  mg/dL     Narrative:       Meter: [id number] : [id number] Delmi Mccabe    Potassium [[id number]]  (Normal) Collected:  10/13/17 1936    Specimen:  Blood Updated:  10/13/17 2018     Potassium 3.5 mmol/L     POC Glucose Fingerstick [[id number]]  (Abnormal) Collected:  10/13/17 2013    Specimen:  Blood Updated:  10/13/17 2053     Glucose 195 (H) mg/dL     Narrative:       Meter: [id number] : [id number] WILD DEL RIO    POC Glucose Fingerstick [[id number]]  (Abnormal) Collected:  10/14/17 0006    Specimen:  Blood Updated:  10/14/17 0015     Glucose 273 (H) mg/dL     Narrative:       Meter: [id number] : [id number] WLID DEL RIO    CBC & Differential [[id number]] Collected:  10/14/17 0129    Specimen:  Blood Updated:  10/14/17 0253    Narrative:       The following orders were created for panel order CBC & Differential.  Procedure                               Abnormality         Status                     ---------                               -----------         ------                     CBC Auto Differential[[id number]]        Abnormal            Final result                 Please view results for these tests on the individual orders.    CBC Auto Differential [[id number]]  (Abnormal) Collected:  10/14/17 0129    Specimen:  Blood Updated:  10/14/17 0253     WBC 7.67 10*3/mm3      RBC 3.95 (L) 10*6/mm3      Hemoglobin 11.1 (L) g/dL      Hematocrit 34.8 (L) %      MCV 88.1 fL      MCH 28.1 pg      MCHC 31.9 (L) g/dL      RDW 13.6 %      RDW-SD 43.0 fl      MPV 10.9 (H) fL      Platelets 417 (H) 10*3/mm3      Neutrophil % 71.0 (H) %      Lymphocyte % 20.5 (L) %      Monocyte % 5.5 %      Eosinophil % 2.5 %      Basophil % 0.1 %      Immature Grans % 0.4 %      Neutrophils, Absolute 5.45 10*3/mm3      Lymphocytes, Absolute 1.57 10*3/mm3      Monocytes, Absolute 0.42 10*3/mm3      Eosinophils, Absolute 0.19 10*3/mm3      Basophils, Absolute 0.01 10*3/mm3      Immature Grans, Absolute 0.03 10*3/mm3     Lipase  [[id number]]  (Abnormal) Collected:  10/14/17 0129    Specimen:  Blood Updated:  10/14/17 0309     Lipase 71 (H) U/L     Comprehensive Metabolic Panel [[id number]]  (Abnormal) Collected:  10/14/17 0129    Specimen:  Blood Updated:  10/14/17 0310     Glucose 253 (H) mg/dL      BUN 5 (L) mg/dL      Creatinine 0.45 mg/dL      Sodium 136 mmol/L      Potassium 3.2 (L) mmol/L      Chloride 104 mmol/L      CO2 25.4 mmol/L      Calcium 8.9 mg/dL      Total Protein 6.1 g/dL      Albumin 3.40 (L) g/dL      ALT (SGPT) 12 U/L      AST (SGOT) 16 U/L      Alkaline Phosphatase 112 (H) U/L       Note New Reference Ranges        Total Bilirubin 0.1 (L) mg/dL      eGFR Non African Amer 149 mL/min/1.73      Globulin 2.7 gm/dL      A/G Ratio 1.3 (L) g/dL      BUN/Creatinine Ratio 11.1     Anion Gap 6.6 mmol/L     Amylase [[id number]]  (Abnormal) Collected:  10/14/17 0129    Specimen:  Blood Updated:  10/14/17 0311     Amylase 213 (H) U/L     Osmolality, Calculated [[id number]]  (Normal) Collected:  10/14/17 0129    Specimen:  Blood Updated:  10/14/17 0311     Osmolality Calc 277.8 mOsm/kg     POC Glucose Fingerstick [[id number]]  (Abnormal) Collected:  10/14/17 0526    Specimen:  Blood Updated:  10/14/17 0533     Glucose 256 (H) mg/dL     Narrative:       Meter: [id number] : [id number] WILD DEL RIO    POC Glucose Fingerstick [[id number]]  (Abnormal) Collected:  10/14/17 0736    Specimen:  Blood Updated:  10/14/17 0745     Glucose 287 (H) mg/dL     Narrative:       Meter: [id number] : [id number] Delmi Mccabe    Blood Culture - Blood, [[id number]]  (Normal) Collected:  10/11/17 0808    Specimen:  Blood from Hand, Right Updated:  10/14/17 0916     Blood Culture No growth at 3 days    Blood Culture - Blood, [[id number]]  (Normal) Collected:  10/11/17 0818    Specimen:  Blood from Hand, Left Updated:  10/14/17 0916     Blood Culture No growth at 3 days    POC Glucose Fingerstick [[id number]]  (Abnormal) Collected:  10/14/17 1128     Specimen:  Blood Updated:  10/14/17 1133     Glucose 242 (H) mg/dL     Narrative:       Meter: [id number] : [id number] DelmiAmpliSense    Potassium [[id number]]  (Normal) Collected:  10/14/17 1413    Specimen:  Blood Updated:  10/14/17 1439     Potassium 4.0 mmol/L     POC Glucose Fingerstick [[id number]]  (Abnormal) Collected:  10/14/17 1709    Specimen:  Blood Updated:  10/14/17 1723     Glucose 226 (H) mg/dL     Narrative:       Meter: [id number] : [id number] DelmiAmpliSense    POC Glucose Fingerstick [[id number]]  (Abnormal) Collected:  10/14/17 2030    Specimen:  Blood Updated:  10/14/17 2045     Glucose 208 (H) mg/dL     Narrative:       Meter: [id number] : [id number] Lanc[place]    POC Glucose Fingerstick [[id number]]  (Abnormal) Collected:  10/14/17 2356    Specimen:  Blood Updated:  10/15/17 0002     Glucose 273 (H) mg/dL     Narrative:       Meter: [id number] : [id number] Lancope    Phosphorus [[id number]]  (Normal) Collected:  10/15/17 0147    Specimen:  Blood Updated:  10/15/17 0245     Phosphorus 4.2 mg/dL     Basic Metabolic Panel [[id number]]  (Abnormal) Collected:  10/15/17 0147    Specimen:  Blood Updated:  10/15/17 0247     Glucose 318 (H) mg/dL      BUN 8 mg/dL      Creatinine 0.57 mg/dL      Sodium 133 (L) mmol/L      Potassium 4.0 mmol/L      Chloride 101 mmol/L      CO2 28.2 mmol/L      Calcium 8.9 mg/dL      eGFR Non African Amer 114 mL/min/1.73      BUN/Creatinine Ratio 14.0     Anion Gap 3.8 mmol/L     Narrative:       GFR Normal >60  Chronic Kidney Disease <60  Kidney Failure <15    Magnesium [[id number]]  (Abnormal) Collected:  10/15/17 0147    Specimen:  Blood Updated:  10/15/17 0247     Magnesium 1.6 (L) mg/dL     Osmolality, Calculated [[id number]]  (Normal) Collected:  10/15/17 0147    Specimen:  Blood Updated:  10/15/17 0249     Osmolality Calc 276.9 mOsm/kg     POC Glucose Fingerstick [[id number]]  (Abnormal) Collected:  10/15/17 0540    Specimen:  Blood Updated:   10/15/17 0546     Glucose 308 (H) mg/dL     Narrative:       Meter: [id number] : [id number] WILD DEL RIO    POC Glucose Fingerstick [[id number]]  (Abnormal) Collected:  10/15/17 0743    Specimen:  Blood Updated:  10/15/17 0754     Glucose 242 (H) mg/dL     Narrative:       Meter: [id number] : [id number] humfleet dulce          Imaging Results (last 72 hours)     Procedure Component Value Units Date/Time    XR Chest 1 View [[id number]] Collected:  10/13/17 0746     Updated:  10/13/17 0804    Narrative:       XR CHEST 1 VIEW-     CLINICAL INDICATION: PICC line placement; K85.90-Acute pancreatitis  without necrosis or infection, unspecified.          COMPARISON: 10/10/2017.      TECHNIQUE: Single frontal view of the chest.     FINDINGS:     PICC line tip at the cavoatrial junction.  Trace bibasilar effusions.  There is no evidence of an acute osseous abnormality.   There are no suspicious-appearing parenchymal soft tissue nodules.            Impression:       PICC line at cavoatrial junction.  Trace bibasilar effusions.         This report was finalized on 10/13/2017 8:02 AM by Dr. Epifanio Macias MD.             Assessment/Plan    Continue pain and nausea control  Correct lytes      Active Problems:    Acute pancreatitis       See orders entered.     Nico Fung MD  10/15/17  8:28 AM

## 2017-10-15 NOTE — PLAN OF CARE
Problem: Patient Care Overview (Adult)  Goal: Plan of Care Review  Outcome: Ongoing (interventions implemented as appropriate)    Problem: Pancreatitis, Acute/Chronic (Adult)  Goal: Signs and Symptoms of Listed Potential Problems Will be Absent or Manageable (Pancreatitis, Acute/Chronic)  Outcome: Ongoing (interventions implemented as appropriate)    Problem: Diabetes, Type 2 (Adult)  Goal: Signs and Symptoms of Listed Potential Problems Will be Absent or Manageable (Diabetes, Type 2)  Outcome: Ongoing (interventions implemented as appropriate)    Problem: Pain, Acute (Adult)  Goal: Identify Related Risk Factors and Signs and Symptoms  Outcome: Ongoing (interventions implemented as appropriate)  Goal: Acceptable Pain Control/Comfort Level  Outcome: Ongoing (interventions implemented as appropriate)

## 2017-10-15 NOTE — PLAN OF CARE
Problem: Patient Care Overview (Adult)  Goal: Plan of Care Review  Outcome: Ongoing (interventions implemented as appropriate)    10/14/17 2333   Coping/Psychosocial Response Interventions   Plan Of Care Reviewed With patient   Patient Care Overview   Progress progress toward functional goals as expected       Goal: Adult Individualization and Mutuality  Outcome: Ongoing (interventions implemented as appropriate)    Problem: Pancreatitis, Acute/Chronic (Adult)  Goal: Signs and Symptoms of Listed Potential Problems Will be Absent or Manageable (Pancreatitis, Acute/Chronic)  Outcome: Ongoing (interventions implemented as appropriate)    10/14/17 2333   Pancreatitis, Acute/Chronic   Problems Assessed (Pancreatitis) all   Problems Present (Pancreatitis) impaired glycemic control;infection;nausea and vomiting         Problem: Diabetes, Type 2 (Adult)  Goal: Signs and Symptoms of Listed Potential Problems Will be Absent or Manageable (Diabetes, Type 2)    10/14/17 2333   Diabetes, Type 2   Problems Assessed (Type 2 Diabetes) all   Problems Present (Type 2 Diabetes) impaired glycemic control         Problem: Pain, Acute (Adult)  Goal: Identify Related Risk Factors and Signs and Symptoms  Outcome: Ongoing (interventions implemented as appropriate)    10/14/17 2333   Pain, Acute   Related Risk Factors (Acute Pain) infection;patient perception;persistent pain   Signs and Symptoms (Acute Pain) verbalization of pain descriptors       Goal: Acceptable Pain Control/Comfort Level  Outcome: Ongoing (interventions implemented as appropriate)    10/14/17 2333   Pain, Acute (Adult)   Acceptable Pain Control/Comfort Level making progress toward outcome

## 2017-10-16 LAB
ALBUMIN SERPL-MCNC: 3.4 G/DL (ref 3.5–5)
ALBUMIN/GLOB SERPL: 1.2 G/DL (ref 1.5–2.5)
ALP SERPL-CCNC: 113 U/L (ref 35–104)
ALT SERPL W P-5'-P-CCNC: 6 U/L (ref 10–36)
AMYLASE SERPL-CCNC: 292 U/L (ref 28–100)
AMYLASE SERPL-CCNC: 318 U/L (ref 28–100)
ANION GAP SERPL CALCULATED.3IONS-SCNC: 5.5 MMOL/L (ref 3.6–11.2)
ANION GAP SERPL CALCULATED.3IONS-SCNC: 6.2 MMOL/L (ref 3.6–11.2)
AST SERPL-CCNC: 14 U/L (ref 10–30)
BACTERIA SPEC AEROBE CULT: NORMAL
BACTERIA SPEC AEROBE CULT: NORMAL
BILIRUB SERPL-MCNC: 0.2 MG/DL (ref 0.2–1.8)
BUN BLD-MCNC: 8 MG/DL (ref 7–21)
BUN BLD-MCNC: 8 MG/DL (ref 7–21)
BUN/CREAT SERPL: 14.5 (ref 7–25)
BUN/CREAT SERPL: 15.7 (ref 7–25)
CALCIUM SPEC-SCNC: 8.5 MG/DL (ref 7.7–10)
CALCIUM SPEC-SCNC: 9.2 MG/DL (ref 7.7–10)
CHLORIDE SERPL-SCNC: 100 MMOL/L (ref 99–112)
CHLORIDE SERPL-SCNC: 101 MMOL/L (ref 99–112)
CO2 SERPL-SCNC: 28.8 MMOL/L (ref 24.3–31.9)
CO2 SERPL-SCNC: 30.5 MMOL/L (ref 24.3–31.9)
CREAT BLD-MCNC: 0.51 MG/DL (ref 0.43–1.29)
CREAT BLD-MCNC: 0.55 MG/DL (ref 0.43–1.29)
DEPRECATED RDW RBC AUTO: 42.1 FL (ref 37–54)
ERYTHROCYTE [DISTWIDTH] IN BLOOD BY AUTOMATED COUNT: 13.6 % (ref 11.5–14.5)
GFR SERPL CREATININE-BSD FRML MDRD: 118 ML/MIN/1.73
GFR SERPL CREATININE-BSD FRML MDRD: 129 ML/MIN/1.73
GLOBULIN UR ELPH-MCNC: 2.8 GM/DL
GLUCOSE BLD-MCNC: 299 MG/DL (ref 70–110)
GLUCOSE BLD-MCNC: 321 MG/DL (ref 70–110)
GLUCOSE BLDC GLUCOMTR-MCNC: 265 MG/DL (ref 70–130)
GLUCOSE BLDC GLUCOMTR-MCNC: 276 MG/DL (ref 70–130)
GLUCOSE BLDC GLUCOMTR-MCNC: 282 MG/DL (ref 70–130)
GLUCOSE BLDC GLUCOMTR-MCNC: 301 MG/DL (ref 70–130)
GLUCOSE BLDC GLUCOMTR-MCNC: 336 MG/DL (ref 70–130)
HCT VFR BLD AUTO: 37.7 % (ref 37–47)
HGB BLD-MCNC: 12 G/DL (ref 12–16)
LIPASE SERPL-CCNC: 91 U/L (ref 13–60)
MAGNESIUM SERPL-MCNC: 2.4 MG/DL (ref 1.7–2.6)
MCH RBC QN AUTO: 27.6 PG (ref 27–33)
MCHC RBC AUTO-ENTMCNC: 31.8 G/DL (ref 33–37)
MCV RBC AUTO: 86.9 FL (ref 80–94)
OSMOLALITY SERPL CALC.SUM OF ELEC: 279.6 MOSM/KG (ref 273–305)
OSMOLALITY SERPL CALC.SUM OF ELEC: 284.5 MOSM/KG (ref 273–305)
PLATELET # BLD AUTO: 357 10*3/MM3 (ref 130–400)
PMV BLD AUTO: 10.2 FL (ref 6–10)
POTASSIUM BLD-SCNC: 3.5 MMOL/L (ref 3.5–5.3)
POTASSIUM BLD-SCNC: 3.8 MMOL/L (ref 3.5–5.3)
PROT SERPL-MCNC: 6.2 G/DL (ref 6–8)
RBC # BLD AUTO: 4.34 10*6/MM3 (ref 4.2–5.4)
SODIUM BLD-SCNC: 135 MMOL/L (ref 135–153)
SODIUM BLD-SCNC: 137 MMOL/L (ref 135–153)
TRIGL SERPL-MCNC: 216 MG/DL (ref 0–150)
WBC NRBC COR # BLD: 7.47 10*3/MM3 (ref 4.5–12.5)

## 2017-10-16 PROCEDURE — 83700 LIPOPRO BLD ELECTROPHORETIC: CPT | Performed by: INTERNAL MEDICINE

## 2017-10-16 PROCEDURE — 25010000002 PROMETHAZINE PER 50 MG: Performed by: INTERNAL MEDICINE

## 2017-10-16 PROCEDURE — 80048 BASIC METABOLIC PNL TOTAL CA: CPT | Performed by: INTERNAL MEDICINE

## 2017-10-16 PROCEDURE — 83690 ASSAY OF LIPASE: CPT | Performed by: INTERNAL MEDICINE

## 2017-10-16 PROCEDURE — 85027 COMPLETE CBC AUTOMATED: CPT | Performed by: INTERNAL MEDICINE

## 2017-10-16 PROCEDURE — 63710000001 INSULIN ASPART PER 5 UNITS

## 2017-10-16 PROCEDURE — 25010000002 ENOXAPARIN PER 10 MG: Performed by: INTERNAL MEDICINE

## 2017-10-16 PROCEDURE — 99232 SBSQ HOSP IP/OBS MODERATE 35: CPT | Performed by: PHYSICIAN ASSISTANT

## 2017-10-16 PROCEDURE — 83735 ASSAY OF MAGNESIUM: CPT | Performed by: INTERNAL MEDICINE

## 2017-10-16 PROCEDURE — 63710000001 INSULIN DETEMIR PER 5 UNITS: Performed by: INTERNAL MEDICINE

## 2017-10-16 PROCEDURE — 82150 ASSAY OF AMYLASE: CPT | Performed by: INTERNAL MEDICINE

## 2017-10-16 PROCEDURE — 86038 ANTINUCLEAR ANTIBODIES: CPT | Performed by: INTERNAL MEDICINE

## 2017-10-16 PROCEDURE — 80053 COMPREHEN METABOLIC PANEL: CPT | Performed by: INTERNAL MEDICINE

## 2017-10-16 PROCEDURE — 94799 UNLISTED PULMONARY SVC/PX: CPT

## 2017-10-16 PROCEDURE — 25010000002 LEVOFLOXACIN PER 250 MG: Performed by: INTERNAL MEDICINE

## 2017-10-16 PROCEDURE — 84478 ASSAY OF TRIGLYCERIDES: CPT | Performed by: INTERNAL MEDICINE

## 2017-10-16 PROCEDURE — 80061 LIPID PANEL: CPT | Performed by: INTERNAL MEDICINE

## 2017-10-16 PROCEDURE — 25010000002 MORPHINE PER 10 MG: Performed by: INTERNAL MEDICINE

## 2017-10-16 PROCEDURE — 25010000002 ONDANSETRON PER 1 MG: Performed by: INTERNAL MEDICINE

## 2017-10-16 PROCEDURE — 82962 GLUCOSE BLOOD TEST: CPT

## 2017-10-16 RX ORDER — POTASSIUM CHLORIDE 7.45 MG/ML
10 INJECTION INTRAVENOUS
Status: DISCONTINUED | OUTPATIENT
Start: 2017-10-16 | End: 2017-10-16

## 2017-10-16 RX ADMIN — Medication 10 ML: at 20:37

## 2017-10-16 RX ADMIN — MORPHINE SULFATE 2 MG: 10 INJECTION, SOLUTION INTRAMUSCULAR; INTRAVENOUS at 17:23

## 2017-10-16 RX ADMIN — MORPHINE SULFATE 2 MG: 10 INJECTION, SOLUTION INTRAMUSCULAR; INTRAVENOUS at 15:14

## 2017-10-16 RX ADMIN — LEUCINE, PHENYLALANINE, LYSINE, METHIONINE, ISOLEUCINE, VALINE, HISTIDINE, THREONINE, TRYPTOPHAN, ALANINE, GLYCINE, ARGININE, PROLINE, SERINE, TYROSINE, SODIUM ACETATE, DIBASIC POTASSIUM PHOSPHATE, MAGNESIUM CHLORIDE, SODIUM CHLORIDE, CALCIUM CHLORIDE, DEXTROSE
365; 280; 290; 200; 300; 290; 240; 210; 90; 1035; 515; 575; 340; 250; 20; 340; 261; 51; 59; 33; 20 INJECTION INTRAVENOUS at 17:24

## 2017-10-16 RX ADMIN — MORPHINE SULFATE 2 MG: 10 INJECTION, SOLUTION INTRAMUSCULAR; INTRAVENOUS at 23:11

## 2017-10-16 RX ADMIN — PANTOPRAZOLE SODIUM 40 MG: 40 INJECTION, POWDER, FOR SOLUTION INTRAVENOUS at 08:40

## 2017-10-16 RX ADMIN — METOPROLOL TARTRATE 100 MG: 100 TABLET, FILM COATED ORAL at 08:40

## 2017-10-16 RX ADMIN — AMLODIPINE BESYLATE 10 MG: 10 TABLET ORAL at 08:39

## 2017-10-16 RX ADMIN — INSULIN ASPART 4 UNITS: 100 INJECTION, SOLUTION INTRAVENOUS; SUBCUTANEOUS at 20:51

## 2017-10-16 RX ADMIN — VENLAFAXINE HYDROCHLORIDE 75 MG: 75 CAPSULE, EXTENDED RELEASE ORAL at 08:39

## 2017-10-16 RX ADMIN — ONDANSETRON 4 MG: 2 INJECTION INTRAMUSCULAR; INTRAVENOUS at 15:22

## 2017-10-16 RX ADMIN — METOPROLOL TARTRATE 100 MG: 100 TABLET, FILM COATED ORAL at 20:36

## 2017-10-16 RX ADMIN — ASCORBIC ACID, VITAMIN A PALMITATE, CHOLECALCIFEROL, THIAMINE HYDROCHLORIDE, RIBOFLAVIN-5 PHOSPHATE SODIUM, PYRIDOXINE HYDROCHLORIDE, NIACINAMIDE, DEXPANTHENOL, ALPHA-TOCOPHEROL ACETATE, VITAMIN K1, FOLIC ACID, BIOTIN, CYANOCOBALAMIN: 200; 3300; 200; 6; 3.6; 6; 40; 15; 10; 150; 600; 60; 5 INJECTION, SOLUTION INTRAVENOUS at 11:31

## 2017-10-16 RX ADMIN — MORPHINE SULFATE 2 MG: 10 INJECTION, SOLUTION INTRAMUSCULAR; INTRAVENOUS at 20:37

## 2017-10-16 RX ADMIN — INSULIN ASPART 4 UNITS: 100 INJECTION, SOLUTION INTRAVENOUS; SUBCUTANEOUS at 11:31

## 2017-10-16 RX ADMIN — MORPHINE SULFATE 2 MG: 10 INJECTION, SOLUTION INTRAMUSCULAR; INTRAVENOUS at 08:55

## 2017-10-16 RX ADMIN — MORPHINE SULFATE 2 MG: 10 INJECTION, SOLUTION INTRAMUSCULAR; INTRAVENOUS at 11:06

## 2017-10-16 RX ADMIN — INSULIN ASPART 5 UNITS: 100 INJECTION, SOLUTION INTRAVENOUS; SUBCUTANEOUS at 06:16

## 2017-10-16 RX ADMIN — ENOXAPARIN SODIUM 40 MG: 40 INJECTION SUBCUTANEOUS at 20:36

## 2017-10-16 RX ADMIN — INSULIN ASPART 4 UNITS: 100 INJECTION, SOLUTION INTRAVENOUS; SUBCUTANEOUS at 17:24

## 2017-10-16 RX ADMIN — PROMETHAZINE HYDROCHLORIDE 12.5 MG: 25 INJECTION INTRAMUSCULAR; INTRAVENOUS at 11:07

## 2017-10-16 RX ADMIN — SODIUM CHLORIDE 50 ML/HR: 9 INJECTION, SOLUTION INTRAVENOUS at 08:38

## 2017-10-16 RX ADMIN — PROMETHAZINE HYDROCHLORIDE 12.5 MG: 25 INJECTION INTRAMUSCULAR; INTRAVENOUS at 06:24

## 2017-10-16 RX ADMIN — LEVOFLOXACIN 750 MG: 5 INJECTION, SOLUTION INTRAVENOUS at 08:38

## 2017-10-16 RX ADMIN — Medication 1 CAPSULE: at 08:41

## 2017-10-16 RX ADMIN — MORPHINE SULFATE 2 MG: 10 INJECTION, SOLUTION INTRAMUSCULAR; INTRAVENOUS at 00:40

## 2017-10-16 RX ADMIN — MORPHINE SULFATE 2 MG: 10 INJECTION, SOLUTION INTRAMUSCULAR; INTRAVENOUS at 03:26

## 2017-10-16 RX ADMIN — INSULIN DETEMIR 15 UNITS: 100 INJECTION, SOLUTION SUBCUTANEOUS at 21:00

## 2017-10-16 RX ADMIN — MORPHINE SULFATE 2 MG: 10 INJECTION, SOLUTION INTRAMUSCULAR; INTRAVENOUS at 06:14

## 2017-10-16 RX ADMIN — LISINOPRIL 20 MG: 10 TABLET ORAL at 08:39

## 2017-10-16 RX ADMIN — PROMETHAZINE HYDROCHLORIDE 12.5 MG: 25 INJECTION INTRAMUSCULAR; INTRAVENOUS at 23:20

## 2017-10-16 RX ADMIN — PANTOPRAZOLE SODIUM 40 MG: 40 INJECTION, POWDER, FOR SOLUTION INTRAVENOUS at 20:36

## 2017-10-16 NOTE — PLAN OF CARE
Problem: Patient Care Overview (Adult)  Goal: Plan of Care Review  Outcome: Ongoing (interventions implemented as appropriate)    10/14/17 2333 10/15/17 2007   Coping/Psychosocial Response Interventions   Plan Of Care Reviewed With --  patient   Patient Care Overview   Progress progress toward functional goals as expected --        Goal: Adult Individualization and Mutuality  Outcome: Ongoing (interventions implemented as appropriate)    Problem: Pancreatitis, Acute/Chronic (Adult)  Goal: Signs and Symptoms of Listed Potential Problems Will be Absent or Manageable (Pancreatitis, Acute/Chronic)  Outcome: Ongoing (interventions implemented as appropriate)    10/14/17 2333 10/15/17 2253   Pancreatitis, Acute/Chronic   Problems Assessed (Pancreatitis) --  all   Problems Present (Pancreatitis) impaired glycemic control;infection;nausea and vomiting --          Problem: Diabetes, Type 2 (Adult)  Goal: Signs and Symptoms of Listed Potential Problems Will be Absent or Manageable (Diabetes, Type 2)  Outcome: Ongoing (interventions implemented as appropriate)    10/15/17 2253   Diabetes, Type 2   Problems Assessed (Type 2 Diabetes) all   Problems Present (Type 2 Diabetes) impaired glycemic control         Problem: Pain, Acute (Adult)  Goal: Identify Related Risk Factors and Signs and Symptoms  Outcome: Ongoing (interventions implemented as appropriate)    10/14/17 2333   Pain, Acute   Related Risk Factors (Acute Pain) infection;patient perception;persistent pain   Signs and Symptoms (Acute Pain) verbalization of pain descriptors       Goal: Acceptable Pain Control/Comfort Level  Outcome: Ongoing (interventions implemented as appropriate)    10/15/17 2253   Pain, Acute (Adult)   Acceptable Pain Control/Comfort Level making progress toward outcome

## 2017-10-16 NOTE — PLAN OF CARE
Problem: Patient Care Overview (Adult)  Goal: Plan of Care Review  Outcome: Ongoing (interventions implemented as appropriate)  Goal: Adult Individualization and Mutuality  Outcome: Ongoing (interventions implemented as appropriate)    Problem: Pancreatitis, Acute/Chronic (Adult)  Goal: Signs and Symptoms of Listed Potential Problems Will be Absent or Manageable (Pancreatitis, Acute/Chronic)  Outcome: Ongoing (interventions implemented as appropriate)    Problem: Diabetes, Type 2 (Adult)  Goal: Signs and Symptoms of Listed Potential Problems Will be Absent or Manageable (Diabetes, Type 2)  Outcome: Ongoing (interventions implemented as appropriate)    Problem: Pain, Acute (Adult)  Goal: Identify Related Risk Factors and Signs and Symptoms  Outcome: Ongoing (interventions implemented as appropriate)  Goal: Acceptable Pain Control/Comfort Level  Outcome: Ongoing (interventions implemented as appropriate)

## 2017-10-16 NOTE — PROGRESS NOTES
Gastroenterology Progress Note    : 1970    Reason for follow-up: pancreatitis    Subjective     Interval History:  Rossy Boone is a 47 y.o. female with PMH of uncontrolled DM2 who was admitted to the hospital due to acute idiopathic pancreatitis on 10/10/2017. She is being followed by GI for this reason. Does admit improvement in pain and nausea over the last several days but complains of persistent and severe generalized abdominal pain. Denies current nausea, vomiting or diarrhea. Denies any obvious GI blood loss. Currently NPO except ice chips and receiving IV fluids along with Levaquin and pain control. Reports relief from abdominal pain with pain medications. Prior to the past 1 month, she was very active and considered herself to be healthy. MRCP on 10/11/2017 did not visualize pancreatic duct but showed pancreatitis. Flagyl was discontinued. History of HLD, triglycerides last elevated at 216 (not on treatment). No alcohol use. Current smoker. Was taking Creon but not now while NPO. She is s/p cholecystectomy.    Review of Systems   Constitutional: Negative for appetite change, chills, fatigue, fever and unexpected weight change.   HENT: Negative for hearing loss, mouth sores and nosebleeds.    Eyes: Negative for itching and visual disturbance.   Respiratory: Negative for cough, chest tightness, shortness of breath and wheezing.    Cardiovascular: Negative for chest pain, palpitations and leg swelling.   Gastrointestinal: Positive for abdominal pain and nausea. Negative for anal bleeding, blood in stool, constipation, diarrhea and vomiting.   Endocrine: Negative for cold intolerance, heat intolerance, polydipsia and polyuria.   Genitourinary: Negative for dysuria, frequency and hematuria.   Musculoskeletal: Negative for arthralgias, joint swelling and myalgias.   Skin: Negative for rash and wound.   Allergic/Immunologic: Negative for food allergies and immunocompromised state.   Neurological:  Negative for seizures, syncope, weakness and light-headedness.   Hematological: Negative for adenopathy. Does not bruise/bleed easily.   Psychiatric/Behavioral: Negative for confusion and sleep disturbance. The patient is not nervous/anxious.        Past medical history, surgical history, family history and social history have been reviewed and remain unchanged since initial consultation.    Objective       Current Facility-Administered Medications:   •  Adult Central Clinimix TPN, , Intravenous, Q24H (TPN), Last Rate: 65 mL/hr at 10/15/17 1840 **AND** [DISCONTINUED] fat emulsion (INTRALIPID,LIPOSYN) 20 % infusion 50 g, 250 mL, Intravenous, Once per day on Mon Wed Fri, Last Rate: 20.8 mL/hr at 10/13/17 1849, 50 g at 10/13/17 1849 **AND** multiple vitamin 10 mL, trace elements Cr-Cu-Mn-Zn 5 mL in sodium chloride 0.9 % 500 mL IVPB, , Intravenous, Once per day on Mon Wed Fri, Kait Rao, [facility], Last Rate: 128.8 mL/hr at 10/13/17 1152  •  amLODIPine (NORVASC) tablet 10 mg, 10 mg, Oral, Q24H, Seven Gallego MD, 10 mg at 10/16/17 0839  •  dextrose (D50W) solution 25 g, 25 g, Intravenous, Q15 Min PRN, Kel Becker MD  •  dextrose (GLUTOSE) oral gel 15 g, 15 g, Oral, Q15 Min PRN, Kel Becker MD  •  enoxaparin (LOVENOX) syringe 40 mg, 40 mg, Subcutaneous, Nightly, Kel Becker MD, 40 mg at 10/15/17 2013  •  glucagon (human recombinant) (GLUCAGEN DIAGNOSTIC) injection 1 mg, 1 mg, Subcutaneous, Q15 Min PRN, Kel Becker MD  •  insulin aspart (novoLOG) injection 0-7 Units, 0-7 Units, Subcutaneous, Q6H, Kait Rao, [facility], 5 Units at 10/16/17 0616  •  insulin detemir (LEVEMIR) injection 15 Units, 15 Units, Subcutaneous, Nightly, Seven Gallego MD, 15 Units at 10/15/17 2014  •  ipratropium-albuterol (DUO-NEB) nebulizer solution 3 mL, 3 mL, Nebulization, Q4H PRN, Seven Gallego MD, 3 mL at 10/11/17 2300  •  levoFLOXacin (LEVAQUIN) 750 mg/150 mL D5W (premix) (LEVAQUIN) 750 mg, 750 mg, Intravenous,  Q24H, Seven Gallego MD, Last Rate: 0 mL/hr at 10/11/17 1000, 750 mg at 10/16/17 0838  •  lisinopril (PRINIVIL,ZESTRIL) tablet 20 mg, 20 mg, Oral, Q24H, Seven Gallego MD, 20 mg at 10/16/17 0839  •  Magnesium Sulfate 2 gram Bolus, followed by 8 gram infusion (total Mg dose 10 grams)- Mg less than or equal to 1mg/dL, 2 g, Intravenous, PRN **OR** Magnesium Sulfate 6 gram Infusion (2 gm x 3) -Mg 1.1 -1.5 mg/dL, 2 g, Intravenous, PRN **OR** magnesium sulfate 4 gram infusion- Mg 1.6-1.9 mg/dL, 4 g, Intravenous, PRN, Seven Gallego MD  •  metoprolol tartrate (LOPRESSOR) tablet 100 mg, 100 mg, Oral, Q12H, Seven Gallego MD, 100 mg at 10/16/17 0840  •  morphine injection 2 mg, 2 mg, Intravenous, Daily PRN, Nico Fung MD  •  morphine injection 2 mg, 2 mg, Intravenous, Q2H PRN, Nico Fung MD, 2 mg at 10/16/17 0855  •  ondansetron (ZOFRAN) injection 4 mg, 4 mg, Intravenous, Q6H PRN, Kel Becker MD, 4 mg at 10/15/17 0550  •  pancrelipase (Lip-Prot-Amyl) (CREON) capsule 24,000 units of lipase, 24,000 units of lipase, Oral, TID With Meals, Seven Gallego MD, 24,000 units of lipase at 10/12/17 1813  •  pantoprazole (PROTONIX) injection 40 mg, 40 mg, Intravenous, Q12H, Nico Fung MD, 40 mg at 10/16/17 0840  •  Pharmacy to Dose TPN, , Does not apply, Continuous PRN, Seven Gallego MD, 65 mg at 10/13/17 1148  •  potassium chloride 10 mEq in 100 mL IVPB, 10 mEq, Intravenous, Q1H PRN, Seven Gallego MD, Last Rate: 100 mL/hr at 10/13/17 1442, 10 mEq at 10/13/17 1442  •  promethazine (PHENERGAN) 12.5 mg in sodium chloride 0.9 % 50 mL, 12.5 mg, Intravenous, Q4H PRN, Nico Fung MD, 12.5 mg at 10/16/17 0624  •  Risaquad-2 capsule 1 capsule, 1 capsule, Oral, Daily, Kait Rao, [facility], 1 capsule at 10/16/17 0841  •  sodium chloride 0.9 % flush 10 mL, 10 mL, Intracatheter, Q12H, Seven Gallego MD, 10 mL at 10/14/17 2119  •  sodium chloride 0.9 % flush 10 mL, 10 mL, Intracatheter, PRN, Seven  NEHAL Gallego MD, 10 mL at 10/14/17 0631  •  sodium chloride 0.9 % flush 10 mL, 10 mL, Intracatheter, Q12H, Seven Gallego MD, 10 mL at 10/15/17 0914  •  sodium chloride 0.9 % flush 10 mL, 10 mL, Intracatheter, PRN, Seven Gallego MD  •  sodium chloride 0.9 % infusion, 50 mL/hr, Intravenous, Continuous, Kel Becker MD, Last Rate: 50 mL/hr at 10/16/17 0838, 50 mL/hr at 10/16/17 0838  •  traMADol (ULTRAM) tablet 50 mg, 50 mg, Oral, Q6H PRN, Seven Gallego MD, 50 mg at 10/13/17 1704  •  venlafaxine XR (EFFEXOR-XR) 24 hr capsule 75 mg, 75 mg, Oral, Daily, Seven Gallego MD, 75 mg at 10/16/17 0839    Allergies:   Hydrocodone-acetaminophen; Hydromorphone; Oxycodone-acetaminophen; and Penicillins    Vital Signs:  Temp:  [98.1 °F (36.7 °C)-99.3 °F (37.4 °C)] 98.1 °F (36.7 °C)  Heart Rate:  [74-94] 75  Resp:  [16-18] 18  BP: ()/(59-74) 122/70  Body mass index is 29.26 kg/(m^2).    Intake/Output Summary (Last 24 hours) at 10/16/17 1009  Last data filed at 10/16/17 0900   Gross per 24 hour   Intake                0 ml   Output                0 ml   Net                0 ml          Physical Exam   Constitutional: She is oriented to person, place, and time. She appears well-developed and well-nourished. No distress.   HENT:   Head: Normocephalic and atraumatic.   Nose: Nose normal.   Mouth/Throat: Oropharynx is clear and moist.   Eyes: Conjunctivae are normal. Right eye exhibits no discharge. Left eye exhibits no discharge. No scleral icterus.   Neck: Normal range of motion. No JVD present.   Cardiovascular: Normal rate, regular rhythm and normal heart sounds.  Exam reveals no gallop and no friction rub.    No murmur heard.  Pulmonary/Chest: Effort normal and breath sounds normal. No respiratory distress. She has no wheezes. She has no rales. She exhibits no tenderness.   Abdominal: Soft. Bowel sounds are normal. She exhibits no mass. There is tenderness (severe and generalized even with light palpation).    Musculoskeletal: Normal range of motion. She exhibits no edema or deformity.   Neurological: She is alert and oriented to person, place, and time. Coordination normal.   Skin: Skin is warm and dry. No rash noted. She is not diaphoretic. No erythema.   Psychiatric: Her behavior is normal. Judgment and thought content normal.   Depressed affect   Vitals reviewed.       Results Review:  I reviewed the patient's new clinical results including all available labs and radiology reports.    Lab Results (last 24 hours)     Procedure Component Value Units Date/Time    POC Glucose Fingerstick [[id number]]  (Abnormal) Collected:  10/15/17 1133    Specimen:  Blood Updated:  10/15/17 1142     Glucose 258 (H) mg/dL     Narrative:       Meter: [id number] : [id number] Delmi Mccabe    POC Glucose Fingerstick [[id number]]  (Abnormal) Collected:  10/15/17 1640    Specimen:  Blood Updated:  10/15/17 1652     Glucose 277 (H) mg/dL     Narrative:       Meter: [id number] : [id number] Delmi Mccabe    Magnesium [[id number]]  (Normal) Collected:  10/15/17 1754    Specimen:  Blood Updated:  10/15/17 1826     Magnesium 1.8 mg/dL     POC Glucose Fingerstick [[id number]]  (Abnormal) Collected:  10/15/17 1951    Specimen:  Blood Updated:  10/15/17 1957     Glucose 268 (H) mg/dL     Narrative:       Meter: [id number] : [id number] WILD DEL RIO    POC Glucose Fingerstick [[id number]]  (Abnormal) Collected:  10/15/17 2352    Specimen:  Blood Updated:  10/15/17 2359     Glucose 299 (H) mg/dL     Narrative:       Meter: [id number] : [id number] WILD [place] RIO    Osmolality, Calculated [[id number]]  (Normal) Collected:  10/16/17 0123    Specimen:  Blood Updated:  10/16/17 0236     Osmolality Calc 279.6 mOsm/kg     Lipase [[id number]]  (Abnormal) Collected:  10/16/17 0123    Specimen:  Blood Updated:  10/16/17 0236     Lipase 91 (H) U/L     Basic Metabolic Panel [[id number]]  (Abnormal) Collected:  10/16/17 0123    Specimen:  Blood Updated:   10/16/17 0236     Glucose 299 (H) mg/dL      BUN 8 mg/dL      Creatinine 0.51 mg/dL      Sodium 135 mmol/L      Potassium 3.5 mmol/L      Chloride 100 mmol/L      CO2 28.8 mmol/L      Calcium 8.5 mg/dL      eGFR Non African Amer 129 mL/min/1.73      BUN/Creatinine Ratio 15.7     Anion Gap 6.2 mmol/L     Narrative:       GFR Normal >60  Chronic Kidney Disease <60  Kidney Failure <15    Amylase [[id number]]  (Abnormal) Collected:  10/16/17 0123    Specimen:  Blood Updated:  10/16/17 0304     Amylase 292 (H) U/L     Magnesium [[id number]]  (Normal) Collected:  10/16/17 0123    Specimen:  Blood Updated:  10/16/17 0304     Magnesium 2.4 mg/dL     CBC (No Diff) [[id number]]  (Abnormal) Collected:  10/16/17 0544    Specimen:  Blood Updated:  10/16/17 0556     WBC 7.47 10*3/mm3      RBC 4.34 10*6/mm3      Hemoglobin 12.0 g/dL      Hematocrit 37.7 %      MCV 86.9 fL      MCH 27.6 pg      MCHC 31.8 (L) g/dL      RDW 13.6 %      RDW-SD 42.1 fl      MPV 10.2 (H) fL      Platelets 357 10*3/mm3     Lipoprotein Phenotyping [[id number]] Collected:  10/16/17 0544    Specimen:  Blood Updated:  10/16/17 0610    Antinuclear Antibodies Direct [[id number]] Collected:  10/16/17 0544    Specimen:  Blood Updated:  10/16/17 0610    Triglycerides [[id number]]  (Abnormal) Collected:  10/16/17 0544    Specimen:  Blood Updated:  10/16/17 0624     Triglycerides 216 (H) mg/dL     Narrative:       Triglyceride Reference Ranges:    Normal           less than 150 mg/dL  Borderline       150-199 mg/dL  High             200-499 mg/dL  Very High        greater than 499 mg/dL    Comprehensive Metabolic Panel [[id number]]  (Abnormal) Collected:  10/16/17 0544    Specimen:  Blood Updated:  10/16/17 0624     Glucose 321 (H) mg/dL      BUN 8 mg/dL      Creatinine 0.55 mg/dL      Sodium 137 mmol/L      Potassium 3.8 mmol/L      Chloride 101 mmol/L      CO2 30.5 mmol/L      Calcium 9.2 mg/dL      Total Protein 6.2 g/dL      Albumin 3.40 (L) g/dL      ALT (SGPT) 6  (L) U/L      AST (SGOT) 14 U/L      Alkaline Phosphatase 113 (H) U/L       Note New Reference Ranges        Total Bilirubin 0.2 mg/dL      eGFR Non African Amer 118 mL/min/1.73      Globulin 2.8 gm/dL      A/G Ratio 1.2 (L) g/dL      BUN/Creatinine Ratio 14.5     Anion Gap 5.5 mmol/L     Osmolality, Calculated [[id number]]  (Normal) Collected:  10/16/17 0544    Specimen:  Blood Updated:  10/16/17 0624     Osmolality Calc 284.5 mOsm/kg     POC Glucose Fingerstick [[id number]]  (Abnormal) Collected:  10/16/17 0613    Specimen:  Blood Updated:  10/16/17 0629     Glucose 336 (H) mg/dL     Narrative:       Meter: [id number] : [id number] WILD [place] RIO    Amylase [[id number]]  (Abnormal) Collected:  10/16/17 0544    Specimen:  Blood Updated:  10/16/17 0633     Amylase 318 (H) U/L     POC Glucose Fingerstick [[id number]]  (Abnormal) Collected:  10/16/17 0722    Specimen:  Blood Updated:  10/16/17 0730     Glucose 282 (H) mg/dL     Narrative:       Meter: [id number] : [id number] Bertin Donis    Blood Culture - Blood, [[id number]]  (Normal) Collected:  10/11/17 0808    Specimen:  Blood from Hand, Right Updated:  10/16/17 0916     Blood Culture No growth at 5 days    Blood Culture - Blood, [[id number]]  (Normal) Collected:  10/11/17 0818    Specimen:  Blood from Hand, Left Updated:  10/16/17 0916     Blood Culture No growth at 5 days          Imaging Results (last 72 hours)     ** No results found for the last 72 hours. **            Assessment/Plan     Assessment:  Active Problems:    Acute pancreatitis    Elevated lipase, improved    Leukocytosis, resolved    Elevated triglycerides    Nausea and vomiting, resolved    Abdominal pain and tenderness, generalized      Plan:  · I would consider enteral nutrition if not expecting to eat within the next few days. Abdominal pain still severe. Hopefully start clear liquids by mouth soon. She will need management of elevated triglycerides. Continue Creon with all food intake.    · Continue pain management and IV fluids.   · GI will continue to monitor.     I discussed the patients findings and my recommendations with patient and nursing staff.      Sandhya Phillip PA-C      Electronically signed 10/16/2017 at 12:37 PM.

## 2017-10-16 NOTE — PROGRESS NOTES
Discharge Planning Assessment   [place]     Patient Name: Rossy Boone  MRN: [id number]  Today's Date: 10/16/2017    Admit Date: 10/10/2017          Discharge Plan       10/16/17 1378    Case Management/Social Work Plan    Plan SS spoke to pt on this date. Pt states spouse is working on applying for Medicaid. Pt lives with spouse and plans to return home at discharge. Pt does not utilize home health services or DME. SS to follow.           Expected Discharge Date and Time     Expected Discharge Date Expected Discharge Time    Oct 16, 2017         Linnea Russell

## 2017-10-16 NOTE — PROGRESS NOTES
LOS: 6 days   Interval History: Awake and alert. MRCP noted. Continued epigastric pain and nausea. Amylase and lipase elevated. Flagyl can occ cause pancreatitis,so will d/c. Will continue symptomatic treatment . She may need biliary manometry in the future.      History taken from: patient chart    Review of Systems:     Review of Systems - Negative except present illness    Objective     Vital Signs  Temp:  [98.1 °F (36.7 °C)-99.3 °F (37.4 °C)] 98.7 °F (37.1 °C)  Heart Rate:  [75-94] 75  Resp:  [16-18] 18  BP: ()/(59-74) 118/74    Physical Exam:  HEENT; Neg  CHEST:Clear  HEART:RRR  ABD: Tender epigastrium            Results Review:     I reviewed the patient's new clinical results  : See Below    Medication Review:     Current Facility-Administered Medications:   •  Adult Central Clinimix TPN, , Intravenous, Q24H (TPN), Last Rate: 65 mL/hr at 10/15/17 1840 **AND** [DISCONTINUED] fat emulsion (INTRALIPID,LIPOSYN) 20 % infusion 50 g, 250 mL, Intravenous, Once per day on Mon Wed Fri, Last Rate: 20.8 mL/hr at 10/13/17 1849, 50 g at 10/13/17 1849 **AND** multiple vitamin 10 mL, trace elements Cr-Cu-Mn-Zn 5 mL in sodium chloride 0.9 % 500 mL IVPB, , Intravenous, Once per day on Mon Wed Fri, Kait Rao [facility], Last Rate: 128.8 mL/hr at 10/13/17 1152  •  amLODIPine (NORVASC) tablet 10 mg, 10 mg, Oral, Q24H, Seven Gallego MD, 10 mg at 10/15/17 0914  •  dextrose (D50W) solution 25 g, 25 g, Intravenous, Q15 Min PRN, Kel Becker MD  •  dextrose (GLUTOSE) oral gel 15 g, 15 g, Oral, Q15 Min PRN, Kel Becker MD  •  enoxaparin (LOVENOX) syringe 40 mg, 40 mg, Subcutaneous, Nightly, Kel Becker MD, 40 mg at 10/15/17 2013  •  glucagon (human recombinant) (GLUCAGEN DIAGNOSTIC) injection 1 mg, 1 mg, Subcutaneous, Q15 Min PRN, Kel Becker MD  •  insulin aspart (novoLOG) injection 0-7 Units, 0-7 Units, Subcutaneous, Q6H, Kait Rao [facility], 4 Units at 10/15/17 3479  •  insulin detemir (LEVEMIR)  injection 15 Units, 15 Units, Subcutaneous, Nightly, Seven Gallego MD, 15 Units at 10/15/17 2014  •  ipratropium-albuterol (DUO-NEB) nebulizer solution 3 mL, 3 mL, Nebulization, Q4H PRN, Seven Gallego MD, 3 mL at 10/11/17 2300  •  levoFLOXacin (LEVAQUIN) 750 mg/150 mL D5W (premix) (LEVAQUIN) 750 mg, 750 mg, Intravenous, Q24H, Seven Gallego MD, Last Rate: 0 mL/hr at 10/11/17 1000, 750 mg at 10/15/17 1029  •  lisinopril (PRINIVIL,ZESTRIL) tablet 20 mg, 20 mg, Oral, Q24H, Seven Gallego MD, 20 mg at 10/15/17 0914  •  Magnesium Sulfate 2 gram Bolus, followed by 8 gram infusion (total Mg dose 10 grams)- Mg less than or equal to 1mg/dL, 2 g, Intravenous, PRN **OR** Magnesium Sulfate 6 gram Infusion (2 gm x 3) -Mg 1.1 -1.5 mg/dL, 2 g, Intravenous, PRN **OR** magnesium sulfate 4 gram infusion- Mg 1.6-1.9 mg/dL, 4 g, Intravenous, PRN, Seven Gallego MD  •  metoprolol tartrate (LOPRESSOR) tablet 100 mg, 100 mg, Oral, Q12H, Seven Gallego MD, 100 mg at 10/15/17 2013  •  morphine injection 2 mg, 2 mg, Intravenous, Daily PRN, Nico Fung MD  •  morphine injection 2 mg, 2 mg, Intravenous, Q2H PRN, Nico Fung MD, 2 mg at 10/16/17 0326  •  ondansetron (ZOFRAN) injection 4 mg, 4 mg, Intravenous, Q6H PRN, Kel Becker MD, 4 mg at 10/15/17 0550  •  pancrelipase (Lip-Prot-Amyl) (CREON) capsule 24,000 units of lipase, 24,000 units of lipase, Oral, TID With Meals, Seven Gallego MD, 24,000 units of lipase at 10/12/17 1813  •  pantoprazole (PROTONIX) injection 40 mg, 40 mg, Intravenous, Q12H, Nico Fung MD, 40 mg at 10/15/17 2013  •  Pharmacy to Dose TPN, , Does not apply, Continuous PRN, Seven Gallego MD, 65 mg at 10/13/17 1148  •  potassium chloride 10 mEq in 100 mL IVPB, 10 mEq, Intravenous, Q1H PRN, Seven Gallego MD, Last Rate: 100 mL/hr at 10/13/17 1442, 10 mEq at 10/13/17 1442  •  promethazine (PHENERGAN) 12.5 mg in sodium chloride 0.9 % 50 mL, 12.5 mg, Intravenous, Q4H PRN, Nico MCCLURE  MD [place], 12.5 mg at 10/15/17 1756  •  Risaquad-2 capsule 1 capsule, 1 capsule, Oral, Daily, Kait Rao, [facility], 1 capsule at 10/15/17 0914  •  sodium chloride 0.9 % flush 10 mL, 10 mL, Intracatheter, Q12H, Seven Gallego MD, 10 mL at 10/14/17 2119  •  sodium chloride 0.9 % flush 10 mL, 10 mL, Intracatheter, PRN, Seven Gallego MD, 10 mL at 10/14/17 0631  •  sodium chloride 0.9 % flush 10 mL, 10 mL, Intracatheter, Q12H, Seven Gallego MD, 10 mL at 10/15/17 0914  •  sodium chloride 0.9 % flush 10 mL, 10 mL, Intracatheter, PRN, Seven Gallego MD  •  sodium chloride 0.9 % infusion, 50 mL/hr, Intravenous, Continuous, Kel Becker MD, Last Rate: 100 mL/hr at 10/12/17 0127, 100 mL/hr at 10/12/17 0127  •  traMADol (ULTRAM) tablet 50 mg, 50 mg, Oral, Q6H PRN, Seven Gallego MD, 50 mg at 10/13/17 1704  •  venlafaxine XR (EFFEXOR-XR) 24 hr capsule 75 mg, 75 mg, Oral, Daily, Seven Gallego MD, 75 mg at 10/15/17 0914    amLODIPine 10 mg Oral Q24H   enoxaparin 40 mg Subcutaneous Nightly   insulin aspart 0-7 Units Subcutaneous Q6H   insulin detemir 15 Units Subcutaneous Nightly   levoFLOXacin 750 mg Intravenous Q24H   lisinopril 20 mg Oral Q24H   metoprolol tartrate 100 mg Oral Q12H   trace minerals + multivitamin IVPB  Intravenous Once per day on Mon Wed Fri   pancrelipase (Lip-Prot-Amyl) 24,000 units of lipase Oral TID With Meals   pantoprazole 40 mg Intravenous Q12H   Risaquad-2 1 capsule Oral Daily   sodium chloride 10 mL Intracatheter Q12H   sodium chloride 10 mL Intracatheter Q12H   venlafaxine XR 75 mg Oral Daily     dextrose  •  dextrose  •  glucagon (human recombinant)  •  ipratropium-albuterol  •  magnesium sulfate **OR** magnesium sulfate **OR** magnesium sulfate  •  Morphine  •  Morphine  •  ondansetron  •  Pharmacy to Dose TPN  •  potassium chloride  •  promethazine (PHENERGAN) in NS 50 mL  •  sodium chloride  •  sodium chloride  •  traMADol      Results from last 7 days  Lab Units  10/14/17  0129 10/13/17  0106 10/12/17  0104 10/10/17  1900 10/10/17  1305   WBC 10*3/mm3 7.67 10.22 13.42* 19.67* 16.87*   HEMOGLOBIN g/dL 11.1* 11.4* 11.3* 13.7 14.6   PLATELETS 10*3/mm3 417* 466* 513* 645* 814*       Results from last 7 days  Lab Units 10/13/17  0106   CRP mg/dL 21.14*       Results from last 7 days  Lab Units 10/16/17  0123 10/15/17  0147 10/14/17  1413 10/14/17  0129 10/13/17  1936 10/13/17  0106 10/12/17  0104 10/10/17  1900 10/10/17  1305   SODIUM mmol/L 135 133*  --  136  --  135 134* 140 141   POTASSIUM mmol/L 3.5 4.0 4.0 3.2* 3.5 3.2* 3.7 4.0 4.3   CHLORIDE mmol/L 100 101  --  104  --  101 103 108 104   CO2 mmol/L 28.8 28.2  --  25.4  --  23.5* 24.6 22.8* 26.7   BUN mg/dL 8 8  --  5*  --  5* 8 8 9   CREATININE mg/dL 0.51 0.57  --  0.45  --  0.47 0.60 0.67 0.86   CALCIUM mg/dL 8.5 8.9  --  8.9  --  8.7 8.9 9.5 10.5*   GLUCOSE mg/dL 299* 318*  --  253*  --  155* 96 220* 268*       Results from last 7 days  Lab Units 10/16/17  0123 10/15/17  1754 10/15/17  0147 10/13/17  0106 10/12/17  0104   MAGNESIUM mg/dL 2.4 1.8 1.6* 2.3 1.6*     No results found for: HGBA1C    Results from last 7 days  Lab Units 10/14/17  0129 10/13/17  0106 10/12/17  0104 10/10/17  1900 10/10/17  1305   BILIRUBIN mg/dL 0.1* 0.2 0.4 0.4 0.5   ALK PHOS U/L 112* 125* 123* 173* 221*   AST (SGOT) U/L 16 16 13 22 26   ALT (SGPT) U/L 12 15 16 21 20                       Imaging Results (last 72 hours)     Procedure Component Value Units Date/Time    XR Chest 1 View [[id number]] Collected:  10/13/17 0746     Updated:  10/13/17 0804    Narrative:       XR CHEST 1 VIEW-     CLINICAL INDICATION: PICC line placement; K85.90-Acute pancreatitis  without necrosis or infection, unspecified.          COMPARISON: 10/10/2017.      TECHNIQUE: Single frontal view of the chest.     FINDINGS:     PICC line tip at the cavoatrial junction.  Trace bibasilar effusions.  There is no evidence of an acute osseous abnormality.   There are no  suspicious-appearing parenchymal soft tissue nodules.            Impression:       PICC line at cavoatrial junction.  Trace bibasilar effusions.         This report was finalized on 10/13/2017 8:02 AM by Dr. Epifanio Macias MD.             Assessment/Plan    Active Problems:    Acute pancreatitis         See orders entered.     Kel Becker MD  10/16/17  5:37 AM

## 2017-10-17 LAB
ANA SER QL: NEGATIVE
ANION GAP SERPL CALCULATED.3IONS-SCNC: 7.8 MMOL/L (ref 3.6–11.2)
BUN BLD-MCNC: 6 MG/DL (ref 7–21)
BUN/CREAT SERPL: 11.1 (ref 7–25)
CALCIUM SPEC-SCNC: 9 MG/DL (ref 7.7–10)
CHLORIDE SERPL-SCNC: 103 MMOL/L (ref 99–112)
CO2 SERPL-SCNC: 27.2 MMOL/L (ref 24.3–31.9)
CREAT BLD-MCNC: 0.54 MG/DL (ref 0.43–1.29)
GFR SERPL CREATININE-BSD FRML MDRD: 121 ML/MIN/1.73
GLUCOSE BLD-MCNC: 312 MG/DL (ref 70–110)
GLUCOSE BLDC GLUCOMTR-MCNC: 253 MG/DL (ref 70–130)
GLUCOSE BLDC GLUCOMTR-MCNC: 272 MG/DL (ref 70–130)
GLUCOSE BLDC GLUCOMTR-MCNC: 284 MG/DL (ref 70–130)
GLUCOSE BLDC GLUCOMTR-MCNC: 293 MG/DL (ref 70–130)
GLUCOSE BLDC GLUCOMTR-MCNC: 297 MG/DL (ref 70–130)
LIPASE SERPL-CCNC: 92 U/L (ref 13–60)
MAGNESIUM SERPL-MCNC: 1.8 MG/DL (ref 1.7–2.6)
OSMOLALITY SERPL CALC.SUM OF ELEC: 285.2 MOSM/KG (ref 273–305)
PHOSPHATE SERPL-MCNC: 3.3 MG/DL (ref 2.7–4.5)
POTASSIUM BLD-SCNC: 3.8 MMOL/L (ref 3.5–5.3)
SODIUM BLD-SCNC: 138 MMOL/L (ref 135–153)

## 2017-10-17 PROCEDURE — 25010000002 LEVOFLOXACIN PER 250 MG: Performed by: INTERNAL MEDICINE

## 2017-10-17 PROCEDURE — 83690 ASSAY OF LIPASE: CPT | Performed by: INTERNAL MEDICINE

## 2017-10-17 PROCEDURE — 99232 SBSQ HOSP IP/OBS MODERATE 35: CPT | Performed by: PHYSICIAN ASSISTANT

## 2017-10-17 PROCEDURE — 63710000001 INSULIN DETEMIR PER 5 UNITS: Performed by: INTERNAL MEDICINE

## 2017-10-17 PROCEDURE — 25010000002 MORPHINE PER 10 MG: Performed by: INTERNAL MEDICINE

## 2017-10-17 PROCEDURE — 63710000001 INSULIN ASPART PER 5 UNITS

## 2017-10-17 PROCEDURE — 25010000002 PROMETHAZINE PER 50 MG: Performed by: INTERNAL MEDICINE

## 2017-10-17 PROCEDURE — 94799 UNLISTED PULMONARY SVC/PX: CPT

## 2017-10-17 PROCEDURE — 25010000002 ENOXAPARIN PER 10 MG: Performed by: INTERNAL MEDICINE

## 2017-10-17 PROCEDURE — 84100 ASSAY OF PHOSPHORUS: CPT

## 2017-10-17 PROCEDURE — 82962 GLUCOSE BLOOD TEST: CPT

## 2017-10-17 PROCEDURE — 83735 ASSAY OF MAGNESIUM: CPT

## 2017-10-17 PROCEDURE — 25010000002 ONDANSETRON PER 1 MG: Performed by: INTERNAL MEDICINE

## 2017-10-17 PROCEDURE — 80048 BASIC METABOLIC PNL TOTAL CA: CPT

## 2017-10-17 RX ORDER — MAGNESIUM SULFATE HEPTAHYDRATE 40 MG/ML
4 INJECTION, SOLUTION INTRAVENOUS ONCE
Status: COMPLETED | OUTPATIENT
Start: 2017-10-17 | End: 2017-10-17

## 2017-10-17 RX ADMIN — MORPHINE SULFATE 2 MG: 10 INJECTION, SOLUTION INTRAMUSCULAR; INTRAVENOUS at 06:18

## 2017-10-17 RX ADMIN — PROMETHAZINE HYDROCHLORIDE 12.5 MG: 25 INJECTION INTRAMUSCULAR; INTRAVENOUS at 13:47

## 2017-10-17 RX ADMIN — MORPHINE SULFATE 2 MG: 10 INJECTION, SOLUTION INTRAMUSCULAR; INTRAVENOUS at 13:46

## 2017-10-17 RX ADMIN — INSULIN DETEMIR 15 UNITS: 100 INJECTION, SOLUTION SUBCUTANEOUS at 21:00

## 2017-10-17 RX ADMIN — MORPHINE SULFATE 2 MG: 10 INJECTION, SOLUTION INTRAMUSCULAR; INTRAVENOUS at 22:31

## 2017-10-17 RX ADMIN — ONDANSETRON 4 MG: 2 INJECTION INTRAMUSCULAR; INTRAVENOUS at 10:59

## 2017-10-17 RX ADMIN — PROMETHAZINE HYDROCHLORIDE 12.5 MG: 25 INJECTION INTRAMUSCULAR; INTRAVENOUS at 08:04

## 2017-10-17 RX ADMIN — LEVOFLOXACIN 750 MG: 5 INJECTION, SOLUTION INTRAVENOUS at 08:39

## 2017-10-17 RX ADMIN — INSULIN ASPART 4 UNITS: 100 INJECTION, SOLUTION INTRAVENOUS; SUBCUTANEOUS at 06:28

## 2017-10-17 RX ADMIN — LEUCINE, PHENYLALANINE, LYSINE, METHIONINE, ISOLEUCINE, VALINE, HISTIDINE, THREONINE, TRYPTOPHAN, ALANINE, GLYCINE, ARGININE, PROLINE, SERINE, TYROSINE, SODIUM ACETATE, DIBASIC POTASSIUM PHOSPHATE, MAGNESIUM CHLORIDE, SODIUM CHLORIDE, CALCIUM CHLORIDE, DEXTROSE
365; 280; 290; 200; 300; 290; 240; 210; 90; 1035; 515; 575; 340; 250; 20; 340; 261; 51; 59; 33; 20 INJECTION INTRAVENOUS at 18:27

## 2017-10-17 RX ADMIN — SODIUM CHLORIDE 50 ML/HR: 9 INJECTION, SOLUTION INTRAVENOUS at 12:52

## 2017-10-17 RX ADMIN — MORPHINE SULFATE 2 MG: 10 INJECTION, SOLUTION INTRAMUSCULAR; INTRAVENOUS at 18:08

## 2017-10-17 RX ADMIN — MAGNESIUM SULFATE HEPTAHYDRATE 4 G: 40 INJECTION, SOLUTION INTRAVENOUS at 05:59

## 2017-10-17 RX ADMIN — METOPROLOL TARTRATE 100 MG: 100 TABLET, FILM COATED ORAL at 08:09

## 2017-10-17 RX ADMIN — PROMETHAZINE HYDROCHLORIDE 12.5 MG: 25 INJECTION INTRAMUSCULAR; INTRAVENOUS at 18:08

## 2017-10-17 RX ADMIN — MORPHINE SULFATE 2 MG: 10 INJECTION, SOLUTION INTRAMUSCULAR; INTRAVENOUS at 04:35

## 2017-10-17 RX ADMIN — MORPHINE SULFATE 2 MG: 10 INJECTION, SOLUTION INTRAMUSCULAR; INTRAVENOUS at 20:07

## 2017-10-17 RX ADMIN — MORPHINE SULFATE 2 MG: 10 INJECTION, SOLUTION INTRAMUSCULAR; INTRAVENOUS at 08:39

## 2017-10-17 RX ADMIN — VENLAFAXINE HYDROCHLORIDE 75 MG: 75 CAPSULE, EXTENDED RELEASE ORAL at 08:10

## 2017-10-17 RX ADMIN — Medication 1 CAPSULE: at 08:10

## 2017-10-17 RX ADMIN — INSULIN ASPART 4 UNITS: 100 INJECTION, SOLUTION INTRAVENOUS; SUBCUTANEOUS at 01:39

## 2017-10-17 RX ADMIN — INSULIN ASPART 6 UNITS: 100 INJECTION, SOLUTION INTRAVENOUS; SUBCUTANEOUS at 18:08

## 2017-10-17 RX ADMIN — AMLODIPINE BESYLATE 10 MG: 10 TABLET ORAL at 08:10

## 2017-10-17 RX ADMIN — Medication 10 ML: at 20:08

## 2017-10-17 RX ADMIN — MORPHINE SULFATE 2 MG: 10 INJECTION, SOLUTION INTRAMUSCULAR; INTRAVENOUS at 15:54

## 2017-10-17 RX ADMIN — ENOXAPARIN SODIUM 40 MG: 40 INJECTION SUBCUTANEOUS at 20:07

## 2017-10-17 RX ADMIN — METOPROLOL TARTRATE 100 MG: 100 TABLET, FILM COATED ORAL at 20:07

## 2017-10-17 RX ADMIN — PANTOPRAZOLE SODIUM 40 MG: 40 INJECTION, POWDER, FOR SOLUTION INTRAVENOUS at 20:07

## 2017-10-17 RX ADMIN — LISINOPRIL 20 MG: 10 TABLET ORAL at 08:10

## 2017-10-17 RX ADMIN — MORPHINE SULFATE 2 MG: 10 INJECTION, SOLUTION INTRAMUSCULAR; INTRAVENOUS at 10:51

## 2017-10-17 RX ADMIN — MORPHINE SULFATE 2 MG: 10 INJECTION, SOLUTION INTRAMUSCULAR; INTRAVENOUS at 01:39

## 2017-10-17 RX ADMIN — INSULIN ASPART 6 UNITS: 100 INJECTION, SOLUTION INTRAVENOUS; SUBCUTANEOUS at 21:33

## 2017-10-17 RX ADMIN — INSULIN ASPART 6 UNITS: 100 INJECTION, SOLUTION INTRAVENOUS; SUBCUTANEOUS at 12:51

## 2017-10-17 RX ADMIN — PANTOPRAZOLE SODIUM 40 MG: 40 INJECTION, POWDER, FOR SOLUTION INTRAVENOUS at 08:10

## 2017-10-17 NOTE — PLAN OF CARE
Problem: Patient Care Overview (Adult)  Goal: Plan of Care Review  Outcome: Ongoing (interventions implemented as appropriate)  Goal: Discharge Needs Assessment  Outcome: Ongoing (interventions implemented as appropriate)    Problem: Pancreatitis, Acute/Chronic (Adult)  Goal: Signs and Symptoms of Listed Potential Problems Will be Absent or Manageable (Pancreatitis, Acute/Chronic)  Outcome: Ongoing (interventions implemented as appropriate)    Problem: Diabetes, Type 2 (Adult)  Goal: Signs and Symptoms of Listed Potential Problems Will be Absent or Manageable (Diabetes, Type 2)  Outcome: Ongoing (interventions implemented as appropriate)    Problem: Pain, Acute (Adult)  Goal: Identify Related Risk Factors and Signs and Symptoms  Outcome: Ongoing (interventions implemented as appropriate)  Goal: Acceptable Pain Control/Comfort Level  Outcome: Ongoing (interventions implemented as appropriate)

## 2017-10-17 NOTE — PROGRESS NOTES
LOS: 7 days   Interval History: Awake and alert. Continued epigastric pain. Tolerating TPN/. Triglycerides elevated. No nausea or vomiting. No other reported changes.      History taken from: patient chart    Review of Systems:     Review of Systems - Negative except present illness    Objective     Vital Signs  Temp:  [98.1 °F (36.7 °C)-98.6 °F (37 °C)] 98.6 °F (37 °C)  Heart Rate:  [74-84] 84  Resp:  [18-20] 18  BP: (108-129)/(70-74) 129/74    Physical Exam:  HEENT; Neg  CHEST:Clear  HEART:RRR  ABD: Tender epigastrium              Results Review:     I reviewed the patient's new clinical results  : See Below    Medication Review:     Current Facility-Administered Medications:   •  Adult Central Clinimix TPN, , Intravenous, Q24H (TPN), Last Rate: 65 mL/hr at 10/16/17 1724 **AND** [DISCONTINUED] fat emulsion (INTRALIPID,LIPOSYN) 20 % infusion 50 g, 250 mL, Intravenous, Once per day on Mon Wed Fri, Last Rate: 20.8 mL/hr at 10/13/17 1849, 50 g at 10/13/17 1849 **AND** multiple vitamin 10 mL, trace elements Cr-Cu-Mn-Zn 5 mL in sodium chloride 0.9 % 500 mL IVPB, , Intravenous, Once per day on Mon Wed Fri, Kait Rao [facility], Last Rate: 128.8 mL/hr at 10/16/17 1131  •  amLODIPine (NORVASC) tablet 10 mg, 10 mg, Oral, Q24H, Seven Gallego MD, 10 mg at 10/16/17 0839  •  dextrose (D50W) solution 25 g, 25 g, Intravenous, Q15 Min PRN, Kel Becker MD  •  dextrose (GLUTOSE) oral gel 15 g, 15 g, Oral, Q15 Min PRN, Kel Becker MD  •  enoxaparin (LOVENOX) syringe 40 mg, 40 mg, Subcutaneous, Nightly, Kel Becker MD, 40 mg at 10/16/17 2036  •  glucagon (human recombinant) (GLUCAGEN DIAGNOSTIC) injection 1 mg, 1 mg, Subcutaneous, Q15 Min PRN, Kel Becker MD  •  insulin aspart (novoLOG) injection 0-7 Units, 0-7 Units, Subcutaneous, Q6H, Kait Rao [facility], 4 Units at 10/17/17 0139  •  insulin detemir (LEVEMIR) injection 15 Units, 15 Units, Subcutaneous, Nightly, Seven Gallego MD, 15 Units at 10/16/17  2100  •  ipratropium-albuterol (DUO-NEB) nebulizer solution 3 mL, 3 mL, Nebulization, Q4H PRN, Seven Gallego MD, 3 mL at 10/11/17 2300  •  levoFLOXacin (LEVAQUIN) 750 mg/150 mL D5W (premix) (LEVAQUIN) 750 mg, 750 mg, Intravenous, Q24H, Seven Gallego MD, Last Rate: 0 mL/hr at 10/11/17 1000, 750 mg at 10/16/17 0838  •  lisinopril (PRINIVIL,ZESTRIL) tablet 20 mg, 20 mg, Oral, Q24H, Seven Gallego MD, 20 mg at 10/16/17 0839  •  Magnesium Sulfate 2 gram Bolus, followed by 8 gram infusion (total Mg dose 10 grams)- Mg less than or equal to 1mg/dL, 2 g, Intravenous, PRN **OR** Magnesium Sulfate 6 gram Infusion (2 gm x 3) -Mg 1.1 -1.5 mg/dL, 2 g, Intravenous, PRN **OR** magnesium sulfate 4 gram infusion- Mg 1.6-1.9 mg/dL, 4 g, Intravenous, PRN, Seven Gallego MD  •  magnesium sulfate 4 gram infusion- Mg 1.6-1.9 mg/dL, 4 g, Intravenous, Once, Seven Gallego MD  •  metoprolol tartrate (LOPRESSOR) tablet 100 mg, 100 mg, Oral, Q12H, Seven Gallego MD, 100 mg at 10/16/17 2036  •  morphine injection 2 mg, 2 mg, Intravenous, Daily PRN, Nico Fung MD  •  morphine injection 2 mg, 2 mg, Intravenous, Q2H PRN, Nico Fung MD, 2 mg at 10/17/17 0435  •  ondansetron (ZOFRAN) injection 4 mg, 4 mg, Intravenous, Q6H PRN, Kel Becker MD, 4 mg at 10/16/17 1522  •  pancrelipase (Lip-Prot-Amyl) (CREON) capsule 24,000 units of lipase, 24,000 units of lipase, Oral, TID With Meals, Seven Gallego MD, 24,000 units of lipase at 10/12/17 1813  •  pantoprazole (PROTONIX) injection 40 mg, 40 mg, Intravenous, Q12H, Nico Fung MD, 40 mg at 10/16/17 2036  •  Pharmacy to Dose TPN, , Does not apply, Continuous PRN, Seven Gallego MD, 65 mg at 10/13/17 1148  •  potassium chloride 10 mEq in 100 mL IVPB, 10 mEq, Intravenous, Q1H PRN, Seven Gallego MD, Last Rate: 100 mL/hr at 10/13/17 1442, 10 mEq at 10/13/17 1442  •  promethazine (PHENERGAN) 12.5 mg in sodium chloride 0.9 % 50 mL, 12.5 mg, Intravenous, Q4H PRN, Nico  REN Fung MD, 12.5 mg at 10/16/17 2320  •  Risaquad-2 capsule 1 capsule, 1 capsule, Oral, Daily, Kait Rao, [facility], 1 capsule at 10/16/17 0841  •  sodium chloride 0.9 % flush 10 mL, 10 mL, Intracatheter, Q12H, Seven Gallego MD, 10 mL at 10/16/17 2037  •  sodium chloride 0.9 % flush 10 mL, 10 mL, Intracatheter, PRN, Seven Gallego MD, 10 mL at 10/14/17 0631  •  sodium chloride 0.9 % flush 10 mL, 10 mL, Intracatheter, Q12H, Seven Gallego MD, 10 mL at 10/16/17 2037  •  sodium chloride 0.9 % flush 10 mL, 10 mL, Intracatheter, PRN, Seven Gallego MD  •  sodium chloride 0.9 % infusion, 50 mL/hr, Intravenous, Continuous, Kel Becker MD, Last Rate: 50 mL/hr at 10/16/17 0838, 50 mL/hr at 10/16/17 0838  •  traMADol (ULTRAM) tablet 50 mg, 50 mg, Oral, Q6H PRN, Seven Gallego MD, 50 mg at 10/13/17 1704  •  venlafaxine XR (EFFEXOR-XR) 24 hr capsule 75 mg, 75 mg, Oral, Daily, Seven aGllego MD, 75 mg at 10/16/17 0839    amLODIPine 10 mg Oral Q24H   enoxaparin 40 mg Subcutaneous Nightly   insulin aspart 0-7 Units Subcutaneous Q6H   insulin detemir 15 Units Subcutaneous Nightly   levoFLOXacin 750 mg Intravenous Q24H   lisinopril 20 mg Oral Q24H   magnesium sulfate 4 g Intravenous Once   metoprolol tartrate 100 mg Oral Q12H   trace minerals + multivitamin IVPB  Intravenous Once per day on Mon Wed Fri   pancrelipase (Lip-Prot-Amyl) 24,000 units of lipase Oral TID With Meals   pantoprazole 40 mg Intravenous Q12H   Risaquad-2 1 capsule Oral Daily   sodium chloride 10 mL Intracatheter Q12H   sodium chloride 10 mL Intracatheter Q12H   venlafaxine XR 75 mg Oral Daily     dextrose  •  dextrose  •  glucagon (human recombinant)  •  ipratropium-albuterol  •  magnesium sulfate **OR** magnesium sulfate **OR** magnesium sulfate  •  Morphine  •  Morphine  •  ondansetron  •  Pharmacy to Dose TPN  •  potassium chloride  •  promethazine (PHENERGAN) in NS 50 mL  •  sodium chloride  •  sodium chloride  •  traMADol      Results  from last 7 days  Lab Units 10/16/17  0544 10/14/17  0129 10/13/17  0106 10/12/17  0104 10/10/17  1900 10/10/17  1305   WBC 10*3/mm3 7.47 7.67 10.22 13.42* 19.67* 16.87*   HEMOGLOBIN g/dL 12.0 11.1* 11.4* 11.3* 13.7 14.6   PLATELETS 10*3/mm3 357 417* 466* 513* 645* 814*       Results from last 7 days  Lab Units 10/13/17  0106   CRP mg/dL 21.14*       Results from last 7 days  Lab Units 10/17/17  0042 10/16/17  0544 10/16/17  0123 10/15/17  0147 10/14/17  1413 10/14/17  0129 10/13/17  1936 10/13/17  0106 10/12/17  0104   SODIUM mmol/L 138 137 135 133*  --  136  --  135 134*   POTASSIUM mmol/L 3.8 3.8 3.5 4.0 4.0 3.2* 3.5 3.2* 3.7   CHLORIDE mmol/L 103 101 100 101  --  104  --  101 103   CO2 mmol/L 27.2 30.5 28.8 28.2  --  25.4  --  23.5* 24.6   BUN mg/dL 6* 8 8 8  --  5*  --  5* 8   CREATININE mg/dL 0.54 0.55 0.51 0.57  --  0.45  --  0.47 0.60   CALCIUM mg/dL 9.0 9.2 8.5 8.9  --  8.9  --  8.7 8.9   GLUCOSE mg/dL 312* 321* 299* 318*  --  253*  --  155* 96       Results from last 7 days  Lab Units 10/17/17  0042 10/16/17  0123 10/15/17  1754 10/15/17  0147 10/13/17  0106 10/12/17  0104   MAGNESIUM mg/dL 1.8 2.4 1.8 1.6* 2.3 1.6*     No results found for: HGBA1C    Results from last 7 days  Lab Units 10/16/17  0544 10/14/17  0129 10/13/17  0106 10/12/17  0104 10/10/17  1900 10/10/17  1305   BILIRUBIN mg/dL 0.2 0.1* 0.2 0.4 0.4 0.5   ALK PHOS U/L 113* 112* 125* 123* 173* 221*   AST (SGOT) U/L 14 16 16 13 22 26   ALT (SGPT) U/L 6* 12 15 16 21 20                       Imaging Results (last 72 hours)     ** No results found for the last 72 hours. **          Assessment/Plan    Active Problems:    Acute pancreatitis         See orders entered.     Kel Becker MD  10/17/17  6:00 AM

## 2017-10-17 NOTE — PROGRESS NOTES
Gastroenterology Progress Note    : 1970    Reason for follow-up: pancreatitis    Subjective     Interval History:  Rossy Boone is a 47 y.o. female who was admitted to the hospital due to acute pancreatitis on 10/10/2017. She is being followed by GI for this reason. Nutrition with TPN currently. Diet is ice chips and has not been able to tolerate more. Abdominal pain has been present and severe now for 7 days. Prior to this admission, she had not eaten for over 1 week then had regular diet for 4 days, discharge home and returned with recurrent symptoms the following day. Lipase elev at 92, triglycerides 216, glucose elev 272.     Today, she complains of persistent nausea and vomiting. Pain medication and anti-emetic is controlling pain and nausea. Still unable to eat or drink. Pain unchanged.     Review of Systems   Constitutional: Negative for appetite change, chills, fatigue, fever and unexpected weight change.   HENT: Negative for hearing loss, mouth sores and nosebleeds.    Eyes: Negative for itching and visual disturbance.   Respiratory: Negative for cough, chest tightness, shortness of breath and wheezing.    Cardiovascular: Negative for chest pain, palpitations and leg swelling.   Gastrointestinal: Positive for abdominal pain and nausea. Negative for anal bleeding, blood in stool, constipation, diarrhea and vomiting.   Endocrine: Negative for cold intolerance, heat intolerance, polydipsia and polyuria.   Genitourinary: Negative for dysuria, frequency and hematuria.   Musculoskeletal: Negative for arthralgias, joint swelling and myalgias.   Skin: Negative for rash and wound.   Allergic/Immunologic: Negative for food allergies and immunocompromised state.   Neurological: Negative for seizures, syncope, weakness and light-headedness.   Hematological: Negative for adenopathy. Does not bruise/bleed easily.   Psychiatric/Behavioral: Negative for confusion and sleep disturbance. The patient is not  nervous/anxious.        Past medical history, surgical history, family history and social history have been reviewed and remain unchanged since initial consultation.    Objective       Current Facility-Administered Medications:   •  Adult Central Clinimix TPN, , Intravenous, Q24H (TPN), Last Rate: 65 mL/hr at 10/16/17 1724 **AND** [DISCONTINUED] fat emulsion (INTRALIPID,LIPOSYN) 20 % infusion 50 g, 250 mL, Intravenous, Once per day on Mon Wed Fri, Last Rate: 20.8 mL/hr at 10/13/17 1849, 50 g at 10/13/17 1849 **AND** multiple vitamin 10 mL, trace elements Cr-Cu-Mn-Zn 5 mL in sodium chloride 0.9 % 500 mL IVPB, , Intravenous, Once per day on Mon Wed Fri, Kait Rao [facility], Last Rate: 128.8 mL/hr at 10/16/17 1131  •  amLODIPine (NORVASC) tablet 10 mg, 10 mg, Oral, Q24H, Seven Gallego MD, 10 mg at 10/17/17 0810  •  dextrose (D50W) solution 25 g, 25 g, Intravenous, Q15 Min PRN, Kel Becker MD  •  dextrose (GLUTOSE) oral gel 15 g, 15 g, Oral, Q15 Min PRN, Kel Becker MD  •  enoxaparin (LOVENOX) syringe 40 mg, 40 mg, Subcutaneous, Nightly, Kel Becker MD, 40 mg at 10/16/17 2036  •  glucagon (human recombinant) (GLUCAGEN DIAGNOSTIC) injection 1 mg, 1 mg, Subcutaneous, Q15 Min PRN, Kel Becker MD  •  insulin aspart (novoLOG) injection 0-7 Units, 0-7 Units, Subcutaneous, Q6H, Kait Rao [facility], 4 Units at 10/17/17 0628  •  insulin detemir (LEVEMIR) injection 15 Units, 15 Units, Subcutaneous, Nightly, Seven Gallego MD, 15 Units at 10/16/17 2100  •  ipratropium-albuterol (DUO-NEB) nebulizer solution 3 mL, 3 mL, Nebulization, Q4H PRN, Seven Gallego MD, 3 mL at 10/11/17 2300  •  levoFLOXacin (LEVAQUIN) 750 mg/150 mL D5W (premix) (LEVAQUIN) 750 mg, 750 mg, Intravenous, Q24H, Seven Gallego MD, Last Rate: 0 mL/hr at 10/11/17 1000, 750 mg at 10/17/17 0839  •  lisinopril (PRINIVIL,ZESTRIL) tablet 20 mg, 20 mg, Oral, Q24H, Seven Gallego MD, 20 mg at 10/17/17 0810  •  Magnesium Sulfate 2 gram  Bolus, followed by 8 gram infusion (total Mg dose 10 grams)- Mg less than or equal to 1mg/dL, 2 g, Intravenous, PRN **OR** Magnesium Sulfate 6 gram Infusion (2 gm x 3) -Mg 1.1 -1.5 mg/dL, 2 g, Intravenous, PRN **OR** magnesium sulfate 4 gram infusion- Mg 1.6-1.9 mg/dL, 4 g, Intravenous, PRN, Seven Gallego MD  •  magnesium sulfate 4 gram infusion- Mg 1.6-1.9 mg/dL, 4 g, Intravenous, Once, Seven Gallego MD, Last Rate: 25 mL/hr at 10/17/17 0559, 4 g at 10/17/17 0559  •  metoprolol tartrate (LOPRESSOR) tablet 100 mg, 100 mg, Oral, Q12H, Seven Gallego MD, 100 mg at 10/17/17 0809  •  morphine injection 2 mg, 2 mg, Intravenous, Daily PRN, Nico Fung MD  •  morphine injection 2 mg, 2 mg, Intravenous, Q2H PRN, Nico Fung MD, 2 mg at 10/17/17 0839  •  ondansetron (ZOFRAN) injection 4 mg, 4 mg, Intravenous, Q6H PRN, Kel Becker MD, 4 mg at 10/16/17 1522  •  pancrelipase (Lip-Prot-Amyl) (CREON) capsule 24,000 units of lipase, 24,000 units of lipase, Oral, TID With Meals, Seven Gallego MD, 24,000 units of lipase at 10/12/17 1813  •  pantoprazole (PROTONIX) injection 40 mg, 40 mg, Intravenous, Q12H, Nico Fung MD, 40 mg at 10/17/17 0810  •  Pharmacy to Dose TPN, , Does not apply, Continuous PRN, Seven Gallego MD, 65 mg at 10/13/17 1148  •  potassium chloride 10 mEq in 100 mL IVPB, 10 mEq, Intravenous, Q1H PRN, Seven Gallego MD, Last Rate: 100 mL/hr at 10/13/17 1442, 10 mEq at 10/13/17 1442  •  promethazine (PHENERGAN) 12.5 mg in sodium chloride 0.9 % 50 mL, 12.5 mg, Intravenous, Q4H PRN, Nico Fung MD, 12.5 mg at 10/17/17 0804  •  Risaquad-2 capsule 1 capsule, 1 capsule, Oral, Daily, Kait Rao, [facility], 1 capsule at 10/17/17 0810  •  sodium chloride 0.9 % flush 10 mL, 10 mL, Intracatheter, Q12H, Seven Gallego MD, 10 mL at 10/16/17 2037  •  sodium chloride 0.9 % flush 10 mL, 10 mL, Intracatheter, PRN, Seven Gallego MD, 10 mL at 10/14/17 0631  •  sodium chloride 0.9 %  flush 10 mL, 10 mL, Intracatheter, Q12H, Seven Gallego MD, 10 mL at 10/16/17 2037  •  sodium chloride 0.9 % flush 10 mL, 10 mL, Intracatheter, PRN, Seven Gallego MD  •  sodium chloride 0.9 % infusion, 50 mL/hr, Intravenous, Continuous, Kel Becker MD, Last Rate: 50 mL/hr at 10/16/17 0838, 50 mL/hr at 10/16/17 0838  •  traMADol (ULTRAM) tablet 50 mg, 50 mg, Oral, Q6H PRN, Seven Gallego MD, 50 mg at 10/13/17 1704  •  venlafaxine XR (EFFEXOR-XR) 24 hr capsule 75 mg, 75 mg, Oral, Daily, Seven Gallego MD, 75 mg at 10/17/17 0810    Allergies:   Hydrocodone-acetaminophen; Hydromorphone; Oxycodone-acetaminophen; and Penicillins    Vital Signs:  Temp:  [98.3 °F (36.8 °C)-98.6 °F (37 °C)] 98.3 °F (36.8 °C)  Heart Rate:  [78-92] 92  Resp:  [18-20] 18  BP: (108-136)/(72-74) 136/73  Body mass index is 29.26 kg/(m^2).    Intake/Output Summary (Last 24 hours) at 10/17/17 0903  Last data filed at 10/16/17 1723   Gross per 24 hour   Intake                0 ml   Output                0 ml   Net                0 ml          Physical Exam   Constitutional: She is oriented to person, place, and time. She appears well-developed and well-nourished. No distress.   HENT:   Head: Normocephalic and atraumatic.   Nose: Nose normal.   Mouth/Throat: Oropharynx is clear and moist.   Eyes: Conjunctivae are normal. Right eye exhibits no discharge. Left eye exhibits no discharge. No scleral icterus.   Neck: Normal range of motion. No JVD present.   Cardiovascular: Normal rate, regular rhythm and normal heart sounds.  Exam reveals no gallop and no friction rub.    No murmur heard.  Pulmonary/Chest: Effort normal and breath sounds normal. No respiratory distress. She has no wheezes. She has no rales. She exhibits no tenderness.   Abdominal: Soft. Bowel sounds are normal. She exhibits no mass. There is tenderness (severe and generalized even with light palpation).   Musculoskeletal: Normal range of motion. She exhibits no edema or  deformity.   Neurological: She is alert and oriented to person, place, and time. Coordination normal.   Skin: Skin is warm and dry. No rash noted. She is not diaphoretic. No erythema.   Psychiatric: Her behavior is normal. Judgment and thought content normal.   Depressed affect   Vitals reviewed.       Results Review:  I reviewed the patient's new clinical results including all available labs and radiology reports.    Lab Results (last 24 hours)     Procedure Component Value Units Date/Time    Blood Culture - Blood, [[id number]]  (Normal) Collected:  10/11/17 0808    Specimen:  Blood from Hand, Right Updated:  10/16/17 0916     Blood Culture No growth at 5 days    Blood Culture - Blood, [[id number]]  (Normal) Collected:  10/11/17 0818    Specimen:  Blood from Hand, Left Updated:  10/16/17 0916     Blood Culture No growth at 5 days    POC Glucose Fingerstick [[id number]]  (Abnormal) Collected:  10/16/17 1111    Specimen:  Blood Updated:  10/16/17 1152     Glucose 265 (H) mg/dL     Narrative:       Meter: [id number] : [id number] [employer]    POC Glucose Fingerstick [[id number]]  (Abnormal) Collected:  10/16/17 1653    Specimen:  Blood Updated:  10/16/17 1700     Glucose 276 (H) mg/dL     Narrative:       Meter: [id number] : [id number] [employer]    POC Glucose Fingerstick [[id number]]  (Abnormal) Collected:  10/16/17 2044    Specimen:  Blood Updated:  10/16/17 2050     Glucose 301 (H) mg/dL     Narrative:       Meter: [id number] : [id number] [employer]    POC Glucose Fingerstick [[id number]]  (Abnormal) Collected:  10/17/17 0134    Specimen:  Blood Updated:  10/17/17 0140     Glucose 284 (H) mg/dL     Narrative:       Meter: [id number] : [id number] [employer]    Phosphorus [[id number]]  (Normal) Collected:  10/17/17 0042    Specimen:  Blood Updated:  10/17/17 0211     Phosphorus 3.3 mg/dL     Osmolality, Calculated [[id number]]  (Normal) Collected:  10/17/17 0042    Specimen:   Blood Updated:  10/17/17 0212     Osmolality Calc 285.2 mOsm/kg     Lipase [[id number]]  (Abnormal) Collected:  10/17/17 0042    Specimen:  Blood Updated:  10/17/17 0212     Lipase 92 (H) U/L     Basic Metabolic Panel [[id number]]  (Abnormal) Collected:  10/17/17 0042    Specimen:  Blood Updated:  10/17/17 0212     Glucose 312 (H) mg/dL      BUN 6 (L) mg/dL      Creatinine 0.54 mg/dL      Sodium 138 mmol/L      Potassium 3.8 mmol/L      Chloride 103 mmol/L      CO2 27.2 mmol/L      Calcium 9.0 mg/dL      eGFR Non African Amer 121 mL/min/1.73      BUN/Creatinine Ratio 11.1     Anion Gap 7.8 mmol/L     Narrative:       GFR Normal >60  Chronic Kidney Disease <60  Kidney Failure <15    Magnesium [[id number]]  (Normal) Collected:  10/17/17 0042    Specimen:  Blood Updated:  10/17/17 0218     Magnesium 1.8 mg/dL     POC Glucose Fingerstick [[id number]]  (Abnormal) Collected:  10/17/17 0623    Specimen:  Blood Updated:  10/17/17 0630     Glucose 272 (H) mg/dL     Narrative:       Meter: [id number] : [id number] [facility]          Imaging Results (last 72 hours)     ** No results found for the last 72 hours. **            Assessment/Plan     Assessment:    Acute pancreatitis    Elevated triglycerides, 216    DM2, uncontrolled, last glucose 272        Plan:  · Nutrition with TPN currently, discussed with patient that we cannot provide TPN long term due to risk of infection and need to have oral diet. She understands. She is very hungry but too nauseated to eat.   · Need control of triglycerides and diabetes.   · Possible need for further pancreatic imaging if no improvement in symptoms soon.   · GI will continue to monitor.    I discussed the patients findings and my recommendations with patient.          Sandhya Phillip PA-C      Electronically signed 10/17/2017 at 9:05 AM.

## 2017-10-18 ENCOUNTER — APPOINTMENT (OUTPATIENT)
Dept: CT IMAGING | Facility: HOSPITAL | Age: 47
End: 2017-10-18

## 2017-10-18 LAB
ANION GAP SERPL CALCULATED.3IONS-SCNC: 6.4 MMOL/L (ref 3.6–11.2)
BASOPHILS # BLD AUTO: 0.03 10*3/MM3 (ref 0–0.3)
BASOPHILS NFR BLD AUTO: 0.3 % (ref 0–2)
BUN BLD-MCNC: 8 MG/DL (ref 7–21)
BUN/CREAT SERPL: 17 (ref 7–25)
CALCIUM SPEC-SCNC: 8.6 MG/DL (ref 7.7–10)
CHLORIDE SERPL-SCNC: 101 MMOL/L (ref 99–112)
CO2 SERPL-SCNC: 25.6 MMOL/L (ref 24.3–31.9)
CREAT BLD-MCNC: 0.47 MG/DL (ref 0.43–1.29)
DEPRECATED RDW RBC AUTO: 43.6 FL (ref 37–54)
EOSINOPHIL # BLD AUTO: 0.34 10*3/MM3 (ref 0–0.7)
EOSINOPHIL NFR BLD AUTO: 3.5 % (ref 0–5)
ERYTHROCYTE [DISTWIDTH] IN BLOOD BY AUTOMATED COUNT: 14 % (ref 11.5–14.5)
GFR SERPL CREATININE-BSD FRML MDRD: 142 ML/MIN/1.73
GLUCOSE BLD-MCNC: 306 MG/DL (ref 70–110)
GLUCOSE BLDC GLUCOMTR-MCNC: 115 MG/DL (ref 70–130)
GLUCOSE BLDC GLUCOMTR-MCNC: 195 MG/DL (ref 70–130)
GLUCOSE BLDC GLUCOMTR-MCNC: 275 MG/DL (ref 70–130)
GLUCOSE BLDC GLUCOMTR-MCNC: 300 MG/DL (ref 70–130)
GLUCOSE BLDC GLUCOMTR-MCNC: 309 MG/DL (ref 70–130)
GLUCOSE BLDC GLUCOMTR-MCNC: 323 MG/DL (ref 70–130)
HCT VFR BLD AUTO: 39.5 % (ref 37–47)
HGB BLD-MCNC: 12.8 G/DL (ref 12–16)
IMM GRANULOCYTES # BLD: 0.04 10*3/MM3 (ref 0–0.03)
IMM GRANULOCYTES NFR BLD: 0.4 % (ref 0–0.5)
LYMPHOCYTES # BLD AUTO: 1.8 10*3/MM3 (ref 1–3)
LYMPHOCYTES NFR BLD AUTO: 18.6 % (ref 21–51)
MAGNESIUM SERPL-MCNC: 1.8 MG/DL (ref 1.7–2.6)
MCH RBC QN AUTO: 27.8 PG (ref 27–33)
MCHC RBC AUTO-ENTMCNC: 32.4 G/DL (ref 33–37)
MCV RBC AUTO: 85.7 FL (ref 80–94)
MONOCYTES # BLD AUTO: 0.63 10*3/MM3 (ref 0.1–0.9)
MONOCYTES NFR BLD AUTO: 6.5 % (ref 0–10)
NEUTROPHILS # BLD AUTO: 6.85 10*3/MM3 (ref 1.4–6.5)
NEUTROPHILS NFR BLD AUTO: 70.7 % (ref 30–70)
OSMOLALITY SERPL CALC.SUM OF ELEC: 276.2 MOSM/KG (ref 273–305)
PLATELET # BLD AUTO: 390 10*3/MM3 (ref 130–400)
PMV BLD AUTO: 10.8 FL (ref 6–10)
POTASSIUM BLD-SCNC: 3.6 MMOL/L (ref 3.5–5.3)
RBC # BLD AUTO: 4.61 10*6/MM3 (ref 4.2–5.4)
SODIUM BLD-SCNC: 133 MMOL/L (ref 135–153)
WBC NRBC COR # BLD: 9.69 10*3/MM3 (ref 4.5–12.5)

## 2017-10-18 PROCEDURE — 82962 GLUCOSE BLOOD TEST: CPT

## 2017-10-18 PROCEDURE — 25010000002 ONDANSETRON PER 1 MG: Performed by: INTERNAL MEDICINE

## 2017-10-18 PROCEDURE — 63710000001 INSULIN ASPART PER 5 UNITS

## 2017-10-18 PROCEDURE — 94799 UNLISTED PULMONARY SVC/PX: CPT

## 2017-10-18 PROCEDURE — 25010000002 ENOXAPARIN PER 10 MG: Performed by: INTERNAL MEDICINE

## 2017-10-18 PROCEDURE — 80048 BASIC METABOLIC PNL TOTAL CA: CPT

## 2017-10-18 PROCEDURE — 25010000003 POTASSIUM CHLORIDE 10 MEQ/100ML SOLUTION: Performed by: INTERNAL MEDICINE

## 2017-10-18 PROCEDURE — 63710000001 INSULIN DETEMIR PER 5 UNITS: Performed by: INTERNAL MEDICINE

## 2017-10-18 PROCEDURE — 85025 COMPLETE CBC W/AUTO DIFF WBC: CPT | Performed by: PHYSICIAN ASSISTANT

## 2017-10-18 PROCEDURE — 99232 SBSQ HOSP IP/OBS MODERATE 35: CPT | Performed by: PHYSICIAN ASSISTANT

## 2017-10-18 PROCEDURE — 0 IOPAMIDOL 61 % SOLUTION: Performed by: INTERNAL MEDICINE

## 2017-10-18 PROCEDURE — 83735 ASSAY OF MAGNESIUM: CPT | Performed by: INTERNAL MEDICINE

## 2017-10-18 PROCEDURE — 25010000002 PROMETHAZINE PER 50 MG: Performed by: INTERNAL MEDICINE

## 2017-10-18 PROCEDURE — 25010000002 LEVOFLOXACIN PER 250 MG: Performed by: INTERNAL MEDICINE

## 2017-10-18 PROCEDURE — 74177 CT ABD & PELVIS W/CONTRAST: CPT

## 2017-10-18 PROCEDURE — 25010000002 MORPHINE PER 10 MG: Performed by: INTERNAL MEDICINE

## 2017-10-18 PROCEDURE — 74177 CT ABD & PELVIS W/CONTRAST: CPT | Performed by: RADIOLOGY

## 2017-10-18 RX ORDER — POTASSIUM CHLORIDE 7.45 MG/ML
10 INJECTION INTRAVENOUS
Status: COMPLETED | OUTPATIENT
Start: 2017-10-18 | End: 2017-10-18

## 2017-10-18 RX ORDER — MAGNESIUM SULFATE HEPTAHYDRATE 40 MG/ML
4 INJECTION, SOLUTION INTRAVENOUS ONCE
Status: COMPLETED | OUTPATIENT
Start: 2017-10-18 | End: 2017-10-18

## 2017-10-18 RX ADMIN — PROMETHAZINE HYDROCHLORIDE 12.5 MG: 25 INJECTION INTRAMUSCULAR; INTRAVENOUS at 02:45

## 2017-10-18 RX ADMIN — MORPHINE SULFATE 2 MG: 10 INJECTION, SOLUTION INTRAMUSCULAR; INTRAVENOUS at 11:35

## 2017-10-18 RX ADMIN — VENLAFAXINE HYDROCHLORIDE 75 MG: 75 CAPSULE, EXTENDED RELEASE ORAL at 08:19

## 2017-10-18 RX ADMIN — ONDANSETRON 4 MG: 2 INJECTION INTRAMUSCULAR; INTRAVENOUS at 00:36

## 2017-10-18 RX ADMIN — LISINOPRIL 20 MG: 10 TABLET ORAL at 08:07

## 2017-10-18 RX ADMIN — PANTOPRAZOLE SODIUM 40 MG: 40 INJECTION, POWDER, FOR SOLUTION INTRAVENOUS at 08:07

## 2017-10-18 RX ADMIN — ASCORBIC ACID, VITAMIN A PALMITATE, CHOLECALCIFEROL, THIAMINE HYDROCHLORIDE, RIBOFLAVIN-5 PHOSPHATE SODIUM, PYRIDOXINE HYDROCHLORIDE, NIACINAMIDE, DEXPANTHENOL, ALPHA-TOCOPHEROL ACETATE, VITAMIN K1, FOLIC ACID, BIOTIN, CYANOCOBALAMIN: 200; 3300; 200; 6; 3.6; 6; 40; 15; 10; 150; 600; 60; 5 INJECTION, SOLUTION INTRAVENOUS at 11:36

## 2017-10-18 RX ADMIN — INSULIN ASPART 6 UNITS: 100 INJECTION, SOLUTION INTRAVENOUS; SUBCUTANEOUS at 00:52

## 2017-10-18 RX ADMIN — AMLODIPINE BESYLATE 10 MG: 10 TABLET ORAL at 08:07

## 2017-10-18 RX ADMIN — MAGNESIUM SULFATE HEPTAHYDRATE 4 G: 40 INJECTION, SOLUTION INTRAVENOUS at 04:56

## 2017-10-18 RX ADMIN — MORPHINE SULFATE 2 MG: 10 INJECTION, SOLUTION INTRAMUSCULAR; INTRAVENOUS at 02:45

## 2017-10-18 RX ADMIN — Medication 10 ML: at 08:11

## 2017-10-18 RX ADMIN — MORPHINE SULFATE 2 MG: 10 INJECTION, SOLUTION INTRAMUSCULAR; INTRAVENOUS at 14:05

## 2017-10-18 RX ADMIN — ENOXAPARIN SODIUM 40 MG: 40 INJECTION SUBCUTANEOUS at 20:10

## 2017-10-18 RX ADMIN — SODIUM CHLORIDE 50 ML/HR: 9 INJECTION, SOLUTION INTRAVENOUS at 09:22

## 2017-10-18 RX ADMIN — MORPHINE SULFATE 2 MG: 10 INJECTION, SOLUTION INTRAMUSCULAR; INTRAVENOUS at 00:36

## 2017-10-18 RX ADMIN — Medication 1 CAPSULE: at 08:07

## 2017-10-18 RX ADMIN — MORPHINE SULFATE 2 MG: 10 INJECTION, SOLUTION INTRAMUSCULAR; INTRAVENOUS at 04:56

## 2017-10-18 RX ADMIN — MORPHINE SULFATE 2 MG: 10 INJECTION, SOLUTION INTRAMUSCULAR; INTRAVENOUS at 06:36

## 2017-10-18 RX ADMIN — INSULIN DETEMIR 20 UNITS: 100 INJECTION, SOLUTION SUBCUTANEOUS at 20:16

## 2017-10-18 RX ADMIN — MORPHINE SULFATE 2 MG: 10 INJECTION, SOLUTION INTRAMUSCULAR; INTRAVENOUS at 16:05

## 2017-10-18 RX ADMIN — Medication 10 ML: at 20:10

## 2017-10-18 RX ADMIN — MORPHINE SULFATE 2 MG: 10 INJECTION, SOLUTION INTRAMUSCULAR; INTRAVENOUS at 20:09

## 2017-10-18 RX ADMIN — LEUCINE, PHENYLALANINE, LYSINE, METHIONINE, ISOLEUCINE, VALINE, HISTIDINE, THREONINE, TRYPTOPHAN, ALANINE, GLYCINE, ARGININE, PROLINE, SERINE, TYROSINE, SODIUM ACETATE, DIBASIC POTASSIUM PHOSPHATE, MAGNESIUM CHLORIDE, SODIUM CHLORIDE, CALCIUM CHLORIDE, DEXTROSE
365; 280; 290; 200; 300; 290; 240; 210; 90; 1035; 515; 575; 340; 250; 20; 340; 261; 51; 59; 33; 20 INJECTION INTRAVENOUS at 17:12

## 2017-10-18 RX ADMIN — INSULIN ASPART 7 UNITS: 100 INJECTION, SOLUTION INTRAVENOUS; SUBCUTANEOUS at 06:04

## 2017-10-18 RX ADMIN — PANTOPRAZOLE SODIUM 40 MG: 40 INJECTION, POWDER, FOR SOLUTION INTRAVENOUS at 20:10

## 2017-10-18 RX ADMIN — MORPHINE SULFATE 2 MG: 10 INJECTION, SOLUTION INTRAMUSCULAR; INTRAVENOUS at 09:22

## 2017-10-18 RX ADMIN — INSULIN ASPART 7 UNITS: 100 INJECTION, SOLUTION INTRAVENOUS; SUBCUTANEOUS at 11:35

## 2017-10-18 RX ADMIN — LEVOFLOXACIN 750 MG: 5 INJECTION, SOLUTION INTRAVENOUS at 09:22

## 2017-10-18 RX ADMIN — ONDANSETRON 4 MG: 2 INJECTION INTRAMUSCULAR; INTRAVENOUS at 14:05

## 2017-10-18 RX ADMIN — INSULIN DETEMIR 20 UNITS: 100 INJECTION, SOLUTION SUBCUTANEOUS at 08:15

## 2017-10-18 RX ADMIN — POTASSIUM CHLORIDE 10 MEQ: 7.46 INJECTION, SOLUTION INTRAVENOUS at 06:00

## 2017-10-18 RX ADMIN — POTASSIUM CHLORIDE 10 MEQ: 7.46 INJECTION, SOLUTION INTRAVENOUS at 04:56

## 2017-10-18 RX ADMIN — METOPROLOL TARTRATE 100 MG: 100 TABLET, FILM COATED ORAL at 08:07

## 2017-10-18 RX ADMIN — Medication 10 ML: at 20:11

## 2017-10-18 RX ADMIN — POTASSIUM CHLORIDE 10 MEQ: 7.46 INJECTION, SOLUTION INTRAVENOUS at 06:36

## 2017-10-18 RX ADMIN — IOPAMIDOL 100 ML: 612 INJECTION, SOLUTION INTRAVENOUS at 15:45

## 2017-10-18 RX ADMIN — PROMETHAZINE HYDROCHLORIDE 12.5 MG: 25 INJECTION INTRAMUSCULAR; INTRAVENOUS at 20:59

## 2017-10-18 RX ADMIN — POTASSIUM CHLORIDE 10 MEQ: 7.46 INJECTION, SOLUTION INTRAVENOUS at 08:16

## 2017-10-18 RX ADMIN — INSULIN ASPART 3 UNITS: 100 INJECTION, SOLUTION INTRAVENOUS; SUBCUTANEOUS at 17:12

## 2017-10-18 NOTE — PROGRESS NOTES
"Gastroenterology Progress Note    : 1970    Reason for follow-up: pancreatitis    Subjective     Interval History:  Rossy Boone is a 47 y.o. female who was admitted to the hospital due to acute pancreatitis on 10/10/2017. She is being followed by GI for this reason. Yesterday she denied worsening pain with movement or when walking. Today, she has developed worsening of pain. She cannot get out of bed to walk due to the severity. She feels as if something \"has burst inside\" of her. Pain started worsening over night. TPN continues. Last imaging was MRI showing pancreatitis but not good visualization of pancreatic duct. She has been ill now for greater than 2 weeks. No food over the past 8 days. She is too nauseated to eat or drink. No vomiting today.     Review of Systems   Constitutional: Negative for appetite change, chills, fatigue, fever and unexpected weight change.   HENT: Negative for hearing loss, mouth sores and nosebleeds.    Eyes: Negative for itching and visual disturbance.   Respiratory: Negative for cough, chest tightness, shortness of breath and wheezing.    Cardiovascular: Negative for chest pain, palpitations and leg swelling.   Gastrointestinal: Positive for abdominal pain and nausea. Negative for anal bleeding, blood in stool, constipation, diarrhea and vomiting.   Endocrine: Negative for cold intolerance, heat intolerance, polydipsia and polyuria.   Genitourinary: Negative for dysuria, frequency and hematuria.   Musculoskeletal: Negative for arthralgias, joint swelling and myalgias.   Skin: Negative for rash and wound.   Allergic/Immunologic: Negative for food allergies and immunocompromised state.   Neurological: Negative for seizures, syncope, weakness and light-headedness.   Hematological: Negative for adenopathy. Does not bruise/bleed easily.   Psychiatric/Behavioral: Negative for confusion and sleep disturbance. The patient is not nervous/anxious.        Past medical history, " surgical history, family history and social history have been reviewed and remain unchanged since initial consultation.    Objective       Current Facility-Administered Medications:   •  Adult Central Clinimix TPN, , Intravenous, Q24H (TPN), Last Rate: 65 mL/hr at 10/17/17 1827 **AND** [DISCONTINUED] fat emulsion (INTRALIPID,LIPOSYN) 20 % infusion 50 g, 250 mL, Intravenous, Once per day on Mon Wed Fri, Last Rate: 20.8 mL/hr at 10/13/17 1849, 50 g at 10/13/17 1849 **AND** multiple vitamin 10 mL, trace elements Cr-Cu-Mn-Zn 5 mL in sodium chloride 0.9 % 500 mL IVPB, , Intravenous, Once per day on Mon Wed Fri, [facility], Last Rate: 128.8 mL/hr at 10/16/17 1131  •  amLODIPine (NORVASC) tablet 10 mg, 10 mg, Oral, Q24H, Seven Gallego MD, 10 mg at 10/18/17 0807  •  dextrose (D50W) solution 25 g, 25 g, Intravenous, Q15 Min PRN, Kel Becker MD  •  dextrose (GLUTOSE) oral gel 15 g, 15 g, Oral, Q15 Min PRN, Kel Becker MD  •  enoxaparin (LOVENOX) syringe 40 mg, 40 mg, Subcutaneous, Nightly, Kel Becker MD, 40 mg at 10/17/17 2007  •  glucagon (human recombinant) (GLUCAGEN DIAGNOSTIC) injection 1 mg, 1 mg, Subcutaneous, Q15 Min PRN, Kel Becker MD  •  insulin aspart (novoLOG) injection 0-9 Units, 0-9 Units, Subcutaneous, Q6H, [facility], 7 Units at 10/18/17 0604  •  insulin detemir (LEVEMIR) injection 20 Units, 20 Units, Subcutaneous, Q12H, Kel Becker MD, 20 Units at 10/18/17 0815  •  ipratropium-albuterol (DUO-NEB) nebulizer solution 3 mL, 3 mL, Nebulization, Q4H PRN, Seven Gallego MD, 3 mL at 10/11/17 2300  •  levoFLOXacin (LEVAQUIN) 750 mg/150 mL D5W (premix) (LEVAQUIN) 750 mg, 750 mg, Intravenous, Q24H, Seven Gallego MD, Last Rate: 0 mL/hr at 10/11/17 1000, 750 mg at 10/18/17 0922  •  lisinopril (PRINIVIL,ZESTRIL) tablet 20 mg, 20 mg, Oral, Q24H, Seven Gallego MD, 20 mg at 10/18/17 0807  •  Magnesium Sulfate 2 gram Bolus, followed by 8 gram infusion (total Mg dose 10  grams)- Mg less than or equal to 1mg/dL, 2 g, Intravenous, PRN **OR** Magnesium Sulfate 6 gram Infusion (2 gm x 3) -Mg 1.1 -1.5 mg/dL, 2 g, Intravenous, PRN **OR** magnesium sulfate 4 gram infusion- Mg 1.6-1.9 mg/dL, 4 g, Intravenous, PRN, Seven Gallego MD  •  metoprolol tartrate (LOPRESSOR) tablet 100 mg, 100 mg, Oral, Q12H, Seven Gallego MD, 100 mg at 10/18/17 0807  •  morphine injection 2 mg, 2 mg, Intravenous, Daily PRN, Nico Fung MD  •  morphine injection 2 mg, 2 mg, Intravenous, Q2H PRN, Nico Fung MD, 2 mg at 10/18/17 0922  •  ondansetron (ZOFRAN) injection 4 mg, 4 mg, Intravenous, Q6H PRN, Kel Becker MD, 4 mg at 10/18/17 0036  •  pancrelipase (Lip-Prot-Amyl) (CREON) capsule 24,000 units of lipase, 24,000 units of lipase, Oral, TID With Meals, Seven Gallego MD, 24,000 units of lipase at 10/12/17 1813  •  pantoprazole (PROTONIX) injection 40 mg, 40 mg, Intravenous, Q12H, Nico Fung MD, 40 mg at 10/18/17 0807  •  Pharmacy to Dose TPN, , Does not apply, Continuous PRN, Seven Gallego MD, 65 mg at 10/13/17 1148  •  potassium chloride 10 mEq in 100 mL IVPB, 10 mEq, Intravenous, Q1H PRN, Seven Gallego MD, Last Rate: 100 mL/hr at 10/13/17 1442, 10 mEq at 10/13/17 1442  •  promethazine (PHENERGAN) 12.5 mg in sodium chloride 0.9 % 50 mL, 12.5 mg, Intravenous, Q4H PRN, Nico Fung MD, 12.5 mg at 10/18/17 0245  •  Risaquad-2 capsule 1 capsule, 1 capsule, Oral, Daily, Kait Rao [facility], 1 capsule at 10/18/17 0807  •  sodium chloride 0.9 % flush 10 mL, 10 mL, Intracatheter, Q12H, Seven Gallego MD, 10 mL at 10/17/17 2008  •  sodium chloride 0.9 % flush 10 mL, 10 mL, Intracatheter, PRN, Seven Gallego MD, 10 mL at 10/14/17 0631  •  sodium chloride 0.9 % flush 10 mL, 10 mL, Intracatheter, Q12H, Seven Gallego MD, 10 mL at 10/18/17 0811  •  sodium chloride 0.9 % flush 10 mL, 10 mL, Intracatheter, PRN, Seven Gallego MD  •  sodium chloride 0.9 % infusion, 50 mL/hr,  Intravenous, Continuous, Kel Becker MD, Last Rate: 50 mL/hr at 10/18/17 0922, 50 mL/hr at 10/18/17 0922  •  traMADol (ULTRAM) tablet 50 mg, 50 mg, Oral, Q6H PRN, Seven Gallego MD, 50 mg at 10/13/17 1704  •  venlafaxine XR (EFFEXOR-XR) 24 hr capsule 75 mg, 75 mg, Oral, Daily, Seven Gallego MD, 75 mg at 10/18/17 0819    Allergies:   Hydrocodone-acetaminophen; Hydromorphone; Oxycodone-acetaminophen; and Penicillins    Vital Signs:  Temp:  [97.8 °F (36.6 °C)-99 °F (37.2 °C)] 97.8 °F (36.6 °C)  Heart Rate:  [80-98] 83  Resp:  [18] 18  BP: (101-132)/(64-79) 101/64  Body mass index is 26.61 kg/(m^2).    Intake/Output Summary (Last 24 hours) at 10/18/17 1123  Last data filed at 10/18/17 0816   Gross per 24 hour   Intake              540 ml   Output                0 ml   Net              540 ml     I/O this shift:  In: 100 [IV Piggyback:100]  Out: -     Physical Exam   Constitutional: She is oriented to person, place, and time. She appears well-developed and well-nourished. No distress.   HENT:   Head: Normocephalic and atraumatic.   Nose: Nose normal.   Mouth/Throat: Oropharynx is clear and moist.   Eyes: Conjunctivae are normal. Right eye exhibits no discharge. Left eye exhibits no discharge. No scleral icterus.   Neck: Normal range of motion. No JVD present.   Cardiovascular: Normal rate, regular rhythm and normal heart sounds.  Exam reveals no gallop and no friction rub.    No murmur heard.  Pulmonary/Chest: Effort normal and breath sounds normal. No respiratory distress. She has no wheezes. She has no rales. She exhibits no tenderness.   Abdominal: Soft. Bowel sounds are normal. She exhibits no mass. There is tenderness (severe and generalized even with light palpation).   Musculoskeletal: Normal range of motion. She exhibits no edema or deformity.   Neurological: She is alert and oriented to person, place, and time. Coordination normal.   Skin: Skin is warm and dry. No rash noted. She is not diaphoretic. No  erythema.   No ecchymosis on abdomen or back   Psychiatric: Her behavior is normal. Judgment and thought content normal.   Depressed affect   Vitals reviewed.       Results Review:  I reviewed the patient's new clinical results including all available labs and radiology reports.    Lab Results (last 24 hours)     Procedure Component Value Units Date/Time    Antinuclear Antibodies Direct [[id number]] Collected:  10/16/17 0544    Specimen:  Blood Updated:  10/17/17 1312     KAYDEN Direct Negative    Narrative:       Performed at:  [address] [place] [address]  [id number]  : Erich Bella PhD, Phone:  [phone]    POC Glucose Fingerstick [[id number]]  (Abnormal) Collected:  10/17/17 1704    Specimen:  Blood Updated:  10/17/17 1713     Glucose 297 (H) mg/dL     Narrative:       Meter: [id number] : [id number] Bertin Donis    POC Glucose Fingerstick [[id number]]  (Abnormal) Collected:  10/17/17 2012    Specimen:  Blood Updated:  10/17/17 2018     Glucose 253 (H) mg/dL     Narrative:       Meter: [id number] : [id number] MentiNovantTastyKhana    POC Glucose Fingerstick [[id number]]  (Abnormal) Collected:  10/18/17 0046    Specimen:  Blood Updated:  10/18/17 0052     Glucose 275 (H) mg/dL     Narrative:       Meter: [id number] : [id number] [employer] smantha    Magnesium [[id number]]  (Normal) Collected:  10/18/17 0059    Specimen:  Blood Updated:  10/18/17 0327     Magnesium 1.8 mg/dL     Basic Metabolic Panel [[id number]]  (Abnormal) Collected:  10/18/17 0059    Specimen:  Blood Updated:  10/18/17 0327     Glucose 306 (H) mg/dL      BUN 8 mg/dL      Creatinine 0.47 mg/dL      Sodium 133 (L) mmol/L      Potassium 3.6 mmol/L      Chloride 101 mmol/L      CO2 25.6 mmol/L      Calcium 8.6 mg/dL      eGFR Non African Amer 142 mL/min/1.73      BUN/Creatinine Ratio 17.0     Anion Gap 6.4 mmol/L     Narrative:       GFR Normal >60  Chronic Kidney Disease <60  Kidney Failure <15     Osmolality, Calculated [[id number]]  (Normal) Collected:  10/18/17 0059    Specimen:  Blood Updated:  10/18/17 0327     Osmolality Calc 276.2 mOsm/kg     POC Glucose Fingerstick [[id number]]  (Abnormal) Collected:  10/18/17 0603    Specimen:  Blood Updated:  10/18/17 0610     Glucose 309 (H) mg/dL     Narrative:       Meter: [id number] : [id number] leo brown    POC Glucose Fingerstick [[id number]]  (Abnormal) Collected:  10/18/17 0733    Specimen:  Blood Updated:  10/18/17 0749     Glucose 300 (H) mg/dL     Narrative:       Meter: [id number] : [id number] VICKEY TOMMIE          Imaging Results (last 72 hours)     ** No results found for the last 72 hours. **            Assessment/Plan     Assessment:    Acute pancreatitis    Elevated triglycerides, 216    DM2, uncontrolled, last glucose 306      Plan:  · With change in severity of abdominal pain and severe pain with movement and walking (which is new), CT abd/pelv with IV contrast has been ordered. Last was performed on admission showing pancreatitis with inflammation also in the mesentery. No abnormal ecchymosis on abdomen or back. Will re-check CBC looking for decrease in H/H or increase in WBC.   · Continue TPN. Lipase remains elevated. Nausea continues. No vomiting. Pain present and worse today but some improved with pain medication.   · GI will continue to follow.    I discussed the patients findings and my recommendations with patient.      Sandhya Phillip PA-C      Electronically signed 10/18/2017 at 1:51 PM.

## 2017-10-18 NOTE — PROGRESS NOTES
LOS: 8 days   Interval History: Awake and alert.. Continued epigastric pain. No other changes. Blood sugar elevated, will increase insulin.      History taken from: patient chart    Review of Systems:     Review of Systems - Negative except present illness    Objective     Vital Signs  Temp:  [98.2 °F (36.8 °C)-99 °F (37.2 °C)] 99 °F (37.2 °C)  Heart Rate:  [80-98] 98  Resp:  [18] 18  BP: (114-136)/(73-79) 114/76    Physical Exam:     HEENT; Neg  CHEST:Clear  HEART:RRR  ABD: Tender epigastrium          Results Review:     I reviewed the patient's new clinical results  : See Below    Medication Review:     Current Facility-Administered Medications:   •  Adult Central Clinimix TPN, , Intravenous, Q24H (TPN), Last Rate: 65 mL/hr at 10/17/17 1827 **AND** [DISCONTINUED] fat emulsion (INTRALIPID,LIPOSYN) 20 % infusion 50 g, 250 mL, Intravenous, Once per day on Mon Wed Fri, Last Rate: 20.8 mL/hr at 10/13/17 1849, 50 g at 10/13/17 1849 **AND** multiple vitamin 10 mL, trace elements Cr-Cu-Mn-Zn 5 mL in sodium chloride 0.9 % 500 mL IVPB, , Intravenous, Once per day on Mon Wed Fri, Kait Rao [facility], Last Rate: 128.8 mL/hr at 10/16/17 1131  •  amLODIPine (NORVASC) tablet 10 mg, 10 mg, Oral, Q24H, Seven Gallego MD, 10 mg at 10/17/17 0810  •  dextrose (D50W) solution 25 g, 25 g, Intravenous, Q15 Min PRN, Kel Becker MD  •  dextrose (GLUTOSE) oral gel 15 g, 15 g, Oral, Q15 Min PRN, Kel Becker MD  •  enoxaparin (LOVENOX) syringe 40 mg, 40 mg, Subcutaneous, Nightly, Kel Becker MD, 40 mg at 10/17/17 2007  •  glucagon (human recombinant) (GLUCAGEN DIAGNOSTIC) injection 1 mg, 1 mg, Subcutaneous, Q15 Min PRN, Kel Becker MD  •  insulin aspart (novoLOG) injection 0-9 Units, 0-9 Units, Subcutaneous, Q6H, Kait Rao, [facility], 7 Units at 10/18/17 0604  •  insulin detemir (LEVEMIR) injection 20 Units, 20 Units, Subcutaneous, Q12H, Kel Becker MD  •  ipratropium-albuterol (DUO-NEB) nebulizer solution 3  mL, 3 mL, Nebulization, Q4H PRN, Seven Gallego MD, 3 mL at 10/11/17 2300  •  levoFLOXacin (LEVAQUIN) 750 mg/150 mL D5W (premix) (LEVAQUIN) 750 mg, 750 mg, Intravenous, Q24H, Seven Gallego MD, Last Rate: 0 mL/hr at 10/11/17 1000, 750 mg at 10/17/17 0839  •  lisinopril (PRINIVIL,ZESTRIL) tablet 20 mg, 20 mg, Oral, Q24H, Seven Gallego MD, 20 mg at 10/17/17 0810  •  Magnesium Sulfate 2 gram Bolus, followed by 8 gram infusion (total Mg dose 10 grams)- Mg less than or equal to 1mg/dL, 2 g, Intravenous, PRN **OR** Magnesium Sulfate 6 gram Infusion (2 gm x 3) -Mg 1.1 -1.5 mg/dL, 2 g, Intravenous, PRN **OR** magnesium sulfate 4 gram infusion- Mg 1.6-1.9 mg/dL, 4 g, Intravenous, PRN, Seven Gallego MD  •  magnesium sulfate 4 gram infusion- Mg 1.6-1.9 mg/dL, 4 g, Intravenous, Once, Seven Gallego MD, Last Rate: 25 mL/hr at 10/18/17 0456, 4 g at 10/18/17 0456  •  metoprolol tartrate (LOPRESSOR) tablet 100 mg, 100 mg, Oral, Q12H, Seven Gallego MD, 100 mg at 10/17/17 2007  •  morphine injection 2 mg, 2 mg, Intravenous, Daily PRN, Nico Fung MD  •  morphine injection 2 mg, 2 mg, Intravenous, Q2H PRN, Nico Fung MD, 2 mg at 10/18/17 0636  •  ondansetron (ZOFRAN) injection 4 mg, 4 mg, Intravenous, Q6H PRN, Kel Becker MD, 4 mg at 10/18/17 0036  •  pancrelipase (Lip-Prot-Amyl) (CREON) capsule 24,000 units of lipase, 24,000 units of lipase, Oral, TID With Meals, Seven Gallego MD, 24,000 units of lipase at 10/12/17 1813  •  pantoprazole (PROTONIX) injection 40 mg, 40 mg, Intravenous, Q12H, Nico Fung MD, 40 mg at 10/17/17 2007  •  Pharmacy to Dose TPN, , Does not apply, Continuous PRN, Seven Gallego MD, 65 mg at 10/13/17 1148  •  potassium chloride 10 mEq in 100 mL IVPB, 10 mEq, Intravenous, Q1H PRN, Seven Gallego MD, Last Rate: 100 mL/hr at 10/13/17 1442, 10 mEq at 10/13/17 1442  •  potassium chloride 10 mEq in 100 mL IVPB, 10 mEq, Intravenous, Q1H, Seven Gallego MD, Last Rate: 100  mL/hr at 10/18/17 0636, 10 mEq at 10/18/17 0636  •  promethazine (PHENERGAN) 12.5 mg in sodium chloride 0.9 % 50 mL, 12.5 mg, Intravenous, Q4H PRN, Nico Fung MD, 12.5 mg at 10/18/17 0245  •  Risaquad-2 capsule 1 capsule, 1 capsule, Oral, Daily, Kait Rao, [facility], 1 capsule at 10/17/17 0810  •  sodium chloride 0.9 % flush 10 mL, 10 mL, Intracatheter, Q12H, Seven Gallego MD, 10 mL at 10/17/17 2008  •  sodium chloride 0.9 % flush 10 mL, 10 mL, Intracatheter, PRN, Seven Gallego MD, 10 mL at 10/14/17 0631  •  sodium chloride 0.9 % flush 10 mL, 10 mL, Intracatheter, Q12H, Seven Gallego MD, 10 mL at 10/17/17 2008  •  sodium chloride 0.9 % flush 10 mL, 10 mL, Intracatheter, PRN, Seven Gallego MD  •  sodium chloride 0.9 % infusion, 50 mL/hr, Intravenous, Continuous, Kel Becker MD, Last Rate: 50 mL/hr at 10/17/17 1252, 50 mL/hr at 10/17/17 1252  •  traMADol (ULTRAM) tablet 50 mg, 50 mg, Oral, Q6H PRN, Seven Gallego MD, 50 mg at 10/13/17 1704  •  venlafaxine XR (EFFEXOR-XR) 24 hr capsule 75 mg, 75 mg, Oral, Daily, Seven Gallego MD, 75 mg at 10/17/17 0810    amLODIPine 10 mg Oral Q24H   enoxaparin 40 mg Subcutaneous Nightly   insulin aspart 0-9 Units Subcutaneous Q6H   insulin detemir 20 Units Subcutaneous Q12H   levoFLOXacin 750 mg Intravenous Q24H   lisinopril 20 mg Oral Q24H   magnesium sulfate 4 g Intravenous Once   metoprolol tartrate 100 mg Oral Q12H   trace minerals + multivitamin IVPB  Intravenous Once per day on Mon Wed Fri   pancrelipase (Lip-Prot-Amyl) 24,000 units of lipase Oral TID With Meals   pantoprazole 40 mg Intravenous Q12H   potassium chloride 10 mEq Intravenous Q1H   Risaquad-2 1 capsule Oral Daily   sodium chloride 10 mL Intracatheter Q12H   sodium chloride 10 mL Intracatheter Q12H   venlafaxine XR 75 mg Oral Daily     dextrose  •  dextrose  •  glucagon (human recombinant)  •  ipratropium-albuterol  •  magnesium sulfate **OR** magnesium sulfate **OR** magnesium  sulfate  •  Morphine  •  Morphine  •  ondansetron  •  Pharmacy to Dose TPN  •  potassium chloride  •  promethazine (PHENERGAN) in NS 50 mL  •  sodium chloride  •  sodium chloride  •  traMADol      Results from last 7 days  Lab Units 10/16/17  0544 10/14/17  0129 10/13/17  0106 10/12/17  0104   WBC 10*3/mm3 7.47 7.67 10.22 13.42*   HEMOGLOBIN g/dL 12.0 11.1* 11.4* 11.3*   PLATELETS 10*3/mm3 357 417* 466* 513*       Results from last 7 days  Lab Units 10/13/17  0106   CRP mg/dL 21.14*       Results from last 7 days  Lab Units 10/18/17  0059 10/17/17  0042 10/16/17  0544 10/16/17  0123 10/15/17  0147 10/14/17  1413 10/14/17  0129  10/13/17  0106   SODIUM mmol/L 133* 138 137 135 133*  --  136  --  135   POTASSIUM mmol/L 3.6 3.8 3.8 3.5 4.0 4.0 3.2*  < > 3.2*   CHLORIDE mmol/L 101 103 101 100 101  --  104  --  101   CO2 mmol/L 25.6 27.2 30.5 28.8 28.2  --  25.4  --  23.5*   BUN mg/dL 8 6* 8 8 8  --  5*  --  5*   CREATININE mg/dL 0.47 0.54 0.55 0.51 0.57  --  0.45  --  0.47   CALCIUM mg/dL 8.6 9.0 9.2 8.5 8.9  --  8.9  --  8.7   GLUCOSE mg/dL 306* 312* 321* 299* 318*  --  253*  --  155*   < > = values in this interval not displayed.    Results from last 7 days  Lab Units 10/18/17  0059 10/17/17  0042 10/16/17  0123 10/15/17  1754 10/15/17  0147 10/13/17  0106 10/12/17  0104   MAGNESIUM mg/dL 1.8 1.8 2.4 1.8 1.6* 2.3 1.6*     No results found for: HGBA1C    Results from last 7 days  Lab Units 10/16/17  0544 10/14/17  0129 10/13/17  0106 10/12/17  0104   BILIRUBIN mg/dL 0.2 0.1* 0.2 0.4   ALK PHOS U/L 113* 112* 125* 123*   AST (SGOT) U/L 14 16 16 13   ALT (SGPT) U/L 6* 12 15 16                       Imaging Results (last 72 hours)     ** No results found for the last 72 hours. **          Assessment/Plan    Active Problems:    Acute pancreatitis         See orders entered.     Kel Becker MD  10/18/17  7:06 AM

## 2017-10-18 NOTE — PLAN OF CARE
Problem: Patient Care Overview (Adult)  Goal: Plan of Care Review  Outcome: Ongoing (interventions implemented as appropriate)    10/18/17 1352   Coping/Psychosocial Response Interventions   Plan Of Care Reviewed With patient   Patient Care Overview   Progress progress toward functional goals as expected         Problem: Pancreatitis, Acute/Chronic (Adult)  Goal: Signs and Symptoms of Listed Potential Problems Will be Absent or Manageable (Pancreatitis, Acute/Chronic)  Outcome: Ongoing (interventions implemented as appropriate)    10/18/17 1352   Pancreatitis, Acute/Chronic   Problems Assessed (Pancreatitis) all   Problems Present (Pancreatitis) pain         Problem: Diabetes, Type 2 (Adult)  Goal: Signs and Symptoms of Listed Potential Problems Will be Absent or Manageable (Diabetes, Type 2)  Outcome: Ongoing (interventions implemented as appropriate)    10/18/17 1352   Diabetes, Type 2   Problems Assessed (Type 2 Diabetes) all   Problems Present (Type 2 Diabetes) impaired glycemic control         Problem: Pain, Acute (Adult)  Goal: Identify Related Risk Factors and Signs and Symptoms  Outcome: Ongoing (interventions implemented as appropriate)    10/18/17 1352   Pain, Acute   Related Risk Factors (Acute Pain) disease process;patient perception   Signs and Symptoms (Acute Pain) verbalization of pain descriptors       Goal: Acceptable Pain Control/Comfort Level  Outcome: Ongoing (interventions implemented as appropriate)    10/18/17 1352   Pain, Acute (Adult)   Acceptable Pain Control/Comfort Level making progress toward outcome

## 2017-10-18 NOTE — PROGRESS NOTES
Discharge Planning Assessment   [place]     Patient Name: Rossy Boone  MRN: [id number]  Today's Date: 10/18/2017    Admit Date: 10/10/2017       Discharge Plan       10/18/17 1446    Case Management/Social Work Plan    Plan Pt was discussed in patient care conference today. Pt lives with spouse and plans to return home at discharge. Pt continues to get TPN. Pt does not utilize home health services or DME. SS to follow.              Expected Discharge Date and Time     Expected Discharge Date Expected Discharge Time    Oct 16, 2017           Linnea Russell

## 2017-10-19 LAB
AMYLASE SERPL-CCNC: 408 U/L (ref 28–100)
ANION GAP SERPL CALCULATED.3IONS-SCNC: 5 MMOL/L (ref 3.6–11.2)
BUN BLD-MCNC: 9 MG/DL (ref 7–21)
BUN/CREAT SERPL: 20.5 (ref 7–25)
CALCIUM SPEC-SCNC: 8.8 MG/DL (ref 7.7–10)
CHLORIDE SERPL-SCNC: 104 MMOL/L (ref 99–112)
CO2 SERPL-SCNC: 27 MMOL/L (ref 24.3–31.9)
CREAT BLD-MCNC: 0.44 MG/DL (ref 0.43–1.29)
GFR SERPL CREATININE-BSD FRML MDRD: >150 ML/MIN/1.73
GLUCOSE BLD-MCNC: 252 MG/DL (ref 70–110)
GLUCOSE BLDC GLUCOMTR-MCNC: 199 MG/DL (ref 70–130)
GLUCOSE BLDC GLUCOMTR-MCNC: 207 MG/DL (ref 70–130)
GLUCOSE BLDC GLUCOMTR-MCNC: 212 MG/DL (ref 70–130)
GLUCOSE BLDC GLUCOMTR-MCNC: 224 MG/DL (ref 70–130)
GLUCOSE BLDC GLUCOMTR-MCNC: 257 MG/DL (ref 70–130)
LIPASE SERPL-CCNC: 80 U/L (ref 13–60)
MAGNESIUM SERPL-MCNC: 1.7 MG/DL (ref 1.7–2.6)
OSMOLALITY SERPL CALC.SUM OF ELEC: 279.2 MOSM/KG (ref 273–305)
PHOSPHATE SERPL-MCNC: 3.9 MG/DL (ref 2.7–4.5)
POTASSIUM BLD-SCNC: 3.6 MMOL/L (ref 3.5–5.3)
POTASSIUM BLD-SCNC: 4.1 MMOL/L (ref 3.5–5.3)
SODIUM BLD-SCNC: 136 MMOL/L (ref 135–153)

## 2017-10-19 PROCEDURE — 25010000002 ONDANSETRON PER 1 MG: Performed by: INTERNAL MEDICINE

## 2017-10-19 PROCEDURE — 63710000001 INSULIN ASPART PER 5 UNITS

## 2017-10-19 PROCEDURE — 25010000002 MORPHINE PER 10 MG: Performed by: INTERNAL MEDICINE

## 2017-10-19 PROCEDURE — 83735 ASSAY OF MAGNESIUM: CPT | Performed by: INTERNAL MEDICINE

## 2017-10-19 PROCEDURE — 99232 SBSQ HOSP IP/OBS MODERATE 35: CPT | Performed by: PHYSICIAN ASSISTANT

## 2017-10-19 PROCEDURE — 80048 BASIC METABOLIC PNL TOTAL CA: CPT

## 2017-10-19 PROCEDURE — 63710000001 INSULIN DETEMIR PER 5 UNITS: Performed by: INTERNAL MEDICINE

## 2017-10-19 PROCEDURE — 25010000002 ENOXAPARIN PER 10 MG: Performed by: INTERNAL MEDICINE

## 2017-10-19 PROCEDURE — 94799 UNLISTED PULMONARY SVC/PX: CPT

## 2017-10-19 PROCEDURE — 84100 ASSAY OF PHOSPHORUS: CPT

## 2017-10-19 PROCEDURE — 82150 ASSAY OF AMYLASE: CPT | Performed by: INTERNAL MEDICINE

## 2017-10-19 PROCEDURE — 25010000002 LEVOFLOXACIN PER 250 MG: Performed by: INTERNAL MEDICINE

## 2017-10-19 PROCEDURE — 83690 ASSAY OF LIPASE: CPT | Performed by: INTERNAL MEDICINE

## 2017-10-19 PROCEDURE — 82962 GLUCOSE BLOOD TEST: CPT

## 2017-10-19 PROCEDURE — 25010000003 POTASSIUM CHLORIDE 10 MEQ/100ML SOLUTION: Performed by: INTERNAL MEDICINE

## 2017-10-19 PROCEDURE — 25010000002 PROMETHAZINE PER 50 MG: Performed by: INTERNAL MEDICINE

## 2017-10-19 PROCEDURE — 84132 ASSAY OF SERUM POTASSIUM: CPT | Performed by: INTERNAL MEDICINE

## 2017-10-19 RX ORDER — MAGNESIUM SULFATE HEPTAHYDRATE 40 MG/ML
4 INJECTION, SOLUTION INTRAVENOUS ONCE
Status: COMPLETED | OUTPATIENT
Start: 2017-10-19 | End: 2017-10-19

## 2017-10-19 RX ORDER — POTASSIUM CHLORIDE 7.45 MG/ML
10 INJECTION INTRAVENOUS
Status: COMPLETED | OUTPATIENT
Start: 2017-10-19 | End: 2017-10-19

## 2017-10-19 RX ADMIN — Medication 10 ML: at 21:59

## 2017-10-19 RX ADMIN — MORPHINE SULFATE 2 MG: 10 INJECTION, SOLUTION INTRAMUSCULAR; INTRAVENOUS at 23:58

## 2017-10-19 RX ADMIN — POTASSIUM CHLORIDE 10 MEQ: 7.46 INJECTION, SOLUTION INTRAVENOUS at 07:32

## 2017-10-19 RX ADMIN — MORPHINE SULFATE 2 MG: 10 INJECTION, SOLUTION INTRAMUSCULAR; INTRAVENOUS at 14:32

## 2017-10-19 RX ADMIN — LEUCINE, PHENYLALANINE, LYSINE, METHIONINE, ISOLEUCINE, VALINE, HISTIDINE, THREONINE, TRYPTOPHAN, ALANINE, GLYCINE, ARGININE, PROLINE, SERINE, TYROSINE, SODIUM ACETATE, DIBASIC POTASSIUM PHOSPHATE, MAGNESIUM CHLORIDE, SODIUM CHLORIDE, CALCIUM CHLORIDE, DEXTROSE
365; 280; 290; 200; 300; 290; 240; 210; 90; 1035; 515; 575; 340; 250; 20; 340; 261; 51; 59; 33; 20 INJECTION INTRAVENOUS at 18:12

## 2017-10-19 RX ADMIN — PANTOPRAZOLE SODIUM 40 MG: 40 INJECTION, POWDER, FOR SOLUTION INTRAVENOUS at 21:58

## 2017-10-19 RX ADMIN — INSULIN DETEMIR 20 UNITS: 100 INJECTION, SOLUTION SUBCUTANEOUS at 21:59

## 2017-10-19 RX ADMIN — MORPHINE SULFATE 2 MG: 10 INJECTION, SOLUTION INTRAMUSCULAR; INTRAVENOUS at 16:54

## 2017-10-19 RX ADMIN — Medication 1 CAPSULE: at 08:07

## 2017-10-19 RX ADMIN — PROMETHAZINE HYDROCHLORIDE 12.5 MG: 25 INJECTION INTRAMUSCULAR; INTRAVENOUS at 04:14

## 2017-10-19 RX ADMIN — INSULIN DETEMIR 20 UNITS: 100 INJECTION, SOLUTION SUBCUTANEOUS at 08:11

## 2017-10-19 RX ADMIN — AMLODIPINE BESYLATE 10 MG: 10 TABLET ORAL at 08:07

## 2017-10-19 RX ADMIN — PROMETHAZINE HYDROCHLORIDE 12.5 MG: 25 INJECTION INTRAMUSCULAR; INTRAVENOUS at 19:47

## 2017-10-19 RX ADMIN — MORPHINE SULFATE 2 MG: 10 INJECTION, SOLUTION INTRAMUSCULAR; INTRAVENOUS at 00:33

## 2017-10-19 RX ADMIN — MORPHINE SULFATE 2 MG: 10 INJECTION, SOLUTION INTRAMUSCULAR; INTRAVENOUS at 21:58

## 2017-10-19 RX ADMIN — INSULIN ASPART 3 UNITS: 100 INJECTION, SOLUTION INTRAVENOUS; SUBCUTANEOUS at 17:01

## 2017-10-19 RX ADMIN — POTASSIUM CHLORIDE 10 MEQ: 7.46 INJECTION, SOLUTION INTRAVENOUS at 09:40

## 2017-10-19 RX ADMIN — MORPHINE SULFATE 2 MG: 10 INJECTION, SOLUTION INTRAMUSCULAR; INTRAVENOUS at 08:45

## 2017-10-19 RX ADMIN — INSULIN ASPART 5 UNITS: 100 INJECTION, SOLUTION INTRAVENOUS; SUBCUTANEOUS at 11:23

## 2017-10-19 RX ADMIN — TRAMADOL HYDROCHLORIDE 50 MG: 50 TABLET, COATED ORAL at 13:23

## 2017-10-19 RX ADMIN — VENLAFAXINE HYDROCHLORIDE 75 MG: 75 CAPSULE, EXTENDED RELEASE ORAL at 08:07

## 2017-10-19 RX ADMIN — LISINOPRIL 20 MG: 10 TABLET ORAL at 08:06

## 2017-10-19 RX ADMIN — MAGNESIUM SULFATE HEPTAHYDRATE 4 G: 40 INJECTION, SOLUTION INTRAVENOUS at 13:23

## 2017-10-19 RX ADMIN — POTASSIUM CHLORIDE 10 MEQ: 7.46 INJECTION, SOLUTION INTRAVENOUS at 08:45

## 2017-10-19 RX ADMIN — ONDANSETRON 4 MG: 2 INJECTION INTRAMUSCULAR; INTRAVENOUS at 06:02

## 2017-10-19 RX ADMIN — LEVOFLOXACIN 750 MG: 5 INJECTION, SOLUTION INTRAVENOUS at 11:45

## 2017-10-19 RX ADMIN — MORPHINE SULFATE 2 MG: 10 INJECTION, SOLUTION INTRAMUSCULAR; INTRAVENOUS at 04:14

## 2017-10-19 RX ADMIN — METOPROLOL TARTRATE 100 MG: 100 TABLET, FILM COATED ORAL at 08:07

## 2017-10-19 RX ADMIN — ONDANSETRON 4 MG: 2 INJECTION INTRAMUSCULAR; INTRAVENOUS at 13:23

## 2017-10-19 RX ADMIN — PANTOPRAZOLE SODIUM 40 MG: 40 INJECTION, POWDER, FOR SOLUTION INTRAVENOUS at 08:07

## 2017-10-19 RX ADMIN — MORPHINE SULFATE 2 MG: 10 INJECTION, SOLUTION INTRAMUSCULAR; INTRAVENOUS at 11:10

## 2017-10-19 RX ADMIN — INSULIN ASPART 5 UNITS: 100 INJECTION, SOLUTION INTRAVENOUS; SUBCUTANEOUS at 21:58

## 2017-10-19 RX ADMIN — INSULIN ASPART 8 UNITS: 100 INJECTION, SOLUTION INTRAVENOUS; SUBCUTANEOUS at 08:06

## 2017-10-19 RX ADMIN — MORPHINE SULFATE 2 MG: 10 INJECTION, SOLUTION INTRAMUSCULAR; INTRAVENOUS at 19:47

## 2017-10-19 RX ADMIN — MORPHINE SULFATE 2 MG: 10 INJECTION, SOLUTION INTRAMUSCULAR; INTRAVENOUS at 06:02

## 2017-10-19 RX ADMIN — ENOXAPARIN SODIUM 40 MG: 40 INJECTION SUBCUTANEOUS at 21:58

## 2017-10-19 RX ADMIN — POTASSIUM CHLORIDE 10 MEQ: 7.46 INJECTION, SOLUTION INTRAVENOUS at 10:37

## 2017-10-19 NOTE — PROGRESS NOTES
Gastroenterology Progress Note    : 1970    Reason for follow-up: pancreatitis    Subjective     Interval History:  Rossy Boone is a 47 y.o. female who was admitted to the hospital due to acute pancreatitis on 10/10/2017. She is being followed by GI for this reason. This is day 9 this admission with nothing besides ice chips by mouth and been getting TPN. Today, she is feeling some better. Abdominal pain in epigastric region and LUQ is still 8/10 in severity. Nausea was present and mild early this morning.     CT yesterday showed development of pseudocyst x2. Last imaging 1 week prior without any cyst formation.     Review of Systems   Constitutional: Negative for appetite change, chills, fatigue, fever and unexpected weight change.   HENT: Negative for hearing loss, mouth sores and nosebleeds.    Eyes: Negative for itching and visual disturbance.   Respiratory: Negative for cough, chest tightness, shortness of breath and wheezing.    Cardiovascular: Negative for chest pain, palpitations and leg swelling.   Gastrointestinal: Positive for abdominal pain and nausea. Negative for anal bleeding, blood in stool, constipation, diarrhea and vomiting.   Endocrine: Negative for cold intolerance, heat intolerance, polydipsia and polyuria.   Genitourinary: Negative for dysuria, frequency and hematuria.   Musculoskeletal: Negative for arthralgias, joint swelling and myalgias.   Skin: Negative for rash and wound.   Allergic/Immunologic: Negative for food allergies and immunocompromised state.   Neurological: Negative for seizures, syncope, weakness and light-headedness.   Hematological: Negative for adenopathy. Does not bruise/bleed easily.   Psychiatric/Behavioral: Negative for confusion and sleep disturbance. The patient is not nervous/anxious.        Past medical history, surgical history, family history and social history have been reviewed and remain unchanged since initial consultation.    Objective       Current  Facility-Administered Medications:   •  Adult [facility] TPN, , Intravenous, Q24H (TPN), Last Rate: 65 mL/hr at 10/18/17 1712 **AND** [DISCONTINUED] fat emulsion (INTRALIPID,LIPOSYN) 20 % infusion 50 g, 250 mL, Intravenous, Once per day on Mon Wed Fri, Last Rate: 20.8 mL/hr at 10/13/17 1849, 50 g at 10/13/17 1849 **AND** multiple vitamin 10 mL, trace elements Cr-Cu-Mn-Zn 5 mL in sodium chloride 0.9 % 500 mL IVPB, , Intravenous, Once per day on Mon Wed Fri, [facility], Last Rate: 128.8 mL/hr at 10/18/17 1136  •  amLODIPine (NORVASC) tablet 10 mg, 10 mg, Oral, Q24H, Seven Gallego MD, 10 mg at 10/19/17 0807  •  dextrose (D50W) solution 25 g, 25 g, Intravenous, Q15 Min PRN, Kel Becker MD  •  dextrose (GLUTOSE) oral gel 15 g, 15 g, Oral, Q15 Min PRN, Kel Becker MD  •  enoxaparin (LOVENOX) syringe 40 mg, 40 mg, Subcutaneous, Nightly, Kel Becker MD, 40 mg at 10/18/17 2010  •  glucagon (human recombinant) (GLUCAGEN DIAGNOSTIC) injection 1 mg, 1 mg, Subcutaneous, Q15 Min PRN, Kel Becker MD  •  insulin aspart (novoLOG) injection 0-14 Units, 0-14 Units, Subcutaneous, 4x Daily AC & at Bedtime, [facility], 8 Units at 10/19/17 0806  •  insulin detemir (LEVEMIR) injection 20 Units, 20 Units, Subcutaneous, Q12H, Kel Becker MD, 20 Units at 10/19/17 0811  •  ipratropium-albuterol (DUO-NEB) nebulizer solution 3 mL, 3 mL, Nebulization, Q4H PRN, Seven Gallego MD, 3 mL at 10/11/17 2300  •  levoFLOXacin (LEVAQUIN) 750 mg/150 mL D5W (premix) (LEVAQUIN) 750 mg, 750 mg, Intravenous, Q24H, Seven Gallego MD, Last Rate: 0 mL/hr at 10/11/17 1000, 750 mg at 10/18/17 0922  •  lisinopril (PRINIVIL,ZESTRIL) tablet 20 mg, 20 mg, Oral, Q24H, Seven Gallego MD, 20 mg at 10/19/17 0806  •  Magnesium Sulfate 2 gram Bolus, followed by 8 gram infusion (total Mg dose 10 grams)- Mg less than or equal to 1mg/dL, 2 g, Intravenous, PRN **OR** Magnesium Sulfate 6 gram Infusion (2 gm x 3) -Mg 1.1  -1.5 mg/dL, 2 g, Intravenous, PRN **OR** magnesium sulfate 4 gram infusion- Mg 1.6-1.9 mg/dL, 4 g, Intravenous, PRN, Seven Gallego MD  •  magnesium sulfate 4 gram infusion- Mg 1.6-1.9 mg/dL, 4 g, Intravenous, Once, Seven Gallego MD  •  metoprolol tartrate (LOPRESSOR) tablet 100 mg, 100 mg, Oral, Q12H, Seven Gallego MD, 100 mg at 10/19/17 0807  •  morphine injection 2 mg, 2 mg, Intravenous, Daily PRN, Nico Fung MD  •  morphine injection 2 mg, 2 mg, Intravenous, Q2H PRN, Nico Fung MD, 2 mg at 10/19/17 0845  •  ondansetron (ZOFRAN) injection 4 mg, 4 mg, Intravenous, Q6H PRN, Kel Becker MD, 4 mg at 10/19/17 0602  •  pancrelipase (Lip-Prot-Amyl) (CREON) capsule 24,000 units of lipase, 24,000 units of lipase, Oral, TID With Meals, Seven Gallego MD, 24,000 units of lipase at 10/12/17 1813  •  pantoprazole (PROTONIX) injection 40 mg, 40 mg, Intravenous, Q12H, Nico Fung MD, 40 mg at 10/19/17 0807  •  Pharmacy to Dose TPN, , Does not apply, Continuous PRN, Seven Gallego MD, 65 mg at 10/13/17 1148  •  potassium chloride 10 mEq in 100 mL IVPB, 10 mEq, Intravenous, Q1H PRN, Seven Gallego MD, Last Rate: 100 mL/hr at 10/13/17 1442, 10 mEq at 10/13/17 1442  •  potassium chloride 10 mEq in 100 mL IVPB, 10 mEq, Intravenous, Q1H, Seven Gallego MD, Last Rate: 100 mL/hr at 10/19/17 0940, 10 mEq at 10/19/17 0940  •  promethazine (PHENERGAN) 12.5 mg in sodium chloride 0.9 % 50 mL, 12.5 mg, Intravenous, Q4H PRN, Nico Fung MD, 12.5 mg at 10/19/17 0414  •  Risaquad-2 capsule 1 capsule, 1 capsule, Oral, Daily, Kait Rao [facility], 1 capsule at 10/19/17 0807  •  sodium chloride 0.9 % flush 10 mL, 10 mL, Intracatheter, Q12H, Seven Gallego MD, 10 mL at 10/18/17 2011  •  sodium chloride 0.9 % flush 10 mL, 10 mL, Intracatheter, PRN, Seven Gallego MD, 10 mL at 10/14/17 0631  •  sodium chloride 0.9 % flush 10 mL, 10 mL, Intracatheter, Q12H, Seven Gallego MD, 10 mL at 10/18/17  2010  •  sodium chloride 0.9 % flush 10 mL, 10 mL, Intracatheter, PRN, Seven Gallego MD  •  sodium chloride 0.9 % infusion, 50 mL/hr, Intravenous, Continuous, Kel Becker MD, Last Rate: 50 mL/hr at 10/18/17 0922, 50 mL/hr at 10/18/17 0922  •  traMADol (ULTRAM) tablet 50 mg, 50 mg, Oral, Q6H PRN, Seven Gallego MD, 50 mg at 10/13/17 1704  •  venlafaxine XR (EFFEXOR-XR) 24 hr capsule 75 mg, 75 mg, Oral, Daily, Seven Gallego MD, 75 mg at 10/19/17 0807    Allergies:   Hydrocodone-acetaminophen; Hydromorphone; Oxycodone-acetaminophen; and Penicillins    Vital Signs:  Temp:  [97.8 °F (36.6 °C)-99 °F (37.2 °C)] 98.3 °F (36.8 °C)  Heart Rate:  [] 90  Resp:  [18] 18  BP: ()/(60-76) 107/76  Body mass index is 26.61 kg/(m^2).    Intake/Output Summary (Last 24 hours) at 10/19/17 0952  Last data filed at 10/19/17 0732   Gross per 24 hour   Intake              100 ml   Output                0 ml   Net              100 ml     I/O this shift:  In: 100 [IV Piggyback:100]  Out: -     Physical Exam   Constitutional: She is oriented to person, place, and time. She appears well-developed and well-nourished. No distress.   HENT:   Head: Normocephalic and atraumatic.   Nose: Nose normal.   Mouth/Throat: Oropharynx is clear and moist.   Eyes: Conjunctivae are normal. Right eye exhibits no discharge. Left eye exhibits no discharge. No scleral icterus.   Neck: Normal range of motion. No JVD present.   Cardiovascular: Normal rate, regular rhythm and normal heart sounds.  Exam reveals no gallop and no friction rub.    No murmur heard.  Pulmonary/Chest: Effort normal and breath sounds normal. No respiratory distress. She has no wheezes. She has no rales. She exhibits no tenderness.   Abdominal: Soft. Bowel sounds are normal. She exhibits no mass. There is tenderness (severe and generalized even with light palpation).   Musculoskeletal: Normal range of motion. She exhibits no edema or deformity.   Neurological: She is  alert and oriented to person, place, and time. Coordination normal.   Skin: Skin is warm and dry. No rash noted. She is not diaphoretic. No erythema.   No ecchymosis on abdomen or back   Psychiatric: Her behavior is normal. Judgment and thought content normal.   Depressed affect   Vitals reviewed.       Results Review:  I reviewed the patient's new clinical results including all available labs and radiology reports.    Lab Results (last 24 hours)     Procedure Component Value Units Date/Time    POC Glucose Fingerstick [[id number]]  (Abnormal) Collected:  10/18/17 1117    Specimen:  Blood Updated:  10/18/17 1130     Glucose 323 (H) mg/dL     Narrative:       Meter: [id number] : [id number] VICKEY REILLY    CBC & Differential [[id number]] Collected:  10/18/17 1139    Specimen:  Blood Updated:  10/18/17 1203    Narrative:       The following orders were created for panel order CBC & Differential.  Procedure                               Abnormality         Status                     ---------                               -----------         ------                     CBC Auto Differential[[id number]]        Abnormal            Final result                 Please view results for these tests on the individual orders.    CBC Auto Differential [[id number]]  (Abnormal) Collected:  10/18/17 1139    Specimen:  Blood Updated:  10/18/17 1203     WBC 9.69 10*3/mm3      RBC 4.61 10*6/mm3      Hemoglobin 12.8 g/dL      Hematocrit 39.5 %      MCV 85.7 fL      MCH 27.8 pg      MCHC 32.4 (L) g/dL      RDW 14.0 %      RDW-SD 43.6 fl      MPV 10.8 (H) fL      Platelets 390 10*3/mm3      Neutrophil % 70.7 (H) %      Lymphocyte % 18.6 (L) %      Monocyte % 6.5 %      Eosinophil % 3.5 %      Basophil % 0.3 %      Immature Grans % 0.4 %      Neutrophils, Absolute 6.85 (H) 10*3/mm3      Lymphocytes, Absolute 1.80 10*3/mm3      Monocytes, Absolute 0.63 10*3/mm3      Eosinophils, Absolute 0.34 10*3/mm3      Basophils, Absolute 0.03 10*3/mm3       Immature Grans, Absolute 0.04 (H) 10*3/mm3     POC Glucose Fingerstick [[id number]]  (Abnormal) Collected:  10/18/17 1657    Specimen:  Blood Updated:  10/18/17 1703     Glucose 195 (H) mg/dL     Narrative:       Meter: [id number] : [id number] VICKEY REILLY    POC Glucose Fingerstick [[id number]]  (Normal) Collected:  10/18/17 2014    Specimen:  Blood Updated:  10/18/17 2041     Glucose 115 mg/dL     Narrative:       Meter: [id number] : [id number] medina crystal    POC Glucose Fingerstick [[id number]]  (Abnormal) Collected:  10/19/17 0033    Specimen:  Blood Updated:  10/19/17 0039     Glucose 224 (H) mg/dL     Narrative:       Meter: [id number] : [id number] medina crystal    Magnesium [[id number]]  (Normal) Collected:  10/19/17 0137    Specimen:  Blood Updated:  10/19/17 0212     Magnesium 1.7 mg/dL     Phosphorus [[id number]]  (Normal) Collected:  10/19/17 0137    Specimen:  Blood Updated:  10/19/17 0212     Phosphorus 3.9 mg/dL     Lipase [[id number]]  (Abnormal) Collected:  10/19/17 0137    Specimen:  Blood Updated:  10/19/17 0212     Lipase 80 (H) U/L     Basic Metabolic Panel [[id number]]  (Abnormal) Collected:  10/19/17 0137    Specimen:  Blood Updated:  10/19/17 0212     Glucose 252 (H) mg/dL      BUN 9 mg/dL      Creatinine 0.44 mg/dL      Sodium 136 mmol/L      Potassium 3.6 mmol/L      Chloride 104 mmol/L      CO2 27.0 mmol/L      Calcium 8.8 mg/dL      eGFR Non African Amer >150 mL/min/1.73      BUN/Creatinine Ratio 20.5     Anion Gap 5.0 mmol/L     Narrative:       GFR Normal >60  Chronic Kidney Disease <60  Kidney Failure <15    Osmolality, Calculated [[id number]]  (Normal) Collected:  10/19/17 0137    Specimen:  Blood Updated:  10/19/17 0212     Osmolality Calc 279.2 mOsm/kg     Amylase [[id number]]  (Abnormal) Collected:  10/19/17 0137    Specimen:  Blood Updated:  10/19/17 0227     Amylase 408 (H) U/L     POC Glucose Fingerstick [[id number]]  (Abnormal) Collected:  10/19/17 0771    Specimen:   "Blood Updated:  10/19/17 0734     Glucose 257 (H) mg/dL     Narrative:       Meter: [id number] : [id number] VICKEY REILLY          Imaging Results (last 72 hours)     Procedure Component Value Units Date/Time    CT Abdomen Pelvis With Contrast [[id number]] Collected:  10/18/17 1514     Updated:  10/18/17 1519    Narrative:          CT ABDOMEN PELVIS W CONTRAST-        TECHNIQUE: Multiple axial CT images were obtained from lung bases  through pubic symphysis with administration of IV contrast. Reformatted  images in the coronal and/or sagittal plane(s) were generated from the  axial data set to facilitate diagnostic accuracy and/or surgical  planning.  Oral Contrast:NONE.     Radiation dose reduction techniques were utilized per ALARA protocol.  Automated exposure control was initiated through either or [employer] or  [employer] software packages by  protocol.       Radiation dose reduction techniques were utilized per ALARA protocol.  Automated exposure control was initiated through either or [employer] or  [employer] software packages by  protocol.       599.94 mGy.cm     Clinical information  worsening abdominal pain, (patient reports sensation that something  \"burst\" in abdomen), pancreatitis with pain lasting longer than 2 weeks,  on TPN cannot tolerate PO with continued nausea; K85.90-Acute  pancreatitis without necrosis or infection, unspecified      Comparison  Comparison: 9/29/2017     Findings  LOWER THORAX: SMALL LEFT GREATER THAN RIGHT PLEURAL EFFUSIONS WITH  BIBASILAR AIRSPACE DISEASE NOTED.     ABDOMEN:        LIVER: Homogeneous. No focal hepatic mass or ductal dilatation.        GALLBLADDER: PRIOR CHOLECYSTECTOMY.        PANCREAS: SINCE THE PREVIOUS EXAM, A LARGE PSEUDOCYST HAS DEVELOPED  INVOLVING THE MIDPORTION OF PANCREAS IMMEDIATELY SITUATED POSTERIOR TO  THE POSTERIOR GASTRIC WALL AND IS APPROXIMATELY 11.4 CM X 6.8 CM.        SPLEEN: Homogeneous. No splenomegaly.        " ADRENALS: No mass.        KIDNEYS: No mass. No obstructive uropathy.  No evidence of  urolithiasis.        GI TRACT: SMALLER PSEUDOCYST IS SEEN ALONG THE GREATER CURVATURE OF  THE STOMACH BUT IS APPROXIMATELY 2.6 CM IN SIZE.        PERITONEUM: THE AMOUNT OF INTRA-ABDOMINAL ASCITES HAS INCREASED SINCE  THE PREVIOUS EXAM.        MESENTERY: Unremarkable.        LYMPH NODES: No lymphadenopathy.        VASCULATURE: No evidence of aneurysm.        ABDOMINAL WALL: No focal hernia or mass.           OTHER: None.     PELVIS:        BLADDER: No focal mass or significant wall thickening        REPRODUCTIVE: Unremarkable as visualized.        APPENDIX: Nondistended. No surrounding inflammation.     BONES: No acute bony abnormality.       Impression:       Impression:  1. DEVELOPMENT OF 11.4 X 6.8 CM LARGE MID BODY REGION PSEUDOCYST OF THE  PANCREAS WHICH IS SITUATED IMMEDIATELY POSTERIOR TO THE STOMACH EFFACING  THE POSTERIOR GASTRIC WALL.  2. SMALLER PSEUDOCYST ALONG GREATER CURVATURE OF STOMACH NEAR FUNDUS.  3. EVOLVING SMALL AMOUNTS OF ASCITES THROUGHOUT THE ABDOMINAL AND PELVIC  REGIONS. NO LOCULATED COLLECTIONS.  4. DEVELOPMENT OF PLEURAL EFFUSIONS WITH BIBASILAR AIRSPACE DISEASE.        This report was finalized on 10/18/2017 3:16 PM by Dr. Mike Tolbert MD.               Assessment/Plan     Assessment:  Active Problems:    Acute pancreatitis    Pancreatic pseudocysts      Plan:  · Pseudocysts should be monitored with repeat imaging in a few weeks depending on her clinical course. She would like to try clear liquids today and we will start slow. Continue TPN for now.   · No need for intervention with drainage of cysts at this time. Usually wait and monitor until 6-8 weeks after development.   · GI will continue to monitor.     I discussed the patients findings and my recommendations with patient and nursing staff.        Sandhya Phillip PA-C      Electronically signed 10/19/2017 at 12:55 PM.

## 2017-10-19 NOTE — PLAN OF CARE
Problem: Patient Care Overview (Adult)  Goal: Plan of Care Review  Outcome: Ongoing (interventions implemented as appropriate)    10/19/17 1310   Coping/Psychosocial Response Interventions   Plan Of Care Reviewed With patient   Patient Care Overview   Progress progress towards functional goals is fair         Problem: Pancreatitis, Acute/Chronic (Adult)  Goal: Signs and Symptoms of Listed Potential Problems Will be Absent or Manageable (Pancreatitis, Acute/Chronic)  Outcome: Ongoing (interventions implemented as appropriate)    10/19/17 1310   Pancreatitis, Acute/Chronic   Problems Assessed (Pancreatitis) all   Problems Present (Pancreatitis) pain         Problem: Diabetes, Type 2 (Adult)  Goal: Signs and Symptoms of Listed Potential Problems Will be Absent or Manageable (Diabetes, Type 2)  Outcome: Ongoing (interventions implemented as appropriate)    10/19/17 1310   Diabetes, Type 2   Problems Assessed (Type 2 Diabetes) all   Problems Present (Type 2 Diabetes) impaired glycemic control         Problem: Pain, Acute (Adult)  Goal: Identify Related Risk Factors and Signs and Symptoms  Outcome: Ongoing (interventions implemented as appropriate)    10/19/17 1310   Pain, Acute   Related Risk Factors (Acute Pain) disease process;patient perception   Signs and Symptoms (Acute Pain) verbalization of pain descriptors       Goal: Acceptable Pain Control/Comfort Level  Outcome: Ongoing (interventions implemented as appropriate)    10/19/17 1310   Pain, Acute (Adult)   Acceptable Pain Control/Comfort Level making progress toward outcome

## 2017-10-19 NOTE — PLAN OF CARE
Problem: Pancreatitis, Acute/Chronic (Adult)  Goal: Signs and Symptoms of Listed Potential Problems Will be Absent or Manageable (Pancreatitis, Acute/Chronic)  Outcome: Ongoing (interventions implemented as appropriate)    10/18/17 1352   Pancreatitis, Acute/Chronic   Problems Assessed (Pancreatitis) all   Problems Present (Pancreatitis) pain         Problem: Diabetes, Type 2 (Adult)  Goal: Signs and Symptoms of Listed Potential Problems Will be Absent or Manageable (Diabetes, Type 2)  Outcome: Ongoing (interventions implemented as appropriate)    10/18/17 1352   Diabetes, Type 2   Problems Assessed (Type 2 Diabetes) all   Problems Present (Type 2 Diabetes) impaired glycemic control         Problem: Pain, Acute (Adult)  Goal: Identify Related Risk Factors and Signs and Symptoms  Outcome: Ongoing (interventions implemented as appropriate)    10/18/17 1352   Pain, Acute   Related Risk Factors (Acute Pain) disease process;patient perception   Signs and Symptoms (Acute Pain) verbalization of pain descriptors       Goal: Acceptable Pain Control/Comfort Level  Outcome: Ongoing (interventions implemented as appropriate)    10/19/17 0210   Pain, Acute (Adult)   Acceptable Pain Control/Comfort Level making progress toward outcome

## 2017-10-20 LAB
ANION GAP SERPL CALCULATED.3IONS-SCNC: 4.1 MMOL/L (ref 3.6–11.2)
BUN BLD-MCNC: 10 MG/DL (ref 7–21)
BUN/CREAT SERPL: 20.4 (ref 7–25)
CALCIUM SPEC-SCNC: 8.6 MG/DL (ref 7.7–10)
CHLORIDE SERPL-SCNC: 101 MMOL/L (ref 99–112)
CO2 SERPL-SCNC: 30.9 MMOL/L (ref 24.3–31.9)
CREAT BLD-MCNC: 0.49 MG/DL (ref 0.43–1.29)
CRP SERPL-MCNC: 1.13 MG/DL (ref 0–0.99)
GFR SERPL CREATININE-BSD FRML MDRD: 135 ML/MIN/1.73
GLUCOSE BLD-MCNC: 212 MG/DL (ref 70–110)
GLUCOSE BLDC GLUCOMTR-MCNC: 125 MG/DL (ref 70–130)
GLUCOSE BLDC GLUCOMTR-MCNC: 175 MG/DL (ref 70–130)
GLUCOSE BLDC GLUCOMTR-MCNC: 183 MG/DL (ref 70–130)
GLUCOSE BLDC GLUCOMTR-MCNC: 220 MG/DL (ref 70–130)
GLUCOSE BLDC GLUCOMTR-MCNC: 227 MG/DL (ref 70–130)
MAGNESIUM SERPL-MCNC: 2 MG/DL (ref 1.7–2.6)
OSMOLALITY SERPL CALC.SUM OF ELEC: 277.3 MOSM/KG (ref 273–305)
POTASSIUM BLD-SCNC: 4.5 MMOL/L (ref 3.5–5.3)
SODIUM BLD-SCNC: 136 MMOL/L (ref 135–153)

## 2017-10-20 PROCEDURE — 63710000001 INSULIN ASPART PER 5 UNITS

## 2017-10-20 PROCEDURE — 25010000002 PROMETHAZINE PER 50 MG: Performed by: INTERNAL MEDICINE

## 2017-10-20 PROCEDURE — 80048 BASIC METABOLIC PNL TOTAL CA: CPT

## 2017-10-20 PROCEDURE — 25010000002 ONDANSETRON PER 1 MG: Performed by: INTERNAL MEDICINE

## 2017-10-20 PROCEDURE — 63710000001 INSULIN DETEMIR PER 5 UNITS: Performed by: INTERNAL MEDICINE

## 2017-10-20 PROCEDURE — 25010000002 LEVOFLOXACIN PER 250 MG: Performed by: INTERNAL MEDICINE

## 2017-10-20 PROCEDURE — 25010000002 ENOXAPARIN PER 10 MG: Performed by: INTERNAL MEDICINE

## 2017-10-20 PROCEDURE — 82962 GLUCOSE BLOOD TEST: CPT

## 2017-10-20 PROCEDURE — 86140 C-REACTIVE PROTEIN: CPT | Performed by: PHYSICIAN ASSISTANT

## 2017-10-20 PROCEDURE — 94799 UNLISTED PULMONARY SVC/PX: CPT

## 2017-10-20 PROCEDURE — 83735 ASSAY OF MAGNESIUM: CPT

## 2017-10-20 RX ORDER — MEPERIDINE HYDROCHLORIDE 25 MG/ML
25 INJECTION INTRAMUSCULAR; INTRAVENOUS; SUBCUTANEOUS
Status: DISPENSED | OUTPATIENT
Start: 2017-10-20 | End: 2017-10-24

## 2017-10-20 RX ADMIN — PANTOPRAZOLE SODIUM 40 MG: 40 INJECTION, POWDER, FOR SOLUTION INTRAVENOUS at 09:04

## 2017-10-20 RX ADMIN — MEPERIDINE HYDROCHLORIDE 25 MG: 25 INJECTION INTRAMUSCULAR; INTRAVENOUS; SUBCUTANEOUS at 05:50

## 2017-10-20 RX ADMIN — METOPROLOL TARTRATE 100 MG: 100 TABLET, FILM COATED ORAL at 20:35

## 2017-10-20 RX ADMIN — PROMETHAZINE HYDROCHLORIDE 12.5 MG: 25 INJECTION INTRAMUSCULAR; INTRAVENOUS at 01:53

## 2017-10-20 RX ADMIN — MEPERIDINE HYDROCHLORIDE 25 MG: 25 INJECTION INTRAMUSCULAR; INTRAVENOUS; SUBCUTANEOUS at 15:55

## 2017-10-20 RX ADMIN — Medication 10 ML: at 09:05

## 2017-10-20 RX ADMIN — PROMETHAZINE HYDROCHLORIDE 12.5 MG: 25 INJECTION INTRAMUSCULAR; INTRAVENOUS at 05:49

## 2017-10-20 RX ADMIN — MEPERIDINE HYDROCHLORIDE 25 MG: 25 INJECTION INTRAMUSCULAR; INTRAVENOUS; SUBCUTANEOUS at 18:19

## 2017-10-20 RX ADMIN — MEPERIDINE HYDROCHLORIDE 25 MG: 25 INJECTION INTRAMUSCULAR; INTRAVENOUS; SUBCUTANEOUS at 10:16

## 2017-10-20 RX ADMIN — LEUCINE, PHENYLALANINE, LYSINE, METHIONINE, ISOLEUCINE, VALINE, HISTIDINE, THREONINE, TRYPTOPHAN, ALANINE, GLYCINE, ARGININE, PROLINE, SERINE, TYROSINE, SODIUM ACETATE, DIBASIC POTASSIUM PHOSPHATE, MAGNESIUM CHLORIDE, SODIUM CHLORIDE, CALCIUM CHLORIDE, DEXTROSE
365; 280; 290; 200; 300; 290; 240; 210; 90; 1035; 515; 575; 340; 250; 20; 340; 261; 51; 59; 33; 20 INJECTION INTRAVENOUS at 17:20

## 2017-10-20 RX ADMIN — INSULIN DETEMIR 22 UNITS: 100 INJECTION, SOLUTION SUBCUTANEOUS at 20:36

## 2017-10-20 RX ADMIN — MEPERIDINE HYDROCHLORIDE 25 MG: 25 INJECTION INTRAMUSCULAR; INTRAVENOUS; SUBCUTANEOUS at 22:58

## 2017-10-20 RX ADMIN — INSULIN ASPART 5 UNITS: 100 INJECTION, SOLUTION INTRAVENOUS; SUBCUTANEOUS at 09:01

## 2017-10-20 RX ADMIN — Medication 1 CAPSULE: at 09:03

## 2017-10-20 RX ADMIN — PROMETHAZINE HYDROCHLORIDE 12.5 MG: 25 INJECTION INTRAMUSCULAR; INTRAVENOUS at 13:26

## 2017-10-20 RX ADMIN — PANCRELIPASE 24000 UNITS OF LIPASE: 60000; 12000; 38000 CAPSULE, DELAYED RELEASE PELLETS ORAL at 17:19

## 2017-10-20 RX ADMIN — MEPERIDINE HYDROCHLORIDE 25 MG: 25 INJECTION INTRAMUSCULAR; INTRAVENOUS; SUBCUTANEOUS at 13:26

## 2017-10-20 RX ADMIN — METOPROLOL TARTRATE 100 MG: 100 TABLET, FILM COATED ORAL at 09:04

## 2017-10-20 RX ADMIN — PANTOPRAZOLE SODIUM 40 MG: 40 INJECTION, POWDER, FOR SOLUTION INTRAVENOUS at 20:35

## 2017-10-20 RX ADMIN — Medication 10 ML: at 20:35

## 2017-10-20 RX ADMIN — INSULIN DETEMIR 22 UNITS: 100 INJECTION, SOLUTION SUBCUTANEOUS at 09:01

## 2017-10-20 RX ADMIN — LISINOPRIL 20 MG: 10 TABLET ORAL at 09:03

## 2017-10-20 RX ADMIN — ONDANSETRON 4 MG: 2 INJECTION INTRAMUSCULAR; INTRAVENOUS at 10:16

## 2017-10-20 RX ADMIN — PROMETHAZINE HYDROCHLORIDE 12.5 MG: 25 INJECTION INTRAMUSCULAR; INTRAVENOUS at 20:36

## 2017-10-20 RX ADMIN — VENLAFAXINE HYDROCHLORIDE 75 MG: 75 CAPSULE, EXTENDED RELEASE ORAL at 09:03

## 2017-10-20 RX ADMIN — MEPERIDINE HYDROCHLORIDE 25 MG: 25 INJECTION INTRAMUSCULAR; INTRAVENOUS; SUBCUTANEOUS at 20:35

## 2017-10-20 RX ADMIN — ASCORBIC ACID, VITAMIN A PALMITATE, CHOLECALCIFEROL, THIAMINE HYDROCHLORIDE, RIBOFLAVIN-5 PHOSPHATE SODIUM, PYRIDOXINE HYDROCHLORIDE, NIACINAMIDE, DEXPANTHENOL, ALPHA-TOCOPHEROL ACETATE, VITAMIN K1, FOLIC ACID, BIOTIN, CYANOCOBALAMIN: 200; 3300; 200; 6; 3.6; 6; 40; 15; 10; 150; 600; 60; 5 INJECTION, SOLUTION INTRAVENOUS at 11:36

## 2017-10-20 RX ADMIN — PANCRELIPASE 24000 UNITS OF LIPASE: 60000; 12000; 38000 CAPSULE, DELAYED RELEASE PELLETS ORAL at 09:02

## 2017-10-20 RX ADMIN — INSULIN ASPART 3 UNITS: 100 INJECTION, SOLUTION INTRAVENOUS; SUBCUTANEOUS at 11:35

## 2017-10-20 RX ADMIN — LEVOFLOXACIN 750 MG: 5 INJECTION, SOLUTION INTRAVENOUS at 09:02

## 2017-10-20 RX ADMIN — Medication 10 ML: at 09:00

## 2017-10-20 RX ADMIN — ENOXAPARIN SODIUM 40 MG: 40 INJECTION SUBCUTANEOUS at 20:31

## 2017-10-20 RX ADMIN — Medication 10 ML: at 23:29

## 2017-10-20 RX ADMIN — SODIUM CHLORIDE 50 ML/HR: 9 INJECTION, SOLUTION INTRAVENOUS at 01:54

## 2017-10-20 RX ADMIN — AMLODIPINE BESYLATE 10 MG: 10 TABLET ORAL at 09:04

## 2017-10-20 RX ADMIN — MEPERIDINE HYDROCHLORIDE 25 MG: 25 INJECTION INTRAMUSCULAR; INTRAVENOUS; SUBCUTANEOUS at 01:53

## 2017-10-20 RX ADMIN — PANCRELIPASE 24000 UNITS OF LIPASE: 60000; 12000; 38000 CAPSULE, DELAYED RELEASE PELLETS ORAL at 11:41

## 2017-10-20 NOTE — PROGRESS NOTES
Gastroenterology Progress Note    : 1970    Reason for follow-up: pancreatitis    Subjective     Interval History:  Rossy Boone is a 47 y.o. female who was admitted to the hospital due to acute pancreatitis, unspecified complication status, unspecified pancreatitis type on 10/10/2017. She is being followed by GI for this reason. She has tolerated clear liquids for the past 24 hours. Still with some nausea and abdominal pain. She is no longer having worsening abdominal pain with getting out of bed. Still receiving TPN.     Review of Systems   Constitutional: Negative for appetite change, chills, fatigue, fever and unexpected weight change.   HENT: Negative for hearing loss, mouth sores and nosebleeds.    Eyes: Negative for itching and visual disturbance.   Respiratory: Negative for cough, chest tightness, shortness of breath and wheezing.    Cardiovascular: Negative for chest pain, palpitations and leg swelling.   Gastrointestinal: Positive for abdominal pain and nausea. Negative for anal bleeding, blood in stool, constipation, diarrhea and vomiting.   Endocrine: Negative for cold intolerance, heat intolerance, polydipsia and polyuria.   Genitourinary: Negative for dysuria, frequency and hematuria.   Musculoskeletal: Negative for arthralgias, joint swelling and myalgias.   Skin: Negative for rash and wound.   Allergic/Immunologic: Negative for food allergies and immunocompromised state.   Neurological: Negative for seizures, syncope, weakness and light-headedness.   Hematological: Negative for adenopathy. Does not bruise/bleed easily.   Psychiatric/Behavioral: Negative for confusion and sleep disturbance. The patient is not nervous/anxious.        Past medical history, surgical history, family history and social history have been reviewed and remain unchanged since initial consultation.    Objective       Current Facility-Administered Medications:   •  Adult [facility] Clin[facility] TPN, , Intravenous, Q24H (TPN),  Last Rate: 65 mL/hr at 10/19/17 1812 **AND** [DISCONTINUED] fat emulsion (INTRALIPID,LIPOSYN) 20 % infusion 50 g, 250 mL, Intravenous, Once per day on Mon Wed Fri, Last Rate: 20.8 mL/hr at 10/13/17 1849, 50 g at 10/13/17 1849 **AND** multiple vitamin 10 mL, trace elements Cr-Cu-Mn-Zn 5 mL in sodium chloride 0.9 % 500 mL IVPB, , Intravenous, Once per day on Mon Wed Fri, Kait Rao, formerly [facility], Last Rate: 128.8 mL/hr at 10/20/17 1136  •  amLODIPine (NORVASC) tablet 10 mg, 10 mg, Oral, Q24H, Seven Gallego MD, 10 mg at 10/20/17 0904  •  dextrose (D50W) solution 25 g, 25 g, Intravenous, Q15 Min PRN, Kel Becker MD  •  dextrose (GLUTOSE) oral gel 15 g, 15 g, Oral, Q15 Min PRN, Kel Becker MD  •  enoxaparin (LOVENOX) syringe 40 mg, 40 mg, Subcutaneous, Nightly, Kel Becker MD, 40 mg at 10/19/17 2158  •  glucagon (human recombinant) (GLUCAGEN DIAGNOSTIC) injection 1 mg, 1 mg, Subcutaneous, Q15 Min PRN, Kel Becker MD  •  insulin aspart (novoLOG) injection 0-14 Units, 0-14 Units, Subcutaneous, 4x Daily AC & at Bedtime, Kait Rao, formerly [facility], 3 Units at 10/20/17 1135  •  insulin detemir (LEVEMIR) injection 22 Units, 22 Units, Subcutaneous, Q12H, Kel Becker MD, 22 Units at 10/20/17 0901  •  ipratropium-albuterol (DUO-NEB) nebulizer solution 3 mL, 3 mL, Nebulization, Q4H PRN, Seven Gallego MD, 3 mL at 10/11/17 2300  •  levoFLOXacin (LEVAQUIN) 750 mg/150 mL D5W (premix) (LEVAQUIN) 750 mg, 750 mg, Intravenous, Q24H, Seven Gallego MD, Last Rate: 0 mL/hr at 10/11/17 1000, 750 mg at 10/20/17 0902  •  lisinopril (PRINIVIL,ZESTRIL) tablet 20 mg, 20 mg, Oral, Q24H, Seven Gallego MD, 20 mg at 10/20/17 0903  •  Magnesium Sulfate 2 gram Bolus, followed by 8 gram infusion (total Mg dose 10 grams)- Mg less than or equal to 1mg/dL, 2 g, Intravenous, PRN **OR** Magnesium Sulfate 6 gram Infusion (2 gm x 3) -Mg 1.1 -1.5 mg/dL, 2 g, Intravenous, PRN **OR** magnesium sulfate 4 gram infusion- Mg 1.6-1.9 mg/dL, 4  g, Intravenous, PRN, Seven Gallego MD  •  meperidine (DEMEROL) injection 25 mg, 25 mg, Intravenous, Q2H PRN, Kel Becker MD, 25 mg at 10/20/17 1326  •  metoprolol tartrate (LOPRESSOR) tablet 100 mg, 100 mg, Oral, Q12H, Seven Gallego MD, 100 mg at 10/20/17 0904  •  morphine injection 2 mg, 2 mg, Intravenous, Daily PRN, Nico Fung MD  •  ondansetron (ZOFRAN) injection 4 mg, 4 mg, Intravenous, Q6H PRN, Kel Becker MD, 4 mg at 10/20/17 1016  •  pancrelipase (Lip-Prot-Amyl) (CREON) capsule 24,000 units of lipase, 24,000 units of lipase, Oral, TID With Meals, Seven Gallego MD, 24,000 units of lipase at 10/20/17 1141  •  pantoprazole (PROTONIX) injection 40 mg, 40 mg, Intravenous, Q12H, Nico Fung MD, 40 mg at 10/20/17 0904  •  Pharmacy to Dose TPN, , Does not apply, Continuous PRN, Seven Gallego MD, 65 mg at 10/13/17 1148  •  potassium chloride 10 mEq in 100 mL IVPB, 10 mEq, Intravenous, Q1H PRN, Seven Gallego MD, Last Rate: 100 mL/hr at 10/13/17 1442, 10 mEq at 10/13/17 1442  •  promethazine (PHENERGAN) 12.5 mg in sodium chloride 0.9 % 50 mL, 12.5 mg, Intravenous, Q4H PRN, Nico Fung MD, 12.5 mg at 10/20/17 1326  •  Risaquad-2 capsule 1 capsule, 1 capsule, Oral, Daily, Kait Rao [facility], 1 capsule at 10/20/17 0903  •  sodium chloride 0.9 % flush 10 mL, 10 mL, Intracatheter, Q12H, Seven Gallego MD, 10 mL at 10/20/17 0900  •  sodium chloride 0.9 % flush 10 mL, 10 mL, Intracatheter, PRN, Seven Gallego MD, 10 mL at 10/14/17 0631  •  sodium chloride 0.9 % flush 10 mL, 10 mL, Intracatheter, Q12H, Seven Gallego MD, 10 mL at 10/20/17 0905  •  sodium chloride 0.9 % flush 10 mL, 10 mL, Intracatheter, PRN, Seven Gallego MD, 10 mL at 10/20/17 0905  •  sodium chloride 0.9 % infusion, 50 mL/hr, Intravenous, Continuous, Kel Becker MD, Last Rate: 50 mL/hr at 10/20/17 0154, 50 mL/hr at 10/20/17 0154  •  traMADol (ULTRAM) tablet 50 mg, 50 mg, Oral, Q6H PRN, Seven CALVERT  MD Julián, 50 mg at 10/19/17 1323  •  venlafaxine XR (EFFEXOR-XR) 24 hr capsule 75 mg, 75 mg, Oral, Daily, Seven Gallego MD, 75 mg at 10/20/17 0903    Allergies:   Hydrocodone-acetaminophen; Hydromorphone; Oxycodone-acetaminophen; and Penicillins    Vital Signs:  Temp:  [98.4 °F (36.9 °C)-99.4 °F (37.4 °C)] 99.4 °F (37.4 °C)  Heart Rate:  [] 98  Resp:  [18-20] 20  BP: (106-115)/(63-70) 115/69  Body mass index is 26.61 kg/(m^2).    Intake/Output Summary (Last 24 hours) at 10/20/17 1408  Last data filed at 10/20/17 1311   Gross per 24 hour   Intake             1818 ml   Output              400 ml   Net             1418 ml     I/O this shift:  In: 458 [P.O.:458]  Out: 400 [Urine:400]    Physical Exam   Constitutional: She is oriented to person, place, and time. She appears well-developed and well-nourished. No distress.   Sitting up in bed today   HENT:   Head: Normocephalic and atraumatic.   Nose: Nose normal.   Mouth/Throat: Oropharynx is clear and moist.   Eyes: Conjunctivae are normal. Right eye exhibits no discharge. Left eye exhibits no discharge. No scleral icterus.   Neck: Normal range of motion. No JVD present.   Cardiovascular: Normal rate, regular rhythm and normal heart sounds.  Exam reveals no gallop and no friction rub.    No murmur heard.  Pulmonary/Chest: Effort normal and breath sounds normal. No respiratory distress. She has no wheezes. She has no rales. She exhibits no tenderness.   Abdominal: Soft. Bowel sounds are normal. She exhibits no mass. There is tenderness (moderate and generalized even with light palpation).   Musculoskeletal: Normal range of motion. She exhibits no edema or deformity.   Neurological: She is alert and oriented to person, place, and time. Coordination normal.   Skin: Skin is warm and dry. No rash noted. She is not diaphoretic. No erythema.   No ecchymosis on abdomen or back   Psychiatric: Her behavior is normal. Judgment and thought content normal.   Depressed  affect, some improved today   Vitals reviewed.       Results Review:  I reviewed the patient's new clinical results including all available labs and radiology reports.    Lab Results (last 24 hours)     Procedure Component Value Units Date/Time    POC Glucose Fingerstick [[id number]]  (Abnormal) Collected:  10/19/17 1657    Specimen:  Blood Updated:  10/19/17 1716     Glucose 199 (H) mg/dL     Narrative:       Meter: [id number] : [id number] VICKEY REILLY    Potassium [[id number]]  (Normal) Collected:  10/19/17 1725    Specimen:  Blood Updated:  10/19/17 1759     Potassium 4.1 mmol/L     POC Glucose Fingerstick [[id number]]  (Abnormal) Collected:  10/19/17 2151    Specimen:  Blood Updated:  10/19/17 2208     Glucose 207 (H) mg/dL     Narrative:       Meter: [id number] : [id number] [employer]    POC Glucose Fingerstick [[id number]]  (Abnormal) Collected:  10/20/17 0031    Specimen:  Blood Updated:  10/20/17 0037     Glucose 227 (H) mg/dL     Narrative:       Meter: [id number] : [id number] [employer] crystal    Magnesium [[id number]]  (Normal) Collected:  10/20/17 0114    Specimen:  Blood Updated:  10/20/17 0233     Magnesium 2.0 mg/dL     C-reactive Protein [[id number]]  (Abnormal) Collected:  10/20/17 0114    Specimen:  Blood Updated:  10/20/17 0233     C-Reactive Protein 1.13 (H) mg/dL     Basic Metabolic Panel [[id number]]  (Abnormal) Collected:  10/20/17 0114    Specimen:  Blood Updated:  10/20/17 0233     Glucose 212 (H) mg/dL      BUN 10 mg/dL      Creatinine 0.49 mg/dL      Sodium 136 mmol/L      Potassium 4.5 mmol/L      Chloride 101 mmol/L      CO2 30.9 mmol/L      Calcium 8.6 mg/dL      eGFR Non African Amer 135 mL/min/1.73      BUN/Creatinine Ratio 20.4     Anion Gap 4.1 mmol/L     Narrative:       GFR Normal >60  Chronic Kidney Disease <60  Kidney Failure <15    Osmolality, Calculated [[id number]]  (Normal) Collected:  10/20/17 0114    Specimen:  Blood Updated:  10/20/17 0233     Osmolality Calc 277.3  "mOsm/kg     POC Glucose Fingerstick [[id number]]  (Abnormal) Collected:  10/20/17 0730    Specimen:  Blood Updated:  10/20/17 0737     Glucose 220 (H) mg/dL     Narrative:       Meter: [id number] : [id number] Raulito Loya    POC Glucose Fingerstick [[id number]]  (Abnormal) Collected:  10/20/17 1122    Specimen:  Blood Updated:  10/20/17 1133     Glucose 183 (H) mg/dL     Narrative:       Meter: [id number] : [id number] Anupam Esapña          Imaging Results (last 72 hours)     Procedure Component Value Units Date/Time    CT Abdomen Pelvis With Contrast [[id number]] Collected:  10/18/17 1514     Updated:  10/18/17 1519    Narrative:          CT ABDOMEN PELVIS W CONTRAST-        TECHNIQUE: Multiple axial CT images were obtained from lung bases  through pubic symphysis with administration of IV contrast. Reformatted  images in the coronal and/or sagittal plane(s) were generated from the  axial data set to facilitate diagnostic accuracy and/or surgical  planning.  Oral Contrast:NONE.     Radiation dose reduction techniques were utilized per ALARA protocol.  Automated exposure control was initiated through either or CareDose or  DoseRight software packages by  protocol.       Radiation dose reduction techniques were utilized per ALARA protocol.  Automated exposure control was initiated through either or [place] or  DoseRight software packages by  protocol.       599.94 mGy.cm     Clinical information  worsening abdominal pain, (patient reports sensation that something  \"burst\" in abdomen), pancreatitis with pain lasting longer than 2 weeks,  on TPN cannot tolerate PO with continued nausea; K85.90-Acute  pancreatitis without necrosis or infection, unspecified      Comparison  Comparison: 9/29/2017     Findings  LOWER THORAX: SMALL LEFT GREATER THAN RIGHT PLEURAL EFFUSIONS WITH  BIBASILAR AIRSPACE DISEASE NOTED.     ABDOMEN:        LIVER: Homogeneous. No focal hepatic mass or ductal " dilatation.        GALLBLADDER: PRIOR CHOLECYSTECTOMY.        PANCREAS: SINCE THE PREVIOUS EXAM, A LARGE PSEUDOCYST HAS DEVELOPED  INVOLVING THE MIDPORTION OF PANCREAS IMMEDIATELY SITUATED POSTERIOR TO  THE POSTERIOR GASTRIC WALL AND IS APPROXIMATELY 11.4 CM X 6.8 CM.        SPLEEN: Homogeneous. No splenomegaly.        ADRENALS: No mass.        KIDNEYS: No mass. No obstructive uropathy.  No evidence of  urolithiasis.        GI TRACT: SMALLER PSEUDOCYST IS SEEN ALONG THE GREATER CURVATURE OF  THE STOMACH BUT IS APPROXIMATELY 2.6 CM IN SIZE.        PERITONEUM: THE AMOUNT OF INTRA-ABDOMINAL ASCITES HAS INCREASED SINCE  THE PREVIOUS EXAM.        MESENTERY: Unremarkable.        LYMPH NODES: No lymphadenopathy.        VASCULATURE: No evidence of aneurysm.        ABDOMINAL WALL: No focal hernia or mass.           OTHER: None.     PELVIS:        BLADDER: No focal mass or significant wall thickening        REPRODUCTIVE: Unremarkable as visualized.        APPENDIX: Nondistended. No surrounding inflammation.     BONES: No acute bony abnormality.       Impression:       Impression:  1. DEVELOPMENT OF 11.4 X 6.8 CM LARGE MID BODY REGION PSEUDOCYST OF THE  PANCREAS WHICH IS SITUATED IMMEDIATELY POSTERIOR TO THE STOMACH EFFACING  THE POSTERIOR GASTRIC WALL.  2. SMALLER PSEUDOCYST ALONG GREATER CURVATURE OF STOMACH NEAR FUNDUS.  3. EVOLVING SMALL AMOUNTS OF ASCITES THROUGHOUT THE ABDOMINAL AND PELVIC  REGIONS. NO LOCULATED COLLECTIONS.  4. DEVELOPMENT OF PLEURAL EFFUSIONS WITH BIBASILAR AIRSPACE DISEASE.        This report was finalized on 10/18/2017 3:16 PM by Dr. Mike Tolbert MD.               Assessment/Plan     Assessment:    Acute pancreatitis    DM2, poorly controlled    Pancreatic pseudocyst          Plan:  · Pseudocyst will be monitored with repeat imaging depending on clinical course. No need for intervention at this time. She will advance diet to full liquids and go slow. Creon with all PO intake. Continue TPN for now  until we see if she can advance diet further. She will likely need nutritional shakes. CRP 1.13, Lipase 80. Amylase still elevated 408.     I discussed the patients findings and my recommendations with patient.marck PA-C      Electronically signed 10/20/2017 at 9:36 PM.

## 2017-10-20 NOTE — PLAN OF CARE
Problem: Pancreatitis, Acute/Chronic (Adult)  Goal: Signs and Symptoms of Listed Potential Problems Will be Absent or Manageable (Pancreatitis, Acute/Chronic)  Outcome: Ongoing (interventions implemented as appropriate)    10/19/17 1310   Pancreatitis, Acute/Chronic   Problems Assessed (Pancreatitis) all   Problems Present (Pancreatitis) pain         Problem: Diabetes, Type 2 (Adult)  Goal: Signs and Symptoms of Listed Potential Problems Will be Absent or Manageable (Diabetes, Type 2)  Outcome: Ongoing (interventions implemented as appropriate)    10/19/17 1310   Diabetes, Type 2   Problems Assessed (Type 2 Diabetes) all   Problems Present (Type 2 Diabetes) impaired glycemic control         Problem: Pain, Acute (Adult)  Goal: Identify Related Risk Factors and Signs and Symptoms  Outcome: Ongoing (interventions implemented as appropriate)    10/19/17 1310   Pain, Acute   Related Risk Factors (Acute Pain) disease process;patient perception   Signs and Symptoms (Acute Pain) verbalization of pain descriptors       Goal: Acceptable Pain Control/Comfort Level  Outcome: Ongoing (interventions implemented as appropriate)    10/20/17 0245   Pain, Acute (Adult)   Acceptable Pain Control/Comfort Level making progress toward outcome

## 2017-10-20 NOTE — CONSULTS
"Diabetes Education    Patient Name:  Rossy Boone  YOB: 1970  MRN: [id number]  Admit Date:  10/10/2017        Client had eaten some of clears and had\" kept it down\" without questions r/t diabetes      Electronically signed by:  Aimee Chapa RN  10/20/17 2:19 PM  "

## 2017-10-20 NOTE — PROGRESS NOTES
LOS: 10 days   Interval History: Awake and alert. She tolerated liquids. She has stable epigastric pain TPN continues. To have repeat CT next week for followup of pseudocysts. Blood sugar still elevated.      History taken from: patient chart    Review of Systems:     Review of Systems - Negative except present illness    Objective     Vital Signs  Temp:  [97.5 °F (36.4 °C)-98.9 °F (37.2 °C)] 98.4 °F (36.9 °C)  Heart Rate:  [] 88  Resp:  [18-20] 18  BP: (106-120)/(63-76) 112/65    Physical Exam:    HEENT; Neg  CHEST:Clear  HEART:RRR  ABD: Tender epigastrium           Results Review:     I reviewed the patient's new clinical results  : See Below    Medication Review:     Current Facility-Administered Medications:   •  Adult Central Clinimix TPN, , Intravenous, Q24H (TPN), Last Rate: 65 mL/hr at 10/19/17 1812 **AND** [DISCONTINUED] fat emulsion (INTRALIPID,LIPOSYN) 20 % infusion 50 g, 250 mL, Intravenous, Once per day on Mon Wed Fri, Last Rate: 20.8 mL/hr at 10/13/17 1849, 50 g at 10/13/17 1849 **AND** multiple vitamin 10 mL, trace elements Cr-Cu-Mn-Zn 5 mL in sodium chloride 0.9 % 500 mL IVPB, , Intravenous, Once per day on Mon Wed Fri, Kait Rao [facility], Last Rate: 128.8 mL/hr at 10/18/17 1136  •  amLODIPine (NORVASC) tablet 10 mg, 10 mg, Oral, Q24H, Seven Gallego MD, 10 mg at 10/19/17 0807  •  dextrose (D50W) solution 25 g, 25 g, Intravenous, Q15 Min PRN, Kel Becker MD  •  dextrose (GLUTOSE) oral gel 15 g, 15 g, Oral, Q15 Min PRN, Kel Becker MD  •  enoxaparin (LOVENOX) syringe 40 mg, 40 mg, Subcutaneous, Nightly, Kel Becker MD, 40 mg at 10/19/17 0758  •  glucagon (human recombinant) (GLUCAGEN DIAGNOSTIC) injection 1 mg, 1 mg, Subcutaneous, Q15 Min PRN, Kel Becker MD  •  insulin aspart (novoLOG) injection 0-14 Units, 0-14 Units, Subcutaneous, 4x Daily AC & at Bedtime, Kait Rao [facility], 5 Units at 10/19/17 9454  •  insulin detemir (LEVEMIR) injection 20 Units, 20 Units,  Subcutaneous, Q12H, Kel Becker MD, 20 Units at 10/19/17 2159  •  ipratropium-albuterol (DUO-NEB) nebulizer solution 3 mL, 3 mL, Nebulization, Q4H PRN, Seven Gallego MD, 3 mL at 10/11/17 2300  •  levoFLOXacin (LEVAQUIN) 750 mg/150 mL D5W (premix) (LEVAQUIN) 750 mg, 750 mg, Intravenous, Q24H, Seven Gallego MD, Last Rate: 0 mL/hr at 10/11/17 1000, 750 mg at 10/19/17 1145  •  lisinopril (PRINIVIL,ZESTRIL) tablet 20 mg, 20 mg, Oral, Q24H, Seven Gallego MD, 20 mg at 10/19/17 0806  •  Magnesium Sulfate 2 gram Bolus, followed by 8 gram infusion (total Mg dose 10 grams)- Mg less than or equal to 1mg/dL, 2 g, Intravenous, PRN **OR** Magnesium Sulfate 6 gram Infusion (2 gm x 3) -Mg 1.1 -1.5 mg/dL, 2 g, Intravenous, PRN **OR** magnesium sulfate 4 gram infusion- Mg 1.6-1.9 mg/dL, 4 g, Intravenous, PRN, Seven Gallego MD  •  meperidine (DEMEROL) injection 25 mg, 25 mg, Intravenous, Q2H PRN, Kel Becker MD, 25 mg at 10/20/17 0550  •  metoprolol tartrate (LOPRESSOR) tablet 100 mg, 100 mg, Oral, Q12H, Seven Gallego MD, 100 mg at 10/19/17 0807  •  morphine injection 2 mg, 2 mg, Intravenous, Daily PRN, Nico Fung MD  •  ondansetron (ZOFRAN) injection 4 mg, 4 mg, Intravenous, Q6H PRN, Kel Becker MD, 4 mg at 10/19/17 1323  •  pancrelipase (Lip-Prot-Amyl) (CREON) capsule 24,000 units of lipase, 24,000 units of lipase, Oral, TID With Meals, Seven Gallego MD, 24,000 units of lipase at 10/12/17 1813  •  pantoprazole (PROTONIX) injection 40 mg, 40 mg, Intravenous, Q12H, Nico Fung MD, 40 mg at 10/19/17 2158  •  Pharmacy to Dose TPN, , Does not apply, Continuous PRN, Seven Gallego MD, 65 mg at 10/13/17 1148  •  potassium chloride 10 mEq in 100 mL IVPB, 10 mEq, Intravenous, Q1H PRN, Seven Gallego MD, Last Rate: 100 mL/hr at 10/13/17 1442, 10 mEq at 10/13/17 1442  •  promethazine (PHENERGAN) 12.5 mg in sodium chloride 0.9 % 50 mL, 12.5 mg, Intravenous, Q4H PRN, Nico Fung MD, 12.5 mg at  10/20/17 0549  •  Risaquad-2 capsule 1 capsule, 1 capsule, Oral, Daily, Kait Rao, [facility], 1 capsule at 10/19/17 0807  •  sodium chloride 0.9 % flush 10 mL, 10 mL, Intracatheter, Q12H, Seven Gallego MD, 10 mL at 10/19/17 2159  •  sodium chloride 0.9 % flush 10 mL, 10 mL, Intracatheter, PRN, Seven Gallego MD, 10 mL at 10/14/17 0631  •  sodium chloride 0.9 % flush 10 mL, 10 mL, Intracatheter, Q12H, Seven Gallego MD, 10 mL at 10/19/17 2159  •  sodium chloride 0.9 % flush 10 mL, 10 mL, Intracatheter, PRN, Seven Gallego MD  •  sodium chloride 0.9 % infusion, 50 mL/hr, Intravenous, Continuous, Kel Becker MD, Last Rate: 50 mL/hr at 10/20/17 0154, 50 mL/hr at 10/20/17 0154  •  traMADol (ULTRAM) tablet 50 mg, 50 mg, Oral, Q6H PRN, Seven Gallego MD, 50 mg at 10/19/17 1323  •  venlafaxine XR (EFFEXOR-XR) 24 hr capsule 75 mg, 75 mg, Oral, Daily, Seven Gallego MD, 75 mg at 10/19/17 0807    amLODIPine 10 mg Oral Q24H   enoxaparin 40 mg Subcutaneous Nightly   insulin aspart 0-14 Units Subcutaneous 4x Daily AC & at Bedtime   insulin detemir 20 Units Subcutaneous Q12H   levoFLOXacin 750 mg Intravenous Q24H   lisinopril 20 mg Oral Q24H   metoprolol tartrate 100 mg Oral Q12H   trace minerals + multivitamin IVPB  Intravenous Once per day on Mon Wed Fri   pancrelipase (Lip-Prot-Amyl) 24,000 units of lipase Oral TID With Meals   pantoprazole 40 mg Intravenous Q12H   Risaquad-2 1 capsule Oral Daily   sodium chloride 10 mL Intracatheter Q12H   sodium chloride 10 mL Intracatheter Q12H   venlafaxine XR 75 mg Oral Daily     dextrose  •  dextrose  •  glucagon (human recombinant)  •  ipratropium-albuterol  •  magnesium sulfate **OR** magnesium sulfate **OR** magnesium sulfate  •  meperidine  •  Morphine  •  ondansetron  •  Pharmacy to Dose TPN  •  potassium chloride  •  promethazine (PHENERGAN) in NS 50 mL  •  sodium chloride  •  sodium chloride  •  traMADol      Results from last 7 days  Lab Units 10/18/17  0190  10/16/17  0544 10/14/17  0129   WBC 10*3/mm3 9.69 7.47 7.67   HEMOGLOBIN g/dL 12.8 12.0 11.1*   PLATELETS 10*3/mm3 390 357 417*       Results from last 7 days  Lab Units 10/20/17  0114   CRP mg/dL 1.13*       Results from last 7 days  Lab Units 10/20/17  0114 10/19/17  1725 10/19/17  0137 10/18/17  0059 10/17/17  0042 10/16/17  0544 10/16/17  0123 10/15/17  0147   SODIUM mmol/L 136  --  136 133* 138 137 135 133*   POTASSIUM mmol/L 4.5 4.1 3.6 3.6 3.8 3.8 3.5 4.0   CHLORIDE mmol/L 101  --  104 101 103 101 100 101   CO2 mmol/L 30.9  --  27.0 25.6 27.2 30.5 28.8 28.2   BUN mg/dL 10  --  9 8 6* 8 8 8   CREATININE mg/dL 0.49  --  0.44 0.47 0.54 0.55 0.51 0.57   CALCIUM mg/dL 8.6  --  8.8 8.6 9.0 9.2 8.5 8.9   GLUCOSE mg/dL 212*  --  252* 306* 312* 321* 299* 318*       Results from last 7 days  Lab Units 10/20/17  0114 10/19/17  0137 10/18/17  0059 10/17/17  0042 10/16/17  0123 10/15/17  1754 10/15/17  0147   MAGNESIUM mg/dL 2.0 1.7 1.8 1.8 2.4 1.8 1.6*     No results found for: HGBA1C    Results from last 7 days  Lab Units 10/16/17  0544 10/14/17  0129   BILIRUBIN mg/dL 0.2 0.1*   ALK PHOS U/L 113* 112*   AST (SGOT) U/L 14 16   ALT (SGPT) U/L 6* 12                       Imaging Results (last 72 hours)     Procedure Component Value Units Date/Time    CT Abdomen Pelvis With Contrast [[id number]] Collected:  10/18/17 1514     Updated:  10/18/17 1519    Narrative:          CT ABDOMEN PELVIS W CONTRAST-        TECHNIQUE: Multiple axial CT images were obtained from lung bases  through pubic symphysis with administration of IV contrast. Reformatted  images in the coronal and/or sagittal plane(s) were generated from the  axial data set to facilitate diagnostic accuracy and/or surgical  planning.  Oral Contrast:NONE.     Radiation dose reduction techniques were utilized per ALARA protocol.  Automated exposure control was initiated through either or CareDose or  DoseRight software packages by  protocol.       Radiation  "dose reduction techniques were utilized per ALARA protocol.  Automated exposure control was initiated through either or CareDoTakeCare or  DoseRight software packages by  protocol.       599.94 mGy.cm     Clinical information  worsening abdominal pain, (patient reports sensation that something  \"burst\" in abdomen), pancreatitis with pain lasting longer than 2 weeks,  on TPN cannot tolerate PO with continued nausea; K85.90-Acute  pancreatitis without necrosis or infection, unspecified      Comparison  Comparison: 9/29/2017     Findings  LOWER THORAX: SMALL LEFT GREATER THAN RIGHT PLEURAL EFFUSIONS WITH  BIBASILAR AIRSPACE DISEASE NOTED.     ABDOMEN:        LIVER: Homogeneous. No focal hepatic mass or ductal dilatation.        GALLBLADDER: PRIOR CHOLECYSTECTOMY.        PANCREAS: SINCE THE PREVIOUS EXAM, A LARGE PSEUDOCYST HAS DEVELOPED  INVOLVING THE MIDPORTION OF PANCREAS IMMEDIATELY SITUATED POSTERIOR TO  THE POSTERIOR GASTRIC WALL AND IS APPROXIMATELY 11.4 CM X 6.8 CM.        SPLEEN: Homogeneous. No splenomegaly.        ADRENALS: No mass.        KIDNEYS: No mass. No obstructive uropathy.  No evidence of  urolithiasis.        GI TRACT: SMALLER PSEUDOCYST IS SEEN ALONG THE GREATER CURVATURE OF  THE STOMACH BUT IS APPROXIMATELY 2.6 CM IN SIZE.        PERITONEUM: THE AMOUNT OF INTRA-ABDOMINAL ASCITES HAS INCREASED SINCE  THE PREVIOUS EXAM.        MESENTERY: Unremarkable.        LYMPH NODES: No lymphadenopathy.        VASCULATURE: No evidence of aneurysm.        ABDOMINAL WALL: No focal hernia or mass.           OTHER: None.     PELVIS:        BLADDER: No focal mass or significant wall thickening        REPRODUCTIVE: Unremarkable as visualized.        APPENDIX: Nondistended. No surrounding inflammation.     BONES: No acute bony abnormality.       Impression:       Impression:  1. DEVELOPMENT OF 11.4 X 6.8 CM LARGE MID BODY REGION PSEUDOCYST OF THE  PANCREAS WHICH IS SITUATED IMMEDIATELY POSTERIOR TO THE STOMACH " EFFACING  THE POSTERIOR GASTRIC WALL.  2. SMALLER PSEUDOCYST ALONG GREATER CURVATURE OF STOMACH NEAR FUNDUS.  3. EVOLVING SMALL AMOUNTS OF ASCITES THROUGHOUT THE ABDOMINAL AND PELVIC  REGIONS. NO LOCULATED COLLECTIONS.  4. DEVELOPMENT OF PLEURAL EFFUSIONS WITH BIBASILAR AIRSPACE DISEASE.        This report was finalized on 10/18/2017 3:16 PM by Dr. Mike Tolbert MD.             Assessment/Plan    Active Problems:    Acute pancreatitis         See orders entered.     Kel Becker MD  10/20/17  6:27 AM

## 2017-10-20 NOTE — PROGRESS NOTES
Discharge Planning Assessment   [place]     Patient Name: Rossy Boone  MRN: [id number]  Today's Date: 10/20/2017    Admit Date: 10/10/2017       Discharge Plan       10/20/17 1633    Case Management/Social Work Plan    Plan Pt lives with spouse and plans to return home at discharge. Pt continues to get TPN. Pt does not utilize home health services or DME. Spouse is assisting with applying for Medicaid. SS to follow.              Expected Discharge Date and Time     Expected Discharge Date Expected Discharge Time    Oct 22, 2017           Linnea Russell

## 2017-10-21 LAB
ANION GAP SERPL CALCULATED.3IONS-SCNC: 6.4 MMOL/L (ref 3.6–11.2)
BUN BLD-MCNC: 10 MG/DL (ref 7–21)
BUN/CREAT SERPL: 22.2 (ref 7–25)
CALCIUM SPEC-SCNC: 9.2 MG/DL (ref 7.7–10)
CHLORIDE SERPL-SCNC: 103 MMOL/L (ref 99–112)
CO2 SERPL-SCNC: 28.6 MMOL/L (ref 24.3–31.9)
CREAT BLD-MCNC: 0.45 MG/DL (ref 0.43–1.29)
GFR SERPL CREATININE-BSD FRML MDRD: 149 ML/MIN/1.73
GLUCOSE BLD-MCNC: 137 MG/DL (ref 70–110)
GLUCOSE BLDC GLUCOMTR-MCNC: 169 MG/DL (ref 70–130)
GLUCOSE BLDC GLUCOMTR-MCNC: 171 MG/DL (ref 70–130)
GLUCOSE BLDC GLUCOMTR-MCNC: 198 MG/DL (ref 70–130)
GLUCOSE BLDC GLUCOMTR-MCNC: 236 MG/DL (ref 70–130)
MAGNESIUM SERPL-MCNC: 1.7 MG/DL (ref 1.7–2.6)
OSMOLALITY SERPL CALC.SUM OF ELEC: 276.9 MOSM/KG (ref 273–305)
PHOSPHATE SERPL-MCNC: 4.8 MG/DL (ref 2.7–4.5)
POTASSIUM BLD-SCNC: 4.1 MMOL/L (ref 3.5–5.3)
SODIUM BLD-SCNC: 138 MMOL/L (ref 135–153)

## 2017-10-21 PROCEDURE — 84100 ASSAY OF PHOSPHORUS: CPT | Performed by: INTERNAL MEDICINE

## 2017-10-21 PROCEDURE — 83735 ASSAY OF MAGNESIUM: CPT | Performed by: INTERNAL MEDICINE

## 2017-10-21 PROCEDURE — 63710000001 INSULIN ASPART PER 5 UNITS

## 2017-10-21 PROCEDURE — 25010000002 LEVOFLOXACIN PER 250 MG: Performed by: INTERNAL MEDICINE

## 2017-10-21 PROCEDURE — 25010000002 ONDANSETRON PER 1 MG: Performed by: INTERNAL MEDICINE

## 2017-10-21 PROCEDURE — 25010000002 PROMETHAZINE PER 50 MG: Performed by: INTERNAL MEDICINE

## 2017-10-21 PROCEDURE — 82962 GLUCOSE BLOOD TEST: CPT

## 2017-10-21 PROCEDURE — 25010000002 ENOXAPARIN PER 10 MG: Performed by: INTERNAL MEDICINE

## 2017-10-21 PROCEDURE — 80048 BASIC METABOLIC PNL TOTAL CA: CPT | Performed by: INTERNAL MEDICINE

## 2017-10-21 PROCEDURE — 63710000001 INSULIN DETEMIR PER 5 UNITS: Performed by: INTERNAL MEDICINE

## 2017-10-21 PROCEDURE — 94799 UNLISTED PULMONARY SVC/PX: CPT

## 2017-10-21 RX ADMIN — PANCRELIPASE 24000 UNITS OF LIPASE: 60000; 12000; 38000 CAPSULE, DELAYED RELEASE PELLETS ORAL at 07:45

## 2017-10-21 RX ADMIN — MEPERIDINE HYDROCHLORIDE 25 MG: 25 INJECTION INTRAMUSCULAR; INTRAVENOUS; SUBCUTANEOUS at 06:03

## 2017-10-21 RX ADMIN — METOPROLOL TARTRATE 100 MG: 100 TABLET, FILM COATED ORAL at 22:00

## 2017-10-21 RX ADMIN — MEPERIDINE HYDROCHLORIDE 25 MG: 25 INJECTION INTRAMUSCULAR; INTRAVENOUS; SUBCUTANEOUS at 17:36

## 2017-10-21 RX ADMIN — Medication 10 ML: at 08:54

## 2017-10-21 RX ADMIN — Medication 10 ML: at 04:08

## 2017-10-21 RX ADMIN — MEPERIDINE HYDROCHLORIDE 25 MG: 25 INJECTION INTRAMUSCULAR; INTRAVENOUS; SUBCUTANEOUS at 10:55

## 2017-10-21 RX ADMIN — LEVOFLOXACIN 750 MG: 5 INJECTION, SOLUTION INTRAVENOUS at 08:54

## 2017-10-21 RX ADMIN — Medication 1 CAPSULE: at 08:53

## 2017-10-21 RX ADMIN — Medication 10 ML: at 06:04

## 2017-10-21 RX ADMIN — LISINOPRIL 20 MG: 10 TABLET ORAL at 08:53

## 2017-10-21 RX ADMIN — AMLODIPINE BESYLATE 10 MG: 10 TABLET ORAL at 08:53

## 2017-10-21 RX ADMIN — METOPROLOL TARTRATE 100 MG: 100 TABLET, FILM COATED ORAL at 08:53

## 2017-10-21 RX ADMIN — MEPERIDINE HYDROCHLORIDE 25 MG: 25 INJECTION INTRAMUSCULAR; INTRAVENOUS; SUBCUTANEOUS at 19:34

## 2017-10-21 RX ADMIN — TRAMADOL HYDROCHLORIDE 50 MG: 50 TABLET, COATED ORAL at 11:56

## 2017-10-21 RX ADMIN — VENLAFAXINE HYDROCHLORIDE 75 MG: 75 CAPSULE, EXTENDED RELEASE ORAL at 08:53

## 2017-10-21 RX ADMIN — MEPERIDINE HYDROCHLORIDE 25 MG: 25 INJECTION INTRAMUSCULAR; INTRAVENOUS; SUBCUTANEOUS at 21:53

## 2017-10-21 RX ADMIN — INSULIN ASPART 5 UNITS: 100 INJECTION, SOLUTION INTRAVENOUS; SUBCUTANEOUS at 11:20

## 2017-10-21 RX ADMIN — MEPERIDINE HYDROCHLORIDE 25 MG: 25 INJECTION INTRAMUSCULAR; INTRAVENOUS; SUBCUTANEOUS at 01:02

## 2017-10-21 RX ADMIN — INSULIN DETEMIR 22 UNITS: 100 INJECTION, SOLUTION SUBCUTANEOUS at 08:52

## 2017-10-21 RX ADMIN — MEPERIDINE HYDROCHLORIDE 25 MG: 25 INJECTION INTRAMUSCULAR; INTRAVENOUS; SUBCUTANEOUS at 04:07

## 2017-10-21 RX ADMIN — MEPERIDINE HYDROCHLORIDE 25 MG: 25 INJECTION INTRAMUSCULAR; INTRAVENOUS; SUBCUTANEOUS at 15:33

## 2017-10-21 RX ADMIN — PROMETHAZINE HYDROCHLORIDE 12.5 MG: 25 INJECTION INTRAMUSCULAR; INTRAVENOUS at 21:53

## 2017-10-21 RX ADMIN — ONDANSETRON 4 MG: 2 INJECTION INTRAMUSCULAR; INTRAVENOUS at 04:08

## 2017-10-21 RX ADMIN — MEPERIDINE HYDROCHLORIDE 25 MG: 25 INJECTION INTRAMUSCULAR; INTRAVENOUS; SUBCUTANEOUS at 07:57

## 2017-10-21 RX ADMIN — INSULIN ASPART 3 UNITS: 100 INJECTION, SOLUTION INTRAVENOUS; SUBCUTANEOUS at 07:46

## 2017-10-21 RX ADMIN — MEPERIDINE HYDROCHLORIDE 25 MG: 25 INJECTION INTRAMUSCULAR; INTRAVENOUS; SUBCUTANEOUS at 13:03

## 2017-10-21 RX ADMIN — PROMETHAZINE HYDROCHLORIDE 12.5 MG: 25 INJECTION INTRAMUSCULAR; INTRAVENOUS at 13:03

## 2017-10-21 RX ADMIN — LEUCINE, PHENYLALANINE, LYSINE, METHIONINE, ISOLEUCINE, VALINE, HISTIDINE, THREONINE, TRYPTOPHAN, ALANINE, GLYCINE, ARGININE, PROLINE, SERINE, TYROSINE, SODIUM ACETATE, DIBASIC POTASSIUM PHOSPHATE, MAGNESIUM CHLORIDE, SODIUM CHLORIDE, CALCIUM CHLORIDE, DEXTROSE
365; 280; 290; 200; 300; 290; 240; 210; 90; 1035; 515; 575; 340; 250; 20; 340; 261; 51; 59; 33; 20 INJECTION INTRAVENOUS at 17:35

## 2017-10-21 RX ADMIN — PANTOPRAZOLE SODIUM 40 MG: 40 INJECTION, POWDER, FOR SOLUTION INTRAVENOUS at 08:54

## 2017-10-21 RX ADMIN — PROMETHAZINE HYDROCHLORIDE 12.5 MG: 25 INJECTION INTRAMUSCULAR; INTRAVENOUS at 01:02

## 2017-10-21 RX ADMIN — Medication 10 ML: at 01:03

## 2017-10-21 RX ADMIN — PANCRELIPASE 24000 UNITS OF LIPASE: 60000; 12000; 38000 CAPSULE, DELAYED RELEASE PELLETS ORAL at 11:19

## 2017-10-21 RX ADMIN — ENOXAPARIN SODIUM 40 MG: 40 INJECTION SUBCUTANEOUS at 22:00

## 2017-10-21 RX ADMIN — SODIUM CHLORIDE 50 ML/HR: 9 INJECTION, SOLUTION INTRAVENOUS at 04:33

## 2017-10-21 RX ADMIN — INSULIN ASPART 3 UNITS: 100 INJECTION, SOLUTION INTRAVENOUS; SUBCUTANEOUS at 17:35

## 2017-10-21 RX ADMIN — PANTOPRAZOLE SODIUM 40 MG: 40 INJECTION, POWDER, FOR SOLUTION INTRAVENOUS at 21:54

## 2017-10-21 RX ADMIN — INSULIN DETEMIR 22 UNITS: 100 INJECTION, SOLUTION SUBCUTANEOUS at 22:00

## 2017-10-21 NOTE — PLAN OF CARE
Problem: Patient Care Overview (Adult)  Goal: Plan of Care Review  Outcome: Ongoing (interventions implemented as appropriate)    10/19/17 1310 10/21/17 0827   Coping/Psychosocial Response Interventions   Plan Of Care Reviewed With --  patient   Patient Care Overview   Progress progress towards functional goals is fair --        Goal: Adult Individualization and Mutuality  Outcome: Ongoing (interventions implemented as appropriate)  Goal: Discharge Needs Assessment  Outcome: Ongoing (interventions implemented as appropriate)    Problem: Pancreatitis, Acute/Chronic (Adult)  Goal: Signs and Symptoms of Listed Potential Problems Will be Absent or Manageable (Pancreatitis, Acute/Chronic)  Outcome: Ongoing (interventions implemented as appropriate)    10/19/17 1310   Pancreatitis, Acute/Chronic   Problems Assessed (Pancreatitis) all   Problems Present (Pancreatitis) pain         Problem: Diabetes, Type 2 (Adult)  Goal: Signs and Symptoms of Listed Potential Problems Will be Absent or Manageable (Diabetes, Type 2)  Outcome: Ongoing (interventions implemented as appropriate)    10/19/17 1310   Diabetes, Type 2   Problems Assessed (Type 2 Diabetes) all   Problems Present (Type 2 Diabetes) impaired glycemic control         Problem: Pain, Acute (Adult)  Goal: Identify Related Risk Factors and Signs and Symptoms  Outcome: Ongoing (interventions implemented as appropriate)    10/19/17 1310   Pain, Acute   Related Risk Factors (Acute Pain) disease process;patient perception   Signs and Symptoms (Acute Pain) verbalization of pain descriptors       Goal: Acceptable Pain Control/Comfort Level  Outcome: Ongoing (interventions implemented as appropriate)    10/21/17 0947   Pain, Acute (Adult)   Acceptable Pain Control/Comfort Level making progress toward outcome

## 2017-10-21 NOTE — PROGRESS NOTES
LOS: 11 days   Interval History:     Mrs. Hardy remains alert and talkative.  She continues with intermittent epigastric pain.  She continues require intermittent pain medications.  She denies any fevers or chills.  She does continue with some intermittent nausea.  She continues on TPN .  Results of recent CT scan discussed with her.  11.5 cm pseudocyst noted.   Nurses have described an uneventful night.      History taken from: patient chart    Review of Systems:     Review of Systems - Negative except present illness    Objective     Vital Signs  Temp:  [98.3 °F (36.8 °C)-99.4 °F (37.4 °C)] 98.3 °F (36.8 °C)  Heart Rate:  [] 104  Resp:  [18-20] 18  BP: (111-128)/(67-74) 123/71    Physical Exam:  General: Alert talkative white female in mild pain distress with position changes  HEENT; Neg  CHEST:Clear  HEART:RRR  ABD: Tender epigastrium , mild distention, bowel sounds positive  Musculoskeletal-no palpable bony abnormalities  Neurological: No focal deficits appreciated.  Psychiatric: Alert and oriented ×3          Results Review:     I reviewed the patient's new clinical results  : See Below    Medication Review:     Current Facility-Administered Medications:   •  Adult Central Clinimix TPN, , Intravenous, Q24H (TPN), Last Rate: 65 mL/hr at 10/20/17 1720 **AND** [DISCONTINUED] fat emulsion (INTRALIPID,LIPOSYN) 20 % infusion 50 g, 250 mL, Intravenous, Once per day on Mon Wed Fri, Last Rate: 20.8 mL/hr at 10/13/17 1849, 50 g at 10/13/17 1849 **AND** multiple vitamin 10 mL, trace elements Cr-Cu-Mn-Zn 5 mL in sodium chloride 0.9 % 500 mL IVPB, , Intravenous, Once per day on Mon Wed Fri, Kait Rao [facility], Last Rate: 128.8 mL/hr at 10/20/17 1136  •  amLODIPine (NORVASC) tablet 10 mg, 10 mg, Oral, Q24H, Seven Gallego MD, 10 mg at 10/20/17 0904  •  dextrose (D50W) solution 25 g, 25 g, Intravenous, Q15 Min PRN, Kel Becker MD  •  dextrose (GLUTOSE) oral gel 15 g, 15 g, Oral, Q15 Min PRN, Kel STEPHENS  MD Eugenio  •  enoxaparin (LOVENOX) syringe 40 mg, 40 mg, Subcutaneous, Nightly, Kel Becker MD, 40 mg at 10/20/17 2031  •  glucagon (human recombinant) (GLUCAGEN DIAGNOSTIC) injection 1 mg, 1 mg, Subcutaneous, Q15 Min PRN, Kel Becker MD  •  insulin aspart (novoLOG) injection 0-14 Units, 0-14 Units, Subcutaneous, 4x Daily AC & at Bedtime, Kait Rao, [facility], 3 Units at 10/21/17 0746  •  insulin detemir (LEVEMIR) injection 22 Units, 22 Units, Subcutaneous, Q12H, Kel Becker MD, 22 Units at 10/20/17 2036  •  levoFLOXacin (LEVAQUIN) 750 mg/150 mL D5W (premix) (LEVAQUIN) 750 mg, 750 mg, Intravenous, Q24H, Seven Gallego MD, Last Rate: 0 mL/hr at 10/11/17 1000, 750 mg at 10/20/17 0902  •  lisinopril (PRINIVIL,ZESTRIL) tablet 20 mg, 20 mg, Oral, Q24H, Seven Gallgeo MD, 20 mg at 10/20/17 0903  •  Magnesium Sulfate 2 gram Bolus, followed by 8 gram infusion (total Mg dose 10 grams)- Mg less than or equal to 1mg/dL, 2 g, Intravenous, PRN **OR** Magnesium Sulfate 6 gram Infusion (2 gm x 3) -Mg 1.1 -1.5 mg/dL, 2 g, Intravenous, PRN **OR** magnesium sulfate 4 gram infusion- Mg 1.6-1.9 mg/dL, 4 g, Intravenous, PRN, Seven Gallego MD  •  meperidine (DEMEROL) injection 25 mg, 25 mg, Intravenous, Q2H PRN, Kel Becker MD, 25 mg at 10/21/17 0757  •  metoprolol tartrate (LOPRESSOR) tablet 100 mg, 100 mg, Oral, Q12H, Seven Gallego MD, 100 mg at 10/20/17 2035  •  morphine injection 2 mg, 2 mg, Intravenous, Daily PRN, Nico Fung MD  •  ondansetron (ZOFRAN) injection 4 mg, 4 mg, Intravenous, Q6H PRN, Kel Becker MD, 4 mg at 10/21/17 0408  •  pancrelipase (Lip-Prot-Amyl) (CREON) capsule 24,000 units of lipase, 24,000 units of lipase, Oral, TID With Meals, Seven Gallego MD, 24,000 units of lipase at 10/21/17 0789  •  pantoprazole (PROTONIX) injection 40 mg, 40 mg, Intravenous, Q12H, Nico Fung MD, 40 mg at 10/20/17 2035  •  Pharmacy to Dose TPN, , Does not apply, Continuous PRN, Seven CALVERT  MD Julián, 65 mg at 10/13/17 1148  •  potassium chloride 10 mEq in 100 mL IVPB, 10 mEq, Intravenous, Q1H PRN, Seven Gallego MD, Last Rate: 100 mL/hr at 10/13/17 1442, 10 mEq at 10/13/17 1442  •  promethazine (PHENERGAN) 12.5 mg in sodium chloride 0.9 % 50 mL, 12.5 mg, Intravenous, Q4H PRN, Nico Fung MD, 12.5 mg at 10/21/17 0102  •  Risaquad-2 capsule 1 capsule, 1 capsule, Oral, Daily, Kait Rao, [facility], 1 capsule at 10/20/17 0903  •  sodium chloride 0.9 % flush 10 mL, 10 mL, Intracatheter, Q12H, Seven Gallego MD, 10 mL at 10/20/17 2329  •  sodium chloride 0.9 % flush 10 mL, 10 mL, Intracatheter, PRN, Seven Gallego MD, 10 mL at 10/14/17 0631  •  sodium chloride 0.9 % flush 10 mL, 10 mL, Intracatheter, Q12H, Seven Gallego MD, 10 mL at 10/20/17 2035  •  sodium chloride 0.9 % flush 10 mL, 10 mL, Intracatheter, PRN, Seven Gallego MD, 10 mL at 10/21/17 0604  •  sodium chloride 0.9 % infusion, 50 mL/hr, Intravenous, Continuous, Kel Becker MD, Last Rate: 50 mL/hr at 10/21/17 0433, 50 mL/hr at 10/21/17 0433  •  traMADol (ULTRAM) tablet 50 mg, 50 mg, Oral, Q6H PRN, Seven Gallego MD, 50 mg at 10/19/17 1323  •  venlafaxine XR (EFFEXOR-XR) 24 hr capsule 75 mg, 75 mg, Oral, Daily, Seven Gallego MD, 75 mg at 10/20/17 0903    amLODIPine 10 mg Oral Q24H   enoxaparin 40 mg Subcutaneous Nightly   insulin aspart 0-14 Units Subcutaneous 4x Daily AC & at Bedtime   insulin detemir 22 Units Subcutaneous Q12H   levoFLOXacin 750 mg Intravenous Q24H   lisinopril 20 mg Oral Q24H   metoprolol tartrate 100 mg Oral Q12H   trace minerals + multivitamin IVPB  Intravenous Once per day on Mon Wed Fri   pancrelipase (Lip-Prot-Amyl) 24,000 units of lipase Oral TID With Meals   pantoprazole 40 mg Intravenous Q12H   Risaquad-2 1 capsule Oral Daily   sodium chloride 10 mL Intracatheter Q12H   sodium chloride 10 mL Intracatheter Q12H   venlafaxine XR 75 mg Oral Daily     dextrose  •  dextrose  •  glucagon (human  recombinant)  •  magnesium sulfate **OR** magnesium sulfate **OR** magnesium sulfate  •  meperidine  •  Morphine  •  ondansetron  •  Pharmacy to Dose TPN  •  potassium chloride  •  promethazine (PHENERGAN) in NS 50 mL  •  sodium chloride  •  sodium chloride  •  traMADol      Results from last 7 days  Lab Units 10/18/17  1139 10/16/17  0544   WBC 10*3/mm3 9.69 7.47   HEMOGLOBIN g/dL 12.8 12.0   PLATELETS 10*3/mm3 390 357       Results from last 7 days  Lab Units 10/20/17  0114   CRP mg/dL 1.13*       Results from last 7 days  Lab Units 10/21/17  0046 10/20/17  0114 10/19/17  1725 10/19/17  0137 10/18/17  0059 10/17/17  0042 10/16/17  0544 10/16/17  0123   SODIUM mmol/L 138 136  --  136 133* 138 137 135   POTASSIUM mmol/L 4.1 4.5 4.1 3.6 3.6 3.8 3.8 3.5   CHLORIDE mmol/L 103 101  --  104 101 103 101 100   CO2 mmol/L 28.6 30.9  --  27.0 25.6 27.2 30.5 28.8   BUN mg/dL 10 10  --  9 8 6* 8 8   CREATININE mg/dL 0.45 0.49  --  0.44 0.47 0.54 0.55 0.51   CALCIUM mg/dL 9.2 8.6  --  8.8 8.6 9.0 9.2 8.5   GLUCOSE mg/dL 137* 212*  --  252* 306* 312* 321* 299*       Results from last 7 days  Lab Units 10/21/17  0046 10/20/17  0114 10/19/17  0137 10/18/17  0059 10/17/17  0042 10/16/17  0123 10/15/17  1754   MAGNESIUM mg/dL 1.7 2.0 1.7 1.8 1.8 2.4 1.8     No results found for: HGBA1C    Results from last 7 days  Lab Units 10/16/17  0544   BILIRUBIN mg/dL 0.2   ALK PHOS U/L 113*   AST (SGOT) U/L 14   ALT (SGPT) U/L 6*                       Imaging Results (last 72 hours)     Procedure Component Value Units Date/Time    CT Abdomen Pelvis With Contrast [[id number]] Collected:  10/18/17 1514     Updated:  10/18/17 1519    Narrative:          CT ABDOMEN PELVIS W CONTRAST-        TECHNIQUE: Multiple axial CT images were obtained from lung bases  through pubic symphysis with administration of IV contrast. Reformatted  images in the coronal and/or sagittal plane(s) were generated from the  axial data set to facilitate diagnostic accuracy  "and/or surgical  planning.  Oral Contrast:NONE.     Radiation dose reduction techniques were utilized per ALARA protocol.  Automated exposure control was initiated through either or CareDose or  DoseRight software packages by  protocol.       Radiation dose reduction techniques were utilized per ALARA protocol.  Automated exposure control was initiated through either or CareDoSigma Force or  DoseRight software packages by  protocol.       599.94 mGy.cm     Clinical information  worsening abdominal pain, (patient reports sensation that something  \"burst\" in abdomen), pancreatitis with pain lasting longer than 2 weeks,  on TPN cannot tolerate PO with continued nausea; K85.90-Acute  pancreatitis without necrosis or infection, unspecified      Comparison  Comparison: 9/29/2017     Findings  LOWER THORAX: SMALL LEFT GREATER THAN RIGHT PLEURAL EFFUSIONS WITH  BIBASILAR AIRSPACE DISEASE NOTED.     ABDOMEN:        LIVER: Homogeneous. No focal hepatic mass or ductal dilatation.        GALLBLADDER: PRIOR CHOLECYSTECTOMY.        PANCREAS: SINCE THE PREVIOUS EXAM, A LARGE PSEUDOCYST HAS DEVELOPED  INVOLVING THE MIDPORTION OF PANCREAS IMMEDIATELY SITUATED POSTERIOR TO  THE POSTERIOR GASTRIC WALL AND IS APPROXIMATELY 11.4 CM X 6.8 CM.        SPLEEN: Homogeneous. No splenomegaly.        ADRENALS: No mass.        KIDNEYS: No mass. No obstructive uropathy.  No evidence of  urolithiasis.        GI TRACT: SMALLER PSEUDOCYST IS SEEN ALONG THE GREATER CURVATURE OF  THE STOMACH BUT IS APPROXIMATELY 2.6 CM IN SIZE.        PERITONEUM: THE AMOUNT OF INTRA-ABDOMINAL ASCITES HAS INCREASED SINCE  THE PREVIOUS EXAM.        MESENTERY: Unremarkable.        LYMPH NODES: No lymphadenopathy.        VASCULATURE: No evidence of aneurysm.        ABDOMINAL WALL: No focal hernia or mass.           OTHER: None.     PELVIS:        BLADDER: No focal mass or significant wall thickening        REPRODUCTIVE: Unremarkable as visualized.        " APPENDIX: Nondistended. No surrounding inflammation.     BONES: No acute bony abnormality.       Impression:       Impression:  1. DEVELOPMENT OF 11.4 X 6.8 CM LARGE MID BODY REGION PSEUDOCYST OF THE  PANCREAS WHICH IS SITUATED IMMEDIATELY POSTERIOR TO THE STOMACH EFFACING  THE POSTERIOR GASTRIC WALL.  2. SMALLER PSEUDOCYST ALONG GREATER CURVATURE OF STOMACH NEAR FUNDUS.  3. EVOLVING SMALL AMOUNTS OF ASCITES THROUGHOUT THE ABDOMINAL AND PELVIC  REGIONS. NO LOCULATED COLLECTIONS.  4. DEVELOPMENT OF PLEURAL EFFUSIONS WITH BIBASILAR AIRSPACE DISEASE.        This report was finalized on 10/18/2017 3:16 PM by Dr. Mike Tolbert MD.             Assessment/Plan    Active Problems:  1) abdominal pain  2) recurrent pancreatitis-MRCP October 11 revealed no obvious cyst or pancreatic duct obstructions.  3) intractable nausea and vomiting-anti-emetics  4) diabetes mellitus type 2  5) DVT prophylaxis.  - Subcutaneous Lovenox  6) leukocytosis  7) colitis/enteritis  8) hypomagnesemia-replace as needed.   9) hypokalemia-replace as needed   10) intractable pain-changed to morphine on October 13.   11) 11 x 6.8 cm pseudocyst in the pancreas         See orders entered.     Seven Gallego MD  10/21/17  8:50 AM

## 2017-10-21 NOTE — PLAN OF CARE
Problem: Patient Care Overview (Adult)  Goal: Plan of Care Review  Outcome: Ongoing (interventions implemented as appropriate)    10/19/17 1310 10/19/17 2137   Coping/Psychosocial Response Interventions   Plan Of Care Reviewed With --  patient   Patient Care Overview   Progress progress towards functional goals is fair --        Goal: Adult Individualization and Mutuality  Outcome: Ongoing (interventions implemented as appropriate)    10/10/17 1828   Mutuality/Individual Preferences   What Anxieties, Fears or Concerns Do You Have About Your Health or Care? none   What Questions Do You Have About Your Health or Care? none   What Information Would Help Us Give You More Personalized Care? none         Problem: Pancreatitis, Acute/Chronic (Adult)  Goal: Signs and Symptoms of Listed Potential Problems Will be Absent or Manageable (Pancreatitis, Acute/Chronic)  Outcome: Ongoing (interventions implemented as appropriate)    10/19/17 1310   Pancreatitis, Acute/Chronic   Problems Assessed (Pancreatitis) all   Problems Present (Pancreatitis) pain         Problem: Diabetes, Type 2 (Adult)  Goal: Signs and Symptoms of Listed Potential Problems Will be Absent or Manageable (Diabetes, Type 2)  Outcome: Ongoing (interventions implemented as appropriate)    10/19/17 1310   Diabetes, Type 2   Problems Assessed (Type 2 Diabetes) all   Problems Present (Type 2 Diabetes) impaired glycemic control         Problem: Pain, Acute (Adult)  Goal: Identify Related Risk Factors and Signs and Symptoms  Outcome: Ongoing (interventions implemented as appropriate)    10/19/17 1310   Pain, Acute   Related Risk Factors (Acute Pain) disease process;patient perception   Signs and Symptoms (Acute Pain) verbalization of pain descriptors       Goal: Acceptable Pain Control/Comfort Level  Outcome: Ongoing (interventions implemented as appropriate)    10/20/17 2218   Pain, Acute (Adult)   Acceptable Pain Control/Comfort Level making progress toward outcome

## 2017-10-22 LAB
AMYLASE SERPL-CCNC: 301 U/L (ref 28–100)
ANION GAP SERPL CALCULATED.3IONS-SCNC: 5.3 MMOL/L (ref 3.6–11.2)
BASOPHILS # BLD AUTO: 0.05 10*3/MM3 (ref 0–0.3)
BASOPHILS NFR BLD AUTO: 0.6 % (ref 0–2)
BUN BLD-MCNC: 10 MG/DL (ref 7–21)
BUN/CREAT SERPL: 20.8 (ref 7–25)
CALCIUM SPEC-SCNC: 9.2 MG/DL (ref 7.7–10)
CHLORIDE SERPL-SCNC: 103 MMOL/L (ref 99–112)
CO2 SERPL-SCNC: 28.7 MMOL/L (ref 24.3–31.9)
CREAT BLD-MCNC: 0.48 MG/DL (ref 0.43–1.29)
DEPRECATED RDW RBC AUTO: 42.5 FL (ref 37–54)
EOSINOPHIL # BLD AUTO: 0.49 10*3/MM3 (ref 0–0.7)
EOSINOPHIL NFR BLD AUTO: 6 % (ref 0–5)
ERYTHROCYTE [DISTWIDTH] IN BLOOD BY AUTOMATED COUNT: 13.7 % (ref 11.5–14.5)
GFR SERPL CREATININE-BSD FRML MDRD: 139 ML/MIN/1.73
GLUCOSE BLD-MCNC: 223 MG/DL (ref 70–110)
GLUCOSE BLDC GLUCOMTR-MCNC: 148 MG/DL (ref 70–130)
GLUCOSE BLDC GLUCOMTR-MCNC: 184 MG/DL (ref 70–130)
GLUCOSE BLDC GLUCOMTR-MCNC: 188 MG/DL (ref 70–130)
GLUCOSE BLDC GLUCOMTR-MCNC: 214 MG/DL (ref 70–130)
GLUCOSE BLDC GLUCOMTR-MCNC: 215 MG/DL (ref 70–130)
GLUCOSE BLDC GLUCOMTR-MCNC: 221 MG/DL (ref 70–130)
HCT VFR BLD AUTO: 36.6 % (ref 37–47)
HGB BLD-MCNC: 11.6 G/DL (ref 12–16)
IMM GRANULOCYTES # BLD: 0.04 10*3/MM3 (ref 0–0.03)
IMM GRANULOCYTES NFR BLD: 0.5 % (ref 0–0.5)
LIPASE SERPL-CCNC: 77 U/L (ref 13–60)
LYMPHOCYTES # BLD AUTO: 1.67 10*3/MM3 (ref 1–3)
LYMPHOCYTES NFR BLD AUTO: 20.4 % (ref 21–51)
MAGNESIUM SERPL-MCNC: 1.7 MG/DL (ref 1.7–2.6)
MCH RBC QN AUTO: 27.3 PG (ref 27–33)
MCHC RBC AUTO-ENTMCNC: 31.7 G/DL (ref 33–37)
MCV RBC AUTO: 86.1 FL (ref 80–94)
MONOCYTES # BLD AUTO: 0.51 10*3/MM3 (ref 0.1–0.9)
MONOCYTES NFR BLD AUTO: 6.2 % (ref 0–10)
NEUTROPHILS # BLD AUTO: 5.44 10*3/MM3 (ref 1.4–6.5)
NEUTROPHILS NFR BLD AUTO: 66.3 % (ref 30–70)
OSMOLALITY SERPL CALC.SUM OF ELEC: 279.8 MOSM/KG (ref 273–305)
PHOSPHATE SERPL-MCNC: 4.6 MG/DL (ref 2.7–4.5)
PLATELET # BLD AUTO: 363 10*3/MM3 (ref 130–400)
PMV BLD AUTO: 11.2 FL (ref 6–10)
POTASSIUM BLD-SCNC: 3.7 MMOL/L (ref 3.5–5.3)
RBC # BLD AUTO: 4.25 10*6/MM3 (ref 4.2–5.4)
SODIUM BLD-SCNC: 137 MMOL/L (ref 135–153)
WBC NRBC COR # BLD: 8.2 10*3/MM3 (ref 4.5–12.5)

## 2017-10-22 PROCEDURE — 63710000001 INSULIN ASPART PER 5 UNITS

## 2017-10-22 PROCEDURE — 63710000001 INSULIN DETEMIR PER 5 UNITS: Performed by: INTERNAL MEDICINE

## 2017-10-22 PROCEDURE — 83735 ASSAY OF MAGNESIUM: CPT | Performed by: INTERNAL MEDICINE

## 2017-10-22 PROCEDURE — 25010000002 LEVOFLOXACIN PER 250 MG: Performed by: INTERNAL MEDICINE

## 2017-10-22 PROCEDURE — 25010000002 PROMETHAZINE PER 50 MG: Performed by: INTERNAL MEDICINE

## 2017-10-22 PROCEDURE — 84100 ASSAY OF PHOSPHORUS: CPT | Performed by: INTERNAL MEDICINE

## 2017-10-22 PROCEDURE — 83690 ASSAY OF LIPASE: CPT | Performed by: INTERNAL MEDICINE

## 2017-10-22 PROCEDURE — 82150 ASSAY OF AMYLASE: CPT | Performed by: INTERNAL MEDICINE

## 2017-10-22 PROCEDURE — 25010000002 ONDANSETRON PER 1 MG: Performed by: INTERNAL MEDICINE

## 2017-10-22 PROCEDURE — 82962 GLUCOSE BLOOD TEST: CPT

## 2017-10-22 PROCEDURE — 25010000002 HYDROMORPHONE PER 4 MG: Performed by: INTERNAL MEDICINE

## 2017-10-22 PROCEDURE — 80048 BASIC METABOLIC PNL TOTAL CA: CPT | Performed by: INTERNAL MEDICINE

## 2017-10-22 PROCEDURE — 85025 COMPLETE CBC W/AUTO DIFF WBC: CPT | Performed by: INTERNAL MEDICINE

## 2017-10-22 RX ORDER — TRAMADOL HYDROCHLORIDE 50 MG/1
50 TABLET ORAL EVERY 6 HOURS PRN
Status: DISCONTINUED | OUTPATIENT
Start: 2017-10-22 | End: 2017-10-26 | Stop reason: HOSPADM

## 2017-10-22 RX ORDER — HYDROMORPHONE HCL 110MG/55ML
2 PATIENT CONTROLLED ANALGESIA SYRINGE INTRAVENOUS EVERY 4 HOURS PRN
Status: DISCONTINUED | OUTPATIENT
Start: 2017-10-22 | End: 2017-10-25

## 2017-10-22 RX ORDER — MAGNESIUM SULFATE HEPTAHYDRATE 40 MG/ML
4 INJECTION, SOLUTION INTRAVENOUS ONCE
Status: COMPLETED | OUTPATIENT
Start: 2017-10-22 | End: 2017-10-22

## 2017-10-22 RX ADMIN — ONDANSETRON 4 MG: 2 INJECTION INTRAMUSCULAR; INTRAVENOUS at 14:01

## 2017-10-22 RX ADMIN — INSULIN ASPART 5 UNITS: 100 INJECTION, SOLUTION INTRAVENOUS; SUBCUTANEOUS at 08:01

## 2017-10-22 RX ADMIN — METOPROLOL TARTRATE 100 MG: 100 TABLET, FILM COATED ORAL at 08:07

## 2017-10-22 RX ADMIN — INSULIN DETEMIR 22 UNITS: 100 INJECTION, SOLUTION SUBCUTANEOUS at 10:04

## 2017-10-22 RX ADMIN — INSULIN ASPART 3 UNITS: 100 INJECTION, SOLUTION INTRAVENOUS; SUBCUTANEOUS at 23:07

## 2017-10-22 RX ADMIN — PROMETHAZINE HYDROCHLORIDE 12.5 MG: 25 INJECTION INTRAMUSCULAR; INTRAVENOUS at 11:57

## 2017-10-22 RX ADMIN — LEVOFLOXACIN 750 MG: 5 INJECTION, SOLUTION INTRAVENOUS at 08:02

## 2017-10-22 RX ADMIN — VENLAFAXINE HYDROCHLORIDE 75 MG: 75 CAPSULE, EXTENDED RELEASE ORAL at 08:07

## 2017-10-22 RX ADMIN — MEPERIDINE HYDROCHLORIDE 25 MG: 25 INJECTION INTRAMUSCULAR; INTRAVENOUS; SUBCUTANEOUS at 10:07

## 2017-10-22 RX ADMIN — ONDANSETRON 4 MG: 2 INJECTION INTRAMUSCULAR; INTRAVENOUS at 01:20

## 2017-10-22 RX ADMIN — MEPERIDINE HYDROCHLORIDE 25 MG: 25 INJECTION INTRAMUSCULAR; INTRAVENOUS; SUBCUTANEOUS at 15:57

## 2017-10-22 RX ADMIN — MEPERIDINE HYDROCHLORIDE 25 MG: 25 INJECTION INTRAMUSCULAR; INTRAVENOUS; SUBCUTANEOUS at 03:18

## 2017-10-22 RX ADMIN — MEPERIDINE HYDROCHLORIDE 25 MG: 25 INJECTION INTRAMUSCULAR; INTRAVENOUS; SUBCUTANEOUS at 20:29

## 2017-10-22 RX ADMIN — MEPERIDINE HYDROCHLORIDE 25 MG: 25 INJECTION INTRAMUSCULAR; INTRAVENOUS; SUBCUTANEOUS at 11:59

## 2017-10-22 RX ADMIN — PANTOPRAZOLE SODIUM 40 MG: 40 INJECTION, POWDER, FOR SOLUTION INTRAVENOUS at 08:05

## 2017-10-22 RX ADMIN — INSULIN DETEMIR 22 UNITS: 100 INJECTION, SOLUTION SUBCUTANEOUS at 23:30

## 2017-10-22 RX ADMIN — MEPERIDINE HYDROCHLORIDE 25 MG: 25 INJECTION INTRAMUSCULAR; INTRAVENOUS; SUBCUTANEOUS at 00:54

## 2017-10-22 RX ADMIN — MEPERIDINE HYDROCHLORIDE 25 MG: 25 INJECTION INTRAMUSCULAR; INTRAVENOUS; SUBCUTANEOUS at 05:41

## 2017-10-22 RX ADMIN — AMLODIPINE BESYLATE 10 MG: 10 TABLET ORAL at 08:07

## 2017-10-22 RX ADMIN — PROMETHAZINE HYDROCHLORIDE 12.5 MG: 25 INJECTION INTRAMUSCULAR; INTRAVENOUS at 03:18

## 2017-10-22 RX ADMIN — LISINOPRIL 20 MG: 10 TABLET ORAL at 08:07

## 2017-10-22 RX ADMIN — Medication 10 ML: at 08:08

## 2017-10-22 RX ADMIN — HYDROMORPHONE HYDROCHLORIDE 1 MG: 1 INJECTION, SOLUTION INTRAMUSCULAR; INTRAVENOUS; SUBCUTANEOUS at 22:36

## 2017-10-22 RX ADMIN — MEPERIDINE HYDROCHLORIDE 25 MG: 25 INJECTION INTRAMUSCULAR; INTRAVENOUS; SUBCUTANEOUS at 14:01

## 2017-10-22 RX ADMIN — MAGNESIUM SULFATE HEPTAHYDRATE 4 G: 40 INJECTION, SOLUTION INTRAVENOUS at 03:40

## 2017-10-22 RX ADMIN — ONDANSETRON 4 MG: 2 INJECTION INTRAMUSCULAR; INTRAVENOUS at 22:02

## 2017-10-22 RX ADMIN — PANTOPRAZOLE SODIUM 40 MG: 40 INJECTION, POWDER, FOR SOLUTION INTRAVENOUS at 22:00

## 2017-10-22 RX ADMIN — PROMETHAZINE HYDROCHLORIDE 12.5 MG: 25 INJECTION INTRAMUSCULAR; INTRAVENOUS at 15:57

## 2017-10-22 RX ADMIN — PROMETHAZINE HYDROCHLORIDE 12.5 MG: 25 INJECTION INTRAMUSCULAR; INTRAVENOUS at 08:02

## 2017-10-22 RX ADMIN — PROMETHAZINE HYDROCHLORIDE 12.5 MG: 25 INJECTION INTRAMUSCULAR; INTRAVENOUS at 20:29

## 2017-10-22 RX ADMIN — Medication 1 CAPSULE: at 08:07

## 2017-10-22 RX ADMIN — METOPROLOL TARTRATE 100 MG: 100 TABLET, FILM COATED ORAL at 22:00

## 2017-10-22 RX ADMIN — LEUCINE, PHENYLALANINE, LYSINE, METHIONINE, ISOLEUCINE, VALINE, HISTIDINE, THREONINE, TRYPTOPHAN, ALANINE, GLYCINE, ARGININE, PROLINE, SERINE, TYROSINE, SODIUM ACETATE, DIBASIC POTASSIUM PHOSPHATE, MAGNESIUM CHLORIDE, SODIUM CHLORIDE, CALCIUM CHLORIDE, DEXTROSE
365; 280; 290; 200; 300; 290; 240; 210; 90; 1035; 515; 575; 340; 250; 20; 340; 261; 51; 59; 33; 20 INJECTION INTRAVENOUS at 19:03

## 2017-10-22 RX ADMIN — MEPERIDINE HYDROCHLORIDE 25 MG: 25 INJECTION INTRAMUSCULAR; INTRAVENOUS; SUBCUTANEOUS at 18:07

## 2017-10-22 RX ADMIN — MEPERIDINE HYDROCHLORIDE 25 MG: 25 INJECTION INTRAMUSCULAR; INTRAVENOUS; SUBCUTANEOUS at 08:02

## 2017-10-22 RX ADMIN — SODIUM CHLORIDE 50 ML/HR: 9 INJECTION, SOLUTION INTRAVENOUS at 01:20

## 2017-10-22 RX ADMIN — INSULIN ASPART 3 UNITS: 100 INJECTION, SOLUTION INTRAVENOUS; SUBCUTANEOUS at 12:01

## 2017-10-22 NOTE — PLAN OF CARE
Problem: Patient Care Overview (Adult)  Goal: Plan of Care Review  Outcome: Ongoing (interventions implemented as appropriate)    10/19/17 1310 10/22/17 0801   Coping/Psychosocial Response Interventions   Plan Of Care Reviewed With --  patient   Patient Care Overview   Progress progress towards functional goals is fair --        Goal: Adult Individualization and Mutuality  Outcome: Ongoing (interventions implemented as appropriate)  Goal: Discharge Needs Assessment  Outcome: Ongoing (interventions implemented as appropriate)    Problem: Pancreatitis, Acute/Chronic (Adult)  Goal: Signs and Symptoms of Listed Potential Problems Will be Absent or Manageable (Pancreatitis, Acute/Chronic)  Outcome: Ongoing (interventions implemented as appropriate)    10/19/17 1310   Pancreatitis, Acute/Chronic   Problems Assessed (Pancreatitis) all   Problems Present (Pancreatitis) pain         Problem: Diabetes, Type 2 (Adult)  Goal: Signs and Symptoms of Listed Potential Problems Will be Absent or Manageable (Diabetes, Type 2)  Outcome: Ongoing (interventions implemented as appropriate)    10/19/17 1310   Diabetes, Type 2   Problems Assessed (Type 2 Diabetes) all   Problems Present (Type 2 Diabetes) impaired glycemic control         Problem: Pain, Acute (Adult)  Goal: Identify Related Risk Factors and Signs and Symptoms  Outcome: Ongoing (interventions implemented as appropriate)    10/19/17 1310   Pain, Acute   Related Risk Factors (Acute Pain) disease process;patient perception   Signs and Symptoms (Acute Pain) verbalization of pain descriptors       Goal: Acceptable Pain Control/Comfort Level  Outcome: Ongoing (interventions implemented as appropriate)    10/22/17 1000   Pain, Acute (Adult)   Acceptable Pain Control/Comfort Level making progress toward outcome

## 2017-10-22 NOTE — PROGRESS NOTES
LOS: 12 days   Interval History:     Mrs. Boone remains alert and talkative.  She continues with abdominal pain which radiates to her back.  She also continues with nausea.  Gastrology recent recommended starting in diet.  Unfortunately, after eating only a few bites she continues with worsening abdominal pain.  She describes that her pain is intermittently relieved with intravenous pain medications.  Otherwise, she continues with poor appetite, continued pain, nausea and diffuse weakness.     History taken from: patient chart    Review of Systems:     Review of Systems - Negative except present illness    Objective     Vital Signs  Temp:  [98.2 °F (36.8 °C)-99.7 °F (37.6 °C)] 98.4 °F (36.9 °C)  Heart Rate:  [80-93] 80  Resp:  [18] 18  BP: (108-123)/(71-78) 118/78    Physical Exam:  General: Alert talkative white female in mild pain distress with position changes  HEENT; Neg  CHEST:Clear  HEART:RRR  ABD: Tender epigastrium , mild distention, bowel sounds positive  Musculoskeletal-no palpable bony abnormalities  Neurological: No focal deficits appreciated.  Psychiatric: Alert and oriented ×3          Results Review:     I reviewed the patient's new clinical results  : See Below    Medication Review:     Current Facility-Administered Medications:   •  Adult [facility] Clinimix TPN, , Intravenous, Q24H (TPN), Last Rate: 65 mL/hr at 10/21/17 1735 **AND** [DISCONTINUED] fat emulsion (INTRALIPID,LIPOSYN) 20 % infusion 50 g, 250 mL, Intravenous, Once per day on Mon Wed Fri, Last Rate: 20.8 mL/hr at 10/13/17 1849, 50 g at 10/13/17 1849 **AND** multiple vitamin 10 mL, trace elements Cr-Cu-Mn-Zn 5 mL in sodium chloride 0.9 % 500 mL IVPB, , Intravenous, Once per day on Mon Wed Fri, Kait Rao [facility], Last Rate: 128.8 mL/hr at 10/20/17 1136  •  amLODIPine (NORVASC) tablet 10 mg, 10 mg, Oral, Q24H, Seven Gallego MD, 10 mg at 10/22/17 0807  •  dextrose (D50W) solution 25 g, 25 g, Intravenous, Q15 Min PRN, Kel STEPHENS  MD Eugenio  •  dextrose (GLUTOSE) oral gel 15 g, 15 g, Oral, Q15 Min PRN, Kel Becker MD  •  enoxaparin (LOVENOX) syringe 40 mg, 40 mg, Subcutaneous, Nightly, Kel Becker MD, 40 mg at 10/21/17 2200  •  glucagon (human recombinant) (GLUCAGEN DIAGNOSTIC) injection 1 mg, 1 mg, Subcutaneous, Q15 Min PRN, Kel Becker MD  •  insulin aspart (novoLOG) injection 0-14 Units, 0-14 Units, Subcutaneous, 4x Daily AC & at Bedtime, Kait Rao, [facility], 5 Units at 10/22/17 0801  •  insulin detemir (LEVEMIR) injection 22 Units, 22 Units, Subcutaneous, Q12H, Kel Becker MD, 22 Units at 10/21/17 2200  •  levoFLOXacin (LEVAQUIN) 750 mg/150 mL D5W (premix) (LEVAQUIN) 750 mg, 750 mg, Intravenous, Q24H, Seven Gallego MD, Last Rate: 0 mL/hr at 10/11/17 1000, 750 mg at 10/22/17 0802  •  lisinopril (PRINIVIL,ZESTRIL) tablet 20 mg, 20 mg, Oral, Q24H, Seven Gallego MD, 20 mg at 10/22/17 0807  •  Magnesium Sulfate 2 gram Bolus, followed by 8 gram infusion (total Mg dose 10 grams)- Mg less than or equal to 1mg/dL, 2 g, Intravenous, PRN **OR** Magnesium Sulfate 6 gram Infusion (2 gm x 3) -Mg 1.1 -1.5 mg/dL, 2 g, Intravenous, PRN **OR** magnesium sulfate 4 gram infusion- Mg 1.6-1.9 mg/dL, 4 g, Intravenous, PRN, Seven Gallego MD  •  meperidine (DEMEROL) injection 25 mg, 25 mg, Intravenous, Q2H PRN, Seven Gallego MD, 25 mg at 10/22/17 0802  •  metoprolol tartrate (LOPRESSOR) tablet 100 mg, 100 mg, Oral, Q12H, Seven Gallego MD, 100 mg at 10/22/17 0807  •  morphine injection 2 mg, 2 mg, Intravenous, Daily PRN, Nico Fung MD  •  ondansetron (ZOFRAN) injection 4 mg, 4 mg, Intravenous, Q6H PRN, Kel Becker MD, 4 mg at 10/22/17 0120  •  pancrelipase (Lip-Prot-Amyl) (CREON) capsule 24,000 units of lipase, 24,000 units of lipase, Oral, TID With Meals, Seven Gallego MD, 24,000 units of lipase at 10/21/17 1119  •  pantoprazole (PROTONIX) injection 40 mg, 40 mg, Intravenous, Q12H, Nico Fung MD, 40 mg at  10/22/17 0805  •  Pharmacy to Dose TPN, , Does not apply, Continuous PRN, Seven Gallego MD, 65 mg at 10/13/17 1148  •  potassium chloride 10 mEq in 100 mL IVPB, 10 mEq, Intravenous, Q1H PRN, Seven Gallego MD, Last Rate: 100 mL/hr at 10/13/17 1442, 10 mEq at 10/13/17 1442  •  promethazine (PHENERGAN) 12.5 mg in sodium chloride 0.9 % 50 mL, 12.5 mg, Intravenous, Q4H PRN, Nico Fung MD, 12.5 mg at 10/22/17 0802  •  Risaquad-2 capsule 1 capsule, 1 capsule, Oral, Daily, Kait Rao [facility], 1 capsule at 10/22/17 0807  •  sodium chloride 0.9 % flush 10 mL, 10 mL, Intracatheter, Q12H, Seven Gallego MD, 10 mL at 10/22/17 0808  •  sodium chloride 0.9 % flush 10 mL, 10 mL, Intracatheter, PRN, Seven Gallego MD, 10 mL at 10/14/17 0631  •  sodium chloride 0.9 % flush 10 mL, 10 mL, Intracatheter, Q12H, Seven Gallego MD, 10 mL at 10/22/17 0808  •  sodium chloride 0.9 % flush 10 mL, 10 mL, Intracatheter, PRN, Seven Gallego MD, 10 mL at 10/21/17 0604  •  sodium chloride 0.9 % infusion, 50 mL/hr, Intravenous, Continuous, Kel Becker MD, Last Rate: 50 mL/hr at 10/22/17 0120, 50 mL/hr at 10/22/17 0120  •  traMADol (ULTRAM) tablet 50 mg, 50 mg, Oral, Q6H PRN, Seven Gallego MD, 50 mg at 10/21/17 1156  •  venlafaxine XR (EFFEXOR-XR) 24 hr capsule 75 mg, 75 mg, Oral, Daily, Seven Gallego MD, 75 mg at 10/22/17 0807    amLODIPine 10 mg Oral Q24H   enoxaparin 40 mg Subcutaneous Nightly   insulin aspart 0-14 Units Subcutaneous 4x Daily AC & at Bedtime   insulin detemir 22 Units Subcutaneous Q12H   levoFLOXacin 750 mg Intravenous Q24H   lisinopril 20 mg Oral Q24H   metoprolol tartrate 100 mg Oral Q12H   trace minerals + multivitamin IVPB  Intravenous Once per day on Mon Wed Fri   pancrelipase (Lip-Prot-Amyl) 24,000 units of lipase Oral TID With Meals   pantoprazole 40 mg Intravenous Q12H   Risaquad-2 1 capsule Oral Daily   sodium chloride 10 mL Intracatheter Q12H   sodium chloride 10 mL Intracatheter Q12H    venlafaxine XR 75 mg Oral Daily     dextrose  •  dextrose  •  glucagon (human recombinant)  •  magnesium sulfate **OR** magnesium sulfate **OR** magnesium sulfate  •  meperidine  •  Morphine  •  ondansetron  •  Pharmacy to Dose TPN  •  potassium chloride  •  promethazine (PHENERGAN) in NS 50 mL  •  sodium chloride  •  sodium chloride  •  traMADol      Results from last 7 days  Lab Units 10/18/17  1139 10/16/17  0544   WBC 10*3/mm3 9.69 7.47   HEMOGLOBIN g/dL 12.8 12.0   PLATELETS 10*3/mm3 390 357       Results from last 7 days  Lab Units 10/20/17  0114   CRP mg/dL 1.13*       Results from last 7 days  Lab Units 10/22/17  0113 10/21/17  0046 10/20/17  0114 10/19/17  1725 10/19/17  0137 10/18/17  0059 10/17/17  0042 10/16/17  0544   SODIUM mmol/L 137 138 136  --  136 133* 138 137   POTASSIUM mmol/L 3.7 4.1 4.5 4.1 3.6 3.6 3.8 3.8   CHLORIDE mmol/L 103 103 101  --  104 101 103 101   CO2 mmol/L 28.7 28.6 30.9  --  27.0 25.6 27.2 30.5   BUN mg/dL 10 10 10  --  9 8 6* 8   CREATININE mg/dL 0.48 0.45 0.49  --  0.44 0.47 0.54 0.55   CALCIUM mg/dL 9.2 9.2 8.6  --  8.8 8.6 9.0 9.2   GLUCOSE mg/dL 223* 137* 212*  --  252* 306* 312* 321*       Results from last 7 days  Lab Units 10/22/17  0113 10/21/17  0046 10/20/17  0114 10/19/17  0137 10/18/17  0059 10/17/17  0042 10/16/17  0123   MAGNESIUM mg/dL 1.7 1.7 2.0 1.7 1.8 1.8 2.4     No results found for: HGBA1C    Results from last 7 days  Lab Units 10/16/17  0544   BILIRUBIN mg/dL 0.2   ALK PHOS U/L 113*   AST (SGOT) U/L 14   ALT (SGPT) U/L 6*                       Imaging Results (last 72 hours)     Procedure Component Value Units Date/Time    CT Abdomen Pelvis With Contrast [[id number]] Collected:  10/18/17 1514     Updated:  10/18/17 1519    Narrative:          CT ABDOMEN PELVIS W CONTRAST-        TECHNIQUE: Multiple axial CT images were obtained from lung bases  through pubic symphysis with administration of IV contrast. Reformatted  images in the coronal and/or sagittal  "plane(s) were generated from the  axial data set to facilitate diagnostic accuracy and/or surgical  planning.  Oral Contrast:NONE.     Radiation dose reduction techniques were utilized per ALARA protocol.  Automated exposure control was initiated through either or CareDose or  DoseRight software packages by  protocol.       Radiation dose reduction techniques were utilized per ALARA protocol.  Automated exposure control was initiated through either or CareDose or  DoseRight software packages by  protocol.       599.94 mGy.cm     Clinical information  worsening abdominal pain, (patient reports sensation that something  \"burst\" in abdomen), pancreatitis with pain lasting longer than 2 weeks,  on TPN cannot tolerate PO with continued nausea; K85.90-Acute  pancreatitis without necrosis or infection, unspecified      Comparison  Comparison: 9/29/2017     Findings  LOWER THORAX: SMALL LEFT GREATER THAN RIGHT PLEURAL EFFUSIONS WITH  BIBASILAR AIRSPACE DISEASE NOTED.     ABDOMEN:        LIVER: Homogeneous. No focal hepatic mass or ductal dilatation.        GALLBLADDER: PRIOR CHOLECYSTECTOMY.        PANCREAS: SINCE THE PREVIOUS EXAM, A LARGE PSEUDOCYST HAS DEVELOPED  INVOLVING THE MIDPORTION OF PANCREAS IMMEDIATELY SITUATED POSTERIOR TO  THE POSTERIOR GASTRIC WALL AND IS APPROXIMATELY 11.4 CM X 6.8 CM.        SPLEEN: Homogeneous. No splenomegaly.        ADRENALS: No mass.        KIDNEYS: No mass. No obstructive uropathy.  No evidence of  urolithiasis.        GI TRACT: SMALLER PSEUDOCYST IS SEEN ALONG THE GREATER CURVATURE OF  THE STOMACH BUT IS APPROXIMATELY 2.6 CM IN SIZE.        PERITONEUM: THE AMOUNT OF INTRA-ABDOMINAL ASCITES HAS INCREASED SINCE  THE PREVIOUS EXAM.        MESENTERY: Unremarkable.        LYMPH NODES: No lymphadenopathy.        VASCULATURE: No evidence of aneurysm.        ABDOMINAL WALL: No focal hernia or mass.           OTHER: None.     PELVIS:        BLADDER: No focal mass or " significant wall thickening        REPRODUCTIVE: Unremarkable as visualized.        APPENDIX: Nondistended. No surrounding inflammation.     BONES: No acute bony abnormality.       Impression:       Impression:  1. DEVELOPMENT OF 11.4 X 6.8 CM LARGE MID BODY REGION PSEUDOCYST OF THE  PANCREAS WHICH IS SITUATED IMMEDIATELY POSTERIOR TO THE STOMACH EFFACING  THE POSTERIOR GASTRIC WALL.  2. SMALLER PSEUDOCYST ALONG GREATER CURVATURE OF STOMACH NEAR FUNDUS.  3. EVOLVING SMALL AMOUNTS OF ASCITES THROUGHOUT THE ABDOMINAL AND PELVIC  REGIONS. NO LOCULATED COLLECTIONS.  4. DEVELOPMENT OF PLEURAL EFFUSIONS WITH BIBASILAR AIRSPACE DISEASE.        This report was finalized on 10/18/2017 3:16 PM by Dr. Mike Tolbert MD.             Assessment/Plan    Active Problems:  1) abdominal pain  2) recurrent pancreatitis-MRCP October 11 revealed no obvious cyst or pancreatic duct obstructions. Follow-up CT scan October 18 with 11 x 7 mid body pseudocyst.  3) intractable nausea and vomiting-anti-emetics  4) diabetes mellitus type 2  5) DVT prophylaxis.  - Subcutaneous Lovenox  6) leukocytosis  7) colitis/enteritis  8) hypomagnesemia-replace as needed.   9) hypokalemia-replace as needed   10) intractable pain-changed to morphine on October 13.   11) 11 x 6.8 cm pseudocyst in the pancreas-Discussed with [facility] Dr. Daniel who discussed with gastrologist-Dr. Rosenthal.  They do not feel patient needs to be transferred at this time.  They recommend post duodenal feedings, allowing time for maturation of the pseudocyst and later draining.  If patient's symptoms persist despite post duodenal feedings and no obvious relief than the recommended transfer to [facility] for  endoscopic ultrasound drainage.  Drainage of an  unmature pseudocyst will have higher risks and they recommended transfer to [facility] if continued symptoms or any evidence of hemorrhage.         See orders entered.     Seven MCLAIN  MD Julián  10/22/17  8:57 AM

## 2017-10-23 ENCOUNTER — APPOINTMENT (OUTPATIENT)
Dept: GENERAL RADIOLOGY | Facility: HOSPITAL | Age: 47
End: 2017-10-23

## 2017-10-23 LAB
GLUCOSE BLDC GLUCOMTR-MCNC: 132 MG/DL (ref 70–130)
GLUCOSE BLDC GLUCOMTR-MCNC: 139 MG/DL (ref 70–130)
GLUCOSE BLDC GLUCOMTR-MCNC: 190 MG/DL (ref 70–130)
GLUCOSE BLDC GLUCOMTR-MCNC: 205 MG/DL (ref 70–130)
MAGNESIUM SERPL-MCNC: 2 MG/DL (ref 1.7–2.6)
PHOSPHATE SERPL-MCNC: 4.9 MG/DL (ref 2.7–4.5)

## 2017-10-23 PROCEDURE — 63710000001 INSULIN ASPART PER 5 UNITS

## 2017-10-23 PROCEDURE — 25010000002 HYDROMORPHONE PER 4 MG

## 2017-10-23 PROCEDURE — 25010000002 ENOXAPARIN PER 10 MG: Performed by: INTERNAL MEDICINE

## 2017-10-23 PROCEDURE — 25010000002 ONDANSETRON PER 1 MG: Performed by: INTERNAL MEDICINE

## 2017-10-23 PROCEDURE — 25010000002 HYDROMORPHONE PER 4 MG: Performed by: INTERNAL MEDICINE

## 2017-10-23 PROCEDURE — 82962 GLUCOSE BLOOD TEST: CPT

## 2017-10-23 PROCEDURE — 25010000002 LEVOFLOXACIN PER 250 MG: Performed by: INTERNAL MEDICINE

## 2017-10-23 PROCEDURE — 84100 ASSAY OF PHOSPHORUS: CPT | Performed by: INTERNAL MEDICINE

## 2017-10-23 PROCEDURE — 25010000002 PROMETHAZINE PER 50 MG: Performed by: INTERNAL MEDICINE

## 2017-10-23 PROCEDURE — 99233 SBSQ HOSP IP/OBS HIGH 50: CPT | Performed by: PHYSICIAN ASSISTANT

## 2017-10-23 PROCEDURE — 63710000001 INSULIN DETEMIR PER 5 UNITS: Performed by: INTERNAL MEDICINE

## 2017-10-23 PROCEDURE — 83735 ASSAY OF MAGNESIUM: CPT | Performed by: INTERNAL MEDICINE

## 2017-10-23 PROCEDURE — 43752 NASAL/OROGASTRIC W/TUBE PLMT: CPT | Performed by: RADIOLOGY

## 2017-10-23 RX ADMIN — PROMETHAZINE HYDROCHLORIDE 12.5 MG: 25 INJECTION INTRAMUSCULAR; INTRAVENOUS at 11:50

## 2017-10-23 RX ADMIN — HYDROMORPHONE HYDROCHLORIDE 2 MG: 1 INJECTION, SOLUTION INTRAMUSCULAR; INTRAVENOUS; SUBCUTANEOUS at 16:07

## 2017-10-23 RX ADMIN — Medication 10 ML: at 21:58

## 2017-10-23 RX ADMIN — ONDANSETRON 4 MG: 2 INJECTION INTRAMUSCULAR; INTRAVENOUS at 18:50

## 2017-10-23 RX ADMIN — METOPROLOL TARTRATE 100 MG: 100 TABLET, FILM COATED ORAL at 09:01

## 2017-10-23 RX ADMIN — LIDOCAINE HYDROCHLORIDE 3 ML: 20 JELLY TOPICAL at 15:40

## 2017-10-23 RX ADMIN — INSULIN DETEMIR 22 UNITS: 100 INJECTION, SOLUTION SUBCUTANEOUS at 09:03

## 2017-10-23 RX ADMIN — INSULIN ASPART 3 UNITS: 100 INJECTION, SOLUTION INTRAVENOUS; SUBCUTANEOUS at 21:56

## 2017-10-23 RX ADMIN — HYDROMORPHONE HYDROCHLORIDE 2 MG: 1 INJECTION, SOLUTION INTRAMUSCULAR; INTRAVENOUS; SUBCUTANEOUS at 07:25

## 2017-10-23 RX ADMIN — Medication 1 CAPSULE: at 09:00

## 2017-10-23 RX ADMIN — PANTOPRAZOLE SODIUM 40 MG: 40 INJECTION, POWDER, FOR SOLUTION INTRAVENOUS at 09:02

## 2017-10-23 RX ADMIN — HYDROMORPHONE HYDROCHLORIDE 2 MG: 1 INJECTION, SOLUTION INTRAMUSCULAR; INTRAVENOUS; SUBCUTANEOUS at 22:14

## 2017-10-23 RX ADMIN — PROMETHAZINE HYDROCHLORIDE 12.5 MG: 25 INJECTION INTRAMUSCULAR; INTRAVENOUS at 21:56

## 2017-10-23 RX ADMIN — HYDROMORPHONE HYDROCHLORIDE 2 MG: 1 INJECTION, SOLUTION INTRAMUSCULAR; INTRAVENOUS; SUBCUTANEOUS at 02:35

## 2017-10-23 RX ADMIN — PANTOPRAZOLE SODIUM 40 MG: 40 INJECTION, POWDER, FOR SOLUTION INTRAVENOUS at 21:57

## 2017-10-23 RX ADMIN — LEVOFLOXACIN 750 MG: 5 INJECTION, SOLUTION INTRAVENOUS at 08:59

## 2017-10-23 RX ADMIN — HYDROMORPHONE HYDROCHLORIDE 2 MG: 1 INJECTION, SOLUTION INTRAMUSCULAR; INTRAVENOUS; SUBCUTANEOUS at 11:46

## 2017-10-23 RX ADMIN — ASCORBIC ACID, VITAMIN A PALMITATE, CHOLECALCIFEROL, THIAMINE HYDROCHLORIDE, RIBOFLAVIN-5 PHOSPHATE SODIUM, PYRIDOXINE HYDROCHLORIDE, NIACINAMIDE, DEXPANTHENOL, ALPHA-TOCOPHEROL ACETATE, VITAMIN K1, FOLIC ACID, BIOTIN, CYANOCOBALAMIN: 200; 3300; 200; 6; 3.6; 6; 40; 15; 10; 150; 600; 60; 5 INJECTION, SOLUTION INTRAVENOUS at 11:53

## 2017-10-23 RX ADMIN — BENZOCAINE 2 SPRAY: 200 LIQUID DENTAL; ORAL; PERIODONTAL at 15:39

## 2017-10-23 RX ADMIN — ENOXAPARIN SODIUM 40 MG: 40 INJECTION SUBCUTANEOUS at 21:57

## 2017-10-23 RX ADMIN — LEUCINE, PHENYLALANINE, LYSINE, METHIONINE, ISOLEUCINE, VALINE, HISTIDINE, THREONINE, TRYPTOPHAN, ALANINE, GLYCINE, ARGININE, PROLINE, SERINE, TYROSINE, DEXTROSE: 365; 280; 290; 200; 300; 290; 240; 210; 90; 1035; 515; 575; 340; 250; 20; 20 INJECTION INTRAVENOUS at 17:34

## 2017-10-23 RX ADMIN — INSULIN ASPART 5 UNITS: 100 INJECTION, SOLUTION INTRAVENOUS; SUBCUTANEOUS at 08:59

## 2017-10-23 RX ADMIN — PROMETHAZINE HYDROCHLORIDE 12.5 MG: 25 INJECTION INTRAMUSCULAR; INTRAVENOUS at 16:10

## 2017-10-23 RX ADMIN — LISINOPRIL 20 MG: 10 TABLET ORAL at 09:00

## 2017-10-23 RX ADMIN — SODIUM CHLORIDE 50 ML/HR: 9 INJECTION, SOLUTION INTRAVENOUS at 07:24

## 2017-10-23 RX ADMIN — AMLODIPINE BESYLATE 10 MG: 10 TABLET ORAL at 09:00

## 2017-10-23 RX ADMIN — PANCRELIPASE 24000 UNITS OF LIPASE: 60000; 12000; 38000 CAPSULE, DELAYED RELEASE PELLETS ORAL at 17:33

## 2017-10-23 RX ADMIN — INSULIN DETEMIR 22 UNITS: 100 INJECTION, SOLUTION SUBCUTANEOUS at 21:56

## 2017-10-23 RX ADMIN — VENLAFAXINE HYDROCHLORIDE 75 MG: 75 CAPSULE, EXTENDED RELEASE ORAL at 09:01

## 2017-10-23 RX ADMIN — HYDROMORPHONE HYDROCHLORIDE 2 MG: 2 INJECTION, SOLUTION INTRAMUSCULAR; INTRAVENOUS; SUBCUTANEOUS at 22:14

## 2017-10-23 NOTE — PROGRESS NOTES
Pharmacy Nutritional Support:    Ms. Boone' labs evaluated on day 12 of TPN.  Glucose control is acceptable at this time with most values under 200 over the past 24 hrs.  The phosphorus has increased further this AM to 4.9 (upper limit of normal is 4.5).      Plan:    1.  Will convert TPN to Clinimix Plain in order to allow the phosphorus to normalize.    2.  Will repeat a CMP, Mg, and phos in AM and replace/adjust as needed.    Thank you.  America Venegas, Pharm.D.  10/23/2017  11:39 AM

## 2017-10-23 NOTE — NURSING NOTE
Ana COYNE with dr. Benitez present on the floor stated to pull dobhoff feeding tube that it was not where they wanted it to be.

## 2017-10-23 NOTE — PLAN OF CARE
Problem: Patient Care Overview (Adult)  Goal: Plan of Care Review  Outcome: Ongoing (interventions implemented as appropriate)  Goal: Adult Individualization and Mutuality  Outcome: Ongoing (interventions implemented as appropriate)    Problem: Pancreatitis, Acute/Chronic (Adult)  Goal: Signs and Symptoms of Listed Potential Problems Will be Absent or Manageable (Pancreatitis, Acute/Chronic)  Outcome: Ongoing (interventions implemented as appropriate)    Problem: Diabetes, Type 2 (Adult)  Goal: Signs and Symptoms of Listed Potential Problems Will be Absent or Manageable (Diabetes, Type 2)  Outcome: Ongoing (interventions implemented as appropriate)    Problem: Pain, Acute (Adult)  Goal: Identify Related Risk Factors and Signs and Symptoms  Outcome: Ongoing (interventions implemented as appropriate)    10/19/17 1310   Pain, Acute   Related Risk Factors (Acute Pain) disease process;patient perception   Signs and Symptoms (Acute Pain) verbalization of pain descriptors

## 2017-10-23 NOTE — PROGRESS NOTES
Discharge Planning Assessment   [place]     Patient Name: Rossy Boone  MRN: [id number]  Today's Date: 10/23/2017    Admit Date: 10/10/2017       Discharge Plan       10/23/17 1335    Case Management/Social Work Plan    Plan SS spoke to pt who states  plans to transfer her to a higher level of care. Pt lives with spouse and plans to return home at discharge. Pt does not utilize home health services or DME. Pt continues to get TPN. Pt informed SS that spouse took additional papers to Medicaid office on Friday, 10-20-17. SS to follow.           Expected Discharge Date and Time     Expected Discharge Date Expected Discharge Time    Oct 25, 2017                Linnea Russell

## 2017-10-23 NOTE — NURSING NOTE
Spoke to Ana COYNE with Dr. Benitez. Informed her that Dr Wright the radiologist left tube in place but that it was coiled in the stomach and hasn't passed threw to the jejunum at this time. Ask PA if they wanted us to use the tube as is or whether to pull the tube. PA stated not to use the tube at this time she would talk to Dr. Benitez and call me back with any new orders.

## 2017-10-23 NOTE — PROGRESS NOTES
10/23/17      Rossy Boone is a 47 y.o. female who is seen today for follow up of pancreatitis    HPI  The patient was seen for a follow-up visit.  She continues to have nausea, vomiting, and abdominal pain.  Alkaline phosphatase is 113, lipase is 77, and amylase is 301.  Patient is having no by mouth intake.  He has been receiving TPN.  Dr. Gallego has requested a nasal jejunal feeding tube.    Past medical history, social history, and family history remain unchanged since initial consultation.    REVIEW OF SYSTEMS  Nausea, vomiting, abdominal pain; all other systems are negative    CURRENT MEDICATION    Current Facility-Administered Medications:   •  Adult Central Clinimix TPN, , Intravenous, Q24H (TPN) **AND** [DISCONTINUED] fat emulsion (INTRALIPID,LIPOSYN) 20 % infusion 50 g, 250 mL, Intravenous, Once per day on Mon Wed Fri, Last Rate: 20.8 mL/hr at 10/13/17 1849, 50 g at 10/13/17 1849 **AND** multiple vitamin 10 mL, trace elements Cr-Cu-Mn-Zn 5 mL in sodium chloride 0.9 % 500 mL IVPB, , Intravenous, Once per day on Mon Wed Fri, America Venegas, [facility], Last Rate: 128.8 mL/hr at 10/23/17 1153  •  amLODIPine (NORVASC) tablet 10 mg, 10 mg, Oral, Q24H, Seven Gallego MD, 10 mg at 10/23/17 0900  •  dextrose (D50W) solution 25 g, 25 g, Intravenous, Q15 Min PRN, Kel Becker MD  •  dextrose (GLUTOSE) oral gel 15 g, 15 g, Oral, Q15 Min PRN, Kel Becker MD  •  enoxaparin (LOVENOX) syringe 40 mg, 40 mg, Subcutaneous, Nightly, Kel Becker MD, 40 mg at 10/21/17 2200  •  glucagon (human recombinant) (GLUCAGEN DIAGNOSTIC) injection 1 mg, 1 mg, Subcutaneous, Q15 Min PRN, Kel Becker MD  •  HYDROmorphone (DILAUDID) injection 2 mg, 2 mg, Intravenous, Q4H PRN, Seven Gallego MD  •  insulin aspart (novoLOG) injection 0-14 Units, 0-14 Units, Subcutaneous, 4x Daily AC & at Bedtime, Kait Rao, [facility], 5 Units at 10/23/17 0859  •  insulin detemir (LEVEMIR) injection 22 Units, 22 Units, Subcutaneous, Q12H,  Kel Becker MD, 22 Units at 10/23/17 0903  •  levoFLOXacin (LEVAQUIN) 750 mg/150 mL D5W (premix) (LEVAQUIN) 750 mg, 750 mg, Intravenous, Q24H, Seven Gallego MD, Last Rate: 0 mL/hr at 10/11/17 1000, 750 mg at 10/23/17 0859  •  lisinopril (PRINIVIL,ZESTRIL) tablet 20 mg, 20 mg, Oral, Q24H, Seven Gallego MD, 20 mg at 10/23/17 0900  •  Magnesium Sulfate 2 gram Bolus, followed by 8 gram infusion (total Mg dose 10 grams)- Mg less than or equal to 1mg/dL, 2 g, Intravenous, PRN **OR** Magnesium Sulfate 6 gram Infusion (2 gm x 3) -Mg 1.1 -1.5 mg/dL, 2 g, Intravenous, PRN **OR** magnesium sulfate 4 gram infusion- Mg 1.6-1.9 mg/dL, 4 g, Intravenous, PRN, Seven Gallego MD  •  meperidine (DEMEROL) injection 25 mg, 25 mg, Intravenous, Q2H PRN, Seven Gallego MD, 25 mg at 10/22/17 2029  •  metoprolol tartrate (LOPRESSOR) tablet 100 mg, 100 mg, Oral, Q12H, Seven Gallego MD, 100 mg at 10/23/17 0901  •  morphine injection 2 mg, 2 mg, Intravenous, Daily PRN, Nico Fung MD  •  ondansetron (ZOFRAN) injection 4 mg, 4 mg, Intravenous, Q6H PRN, Kel Becker MD, 4 mg at 10/22/17 2202  •  pancrelipase (Lip-Prot-Amyl) (CREON) capsule 24,000 units of lipase, 24,000 units of lipase, Oral, TID With Meals, Seven Gallego MD, 24,000 units of lipase at 10/21/17 1119  •  pantoprazole (PROTONIX) injection 40 mg, 40 mg, Intravenous, Q12H, Nico Fung MD, 40 mg at 10/23/17 0902  •  Pharmacy to Dose TPN, , Does not apply, Continuous PRN, Seven Gallego MD, 65 mg at 10/13/17 1148  •  potassium chloride 10 mEq in 100 mL IVPB, 10 mEq, Intravenous, Q1H PRN, Seven Gallego MD, Last Rate: 100 mL/hr at 10/13/17 1442, 10 mEq at 10/13/17 1442  •  promethazine (PHENERGAN) 12.5 mg in sodium chloride 0.9 % 50 mL, 12.5 mg, Intravenous, Q4H PRN, Nico Fung MD, 12.5 mg at 10/23/17 1150  •  Risaquad-2 capsule 1 capsule, 1 capsule, Oral, Daily, Kait Rao [facility], 1 capsule at 10/23/17 0900  •  sodium chloride 0.9 %  flush 10 mL, 10 mL, Intracatheter, Q12H, Seven Gallego MD, 10 mL at 10/22/17 0808  •  sodium chloride 0.9 % flush 10 mL, 10 mL, Intracatheter, PRN, Seven Gallego MD, 10 mL at 10/14/17 0631  •  sodium chloride 0.9 % flush 10 mL, 10 mL, Intracatheter, Q12H, Seven Gallego MD, 10 mL at 10/22/17 0808  •  sodium chloride 0.9 % flush 10 mL, 10 mL, Intracatheter, PRN, Seven Gallego MD, 10 mL at 10/21/17 0604  •  sodium chloride 0.9 % infusion, 50 mL/hr, Intravenous, Continuous, Kel Becker MD, Last Rate: 50 mL/hr at 10/23/17 0724, 50 mL/hr at 10/23/17 0724  •  traMADol (ULTRAM) tablet 50 mg, 50 mg, Oral, Q6H PRN, Seven Gallego MD  •  venlafaxine XR (EFFEXOR-XR) 24 hr capsule 75 mg, 75 mg, Oral, Daily, Seven Gallego MD, 75 mg at 10/23/17 0901    ALLERGIES  Hydrocodone-acetaminophen; Hydromorphone; Oxycodone-acetaminophen; and Penicillins     VITAL SIGNS  Vital Signs (last 24 hours)       10/22 0700  -  10/23 0659 10/23 0700  -  10/23 1254   Most Recent    Temp (°F) 98.4 -  98.6    97 -  97.9     97.9 (36.6)    Heart Rate 80 -  94    77 -  86     86    Resp 16 -  18    16 -  18     18    /78 -  145/92    101/66 -  110/73     110/73    SpO2 (%)   98       98            PHYSICAL EXAM  General:  Appearance alert, pleasant, interactive and cooperative; Ill-appearing  Head:  normocephalic, without obvious abnormality and atraumatic  Eyes:  lids and lashes normal, conjunctivae and sclerae normal, no icterus, no pallor, corneas clear and PERRLA  Ears:  ears appear intact with no abnormalities noted  Nose:  nares normal, septum midline, mucosa normal and no drainage  Throat:  no oral lesions, no thrush and oral mucosa moist  Lungs:  clear to auscultation, respirations regular, respirations even and respirations unlabored  Heart:  regular rhythm & normal rate, normal S1, S2, no murmur, no gallop, no rub and no click  Abdomen:  Significant diffuse tenderness; normal bowel sounds, no masses, no hepatomegaly,  no splenomegaly, soft, no guarding and no rebound tenderness  Extremities:  moves extremities well, no edema, no cyanosis and no redness  Skin:  no bleeding, bruising or rash, color normal, no lesions noted and temperature normal  Neurologic:  Mental Status orientated to person, place, time and situation, alertness alert, awake and arousable, orientation time, date, person, place, city and president and mood/affect:  normal  Cranial Nerves:  cranial nerves 2 - 12 grossly intact as examined, Speech normal content and execution, Sensory intact, Motor strength is equal in upper and lower extremities, Reflexes normal and symmetric      LABS   Lab Results (last 24 hours)     Procedure Component Value Units Date/Time    POC Glucose Fingerstick [[id number]]  (Abnormal) Collected:  10/22/17 1643    Specimen:  Blood Updated:  10/22/17 1649     Glucose 148 (H) mg/dL     Narrative:       Meter: [id number] : [id number] faisal cruz    POC Glucose Fingerstick [[id number]]  (Abnormal) Collected:  10/22/17 2211    Specimen:  Blood Updated:  10/22/17 2219     Glucose 184 (H) mg/dL     Narrative:       Meter: [id number] : [id number] holly hernandez    Magnesium [[id number]]  (Normal) Collected:  10/23/17 0047    Specimen:  Blood Updated:  10/23/17 0201     Magnesium 2.0 mg/dL     Phosphorus [[id number]]  (Abnormal) Collected:  10/23/17 0047    Specimen:  Blood Updated:  10/23/17 0201     Phosphorus 4.9 (H) mg/dL     POC Glucose Fingerstick [[id number]]  (Abnormal) Collected:  10/23/17 0736    Specimen:  Blood Updated:  10/23/17 0804     Glucose 205 (H) mg/dL     Narrative:       Meter: [id number] : [id number] Bertin Donis    POC Glucose Fingerstick [[id number]]  (Abnormal) Collected:  10/23/17 1156    Specimen:  Blood Updated:  10/23/17 1233     Glucose 139 (H) mg/dL     Narrative:       Meter: [id number] : [id number] Bertin Donis          IMAGING  Imaging Results (last 24 hours)     ** No results found for the last  24 hours. **            ASSESSMENT/PLAN  -Acute pancreatitis  -Diabetes mellitus, type II  -Pancreatic pseudocysts    A request for Radiology to place a nasal jejunal feeding tube will be ordered.  We will continue to follow.        Electronically signed by: DORETHA Linton

## 2017-10-23 NOTE — PROGRESS NOTES
LOS: 13 days   Interval History:     Mrs. Boone describes significant pain relief with intravenous Dilantin last night.  She describes sleeping better and feeling better this morning.  She does continue with nausea.  She has no associated fevers or chills.  He continues with generalized weakness, anorexia, abdominal pain.       History taken from: patient chart    Review of Systems:     Review of Systems - Negative except present illness    Objective     Vital Signs  Temp:  [98.4 °F (36.9 °C)-98.6 °F (37 °C)] 98.6 °F (37 °C)  Heart Rate:  [80-94] 94  Resp:  [16-18] 16  BP: (118-145)/(78-92) 145/92    Physical Exam:  General: Alert talkative white female in mild pain distress with position changes  HEENT; pupils are reactive, nostrils patent, oral mucosa is moist.  CHEST: Lungs are clear bilaterally, no respiratory distress, no fremitus, no wheezing.  HEART:RRR, no murmurs gallops or rubs.  ABD: Tender epigastrium , mild distention, bowel sounds positive  Musculoskeletal-no palpable bony abnormalities  Neurological: No focal deficits appreciated.  Psychiatric: Alert and oriented ×3          Results Review:     I reviewed the patient's new clinical results  : See Below    Medication Review:     Current Facility-Administered Medications:   •  Adult Central Clinimix TPN, , Intravenous, Q24H (TPN), Last Rate: 65 mL/hr at 10/22/17 1903 **AND** [DISCONTINUED] fat emulsion (INTRALIPID,LIPOSYN) 20 % infusion 50 g, 250 mL, Intravenous, Once per day on Mon Wed Fri, Last Rate: 20.8 mL/hr at 10/13/17 1849, 50 g at 10/13/17 1849 **AND** multiple vitamin 10 mL, trace elements Cr-Cu-Mn-Zn 5 mL in sodium chloride 0.9 % 500 mL IVPB, , Intravenous, Once per day on Mon Wed Fri, Kait Rao [facility], Last Rate: 128.8 mL/hr at 10/20/17 1136  •  amLODIPine (NORVASC) tablet 10 mg, 10 mg, Oral, Q24H, Seven Gallego MD, 10 mg at 10/22/17 0807  •  dextrose (D50W) solution 25 g, 25 g, Intravenous, Q15 Min PRN, Kel Becker MD  •   dextrose (GLUTOSE) oral gel 15 g, 15 g, Oral, Q15 Min PRN, Kel Becker MD  •  enoxaparin (LOVENOX) syringe 40 mg, 40 mg, Subcutaneous, Nightly, Kel Becker MD, 40 mg at 10/21/17 2200  •  glucagon (human recombinant) (GLUCAGEN DIAGNOSTIC) injection 1 mg, 1 mg, Subcutaneous, Q15 Min PRN, Kel Becker MD  •  HYDROmorphone (DILAUDID) 1 MG/ML injection  - ADS Override Pull, , , ,   •  HYDROmorphone (DILAUDID) 1 MG/ML injection  - ADS Override Pull, , , ,   •  HYDROmorphone (DILAUDID) injection 2 mg, 2 mg, Intravenous, Q4H PRN, Seven Gallego MD  •  insulin aspart (novoLOG) injection 0-14 Units, 0-14 Units, Subcutaneous, 4x Daily AC & at Bedtime, Kait Rao, [facility], 3 Units at 10/22/17 2307  •  insulin detemir (LEVEMIR) injection 22 Units, 22 Units, Subcutaneous, Q12H, Kel Becker MD, 22 Units at 10/22/17 2330  •  levoFLOXacin (LEVAQUIN) 750 mg/150 mL D5W (premix) (LEVAQUIN) 750 mg, 750 mg, Intravenous, Q24H, Seven Gallego MD, Last Rate: 0 mL/hr at 10/11/17 1000, 750 mg at 10/22/17 0802  •  lisinopril (PRINIVIL,ZESTRIL) tablet 20 mg, 20 mg, Oral, Q24H, Seven Gallego MD, 20 mg at 10/22/17 0807  •  Magnesium Sulfate 2 gram Bolus, followed by 8 gram infusion (total Mg dose 10 grams)- Mg less than or equal to 1mg/dL, 2 g, Intravenous, PRN **OR** Magnesium Sulfate 6 gram Infusion (2 gm x 3) -Mg 1.1 -1.5 mg/dL, 2 g, Intravenous, PRN **OR** magnesium sulfate 4 gram infusion- Mg 1.6-1.9 mg/dL, 4 g, Intravenous, PRN, Seven Gallego MD  •  meperidine (DEMEROL) injection 25 mg, 25 mg, Intravenous, Q2H PRN, Seven Gallego MD, 25 mg at 10/22/17 2029  •  metoprolol tartrate (LOPRESSOR) tablet 100 mg, 100 mg, Oral, Q12H, Seven Gallego MD, 100 mg at 10/22/17 2200  •  morphine injection 2 mg, 2 mg, Intravenous, Daily PRN, Nico Fung MD  •  ondansetron (ZOFRAN) injection 4 mg, 4 mg, Intravenous, Q6H PRN, Kel Becker MD, 4 mg at 10/22/17 2202  •  pancrelipase (Lip-Prot-Amyl) (CREON) capsule  24,000 units of lipase, 24,000 units of lipase, Oral, TID With Meals, Seven Gallego MD, 24,000 units of lipase at 10/21/17 1119  •  pantoprazole (PROTONIX) injection 40 mg, 40 mg, Intravenous, Q12H, Nico Fung MD, 40 mg at 10/22/17 2200  •  Pharmacy to Dose TPN, , Does not apply, Continuous PRN, Seven Gallego MD, 65 mg at 10/13/17 1148  •  potassium chloride 10 mEq in 100 mL IVPB, 10 mEq, Intravenous, Q1H PRN, Seven Gallego MD, Last Rate: 100 mL/hr at 10/13/17 1442, 10 mEq at 10/13/17 1442  •  promethazine (PHENERGAN) 12.5 mg in sodium chloride 0.9 % 50 mL, 12.5 mg, Intravenous, Q4H PRN, Nico Fung MD, 12.5 mg at 10/22/17 2029  •  Risaquad-2 capsule 1 capsule, 1 capsule, Oral, Daily, [facility], 1 capsule at 10/22/17 0807  •  sodium chloride 0.9 % flush 10 mL, 10 mL, Intracatheter, Q12H, Seven Gallego MD, 10 mL at 10/22/17 0808  •  sodium chloride 0.9 % flush 10 mL, 10 mL, Intracatheter, PRN, Seven Gallego MD, 10 mL at 10/14/17 0631  •  sodium chloride 0.9 % flush 10 mL, 10 mL, Intracatheter, Q12H, Seven Gallego MD, 10 mL at 10/22/17 0808  •  sodium chloride 0.9 % flush 10 mL, 10 mL, Intracatheter, PRN, Seven Gallego MD, 10 mL at 10/21/17 0604  •  sodium chloride 0.9 % infusion, 50 mL/hr, Intravenous, Continuous, Kel Becker MD, Last Rate: 50 mL/hr at 10/22/17 0120, 50 mL/hr at 10/22/17 0120  •  traMADol (ULTRAM) tablet 50 mg, 50 mg, Oral, Q6H PRN, Seven Gallego MD  •  venlafaxine XR (EFFEXOR-XR) 24 hr capsule 75 mg, 75 mg, Oral, Daily, Seven Gallego MD, 75 mg at 10/22/17 0807    amLODIPine 10 mg Oral Q24H   enoxaparin 40 mg Subcutaneous Nightly   HYDROmorphone      HYDROmorphone      insulin aspart 0-14 Units Subcutaneous 4x Daily AC & at Bedtime   insulin detemir 22 Units Subcutaneous Q12H   levoFLOXacin 750 mg Intravenous Q24H   lisinopril 20 mg Oral Q24H   metoprolol tartrate 100 mg Oral Q12H   trace minerals + multivitamin IVPB  Intravenous Once per day  on Mon Wed Fri   pancrelipase (Lip-Prot-Amyl) 24,000 units of lipase Oral TID With Meals   pantoprazole 40 mg Intravenous Q12H   Risaquad-2 1 capsule Oral Daily   sodium chloride 10 mL Intracatheter Q12H   sodium chloride 10 mL Intracatheter Q12H   venlafaxine XR 75 mg Oral Daily     dextrose  •  dextrose  •  glucagon (human recombinant)  •  HYDROmorphone  •  magnesium sulfate **OR** magnesium sulfate **OR** magnesium sulfate  •  meperidine  •  Morphine  •  ondansetron  •  Pharmacy to Dose TPN  •  potassium chloride  •  promethazine (PHENERGAN) in NS 50 mL  •  sodium chloride  •  sodium chloride  •  traMADol      Results from last 7 days  Lab Units 10/22/17  0925 10/18/17  1139   WBC 10*3/mm3 8.20 9.69   HEMOGLOBIN g/dL 11.6* 12.8   PLATELETS 10*3/mm3 363 390       Results from last 7 days  Lab Units 10/20/17  0114   CRP mg/dL 1.13*       Results from last 7 days  Lab Units 10/22/17  0113 10/21/17  0046 10/20/17  0114 10/19/17  1725 10/19/17  0137 10/18/17  0059 10/17/17  0042   SODIUM mmol/L 137 138 136  --  136 133* 138   POTASSIUM mmol/L 3.7 4.1 4.5 4.1 3.6 3.6 3.8   CHLORIDE mmol/L 103 103 101  --  104 101 103   CO2 mmol/L 28.7 28.6 30.9  --  27.0 25.6 27.2   BUN mg/dL 10 10 10  --  9 8 6*   CREATININE mg/dL 0.48 0.45 0.49  --  0.44 0.47 0.54   CALCIUM mg/dL 9.2 9.2 8.6  --  8.8 8.6 9.0   GLUCOSE mg/dL 223* 137* 212*  --  252* 306* 312*       Results from last 7 days  Lab Units 10/23/17  0047 10/22/17  0113 10/21/17  0046 10/20/17  0114 10/19/17  0137 10/18/17  0059 10/17/17  0042   MAGNESIUM mg/dL 2.0 1.7 1.7 2.0 1.7 1.8 1.8     No results found for: HGBA1C                        Imaging Results (last 72 hours)     Procedure Component Value Units Date/Time    CT Abdomen Pelvis With Contrast [[id number]] Collected:  10/18/17 1514     Updated:  10/18/17 1519    Narrative:          CT ABDOMEN PELVIS W CONTRAST-        TECHNIQUE: Multiple axial CT images were obtained from lung bases  through pubic symphysis with  "administration of IV contrast. Reformatted  images in the coronal and/or sagittal plane(s) were generated from the  axial data set to facilitate diagnostic accuracy and/or surgical  planning.  Oral Contrast:NONE.     Radiation dose reduction techniques were utilized per ALARA protocol.  Automated exposure control was initiated through either or CareDose or  DoseRight software packages by  protocol.       Radiation dose reduction techniques were utilized per ALARA protocol.  Automated exposure control was initiated through either or CareDose or  DoseRight software packages by  protocol.       599.94 mGy.cm     Clinical information  worsening abdominal pain, (patient reports sensation that something  \"burst\" in abdomen), pancreatitis with pain lasting longer than 2 weeks,  on TPN cannot tolerate PO with continued nausea; K85.90-Acute  pancreatitis without necrosis or infection, unspecified      Comparison  Comparison: 9/29/2017     Findings  LOWER THORAX: SMALL LEFT GREATER THAN RIGHT PLEURAL EFFUSIONS WITH  BIBASILAR AIRSPACE DISEASE NOTED.     ABDOMEN:        LIVER: Homogeneous. No focal hepatic mass or ductal dilatation.        GALLBLADDER: PRIOR CHOLECYSTECTOMY.        PANCREAS: SINCE THE PREVIOUS EXAM, A LARGE PSEUDOCYST HAS DEVELOPED  INVOLVING THE MIDPORTION OF PANCREAS IMMEDIATELY SITUATED POSTERIOR TO  THE POSTERIOR GASTRIC WALL AND IS APPROXIMATELY 11.4 CM X 6.8 CM.        SPLEEN: Homogeneous. No splenomegaly.        ADRENALS: No mass.        KIDNEYS: No mass. No obstructive uropathy.  No evidence of  urolithiasis.        GI TRACT: SMALLER PSEUDOCYST IS SEEN ALONG THE GREATER CURVATURE OF  THE STOMACH BUT IS APPROXIMATELY 2.6 CM IN SIZE.        PERITONEUM: THE AMOUNT OF INTRA-ABDOMINAL ASCITES HAS INCREASED SINCE  THE PREVIOUS EXAM.        MESENTERY: Unremarkable.        LYMPH NODES: No lymphadenopathy.        VASCULATURE: No evidence of aneurysm.        ABDOMINAL WALL: No focal " hernia or mass.           OTHER: None.     PELVIS:        BLADDER: No focal mass or significant wall thickening        REPRODUCTIVE: Unremarkable as visualized.        APPENDIX: Nondistended. No surrounding inflammation.     BONES: No acute bony abnormality.       Impression:       Impression:  1. DEVELOPMENT OF 11.4 X 6.8 CM LARGE MID BODY REGION PSEUDOCYST OF THE  PANCREAS WHICH IS SITUATED IMMEDIATELY POSTERIOR TO THE STOMACH EFFACING  THE POSTERIOR GASTRIC WALL.  2. SMALLER PSEUDOCYST ALONG GREATER CURVATURE OF STOMACH NEAR FUNDUS.  3. EVOLVING SMALL AMOUNTS OF ASCITES THROUGHOUT THE ABDOMINAL AND PELVIC  REGIONS. NO LOCULATED COLLECTIONS.  4. DEVELOPMENT OF PLEURAL EFFUSIONS WITH BIBASILAR AIRSPACE DISEASE.        This report was finalized on 10/18/2017 3:16 PM by Dr. Mike Tolbert MD.             Assessment/Plan    Active Problems:  1) abdominal pain  2) recurrent pancreatitis-MRCP October 11 revealed no obvious cyst or pancreatic duct obstructions. Follow-up CT scan October 18 with 11 x 7 mid body pseudocyst.  3) intractable nausea and vomiting-anti-emetics  4) diabetes mellitus type 2  5) DVT prophylaxis.  - Subcutaneous Lovenox  6) leukocytosis  7) colitis/enteritis  8) hypomagnesemia-replace as needed.   9) hypokalemia-replace as needed   10) intractable pain-changed to morphine on October 13. - Improved with Dilaudid October 22.   11) 11 x 6.8 cm pseudocyst in the pancreas-Discussed with [facility] hospitalist Dr. Daniel  (October 22) who discussed with gastrologist-Dr. Rosenthal.  They do not feel patient needs to be transferred at this time.  They recommend post duodenal feedings, allowing time for maturation of the pseudocyst and later draining.  If patient's symptoms persist despite post duodenal feedings and no obvious relief than the recommended transfer to [facility] for  endoscopic ultrasound drainage.  Drainage of an  unmature pseudocyst will have higher risks and they  recommended transfer to [facility] if continued symptoms or any evidence of hemorrhage.         See orders entered.     Seven Gallego MD  10/23/17  7:16 AM

## 2017-10-23 NOTE — NURSING NOTE
Called and Spoke to Dr. Wright the radiologist who placed the dobhoff naso jejunal feeding tube. Called to see if tube was ok to use. Radiologist stated he got the tube down and it coiled in the body of the stomach that it hasn't passed threw to the jejunum at this time. Radiologist recommended calling Dr. Benitez to see if they wanted the tube used or not at this time.

## 2017-10-24 LAB
ALBUMIN SERPL-MCNC: 3.3 G/DL (ref 3.5–5)
ALBUMIN/GLOB SERPL: 1.2 G/DL (ref 1.5–2.5)
ALP SERPL-CCNC: 188 U/L (ref 35–104)
ALT SERPL W P-5'-P-CCNC: 22 U/L (ref 10–36)
ANION GAP SERPL CALCULATED.3IONS-SCNC: 5.5 MMOL/L (ref 3.6–11.2)
AST SERPL-CCNC: 31 U/L (ref 10–30)
BILIRUB SERPL-MCNC: 0.2 MG/DL (ref 0.2–1.8)
BUN BLD-MCNC: 10 MG/DL (ref 7–21)
BUN/CREAT SERPL: 21.3 (ref 7–25)
CALCIUM SPEC-SCNC: 9.1 MG/DL (ref 7.7–10)
CHLORIDE SERPL-SCNC: 103 MMOL/L (ref 99–112)
CHOLEST SERPL-MCNC: 102 MG/DL (ref 100–199)
CO2 SERPL-SCNC: 29.5 MMOL/L (ref 24.3–31.9)
CREAT BLD-MCNC: 0.47 MG/DL (ref 0.43–1.29)
GFR SERPL CREATININE-BSD FRML MDRD: 142 ML/MIN/1.73
GLOBULIN UR ELPH-MCNC: 2.8 GM/DL
GLUCOSE BLD-MCNC: 146 MG/DL (ref 70–110)
GLUCOSE BLDC GLUCOMTR-MCNC: 144 MG/DL (ref 70–130)
GLUCOSE BLDC GLUCOMTR-MCNC: 145 MG/DL (ref 70–130)
GLUCOSE BLDC GLUCOMTR-MCNC: 148 MG/DL (ref 70–130)
GLUCOSE BLDC GLUCOMTR-MCNC: 157 MG/DL (ref 70–130)
GLUCOSE BLDC GLUCOMTR-MCNC: 166 MG/DL (ref 70–130)
HDLC SERPL-MCNC: 14 MG/DL
LDLC SERPL CALC-MCNC: 39 MG/DL (ref 0–99)
LP SERPL ELPH-IMP: ABNORMAL
MAGNESIUM SERPL-MCNC: 1.7 MG/DL (ref 1.7–2.6)
OSMOLALITY SERPL CALC.SUM OF ELEC: 277.4 MOSM/KG (ref 273–305)
PHOSPHATE SERPL-MCNC: 4.9 MG/DL (ref 2.7–4.5)
POTASSIUM BLD-SCNC: 4.2 MMOL/L (ref 3.5–5.3)
PROT SERPL-MCNC: 6.1 G/DL (ref 6–8)
SODIUM BLD-SCNC: 138 MMOL/L (ref 135–153)
TRIGL SERPL-MCNC: 243 MG/DL (ref 0–149)
VLDLC SERPL-MCNC: 49 MG/DL (ref 5–40)

## 2017-10-24 PROCEDURE — 99233 SBSQ HOSP IP/OBS HIGH 50: CPT | Performed by: PHYSICIAN ASSISTANT

## 2017-10-24 PROCEDURE — 63710000001 INSULIN DETEMIR PER 5 UNITS: Performed by: INTERNAL MEDICINE

## 2017-10-24 PROCEDURE — 83735 ASSAY OF MAGNESIUM: CPT

## 2017-10-24 PROCEDURE — 25010000002 PROMETHAZINE PER 50 MG: Performed by: INTERNAL MEDICINE

## 2017-10-24 PROCEDURE — 25010000002 LEVOFLOXACIN PER 250 MG: Performed by: INTERNAL MEDICINE

## 2017-10-24 PROCEDURE — 84100 ASSAY OF PHOSPHORUS: CPT

## 2017-10-24 PROCEDURE — 25010000002 ENOXAPARIN PER 10 MG: Performed by: INTERNAL MEDICINE

## 2017-10-24 PROCEDURE — 63710000001 INSULIN ASPART PER 5 UNITS

## 2017-10-24 PROCEDURE — 80053 COMPREHEN METABOLIC PANEL: CPT

## 2017-10-24 PROCEDURE — 25010000002 HYDROMORPHONE PER 4 MG

## 2017-10-24 PROCEDURE — 82962 GLUCOSE BLOOD TEST: CPT

## 2017-10-24 RX ORDER — MAGNESIUM SULFATE HEPTAHYDRATE 40 MG/ML
4 INJECTION, SOLUTION INTRAVENOUS ONCE
Status: COMPLETED | OUTPATIENT
Start: 2017-10-24 | End: 2017-10-24

## 2017-10-24 RX ADMIN — PANCRELIPASE 24000 UNITS OF LIPASE: 60000; 12000; 38000 CAPSULE, DELAYED RELEASE PELLETS ORAL at 12:03

## 2017-10-24 RX ADMIN — PANTOPRAZOLE SODIUM 40 MG: 40 INJECTION, POWDER, FOR SOLUTION INTRAVENOUS at 20:52

## 2017-10-24 RX ADMIN — ENOXAPARIN SODIUM 40 MG: 40 INJECTION SUBCUTANEOUS at 20:58

## 2017-10-24 RX ADMIN — LEVOFLOXACIN 750 MG: 5 INJECTION, SOLUTION INTRAVENOUS at 08:32

## 2017-10-24 RX ADMIN — HYDROMORPHONE HYDROCHLORIDE 2 MG: 1 INJECTION, SOLUTION INTRAMUSCULAR; INTRAVENOUS; SUBCUTANEOUS at 12:12

## 2017-10-24 RX ADMIN — INSULIN ASPART 3 UNITS: 100 INJECTION, SOLUTION INTRAVENOUS; SUBCUTANEOUS at 12:03

## 2017-10-24 RX ADMIN — PROMETHAZINE HYDROCHLORIDE 12.5 MG: 25 INJECTION INTRAMUSCULAR; INTRAVENOUS at 08:19

## 2017-10-24 RX ADMIN — HYDROMORPHONE HYDROCHLORIDE 2 MG: 1 INJECTION, SOLUTION INTRAMUSCULAR; INTRAVENOUS; SUBCUTANEOUS at 20:48

## 2017-10-24 RX ADMIN — PANTOPRAZOLE SODIUM 40 MG: 40 INJECTION, POWDER, FOR SOLUTION INTRAVENOUS at 08:20

## 2017-10-24 RX ADMIN — PROMETHAZINE HYDROCHLORIDE 12.5 MG: 25 INJECTION INTRAMUSCULAR; INTRAVENOUS at 16:26

## 2017-10-24 RX ADMIN — METOPROLOL TARTRATE 100 MG: 100 TABLET, FILM COATED ORAL at 20:58

## 2017-10-24 RX ADMIN — AMLODIPINE BESYLATE 10 MG: 10 TABLET ORAL at 08:23

## 2017-10-24 RX ADMIN — Medication 10 ML: at 08:25

## 2017-10-24 RX ADMIN — HYDROMORPHONE HYDROCHLORIDE 2 MG: 1 INJECTION, SOLUTION INTRAMUSCULAR; INTRAVENOUS; SUBCUTANEOUS at 02:08

## 2017-10-24 RX ADMIN — HYDROMORPHONE HYDROCHLORIDE 2 MG: 1 INJECTION, SOLUTION INTRAMUSCULAR; INTRAVENOUS; SUBCUTANEOUS at 16:26

## 2017-10-24 RX ADMIN — Medication 10 ML: at 21:02

## 2017-10-24 RX ADMIN — LEUCINE, PHENYLALANINE, LYSINE, METHIONINE, ISOLEUCINE, VALINE, HISTIDINE, THREONINE, TRYPTOPHAN, ALANINE, GLYCINE, ARGININE, PROLINE, SERINE, TYROSINE, DEXTROSE: 365; 280; 290; 200; 300; 290; 240; 210; 90; 1035; 515; 575; 340; 250; 20; 20 INJECTION INTRAVENOUS at 18:23

## 2017-10-24 RX ADMIN — Medication 1 CAPSULE: at 08:19

## 2017-10-24 RX ADMIN — HYDROMORPHONE HYDROCHLORIDE 2 MG: 1 INJECTION, SOLUTION INTRAMUSCULAR; INTRAVENOUS; SUBCUTANEOUS at 06:41

## 2017-10-24 RX ADMIN — INSULIN DETEMIR 22 UNITS: 100 INJECTION, SOLUTION SUBCUTANEOUS at 08:28

## 2017-10-24 RX ADMIN — MAGNESIUM SULFATE HEPTAHYDRATE 4 G: 40 INJECTION, SOLUTION INTRAVENOUS at 04:00

## 2017-10-24 RX ADMIN — PROMETHAZINE HYDROCHLORIDE 12.5 MG: 25 INJECTION INTRAMUSCULAR; INTRAVENOUS at 12:12

## 2017-10-24 RX ADMIN — SODIUM CHLORIDE 50 ML/HR: 9 INJECTION, SOLUTION INTRAVENOUS at 02:13

## 2017-10-24 RX ADMIN — INSULIN DETEMIR 22 UNITS: 100 INJECTION, SOLUTION SUBCUTANEOUS at 21:07

## 2017-10-24 RX ADMIN — SODIUM CHLORIDE 50 ML/HR: 9 INJECTION, SOLUTION INTRAVENOUS at 20:57

## 2017-10-24 RX ADMIN — PROMETHAZINE HYDROCHLORIDE 12.5 MG: 25 INJECTION INTRAMUSCULAR; INTRAVENOUS at 02:07

## 2017-10-24 RX ADMIN — PROMETHAZINE HYDROCHLORIDE 12.5 MG: 25 INJECTION INTRAMUSCULAR; INTRAVENOUS at 20:48

## 2017-10-24 NOTE — DISCHARGE PLACEMENT REQUEST
Case Management/Social Work    Patient Name:  Rossy Boone  YOB: 1970  MRN: [id number]  Admit Date:  10/10/2017    Consult received for Swing Bed services.  Patient is self-pay, therefore, does not meet criteria for services.     Electronically signed by:  Mariela Michel RN  10/24/17 8:11 AM

## 2017-10-24 NOTE — PROGRESS NOTES
Discharge Planning Assessment   [place]     Patient Name: Rossy Boone  MRN: [id number]  Today's Date: 10/24/2017    Admit Date: 10/10/2017       Discharge Plan       10/24/17 1254    Case Management/Social Work Plan    Plan SS spoke to swing bed nurse, Cherri and  nurse, Bridgette who states  ordered swing bed consult and [facility] consult this am. Pt is self pay, therefore she does not qualify for swing bed or [facility]. SS to follow.              Expected Discharge Date and Time     Expected Discharge Date Expected Discharge Time    Oct 25, 2017           Linnea Russell

## 2017-10-24 NOTE — PROGRESS NOTES
Antimicrobial Length of Therapy:    Day 14 levofloxacin    Thank you.  America Venegas, Pharm.D.  10/24/2017  11:07 AM

## 2017-10-24 NOTE — PROGRESS NOTES
LOS: 14 days   Interval History:     Mrs. Boone is an half Dobbhoff tube and is post duodenal feedings.  Gastrologist are planning to place tube today.  Otherwise she continues with abdominal pain and nausea.  She has no other complaints and continues with generalized weakness.  Nurses describe an uneventful night except for continued pain.      History taken from: patient chart    Review of Systems:     Review of Systems - Negative except present illness    Objective     Vital Signs  Temp:  [97.9 °F (36.6 °C)-98.4 °F (36.9 °C)] 98.3 °F (36.8 °C)  Heart Rate:  [73-86] 73  Resp:  [18] 18  BP: (103-112)/(64-73) 103/64    Physical Exam:  General: Alert talkative white female in mild pain distress with position changes  HEENT; pupils are reactive, nostrils patent, oral mucosa is moist.  CHEST: Lungs are clear bilaterally, no respiratory distress, no fremitus, no wheezing.  HEART:RRR, no murmurs gallops or rubs.  ABD: Tender epigastrium , mild distention, bowel sounds positive  Musculoskeletal-no palpable bony abnormalities  Neurological: No focal deficits appreciated.  Psychiatric: Alert and oriented ×3          Results Review:     I reviewed the patient's new clinical results  : See Below    Medication Review:     Current Facility-Administered Medications:   •  Adult [facility] Clinimix TPN, , Intravenous, Q24H (TPN), Last Rate: 65 mL/hr at 10/23/17 1734 **AND** [DISCONTINUED] fat emulsion (INTRALIPID,LIPOSYN) 20 % infusion 50 g, 250 mL, Intravenous, Once per day on Mon Wed Fri, Last Rate: 20.8 mL/hr at 10/13/17 1849, 50 g at 10/13/17 1849 **AND** multiple vitamin 10 mL, trace elements Cr-Cu-Mn-Zn 5 mL in sodium chloride 0.9 % 500 mL IVPB, , Intravenous, Once per day on Mon Wed Fri, [facility] Venegas [facility], Last Rate: 128.8 mL/hr at 10/23/17 1153  •  amLODIPine (NORVASC) tablet 10 mg, 10 mg, Oral, Q24H, Seven Gallego MD, 10 mg at 10/23/17 0900  •  dextrose (D50W) solution 25 g, 25 g, Intravenous, Q15 Min PRN, Kel  ANGELO Becker MD  •  dextrose (GLUTOSE) oral gel 15 g, 15 g, Oral, Q15 Min PRN, Kel Becker MD  •  enoxaparin (LOVENOX) syringe 40 mg, 40 mg, Subcutaneous, Nightly, Kel Becker MD, 40 mg at 10/23/17 2157  •  glucagon (human recombinant) (GLUCAGEN DIAGNOSTIC) injection 1 mg, 1 mg, Subcutaneous, Q15 Min PRN, Kel Becker MD  •  HYDROmorphone (DILAUDID) 1 MG/ML injection  - ADS Override Pull, , , ,   •  HYDROmorphone (DILAUDID) injection 2 mg, 2 mg, Intravenous, Q4H PRN, Seven Gallego MD, 2 mg at 10/23/17 2214  •  insulin aspart (novoLOG) injection 0-14 Units, 0-14 Units, Subcutaneous, 4x Daily AC & at Bedtime, Kait Rao, [facility], 3 Units at 10/23/17 2156  •  insulin detemir (LEVEMIR) injection 22 Units, 22 Units, Subcutaneous, Q12H, Kel Becker MD, 22 Units at 10/23/17 2156  •  levoFLOXacin (LEVAQUIN) 750 mg/150 mL D5W (premix) (LEVAQUIN) 750 mg, 750 mg, Intravenous, Q24H, Seven Gallego MD, Last Rate: 0 mL/hr at 10/11/17 1000, 750 mg at 10/23/17 0859  •  lisinopril (PRINIVIL,ZESTRIL) tablet 20 mg, 20 mg, Oral, Q24H, Seven Gallego MD, 20 mg at 10/23/17 0900  •  Magnesium Sulfate 2 gram Bolus, followed by 8 gram infusion (total Mg dose 10 grams)- Mg less than or equal to 1mg/dL, 2 g, Intravenous, PRN **OR** Magnesium Sulfate 6 gram Infusion (2 gm x 3) -Mg 1.1 -1.5 mg/dL, 2 g, Intravenous, PRN **OR** magnesium sulfate 4 gram infusion- Mg 1.6-1.9 mg/dL, 4 g, Intravenous, PRN, Seven Gallego MD  •  magnesium sulfate 4 gram infusion- Mg 1.6-1.9 mg/dL, 4 g, Intravenous, Once, Seven Gallego MD, Last Rate: 25 mL/hr at 10/24/17 0400, 4 g at 10/24/17 0400  •  meperidine (DEMEROL) injection 25 mg, 25 mg, Intravenous, Q2H PRN, Seven Gallego MD, 25 mg at 10/22/17 2029  •  metoprolol tartrate (LOPRESSOR) tablet 100 mg, 100 mg, Oral, Q12H, Seven Gallego MD, 100 mg at 10/23/17 0901  •  morphine injection 2 mg, 2 mg, Intravenous, Daily PRN, Nico Fung MD  •  ondansetron (ZOFRAN) injection 4  mg, 4 mg, Intravenous, Q6H PRN, Kel Becker MD, 4 mg at 10/23/17 1850  •  pancrelipase (Lip-Prot-Amyl) (CREON) capsule 24,000 units of lipase, 24,000 units of lipase, Oral, TID With Meals, Seven Gallego MD, 24,000 units of lipase at 10/23/17 1733  •  pantoprazole (PROTONIX) injection 40 mg, 40 mg, Intravenous, Q12H, Nico Fung MD, 40 mg at 10/23/17 2157  •  Pharmacy to Dose TPN, , Does not apply, Continuous PRN, Seven Gallego MD, 65 mg at 10/13/17 1148  •  potassium chloride 10 mEq in 100 mL IVPB, 10 mEq, Intravenous, Q1H PRN, Seven Gallego MD, Last Rate: 100 mL/hr at 10/13/17 1442, 10 mEq at 10/13/17 1442  •  promethazine (PHENERGAN) 12.5 mg in sodium chloride 0.9 % 50 mL, 12.5 mg, Intravenous, Q4H PRN, Nico Fung MD, 12.5 mg at 10/24/17 0207  •  Risaquad-2 capsule 1 capsule, 1 capsule, Oral, Daily, Kait Rao [facility], 1 capsule at 10/23/17 0900  •  sodium chloride 0.9 % flush 10 mL, 10 mL, Intracatheter, Q12H, Seven Gallego MD, 10 mL at 10/23/17 2158  •  sodium chloride 0.9 % flush 10 mL, 10 mL, Intracatheter, PRN, Seven Gallego MD, 10 mL at 10/14/17 0631  •  sodium chloride 0.9 % flush 10 mL, 10 mL, Intracatheter, Q12H, Seven Gallego MD, 10 mL at 10/23/17 2158  •  sodium chloride 0.9 % flush 10 mL, 10 mL, Intracatheter, PRN, Seven Gallego MD, 10 mL at 10/21/17 0604  •  sodium chloride 0.9 % infusion, 50 mL/hr, Intravenous, Continuous, Kel Becker MD, Last Rate: 50 mL/hr at 10/24/17 0213, 50 mL/hr at 10/24/17 0213  •  traMADol (ULTRAM) tablet 50 mg, 50 mg, Oral, Q6H PRN, Seven Gallego MD  •  venlafaxine XR (EFFEXOR-XR) 24 hr capsule 75 mg, 75 mg, Oral, Daily, Seven Gallego MD, 75 mg at 10/23/17 0901    amLODIPine 10 mg Oral Q24H   enoxaparin 40 mg Subcutaneous Nightly   HYDROmorphone      insulin aspart 0-14 Units Subcutaneous 4x Daily AC & at Bedtime   insulin detemir 22 Units Subcutaneous Q12H   levoFLOXacin 750 mg Intravenous Q24H   lisinopril 20 mg Oral  Q24H   magnesium sulfate 4 g Intravenous Once   metoprolol tartrate 100 mg Oral Q12H   trace minerals + multivitamin IVPB  Intravenous Once per day on Mon Wed Fri   pancrelipase (Lip-Prot-Amyl) 24,000 units of lipase Oral TID With Meals   pantoprazole 40 mg Intravenous Q12H   Risaquad-2 1 capsule Oral Daily   sodium chloride 10 mL Intracatheter Q12H   sodium chloride 10 mL Intracatheter Q12H   venlafaxine XR 75 mg Oral Daily     dextrose  •  dextrose  •  glucagon (human recombinant)  •  HYDROmorphone  •  magnesium sulfate **OR** magnesium sulfate **OR** magnesium sulfate  •  meperidine  •  Morphine  •  ondansetron  •  Pharmacy to Dose TPN  •  potassium chloride  •  promethazine (PHENERGAN) in NS 50 mL  •  sodium chloride  •  sodium chloride  •  traMADol      Results from last 7 days  Lab Units 10/22/17  0925 10/18/17  1139   WBC 10*3/mm3 8.20 9.69   HEMOGLOBIN g/dL 11.6* 12.8   PLATELETS 10*3/mm3 363 390       Results from last 7 days  Lab Units 10/20/17  0114   CRP mg/dL 1.13*       Results from last 7 days  Lab Units 10/24/17  0105 10/22/17  0113 10/21/17  0046 10/20/17  0114 10/19/17  1725 10/19/17  0137 10/18/17  0059   SODIUM mmol/L 138 137 138 136  --  136 133*   POTASSIUM mmol/L 4.2 3.7 4.1 4.5 4.1 3.6 3.6   CHLORIDE mmol/L 103 103 103 101  --  104 101   CO2 mmol/L 29.5 28.7 28.6 30.9  --  27.0 25.6   BUN mg/dL 10 10 10 10  --  9 8   CREATININE mg/dL 0.47 0.48 0.45 0.49  --  0.44 0.47   CALCIUM mg/dL 9.1 9.2 9.2 8.6  --  8.8 8.6   GLUCOSE mg/dL 146* 223* 137* 212*  --  252* 306*       Results from last 7 days  Lab Units 10/24/17  0105 10/23/17  0047 10/22/17  0113 10/21/17  0046 10/20/17  0114 10/19/17  0137 10/18/17  0059   MAGNESIUM mg/dL 1.7 2.0 1.7 1.7 2.0 1.7 1.8     No results found for: HGBA1C    Results from last 7 days  Lab Units 10/24/17  0105   BILIRUBIN mg/dL 0.2   ALK PHOS U/L 188*   AST (SGOT) U/L 31*   ALT (SGPT) U/L 22                       Imaging Results (last 72 hours)     Procedure  "Component Value Units Date/Time    CT Abdomen Pelvis With Contrast [[id number]] Collected:  10/18/17 1514     Updated:  10/18/17 1519    Narrative:          CT ABDOMEN PELVIS W CONTRAST-        TECHNIQUE: Multiple axial CT images were obtained from lung bases  through pubic symphysis with administration of IV contrast. Reformatted  images in the coronal and/or sagittal plane(s) were generated from the  axial data set to facilitate diagnostic accuracy and/or surgical  planning.  Oral Contrast:NONE.     Radiation dose reduction techniques were utilized per ALARA protocol.  Automated exposure control was initiated through either or GlycoVaxyn or  DoseRight software packages by  protocol.       Radiation dose reduction techniques were utilized per ALARA protocol.  Automated exposure control was initiated through either or GlycoVaxyn or  DoseRight software packages by  protocol.       599.94 mGy.cm     Clinical information  worsening abdominal pain, (patient reports sensation that something  \"burst\" in abdomen), pancreatitis with pain lasting longer than 2 weeks,  on TPN cannot tolerate PO with continued nausea; K85.90-Acute  pancreatitis without necrosis or infection, unspecified      Comparison  Comparison: 9/29/2017     Findings  LOWER THORAX: SMALL LEFT GREATER THAN RIGHT PLEURAL EFFUSIONS WITH  BIBASILAR AIRSPACE DISEASE NOTED.     ABDOMEN:        LIVER: Homogeneous. No focal hepatic mass or ductal dilatation.        GALLBLADDER: PRIOR CHOLECYSTECTOMY.        PANCREAS: SINCE THE PREVIOUS EXAM, A LARGE PSEUDOCYST HAS DEVELOPED  INVOLVING THE MIDPORTION OF PANCREAS IMMEDIATELY SITUATED POSTERIOR TO  THE POSTERIOR GASTRIC WALL AND IS APPROXIMATELY 11.4 CM X 6.8 CM.        SPLEEN: Homogeneous. No splenomegaly.        ADRENALS: No mass.        KIDNEYS: No mass. No obstructive uropathy.  No evidence of  urolithiasis.        GI TRACT: SMALLER PSEUDOCYST IS SEEN ALONG THE GREATER CURVATURE OF  THE STOMACH " BUT IS APPROXIMATELY 2.6 CM IN SIZE.        PERITONEUM: THE AMOUNT OF INTRA-ABDOMINAL ASCITES HAS INCREASED SINCE  THE PREVIOUS EXAM.        MESENTERY: Unremarkable.        LYMPH NODES: No lymphadenopathy.        VASCULATURE: No evidence of aneurysm.        ABDOMINAL WALL: No focal hernia or mass.           OTHER: None.     PELVIS:        BLADDER: No focal mass or significant wall thickening        REPRODUCTIVE: Unremarkable as visualized.        APPENDIX: Nondistended. No surrounding inflammation.     BONES: No acute bony abnormality.       Impression:       Impression:  1. DEVELOPMENT OF 11.4 X 6.8 CM LARGE MID BODY REGION PSEUDOCYST OF THE  PANCREAS WHICH IS SITUATED IMMEDIATELY POSTERIOR TO THE STOMACH EFFACING  THE POSTERIOR GASTRIC WALL.  2. SMALLER PSEUDOCYST ALONG GREATER CURVATURE OF STOMACH NEAR FUNDUS.  3. EVOLVING SMALL AMOUNTS OF ASCITES THROUGHOUT THE ABDOMINAL AND PELVIC  REGIONS. NO LOCULATED COLLECTIONS.  4. DEVELOPMENT OF PLEURAL EFFUSIONS WITH BIBASILAR AIRSPACE DISEASE.        This report was finalized on 10/18/2017 3:16 PM by Dr. Mike Tolbert MD.             Assessment/Plan    Active Problems:  1) abdominal pain  2) recurrent pancreatitis-MRCP October 11 revealed no obvious cyst or pancreatic duct obstructions. Follow-up CT scan October 18 with 11 x 7 mid body pseudocyst.  3) intractable nausea and vomiting-anti-emetics  4) diabetes mellitus type 2  5) DVT prophylaxis.  - Subcutaneous Lovenox  6) leukocytosis  7) colitis/enteritis  8) hypomagnesemia-replace as needed.   9) hypokalemia-replace as needed   10) intractable pain-changed to morphine on October 13. - Improved with Dilaudid October 22.   11) 11 x 6.8 cm pseudocyst in the pancreas-Discussed with [facility] hospitalist Dr. Daniel  (October 22) who discussed with gastrologist-Dr. Rosenthal.  They do not feel patient needs to be transferred at this time.  They recommend post duodenal feedings, allowing time for maturation of  the pseudocyst and later draining.  If patient's symptoms persist despite post duodenal feedings and no obvious relief than the recommended transfer to [facility] for  endoscopic ultrasound drainage.  Drainage of an  unmature pseudocyst will have higher risks and they recommended transfer to [facility] if continued symptoms or any evidence of hemorrhage.         See orders entered.     Seven Gallego MD  10/24/17  7:01 AM

## 2017-10-24 NOTE — PLAN OF CARE
Problem: Patient Care Overview (Adult)  Goal: Plan of Care Review  Outcome: Ongoing (interventions implemented as appropriate)    10/24/17 0134   Coping/Psychosocial Response Interventions   Plan Of Care Reviewed With patient   Patient Care Overview   Progress no change         Problem: Pancreatitis, Acute/Chronic (Adult)  Goal: Signs and Symptoms of Listed Potential Problems Will be Absent or Manageable (Pancreatitis, Acute/Chronic)  Outcome: Ongoing (interventions implemented as appropriate)    10/24/17 0134   Pancreatitis, Acute/Chronic   Problems Assessed (Pancreatitis) all   Problems Present (Pancreatitis) none;pain         Problem: Pain, Acute (Adult)  Goal: Identify Related Risk Factors and Signs and Symptoms  Outcome: Ongoing (interventions implemented as appropriate)    10/23/17 1821   Pain, Acute   Related Risk Factors (Acute Pain) disease process   Signs and Symptoms (Acute Pain) verbalization of pain descriptors       Goal: Acceptable Pain Control/Comfort Level  Outcome: Ongoing (interventions implemented as appropriate)    10/24/17 0134   Pain, Acute (Adult)   Acceptable Pain Control/Comfort Level making progress toward outcome

## 2017-10-25 ENCOUNTER — ANESTHESIA EVENT (OUTPATIENT)
Dept: PERIOP | Facility: HOSPITAL | Age: 47
End: 2017-10-25

## 2017-10-25 ENCOUNTER — ANESTHESIA (OUTPATIENT)
Dept: PERIOP | Facility: HOSPITAL | Age: 47
End: 2017-10-25

## 2017-10-25 LAB
ALBUMIN SERPL-MCNC: 3.7 G/DL (ref 3.5–5)
ALBUMIN/GLOB SERPL: 1.2 G/DL (ref 1.5–2.5)
ALP SERPL-CCNC: 206 U/L (ref 35–104)
ALT SERPL W P-5'-P-CCNC: 21 U/L (ref 10–36)
AMYLASE SERPL-CCNC: 184 U/L (ref 28–100)
ANION GAP SERPL CALCULATED.3IONS-SCNC: 4.1 MMOL/L (ref 3.6–11.2)
AST SERPL-CCNC: 29 U/L (ref 10–30)
BASOPHILS # BLD AUTO: 0.07 10*3/MM3 (ref 0–0.3)
BASOPHILS NFR BLD AUTO: 0.9 % (ref 0–2)
BILIRUB SERPL-MCNC: 0.2 MG/DL (ref 0.2–1.8)
BUN BLD-MCNC: 10 MG/DL (ref 7–21)
BUN/CREAT SERPL: 18.2 (ref 7–25)
CALCIUM SPEC-SCNC: 9.2 MG/DL (ref 7.7–10)
CHLORIDE SERPL-SCNC: 103 MMOL/L (ref 99–112)
CO2 SERPL-SCNC: 29.9 MMOL/L (ref 24.3–31.9)
CREAT BLD-MCNC: 0.55 MG/DL (ref 0.43–1.29)
CRP SERPL-MCNC: 0.63 MG/DL (ref 0–0.99)
DEPRECATED RDW RBC AUTO: 41.7 FL (ref 37–54)
EOSINOPHIL # BLD AUTO: 0.39 10*3/MM3 (ref 0–0.7)
EOSINOPHIL NFR BLD AUTO: 5 % (ref 0–5)
ERYTHROCYTE [DISTWIDTH] IN BLOOD BY AUTOMATED COUNT: 13.7 % (ref 11.5–14.5)
GFR SERPL CREATININE-BSD FRML MDRD: 118 ML/MIN/1.73
GLOBULIN UR ELPH-MCNC: 3.1 GM/DL
GLUCOSE BLD-MCNC: 197 MG/DL (ref 70–110)
GLUCOSE BLDC GLUCOMTR-MCNC: 138 MG/DL (ref 70–130)
GLUCOSE BLDC GLUCOMTR-MCNC: 140 MG/DL (ref 70–130)
GLUCOSE BLDC GLUCOMTR-MCNC: 153 MG/DL (ref 70–130)
GLUCOSE BLDC GLUCOMTR-MCNC: 180 MG/DL (ref 70–130)
GLUCOSE BLDC GLUCOMTR-MCNC: 184 MG/DL (ref 70–130)
GLUCOSE BLDC GLUCOMTR-MCNC: 195 MG/DL (ref 70–130)
HCT VFR BLD AUTO: 37.3 % (ref 37–47)
HGB BLD-MCNC: 11.8 G/DL (ref 12–16)
IMM GRANULOCYTES # BLD: 0.02 10*3/MM3 (ref 0–0.03)
IMM GRANULOCYTES NFR BLD: 0.3 % (ref 0–0.5)
LIPASE SERPL-CCNC: 49 U/L (ref 13–60)
LYMPHOCYTES # BLD AUTO: 1.6 10*3/MM3 (ref 1–3)
LYMPHOCYTES NFR BLD AUTO: 20.6 % (ref 21–51)
MAGNESIUM SERPL-MCNC: 1.8 MG/DL (ref 1.7–2.6)
MCH RBC QN AUTO: 26.9 PG (ref 27–33)
MCHC RBC AUTO-ENTMCNC: 31.6 G/DL (ref 33–37)
MCV RBC AUTO: 85.2 FL (ref 80–94)
MONOCYTES # BLD AUTO: 0.37 10*3/MM3 (ref 0.1–0.9)
MONOCYTES NFR BLD AUTO: 4.8 % (ref 0–10)
NEUTROPHILS # BLD AUTO: 5.3 10*3/MM3 (ref 1.4–6.5)
NEUTROPHILS NFR BLD AUTO: 68.4 % (ref 30–70)
OSMOLALITY SERPL CALC.SUM OF ELEC: 278.3 MOSM/KG (ref 273–305)
PLATELET # BLD AUTO: 356 10*3/MM3 (ref 130–400)
PMV BLD AUTO: 11.5 FL (ref 6–10)
POTASSIUM BLD-SCNC: 3.5 MMOL/L (ref 3.5–5.3)
PROT SERPL-MCNC: 6.8 G/DL (ref 6–8)
RBC # BLD AUTO: 4.38 10*6/MM3 (ref 4.2–5.4)
SODIUM BLD-SCNC: 137 MMOL/L (ref 135–153)
WBC NRBC COR # BLD: 7.75 10*3/MM3 (ref 4.5–12.5)

## 2017-10-25 PROCEDURE — 25010000002 PROMETHAZINE PER 50 MG: Performed by: INTERNAL MEDICINE

## 2017-10-25 PROCEDURE — 63710000001 INSULIN DETEMIR PER 5 UNITS: Performed by: INTERNAL MEDICINE

## 2017-10-25 PROCEDURE — 82962 GLUCOSE BLOOD TEST: CPT

## 2017-10-25 PROCEDURE — 25010000002 FENTANYL CITRATE (PF) 100 MCG/2ML SOLUTION: Performed by: NURSE ANESTHETIST, CERTIFIED REGISTERED

## 2017-10-25 PROCEDURE — 82150 ASSAY OF AMYLASE: CPT | Performed by: INTERNAL MEDICINE

## 2017-10-25 PROCEDURE — 25010000002 PROPOFOL 10 MG/ML EMULSION: Performed by: NURSE ANESTHETIST, CERTIFIED REGISTERED

## 2017-10-25 PROCEDURE — 25010000002 LEVOFLOXACIN PER 250 MG: Performed by: INTERNAL MEDICINE

## 2017-10-25 PROCEDURE — 25010000002 ENOXAPARIN PER 10 MG: Performed by: INTERNAL MEDICINE

## 2017-10-25 PROCEDURE — 25010000002 HYDROMORPHONE PER 4 MG

## 2017-10-25 PROCEDURE — 0DHA3UZ INSERTION OF FEEDING DEVICE INTO JEJUNUM, PERCUTANEOUS APPROACH: ICD-10-PCS | Performed by: INTERNAL MEDICINE

## 2017-10-25 PROCEDURE — 25010000002 MIDAZOLAM PER 1 MG: Performed by: NURSE ANESTHETIST, CERTIFIED REGISTERED

## 2017-10-25 PROCEDURE — 83735 ASSAY OF MAGNESIUM: CPT

## 2017-10-25 PROCEDURE — 80053 COMPREHEN METABOLIC PANEL: CPT | Performed by: INTERNAL MEDICINE

## 2017-10-25 PROCEDURE — 83690 ASSAY OF LIPASE: CPT | Performed by: INTERNAL MEDICINE

## 2017-10-25 PROCEDURE — 86140 C-REACTIVE PROTEIN: CPT | Performed by: INTERNAL MEDICINE

## 2017-10-25 PROCEDURE — 94799 UNLISTED PULMONARY SVC/PX: CPT

## 2017-10-25 PROCEDURE — 63710000001 INSULIN ASPART PER 5 UNITS

## 2017-10-25 PROCEDURE — 44372 SMALL BOWEL ENDOSCOPY: CPT | Performed by: INTERNAL MEDICINE

## 2017-10-25 PROCEDURE — 85025 COMPLETE CBC W/AUTO DIFF WBC: CPT | Performed by: INTERNAL MEDICINE

## 2017-10-25 DEVICE — CLIPPING DEVICE
Type: IMPLANTABLE DEVICE | Site: ESOPHAGUS | Status: FUNCTIONAL
Brand: RESOLUTION CLIP

## 2017-10-25 RX ORDER — LIDOCAINE HYDROCHLORIDE 20 MG/ML
INJECTION, SOLUTION INFILTRATION; PERINEURAL AS NEEDED
Status: DISCONTINUED | OUTPATIENT
Start: 2017-10-25 | End: 2017-10-25 | Stop reason: SURG

## 2017-10-25 RX ORDER — ONDANSETRON 2 MG/ML
4 INJECTION INTRAMUSCULAR; INTRAVENOUS ONCE AS NEEDED
Status: DISCONTINUED | OUTPATIENT
Start: 2017-10-25 | End: 2017-10-25 | Stop reason: HOSPADM

## 2017-10-25 RX ORDER — FENTANYL CITRATE 50 UG/ML
50 INJECTION, SOLUTION INTRAMUSCULAR; INTRAVENOUS
Status: DISCONTINUED | OUTPATIENT
Start: 2017-10-25 | End: 2017-10-25 | Stop reason: HOSPADM

## 2017-10-25 RX ORDER — SODIUM CHLORIDE, SODIUM LACTATE, POTASSIUM CHLORIDE, CALCIUM CHLORIDE 600; 310; 30; 20 MG/100ML; MG/100ML; MG/100ML; MG/100ML
125 INJECTION, SOLUTION INTRAVENOUS CONTINUOUS
Status: DISCONTINUED | OUTPATIENT
Start: 2017-10-25 | End: 2017-10-26

## 2017-10-25 RX ORDER — PROPOFOL 10 MG/ML
VIAL (ML) INTRAVENOUS AS NEEDED
Status: DISCONTINUED | OUTPATIENT
Start: 2017-10-25 | End: 2017-10-25 | Stop reason: SURG

## 2017-10-25 RX ORDER — SODIUM CHLORIDE 0.9 % (FLUSH) 0.9 %
1-10 SYRINGE (ML) INJECTION AS NEEDED
Status: DISCONTINUED | OUTPATIENT
Start: 2017-10-25 | End: 2017-10-25 | Stop reason: HOSPADM

## 2017-10-25 RX ORDER — ALUMINA, MAGNESIA, AND SIMETHICONE 2400; 2400; 240 MG/30ML; MG/30ML; MG/30ML
30 SUSPENSION ORAL EVERY 6 HOURS
Status: DISCONTINUED | OUTPATIENT
Start: 2017-10-25 | End: 2017-10-25

## 2017-10-25 RX ORDER — MIDAZOLAM HYDROCHLORIDE 1 MG/ML
INJECTION INTRAMUSCULAR; INTRAVENOUS AS NEEDED
Status: DISCONTINUED | OUTPATIENT
Start: 2017-10-25 | End: 2017-10-25 | Stop reason: SURG

## 2017-10-25 RX ORDER — IPRATROPIUM BROMIDE AND ALBUTEROL SULFATE 2.5; .5 MG/3ML; MG/3ML
3 SOLUTION RESPIRATORY (INHALATION) ONCE AS NEEDED
Status: DISCONTINUED | OUTPATIENT
Start: 2017-10-25 | End: 2017-10-25 | Stop reason: HOSPADM

## 2017-10-25 RX ORDER — FENTANYL CITRATE 50 UG/ML
INJECTION, SOLUTION INTRAMUSCULAR; INTRAVENOUS AS NEEDED
Status: DISCONTINUED | OUTPATIENT
Start: 2017-10-25 | End: 2017-10-25 | Stop reason: SURG

## 2017-10-25 RX ORDER — HYDROMORPHONE HCL 110MG/55ML
2 PATIENT CONTROLLED ANALGESIA SYRINGE INTRAVENOUS
Status: DISCONTINUED | OUTPATIENT
Start: 2017-10-25 | End: 2017-10-26 | Stop reason: HOSPADM

## 2017-10-25 RX ORDER — ALUMINA, MAGNESIA, AND SIMETHICONE 2400; 2400; 240 MG/30ML; MG/30ML; MG/30ML
30 SUSPENSION ORAL EVERY 4 HOURS PRN
Status: DISCONTINUED | OUTPATIENT
Start: 2017-10-25 | End: 2017-10-26 | Stop reason: HOSPADM

## 2017-10-25 RX ADMIN — INSULIN ASPART 3 UNITS: 100 INJECTION, SOLUTION INTRAVENOUS; SUBCUTANEOUS at 20:40

## 2017-10-25 RX ADMIN — PANTOPRAZOLE SODIUM 40 MG: 40 INJECTION, POWDER, FOR SOLUTION INTRAVENOUS at 20:31

## 2017-10-25 RX ADMIN — PROPOFOL 10 MG: 10 INJECTION, EMULSION INTRAVENOUS at 14:28

## 2017-10-25 RX ADMIN — INSULIN ASPART 3 UNITS: 100 INJECTION, SOLUTION INTRAVENOUS; SUBCUTANEOUS at 09:13

## 2017-10-25 RX ADMIN — PROPOFOL 20 MG: 10 INJECTION, EMULSION INTRAVENOUS at 14:32

## 2017-10-25 RX ADMIN — INSULIN DETEMIR 22 UNITS: 100 INJECTION, SOLUTION SUBCUTANEOUS at 20:41

## 2017-10-25 RX ADMIN — MIDAZOLAM HYDROCHLORIDE 2 MG: 1 INJECTION, SOLUTION INTRAMUSCULAR; INTRAVENOUS at 14:23

## 2017-10-25 RX ADMIN — METOPROLOL TARTRATE 100 MG: 100 TABLET, FILM COATED ORAL at 09:14

## 2017-10-25 RX ADMIN — INSULIN ASPART 3 UNITS: 100 INJECTION, SOLUTION INTRAVENOUS; SUBCUTANEOUS at 11:59

## 2017-10-25 RX ADMIN — AMLODIPINE BESYLATE 10 MG: 10 TABLET ORAL at 09:15

## 2017-10-25 RX ADMIN — PANTOPRAZOLE SODIUM 40 MG: 40 INJECTION, POWDER, FOR SOLUTION INTRAVENOUS at 09:59

## 2017-10-25 RX ADMIN — PROPOFOL 20 MG: 10 INJECTION, EMULSION INTRAVENOUS at 14:26

## 2017-10-25 RX ADMIN — Medication 10 ML: at 20:41

## 2017-10-25 RX ADMIN — LEVOFLOXACIN 750 MG: 5 INJECTION, SOLUTION INTRAVENOUS at 09:04

## 2017-10-25 RX ADMIN — PROMETHAZINE HYDROCHLORIDE 12.5 MG: 25 INJECTION INTRAMUSCULAR; INTRAVENOUS at 16:35

## 2017-10-25 RX ADMIN — HYDROMORPHONE HYDROCHLORIDE 2 MG: 1 INJECTION, SOLUTION INTRAMUSCULAR; INTRAVENOUS; SUBCUTANEOUS at 21:02

## 2017-10-25 RX ADMIN — Medication 10 ML: at 09:08

## 2017-10-25 RX ADMIN — HYDROMORPHONE HYDROCHLORIDE 2 MG: 1 INJECTION, SOLUTION INTRAMUSCULAR; INTRAVENOUS; SUBCUTANEOUS at 05:46

## 2017-10-25 RX ADMIN — PROPOFOL 10 MG: 10 INJECTION, EMULSION INTRAVENOUS at 14:34

## 2017-10-25 RX ADMIN — PROMETHAZINE HYDROCHLORIDE 12.5 MG: 25 INJECTION INTRAMUSCULAR; INTRAVENOUS at 21:02

## 2017-10-25 RX ADMIN — ASCORBIC ACID, VITAMIN A PALMITATE, CHOLECALCIFEROL, THIAMINE HYDROCHLORIDE, RIBOFLAVIN-5 PHOSPHATE SODIUM, PYRIDOXINE HYDROCHLORIDE, NIACINAMIDE, DEXPANTHENOL, ALPHA-TOCOPHEROL ACETATE, VITAMIN K1, FOLIC ACID, BIOTIN, CYANOCOBALAMIN: 200; 3300; 200; 6; 3.6; 6; 40; 15; 10; 150; 600; 60; 5 INJECTION, SOLUTION INTRAVENOUS at 12:07

## 2017-10-25 RX ADMIN — HYDROMORPHONE HYDROCHLORIDE 2 MG: 1 INJECTION, SOLUTION INTRAMUSCULAR; INTRAVENOUS; SUBCUTANEOUS at 09:02

## 2017-10-25 RX ADMIN — HYDROMORPHONE HYDROCHLORIDE 2 MG: 1 INJECTION, SOLUTION INTRAMUSCULAR; INTRAVENOUS; SUBCUTANEOUS at 17:43

## 2017-10-25 RX ADMIN — HYDROMORPHONE HYDROCHLORIDE 2 MG: 1 INJECTION, SOLUTION INTRAMUSCULAR; INTRAVENOUS; SUBCUTANEOUS at 01:43

## 2017-10-25 RX ADMIN — ENOXAPARIN SODIUM 40 MG: 40 INJECTION SUBCUTANEOUS at 20:40

## 2017-10-25 RX ADMIN — SODIUM CHLORIDE, POTASSIUM CHLORIDE, SODIUM LACTATE AND CALCIUM CHLORIDE: 600; 310; 30; 20 INJECTION, SOLUTION INTRAVENOUS at 14:24

## 2017-10-25 RX ADMIN — PROMETHAZINE HYDROCHLORIDE 12.5 MG: 25 INJECTION INTRAMUSCULAR; INTRAVENOUS at 12:33

## 2017-10-25 RX ADMIN — LIDOCAINE HYDROCHLORIDE 100 MG: 20 INJECTION, SOLUTION INFILTRATION; PERINEURAL at 14:26

## 2017-10-25 RX ADMIN — INSULIN DETEMIR 22 UNITS: 100 INJECTION, SOLUTION SUBCUTANEOUS at 09:16

## 2017-10-25 RX ADMIN — PROMETHAZINE HYDROCHLORIDE 12.5 MG: 25 INJECTION INTRAMUSCULAR; INTRAVENOUS at 01:43

## 2017-10-25 RX ADMIN — PROPOFOL 20 MG: 10 INJECTION, EMULSION INTRAVENOUS at 14:30

## 2017-10-25 RX ADMIN — ALUMINUM HYDROXIDE, MAGNESIUM HYDROXIDE, AND DIMETHICONE 30 ML: 400; 400; 40 SUSPENSION ORAL at 18:28

## 2017-10-25 RX ADMIN — PROMETHAZINE HYDROCHLORIDE 12.5 MG: 25 INJECTION INTRAMUSCULAR; INTRAVENOUS at 05:46

## 2017-10-25 RX ADMIN — FENTANYL CITRATE 100 MCG: 50 INJECTION INTRAMUSCULAR; INTRAVENOUS at 14:23

## 2017-10-25 RX ADMIN — LEUCINE, PHENYLALANINE, LYSINE, METHIONINE, ISOLEUCINE, VALINE, HISTIDINE, THREONINE, TRYPTOPHAN, ALANINE, GLYCINE, ARGININE, PROLINE, SERINE, TYROSINE, DEXTROSE: 365; 280; 290; 200; 300; 290; 240; 210; 90; 1035; 515; 575; 340; 250; 20; 20 INJECTION INTRAVENOUS at 18:33

## 2017-10-25 RX ADMIN — HYDROMORPHONE HYDROCHLORIDE 2 MG: 1 INJECTION, SOLUTION INTRAMUSCULAR; INTRAVENOUS; SUBCUTANEOUS at 12:06

## 2017-10-25 RX ADMIN — SODIUM CHLORIDE 50 ML/HR: 9 INJECTION, SOLUTION INTRAVENOUS at 22:28

## 2017-10-25 RX ADMIN — LISINOPRIL 20 MG: 10 TABLET ORAL at 09:14

## 2017-10-25 NOTE — PROGRESS NOTES
Nutrition Services    Patient Name:  Rossy Boone  YOB: 1970  MRN: [id number]  Admit Date:  10/10/2017    Pt to have ND tube placed today.  RECOMMEND: starting Jevity 1.2 pedro at 30 ml/hr and decrease TPN to 30 ml/hr also w/ TPN to dc when tube feeding to goal rate of 65 ml/hr.  TF will meet est kcal, protein needs.  H2o flush of 20 ml/hr when no IVF.  RD will follow.  Thank you    Electronically signed by:  Oneida Montesinos RD  10/25/17 12:29 PM

## 2017-10-25 NOTE — PLAN OF CARE
Problem: GI Endoscopy (Adult)  Goal: Signs and Symptoms of Listed Potential Problems Will be Absent or Manageable (GI Endoscopy)  Outcome: Ongoing (interventions implemented as appropriate)    10/25/17 1411   GI Endoscopy   Problems Assessed (GI Endoscopy) all   Problems Present (GI Endoscopy) none

## 2017-10-25 NOTE — PROGRESS NOTES
LOS: 15 days   Interval History:     Mrs. Boone remains alert and talking.  She continues with abdominal pain.  She still unable to eat.  She agrees with gastroenterology's recommendations for post duodenal feeding tube.  She notes her pain medicine intermittently is not working.    History taken from: patient chart    Review of Systems:     Review of Systems - Negative except present illness    Objective     Vital Signs  Temp:  [98.6 °F (37 °C)-99.5 °F (37.5 °C)] 98.7 °F (37.1 °C)  Heart Rate:  [101-118] 101  Resp:  [18-20] 20  BP: (124-127)/(67-81) 125/81    Physical Exam:  General: Alert talkative white female in mild pain distress with position changes  HEENT; pupils are reactive, nostrils patent, oral mucosa is moist.  CHEST: Lungs are clear bilaterally, no respiratory distress, no fremitus, no wheezing.  HEART:RRR, no murmurs gallops or rubs.  ABD: Tender epigastrium , mild distention, bowel sounds positive  Musculoskeletal-no palpable bony abnormalities  Neurological: No focal deficits appreciated.  Psychiatric: Alert and oriented ×3          Results Review:     I reviewed the patient's new clinical results  : See Below    Medication Review:     Current Facility-Administered Medications:   •  Adult Central Clinimix TPN, , Intravenous, Q24H (TPN), Last Rate: 65 mL/hr at 10/24/17 1823 **AND** [DISCONTINUED] fat emulsion (INTRALIPID,LIPOSYN) 20 % infusion 50 g, 250 mL, Intravenous, Once per day on Mon Wed Fri, Last Rate: 20.8 mL/hr at 10/13/17 1849, 50 g at 10/13/17 1849 **AND** multiple vitamin 10 mL, trace elements Cr-Cu-Mn-Zn 5 mL in sodium chloride 0.9 % 500 mL IVPB, , Intravenous, Once per day on Mon Wed Fri, America Venegas [facility], Last Rate: 128.8 mL/hr at 10/23/17 1153  •  amLODIPine (NORVASC) tablet 10 mg, 10 mg, Oral, Q24H, Seven Gallego MD, 10 mg at 10/24/17 0823  •  dextrose (D50W) solution 25 g, 25 g, Intravenous, Q15 Min PRN, Kel Becker MD  •  dextrose (GLUTOSE) oral gel 15 g, 15 g,  Oral, Q15 Min PRN, Kel Becker MD  •  enoxaparin (LOVENOX) syringe 40 mg, 40 mg, Subcutaneous, Nightly, Kel Becker MD, 40 mg at 10/24/17 2058  •  glucagon (human recombinant) (GLUCAGEN DIAGNOSTIC) injection 1 mg, 1 mg, Subcutaneous, Q15 Min PRN, Kel Becker MD  •  HYDROmorphone (DILAUDID) injection 2 mg, 2 mg, Intravenous, Q4H PRN, Seven Gallego MD, 2 mg at 10/23/17 2214  •  insulin aspart (novoLOG) injection 0-14 Units, 0-14 Units, Subcutaneous, 4x Daily AC & at Bedtime, Kait Rao [facility], 3 Units at 10/24/17 1203  •  insulin detemir (LEVEMIR) injection 22 Units, 22 Units, Subcutaneous, Q12H, Kel Becker MD, 22 Units at 10/24/17 2107  •  levoFLOXacin (LEVAQUIN) 750 mg/150 mL D5W (premix) (LEVAQUIN) 750 mg, 750 mg, Intravenous, Q24H, Seven Gallego MD, Last Rate: 0 mL/hr at 10/11/17 1000, 750 mg at 10/24/17 0832  •  lisinopril (PRINIVIL,ZESTRIL) tablet 20 mg, 20 mg, Oral, Q24H, Seven Gallego MD, 20 mg at 10/23/17 0900  •  Magnesium Sulfate 2 gram Bolus, followed by 8 gram infusion (total Mg dose 10 grams)- Mg less than or equal to 1mg/dL, 2 g, Intravenous, PRN **OR** Magnesium Sulfate 6 gram Infusion (2 gm x 3) -Mg 1.1 -1.5 mg/dL, 2 g, Intravenous, PRN **OR** magnesium sulfate 4 gram infusion- Mg 1.6-1.9 mg/dL, 4 g, Intravenous, PRN, Seven Gallego MD  •  metoprolol tartrate (LOPRESSOR) tablet 100 mg, 100 mg, Oral, Q12H, Seven Gallego MD, 100 mg at 10/24/17 2058  •  morphine injection 2 mg, 2 mg, Intravenous, Daily PRN, Nico Fung MD  •  ondansetron (ZOFRAN) injection 4 mg, 4 mg, Intravenous, Q6H PRN, Kel Becker MD, 4 mg at 10/23/17 1850  •  pancrelipase (Lip-Prot-Amyl) (CREON) capsule 24,000 units of lipase, 24,000 units of lipase, Oral, TID With Meals, Seven Gallego MD, 24,000 units of lipase at 10/24/17 1203  •  pantoprazole (PROTONIX) injection 40 mg, 40 mg, Intravenous, Q12H, Nico Fung MD, 40 mg at 10/24/17 2052  •  Pharmacy to Dose TPN, , Does not  apply, Continuous PRN, Seven Gallego MD, 65 mg at 10/13/17 1148  •  potassium chloride 10 mEq in 100 mL IVPB, 10 mEq, Intravenous, Q1H PRN, Seven Gallego MD, Last Rate: 100 mL/hr at 10/13/17 1442, 10 mEq at 10/13/17 1442  •  promethazine (PHENERGAN) 12.5 mg in sodium chloride 0.9 % 50 mL, 12.5 mg, Intravenous, Q4H PRN, Nico Fung MD, 12.5 mg at 10/25/17 0546  •  Risaquad-2 capsule 1 capsule, 1 capsule, Oral, Daily, Kait Rao [facility], 1 capsule at 10/24/17 0819  •  sodium chloride 0.9 % flush 10 mL, 10 mL, Intracatheter, Q12H, Seven Gallego MD, 10 mL at 10/24/17 2102  •  sodium chloride 0.9 % flush 10 mL, 10 mL, Intracatheter, PRN, Seven Gallego MD, 10 mL at 10/14/17 0631  •  sodium chloride 0.9 % flush 10 mL, 10 mL, Intracatheter, Q12H, Seven Gallego MD, 10 mL at 10/24/17 0825  •  sodium chloride 0.9 % flush 10 mL, 10 mL, Intracatheter, PRN, Seven Gallego MD, 10 mL at 10/21/17 0604  •  sodium chloride 0.9 % infusion, 50 mL/hr, Intravenous, Continuous, Kel Becker MD, Last Rate: 50 mL/hr at 10/24/17 2057, 50 mL/hr at 10/24/17 2057  •  traMADol (ULTRAM) tablet 50 mg, 50 mg, Oral, Q6H PRN, Seven Gallego MD  •  venlafaxine XR (EFFEXOR-XR) 24 hr capsule 75 mg, 75 mg, Oral, Daily, Seven Gallego MD, 75 mg at 10/23/17 0901    amLODIPine 10 mg Oral Q24H   enoxaparin 40 mg Subcutaneous Nightly   insulin aspart 0-14 Units Subcutaneous 4x Daily AC & at Bedtime   insulin detemir 22 Units Subcutaneous Q12H   levoFLOXacin 750 mg Intravenous Q24H   lisinopril 20 mg Oral Q24H   metoprolol tartrate 100 mg Oral Q12H   trace minerals + multivitamin IVPB  Intravenous Once per day on Mon Wed Fri   pancrelipase (Lip-Prot-Amyl) 24,000 units of lipase Oral TID With Meals   pantoprazole 40 mg Intravenous Q12H   Risaquad-2 1 capsule Oral Daily   sodium chloride 10 mL Intracatheter Q12H   sodium chloride 10 mL Intracatheter Q12H   venlafaxine XR 75 mg Oral Daily     dextrose  •  dextrose  •  glucagon  (human recombinant)  •  HYDROmorphone  •  magnesium sulfate **OR** magnesium sulfate **OR** magnesium sulfate  •  Morphine  •  ondansetron  •  Pharmacy to Dose TPN  •  potassium chloride  •  promethazine (PHENERGAN) in NS 50 mL  •  sodium chloride  •  sodium chloride  •  traMADol      Results from last 7 days  Lab Units 10/25/17  0246 10/22/17  0925 10/18/17  1139   WBC 10*3/mm3 7.75 8.20 9.69   HEMOGLOBIN g/dL 11.8* 11.6* 12.8   PLATELETS 10*3/mm3 356 363 390       Results from last 7 days  Lab Units 10/25/17  0246 10/20/17  0114   CRP mg/dL 0.63 1.13*       Results from last 7 days  Lab Units 10/25/17  0246 10/24/17  0105 10/22/17  0113 10/21/17  0046 10/20/17  0114 10/19/17  1725 10/19/17  0137   SODIUM mmol/L 137 138 137 138 136  --  136   POTASSIUM mmol/L 3.5 4.2 3.7 4.1 4.5 4.1 3.6   CHLORIDE mmol/L 103 103 103 103 101  --  104   CO2 mmol/L 29.9 29.5 28.7 28.6 30.9  --  27.0   BUN mg/dL 10 10 10 10 10  --  9   CREATININE mg/dL 0.55 0.47 0.48 0.45 0.49  --  0.44   CALCIUM mg/dL 9.2 9.1 9.2 9.2 8.6  --  8.8   GLUCOSE mg/dL 197* 146* 223* 137* 212*  --  252*       Results from last 7 days  Lab Units 10/25/17  0246 10/24/17  0105 10/23/17  0047 10/22/17  0113 10/21/17  0046 10/20/17  0114 10/19/17  0137   MAGNESIUM mg/dL 1.8 1.7 2.0 1.7 1.7 2.0 1.7     No results found for: HGBA1C    Results from last 7 days  Lab Units 10/25/17  0246 10/24/17  0105   BILIRUBIN mg/dL 0.2 0.2   ALK PHOS U/L 206* 188*   AST (SGOT) U/L 29 31*   ALT (SGPT) U/L 21 22                       Imaging Results (last 72 hours)     Procedure Component Value Units Date/Time    CT Abdomen Pelvis With Contrast [[id number]] Collected:  10/18/17 1514     Updated:  10/18/17 1519    Narrative:          CT ABDOMEN PELVIS W CONTRAST-        TECHNIQUE: Multiple axial CT images were obtained from lung bases  through pubic symphysis with administration of IV contrast. Reformatted  images in the coronal and/or sagittal plane(s) were generated from the  axial  "data set to facilitate diagnostic accuracy and/or surgical  planning.  Oral Contrast:NONE.     Radiation dose reduction techniques were utilized per ALARA protocol.  Automated exposure control was initiated through either or CareDose or  DoseRight software packages by  protocol.       Radiation dose reduction techniques were utilized per ALARA protocol.  Automated exposure control was initiated through either or CareDose or  DoseRight software packages by  protocol.       599.94 mGy.cm     Clinical information  worsening abdominal pain, (patient reports sensation that something  \"burst\" in abdomen), pancreatitis with pain lasting longer than 2 weeks,  on TPN cannot tolerate PO with continued nausea; K85.90-Acute  pancreatitis without necrosis or infection, unspecified      Comparison  Comparison: 9/29/2017     Findings  LOWER THORAX: SMALL LEFT GREATER THAN RIGHT PLEURAL EFFUSIONS WITH  BIBASILAR AIRSPACE DISEASE NOTED.     ABDOMEN:        LIVER: Homogeneous. No focal hepatic mass or ductal dilatation.        GALLBLADDER: PRIOR CHOLECYSTECTOMY.        PANCREAS: SINCE THE PREVIOUS EXAM, A LARGE PSEUDOCYST HAS DEVELOPED  INVOLVING THE MIDPORTION OF PANCREAS IMMEDIATELY SITUATED POSTERIOR TO  THE POSTERIOR GASTRIC WALL AND IS APPROXIMATELY 11.4 CM X 6.8 CM.        SPLEEN: Homogeneous. No splenomegaly.        ADRENALS: No mass.        KIDNEYS: No mass. No obstructive uropathy.  No evidence of  urolithiasis.        GI TRACT: SMALLER PSEUDOCYST IS SEEN ALONG THE GREATER CURVATURE OF  THE STOMACH BUT IS APPROXIMATELY 2.6 CM IN SIZE.        PERITONEUM: THE AMOUNT OF INTRA-ABDOMINAL ASCITES HAS INCREASED SINCE  THE PREVIOUS EXAM.        MESENTERY: Unremarkable.        LYMPH NODES: No lymphadenopathy.        VASCULATURE: No evidence of aneurysm.        ABDOMINAL WALL: No focal hernia or mass.           OTHER: None.     PELVIS:        BLADDER: No focal mass or significant wall thickening        " REPRODUCTIVE: Unremarkable as visualized.        APPENDIX: Nondistended. No surrounding inflammation.     BONES: No acute bony abnormality.       Impression:       Impression:  1. DEVELOPMENT OF 11.4 X 6.8 CM LARGE MID BODY REGION PSEUDOCYST OF THE  PANCREAS WHICH IS SITUATED IMMEDIATELY POSTERIOR TO THE STOMACH EFFACING  THE POSTERIOR GASTRIC WALL.  2. SMALLER PSEUDOCYST ALONG GREATER CURVATURE OF STOMACH NEAR FUNDUS.  3. EVOLVING SMALL AMOUNTS OF ASCITES THROUGHOUT THE ABDOMINAL AND PELVIC  REGIONS. NO LOCULATED COLLECTIONS.  4. DEVELOPMENT OF PLEURAL EFFUSIONS WITH BIBASILAR AIRSPACE DISEASE.        This report was finalized on 10/18/2017 3:16 PM by Dr. Mike Tolbert MD.             Assessment/Plan    Active Problems:  1) abdominal pain  2) recurrent pancreatitis-MRCP October 11 revealed no obvious cyst or pancreatic duct obstructions. Follow-up CT scan October 18 with 11 x 7 mid body pseudocyst.  3) intractable nausea and vomiting-anti-emetics  4) diabetes mellitus type 2  5) DVT prophylaxis.  - Subcutaneous Lovenox  6) leukocytosis  7) colitis/enteritis  8) hypomagnesemia-replace as needed.   9) hypokalemia-replace as needed   10) intractable pain-changed to morphine on October 13. - Improved with Dilaudid October 22.   11) 11 x 6.8 cm pseudocyst in the pancreas-Discussed with [facility] hospitalist Dr. Daniel  (October 22) who discussed with gastrologist-Dr. Rosenthal.  They do not feel patient needs to be transferred at this time.  They recommend post duodenal feedings, allowing time for maturation of the pseudocyst and later draining.  If patient's symptoms persist despite post duodenal feedings and no obvious relief than the recommended transfer to [facility] for  endoscopic ultrasound drainage.  Drainage of an  unmature pseudocyst will have higher risks and they recommended transfer to [facility] if continued symptoms or any evidence of hemorrhage.  -Discussed with [facility] October 25.  Dr. Yefri Avila from the medical team has agreed to accept the patient when bed becomes available.  Gastroenterologist from  recommended continued plans for post pancreas feedings.         See orders entered.     Seven Gallego MD  10/25/17  7:55 AM

## 2017-10-25 NOTE — PROGRESS NOTES
Discharge Planning Assessment   [place]     Patient Name: Rossy Boone  MRN: [id number]  Today's Date: 10/25/2017    Admit Date: 10/10/2017       Discharge Plan       10/25/17 1559    Case Management/Social Work Plan    Plan Pt was discussed in pt care conference today. Dr. Gallego plans to discharge pt to [facility] when bed is available. Pt lives with spouse and plans to return home at discharge. Pt does not utilize home health services or DME. SS to follow.              Expected Discharge Date and Time     Expected Discharge Date Expected Discharge Time    Oct 26, 2017                Linnea Russell

## 2017-10-25 NOTE — ANESTHESIA POSTPROCEDURE EVALUATION
Patient: Rossy Boone    Procedure Summary     Date Anesthesia Start Anesthesia Stop Room / Location    10/25/17 1424 1444  [facility] OR 10 / [facility] OR       Procedure Diagnosis Surgeon Provider    ESOPHAGOGASTRODUODENOSCOPY with nasal jejunostomy tube placement (N/A Esophagus) Nausea; Epigastric pain; Acute pancreatitis, unspecified complication status, unspecified pancreatitis type  (Nausea [R11.0]; Epigastric pain [R10.13]; Acute pancreatitis, unspecified complication status, unspecified pancreatitis type [K85.90]) MD Tarik Kearney III, MD          Anesthesia Type: general  Last vitals  BP   113/67 (10/25/17 1500)   Temp   97.6 °F (36.4 °C) (10/25/17 1445)   Pulse   89 (10/25/17 1500)   Resp   20 (10/25/17 1500)     SpO2   98 % (10/25/17 1500)     Post Anesthesia Care and Evaluation    Patient location during evaluation: PACU  Patient participation: complete - patient participated  Level of consciousness: awake and alert  Pain score: 1  Pain management: adequate  Airway patency: patent  Anesthetic complications: No anesthetic complications  PONV Status: controlled  Cardiovascular status: acceptable  Respiratory status: acceptable  Hydration status: acceptable

## 2017-10-25 NOTE — PROGRESS NOTES
10/24/17      Rossy Boone is a 47 y.o. female who is seen today for follow up of pancreatitis    HPI  The patient is feeling a little better today and asking to try orange sherbet and saltine crackers.  She continues to have nausea and abdominal pain.  Alkaline phosphatase level is 188 and AST is 31.  Pancreatic enzymes will need to be rechecked.    Past medical history, social history, and family history remain unchanged since initial consultation.    REVIEW OF SYSTEMS  abdominal pain, nausea, vomiting, fatigue;  All other systems are negative.    CURRENT MEDICATION    Current Facility-Administered Medications:   •  Adult Central Clinimix TPN, , Intravenous, Q24H (TPN), Last Rate: 65 mL/hr at 10/24/17 1823 **AND** [DISCONTINUED] fat emulsion (INTRALIPID,LIPOSYN) 20 % infusion 50 g, 250 mL, Intravenous, Once per day on Mon Wed Fri, Last Rate: 20.8 mL/hr at 10/13/17 1849, 50 g at 10/13/17 1849 **AND** multiple vitamin 10 mL, trace elements Cr-Cu-Mn-Zn 5 mL in sodium chloride 0.9 % 500 mL IVPB, , Intravenous, Once per day on Mon Wed Fri, America Venegas, [facility], Last Rate: 128.8 mL/hr at 10/23/17 1153  •  amLODIPine (NORVASC) tablet 10 mg, 10 mg, Oral, Q24H, Seven Gallego MD, 10 mg at 10/24/17 0823  •  dextrose (D50W) solution 25 g, 25 g, Intravenous, Q15 Min PRN, Kel Becker MD  •  dextrose (GLUTOSE) oral gel 15 g, 15 g, Oral, Q15 Min PRN, Kel Becker MD  •  enoxaparin (LOVENOX) syringe 40 mg, 40 mg, Subcutaneous, Nightly, Kel Becker MD, 40 mg at 10/24/17 2058  •  glucagon (human recombinant) (GLUCAGEN DIAGNOSTIC) injection 1 mg, 1 mg, Subcutaneous, Q15 Min PRN, Kel Becker MD  •  HYDROmorphone (DILAUDID) injection 2 mg, 2 mg, Intravenous, Q4H PRN, Seven Gallego MD, 2 mg at 10/23/17 2214  •  insulin aspart (novoLOG) injection 0-14 Units, 0-14 Units, Subcutaneous, 4x Daily AC & at Bedtime, Kait Rao [facility], 3 Units at 10/24/17 1203  •  insulin detemir (LEVEMIR) injection 22 Units, 22  Units, Subcutaneous, Q12H, Kel Becker MD, 22 Units at 10/24/17 2107  •  levoFLOXacin (LEVAQUIN) 750 mg/150 mL D5W (premix) (LEVAQUIN) 750 mg, 750 mg, Intravenous, Q24H, Seven Gallego MD, Last Rate: 0 mL/hr at 10/11/17 1000, 750 mg at 10/24/17 0832  •  lisinopril (PRINIVIL,ZESTRIL) tablet 20 mg, 20 mg, Oral, Q24H, Seven Gallego MD, 20 mg at 10/23/17 0900  •  Magnesium Sulfate 2 gram Bolus, followed by 8 gram infusion (total Mg dose 10 grams)- Mg less than or equal to 1mg/dL, 2 g, Intravenous, PRN **OR** Magnesium Sulfate 6 gram Infusion (2 gm x 3) -Mg 1.1 -1.5 mg/dL, 2 g, Intravenous, PRN **OR** magnesium sulfate 4 gram infusion- Mg 1.6-1.9 mg/dL, 4 g, Intravenous, PRN, Seven Gallego MD  •  metoprolol tartrate (LOPRESSOR) tablet 100 mg, 100 mg, Oral, Q12H, Seven Gallego MD, 100 mg at 10/24/17 2058  •  morphine injection 2 mg, 2 mg, Intravenous, Daily PRN, Nico Fung MD  •  ondansetron (ZOFRAN) injection 4 mg, 4 mg, Intravenous, Q6H PRN, Kel Becker MD, 4 mg at 10/23/17 1850  •  pancrelipase (Lip-Prot-Amyl) (CREON) capsule 24,000 units of lipase, 24,000 units of lipase, Oral, TID With Meals, Seven Gallego MD, 24,000 units of lipase at 10/24/17 1203  •  pantoprazole (PROTONIX) injection 40 mg, 40 mg, Intravenous, Q12H, Nico Fung MD, 40 mg at 10/24/17 2052  •  Pharmacy to Dose TPN, , Does not apply, Continuous PRN, Seven Gallego MD, 65 mg at 10/13/17 1148  •  potassium chloride 10 mEq in 100 mL IVPB, 10 mEq, Intravenous, Q1H PRN, Seven Gallego MD, Last Rate: 100 mL/hr at 10/13/17 1442, 10 mEq at 10/13/17 1442  •  promethazine (PHENERGAN) 12.5 mg in sodium chloride 0.9 % 50 mL, 12.5 mg, Intravenous, Q4H PRN, Nico Fung MD, 12.5 mg at 10/24/17 2048  •  Risaquad-2 capsule 1 capsule, 1 capsule, Oral, Daily, Kait Rao, [facility], 1 capsule at 10/24/17 0819  •  sodium chloride 0.9 % flush 10 mL, 10 mL, Intracatheter, Q12H, Seven Gallego MD, 10 mL at 10/24/17 2102  •   sodium chloride 0.9 % flush 10 mL, 10 mL, Intracatheter, PRN, Seven Gallego MD, 10 mL at 10/14/17 0631  •  sodium chloride 0.9 % flush 10 mL, 10 mL, Intracatheter, Q12H, Seven Gallego MD, 10 mL at 10/24/17 0825  •  sodium chloride 0.9 % flush 10 mL, 10 mL, Intracatheter, PRN, Seven Gallego MD, 10 mL at 10/21/17 0604  •  sodium chloride 0.9 % infusion, 50 mL/hr, Intravenous, Continuous, Kel Becker MD, Last Rate: 50 mL/hr at 10/24/17 2057, 50 mL/hr at 10/24/17 2057  •  traMADol (ULTRAM) tablet 50 mg, 50 mg, Oral, Q6H PRN, Seven Gallego MD  •  venlafaxine XR (EFFEXOR-XR) 24 hr capsule 75 mg, 75 mg, Oral, Daily, Seven Gallego MD, 75 mg at 10/23/17 0901    ALLERGIES  Hydrocodone-acetaminophen; Hydromorphone; Oxycodone-acetaminophen; and Penicillins     VITAL SIGNS  Vital Signs (last 24 hours)       10/23 0700  -  10/24 0659 10/24 0700  -  10/24 2232   Most Recent    Temp (°F) 97 -  98.4    98.3 -  99.5     98.6 (37)    Heart Rate 73 -  86    88 -  118     106    Resp 16 -  18      18     18    /66 -  112/69    108/60 -  127/74     124/67    SpO2 (%)   97                   PHYSICAL EXAM  General:  Appearance alert, pleasant, interactive and cooperative  Head:  normocephalic, without obvious abnormality and atraumatic  Eyes:  lids and lashes normal, conjunctivae and sclerae normal, no icterus, no pallor, corneas clear and PERRLA  Ears:  ears appear intact with no abnormalities noted  Nose:  nares normal, septum midline, mucosa normal and no drainage  Throat:  no oral lesions, no thrush and oral mucosa moist  Lungs:  clear to auscultation, respirations regular, respirations even and respirations unlabored  Heart:  regular rhythm & normal rate, normal S1, S2, no murmur, no gallop, no rub and no click  Abdomen:  Diffuse tenderness, especially in upper abdomen; normal bowel sounds, no masses, no hepatomegaly, no splenomegaly, soft , no guarding and no rebound tenderness  Extremities:  moves  extremities well, no edema, no cyanosis and no redness  Skin:  no bleeding, bruising or rash, color normal, no lesions noted and temperature normal  Neurologic:  Mental Status orientated to person, place, time and situation, alertness alert, awake and arousable, orientation time, date, person, place, city and president and mood/affect:  normal  Cranial Nerves:  cranial nerves 2 - 12 grossly intact as examined, Speech normal content and execution, Sensory intact, Motor strength is equal in upper and lower extremities, Reflexes normal and symmetric      LABS   Lab Results (last 24 hours)     Procedure Component Value Units Date/Time    POC Glucose Fingerstick [[id number]]  (Abnormal) Collected:  10/24/17 0202    Specimen:  Blood Updated:  10/24/17 0209     Glucose 148 (H) mg/dL     Narrative:       Meter: [id number] : [id number] mercedes jeter    Magnesium [[id number]]  (Normal) Collected:  10/24/17 0105    Specimen:  Blood Updated:  10/24/17 0301     Magnesium 1.7 mg/dL     Phosphorus [[id number]]  (Abnormal) Collected:  10/24/17 0105    Specimen:  Blood Updated:  10/24/17 0301     Phosphorus 4.9 (H) mg/dL     Comprehensive Metabolic Panel [[id number]]  (Abnormal) Collected:  10/24/17 0105    Specimen:  Blood Updated:  10/24/17 0304     Glucose 146 (H) mg/dL      BUN 10 mg/dL      Creatinine 0.47 mg/dL      Sodium 138 mmol/L      Potassium 4.2 mmol/L      Chloride 103 mmol/L      CO2 29.5 mmol/L      Calcium 9.1 mg/dL      Total Protein 6.1 g/dL      Albumin 3.30 (L) g/dL      ALT (SGPT) 22 U/L      AST (SGOT) 31 (H) U/L      Alkaline Phosphatase 188 (H) U/L       Note New Reference Ranges        Total Bilirubin 0.2 mg/dL      eGFR Non African Amer 142 mL/min/1.73      Globulin 2.8 gm/dL      A/G Ratio 1.2 (L) g/dL      BUN/Creatinine Ratio 21.3     Anion Gap 5.5 mmol/L     Osmolality, Calculated [[id number]]  (Normal) Collected:  10/24/17 0105    Specimen:  Blood Updated:  10/24/17 0304     Osmolality Calc 277.4  mOsm/kg     POC Glucose Fingerstick [[id number]]  (Abnormal) Collected:  10/24/17 0724    Specimen:  Blood Updated:  10/24/17 0734     Glucose 144 (H) mg/dL     Narrative:       Meter: [id number] : [id number] mary chavezby    POC Glucose Fingerstick [[id number]]  (Abnormal) Collected:  10/24/17 1059    Specimen:  Blood Updated:  10/24/17 1106     Glucose 166 (H) mg/dL     Narrative:       Meter: [id number] : [id number] hernandez milvia    POC Glucose Fingerstick [[id number]]  (Abnormal) Collected:  10/24/17 1631    Specimen:  Blood Updated:  10/24/17 1638     Glucose 145 (H) mg/dL     Narrative:       Meter: [id number] : [id number] hernandez milvia    Lipoprotein Phenotyping [[id number]]  (Abnormal) Collected:  10/16/17 0544    Specimen:  Blood Updated:  10/24/17 1712     Total Cholesterol 102 mg/dL      Triglycerides 243 (H) mg/dL      HDL Cholesterol 14 (L) mg/dL      VLDL Cholesterol 49 (H) mg/dL      LDL Cholesterol  39 mg/dL      Lipoprotein Phenotype Comment      Classification            Type IV  Appearance                Clear  Chylomicrons              Absent  Beta                      Normal  Pre-Beta                  Increased  Alpha                     Normal       Narrative:       Performed at:  01 - [facility] [address]  [id number]  : Erich Bella PhD, Phone:  [phone]  Performed at:  02 - [facility] [address]  [id number]  : Bobo Hunter MD, Phone:  [phone]    POC Glucose Fingerstick [[id number]]  (Abnormal) Collected:  10/24/17 2101    Specimen:  Blood Updated:  10/24/17 2132     Glucose 157 (H) mg/dL     Narrative:       Meter: [id number] : [id number] holly hernandez          IMAGING  Imaging Results (last 24 hours)     ** No results found for the last 24 hours. **            ASSESSMENT/PLAN  -Acute pancreatitis  -Diabetes mellitus, type II  -Pancreatic pseudocysts    The patient will be scheduled for an EGD  with nasal-jejunal feeding tube to be placed tomorrow.  Procedure was explained to the patient who voiced understanding and agreement.          Electronically signed by: DORETHA Linton

## 2017-10-25 NOTE — ANESTHESIA PREPROCEDURE EVALUATION
Anesthesia Evaluation     Patient summary reviewed and Nursing notes reviewed   no history of anesthetic complications:  NPO Solid Status: > 8 hours  NPO Liquid Status: > 8 hours     Airway   Mallampati: II  TM distance: >3 FB  Neck ROM: full  no difficulty expected  Dental - normal exam   (+) upper dentures    Pulmonary - negative pulmonary ROS and normal exam   Cardiovascular - normal exam  Exercise tolerance: good (4-7 METS)    NYHA Classification: II    (+) hypertension,       Neuro/Psych- negative ROS  GI/Hepatic/Renal/Endo    (+)  diabetes mellitus,     Musculoskeletal (-) negative ROS    Abdominal  - normal exam    Bowel sounds: normal.   Substance History - negative use     OB/GYN negative ob/gyn ROS         Other - negative ROS                                       Anesthesia Plan    ASA 2     general   total IV anesthesia  intravenous induction   Anesthetic plan and risks discussed with patient.    Plan discussed with CRNA.

## 2017-10-25 NOTE — PROGRESS NOTES
Patient continues on day 14 TPN. Tube feeds were started today. Will decrease TPN to 30 mL/hr per dietician's suggestion.     Thank you,    Viry Heredia RP

## 2017-10-25 NOTE — OP NOTE
10/25/17    ESOPHAGOGASTRODUODENOSCOPY/enteroscopy with placement of nasojejunal feeding tube  Procedure Note    Rossy Boone  10/10/2017 - 10/25/2017    Pre-op Diagnosis: Pancreatitis, pancreatic pseudocysts      Post-op Diagnosis: Successful NJ feeding tube placement        Anesthesia: Per Anesthesia service, general anesthesia      Estimated Blood Loss: None      Findings: Examination was performed using the pediatric colonoscope.  Brief inspection of the esophagus, stomach and duodenum revealed no abnormality.  A 12 Kiswahili nasojejunal feeding tube was advanced through the nares into the stomach.  The tip of the feeding tube was grasped within the stomach and then advanced into the jejunum using the endoscope.  The feeding tube was anchored to the jejunal wall using an endoscopic clip.  The endoscope was then withdrawn and the procedure concluded without complications.  The patient left the OR in stable and satisfactory condition.      Complications: None      Recommendations:   Okay to use feeding tube  Okay to begin tube feeding  Routine tube care      Rudolph Benitez III, MD     Date: 10/25/2017  Time: 2:43 PM     CC:  Dr. Seven Gallego

## 2017-10-25 NOTE — PLAN OF CARE
Problem: Patient Care Overview (Adult)  Goal: Plan of Care Review  Outcome: Ongoing (interventions implemented as appropriate)  Goal: Adult Individualization and Mutuality  Outcome: Ongoing (interventions implemented as appropriate)    Problem: Pancreatitis, Acute/Chronic (Adult)  Goal: Signs and Symptoms of Listed Potential Problems Will be Absent or Manageable (Pancreatitis, Acute/Chronic)  Outcome: Ongoing (interventions implemented as appropriate)    Problem: Diabetes, Type 2 (Adult)  Goal: Signs and Symptoms of Listed Potential Problems Will be Absent or Manageable (Diabetes, Type 2)  Outcome: Ongoing (interventions implemented as appropriate)    Problem: Pain, Acute (Adult)  Goal: Identify Related Risk Factors and Signs and Symptoms  Outcome: Ongoing (interventions implemented as appropriate)  Goal: Acceptable Pain Control/Comfort Level  Outcome: Ongoing (interventions implemented as appropriate)    10/25/17 0700   Pain, Acute (Adult)   Acceptable Pain Control/Comfort Level making progress toward outcome

## 2017-10-25 NOTE — NURSING NOTE
I NOTICED THE TPN BAG WAS LEAKING CALLED PHARM AND TALKED TO PARDEEP, STATED HE WILL SEND ANOTHER UP

## 2017-10-26 VITALS
BODY MASS INDEX: 27.42 KG/M2 | OXYGEN SATURATION: 98 % | DIASTOLIC BLOOD PRESSURE: 80 MMHG | SYSTOLIC BLOOD PRESSURE: 128 MMHG | HEIGHT: 62 IN | WEIGHT: 149 LBS | RESPIRATION RATE: 18 BRPM | HEART RATE: 106 BPM | TEMPERATURE: 98.8 F

## 2017-10-26 LAB
ALBUMIN SERPL-MCNC: 3.4 G/DL (ref 3.5–5)
ALBUMIN/GLOB SERPL: 1.3 G/DL (ref 1.5–2.5)
ALP SERPL-CCNC: 169 U/L (ref 35–104)
ALT SERPL W P-5'-P-CCNC: 13 U/L (ref 10–36)
AMYLASE SERPL-CCNC: 285 U/L (ref 28–100)
ANION GAP SERPL CALCULATED.3IONS-SCNC: 5 MMOL/L (ref 3.6–11.2)
AST SERPL-CCNC: 19 U/L (ref 10–30)
BASOPHILS # BLD AUTO: 0.05 10*3/MM3 (ref 0–0.3)
BASOPHILS NFR BLD AUTO: 0.6 % (ref 0–2)
BILIRUB SERPL-MCNC: 0.2 MG/DL (ref 0.2–1.8)
BUN BLD-MCNC: 9 MG/DL (ref 7–21)
BUN/CREAT SERPL: 20.9 (ref 7–25)
CALCIUM SPEC-SCNC: 9 MG/DL (ref 7.7–10)
CHLORIDE SERPL-SCNC: 107 MMOL/L (ref 99–112)
CO2 SERPL-SCNC: 25 MMOL/L (ref 24.3–31.9)
CREAT BLD-MCNC: 0.43 MG/DL (ref 0.43–1.29)
DEPRECATED RDW RBC AUTO: 42.9 FL (ref 37–54)
EOSINOPHIL # BLD AUTO: 0.34 10*3/MM3 (ref 0–0.7)
EOSINOPHIL NFR BLD AUTO: 4.2 % (ref 0–5)
ERYTHROCYTE [DISTWIDTH] IN BLOOD BY AUTOMATED COUNT: 14 % (ref 11.5–14.5)
GFR SERPL CREATININE-BSD FRML MDRD: >150 ML/MIN/1.73
GLOBULIN UR ELPH-MCNC: 2.6 GM/DL
GLUCOSE BLD-MCNC: 134 MG/DL (ref 70–110)
GLUCOSE BLDC GLUCOMTR-MCNC: 146 MG/DL (ref 70–130)
GLUCOSE BLDC GLUCOMTR-MCNC: 147 MG/DL (ref 70–130)
GLUCOSE BLDC GLUCOMTR-MCNC: 149 MG/DL (ref 70–130)
GLUCOSE BLDC GLUCOMTR-MCNC: 184 MG/DL (ref 70–130)
GLUCOSE BLDC GLUCOMTR-MCNC: 221 MG/DL (ref 70–130)
HCT VFR BLD AUTO: 36.3 % (ref 37–47)
HGB BLD-MCNC: 11.2 G/DL (ref 12–16)
IMM GRANULOCYTES # BLD: 0.03 10*3/MM3 (ref 0–0.03)
IMM GRANULOCYTES NFR BLD: 0.4 % (ref 0–0.5)
LIPASE SERPL-CCNC: 56 U/L (ref 13–60)
LYMPHOCYTES # BLD AUTO: 2.73 10*3/MM3 (ref 1–3)
LYMPHOCYTES NFR BLD AUTO: 34 % (ref 21–51)
MAGNESIUM SERPL-MCNC: 1.7 MG/DL (ref 1.7–2.6)
MCH RBC QN AUTO: 26.4 PG (ref 27–33)
MCHC RBC AUTO-ENTMCNC: 30.9 G/DL (ref 33–37)
MCV RBC AUTO: 85.4 FL (ref 80–94)
MONOCYTES # BLD AUTO: 0.5 10*3/MM3 (ref 0.1–0.9)
MONOCYTES NFR BLD AUTO: 6.2 % (ref 0–10)
NEUTROPHILS # BLD AUTO: 4.37 10*3/MM3 (ref 1.4–6.5)
NEUTROPHILS NFR BLD AUTO: 54.6 % (ref 30–70)
OSMOLALITY SERPL CALC.SUM OF ELEC: 274.5 MOSM/KG (ref 273–305)
PHOSPHATE SERPL-MCNC: 4.1 MG/DL (ref 2.7–4.5)
PLATELET # BLD AUTO: 347 10*3/MM3 (ref 130–400)
PMV BLD AUTO: 11.4 FL (ref 6–10)
POTASSIUM BLD-SCNC: 3.3 MMOL/L (ref 3.5–5.3)
PROT SERPL-MCNC: 6 G/DL (ref 6–8)
RBC # BLD AUTO: 4.25 10*6/MM3 (ref 4.2–5.4)
SODIUM BLD-SCNC: 137 MMOL/L (ref 135–153)
WBC NRBC COR # BLD: 8.02 10*3/MM3 (ref 4.5–12.5)

## 2017-10-26 PROCEDURE — 25010000002 PROMETHAZINE PER 50 MG: Performed by: INTERNAL MEDICINE

## 2017-10-26 PROCEDURE — 84100 ASSAY OF PHOSPHORUS: CPT | Performed by: INTERNAL MEDICINE

## 2017-10-26 PROCEDURE — 80053 COMPREHEN METABOLIC PANEL: CPT | Performed by: INTERNAL MEDICINE

## 2017-10-26 PROCEDURE — 94799 UNLISTED PULMONARY SVC/PX: CPT

## 2017-10-26 PROCEDURE — 25010000002 HYDROMORPHONE PER 4 MG

## 2017-10-26 PROCEDURE — 63710000001 INSULIN ASPART PER 5 UNITS

## 2017-10-26 PROCEDURE — 85025 COMPLETE CBC W/AUTO DIFF WBC: CPT | Performed by: INTERNAL MEDICINE

## 2017-10-26 PROCEDURE — 83690 ASSAY OF LIPASE: CPT | Performed by: INTERNAL MEDICINE

## 2017-10-26 PROCEDURE — 63710000001 INSULIN DETEMIR PER 5 UNITS: Performed by: INTERNAL MEDICINE

## 2017-10-26 PROCEDURE — 25010000002 ENOXAPARIN PER 10 MG: Performed by: INTERNAL MEDICINE

## 2017-10-26 PROCEDURE — 99231 SBSQ HOSP IP/OBS SF/LOW 25: CPT | Performed by: PHYSICIAN ASSISTANT

## 2017-10-26 PROCEDURE — 83735 ASSAY OF MAGNESIUM: CPT | Performed by: INTERNAL MEDICINE

## 2017-10-26 PROCEDURE — 25010000002 HYDROMORPHONE PER 4 MG: Performed by: INTERNAL MEDICINE

## 2017-10-26 PROCEDURE — 82150 ASSAY OF AMYLASE: CPT | Performed by: INTERNAL MEDICINE

## 2017-10-26 PROCEDURE — 25010000003 POTASSIUM CHLORIDE 10 MEQ/100ML SOLUTION: Performed by: INTERNAL MEDICINE

## 2017-10-26 PROCEDURE — 82962 GLUCOSE BLOOD TEST: CPT

## 2017-10-26 RX ORDER — MAGNESIUM SULFATE HEPTAHYDRATE 40 MG/ML
4 INJECTION, SOLUTION INTRAVENOUS ONCE
Status: COMPLETED | OUTPATIENT
Start: 2017-10-26 | End: 2017-10-26

## 2017-10-26 RX ORDER — ALUMINA, MAGNESIA, AND SIMETHICONE 2400; 2400; 240 MG/30ML; MG/30ML; MG/30ML
30 SUSPENSION ORAL EVERY 4 HOURS PRN
Qty: 250 ML | Refills: 3
Start: 2017-10-26 | End: 2019-05-07

## 2017-10-26 RX ORDER — HYDROMORPHONE HCL 110MG/55ML
2 PATIENT CONTROLLED ANALGESIA SYRINGE INTRAVENOUS
Qty: 100 ML | Refills: 0
Start: 2017-10-26 | End: 2019-05-07

## 2017-10-26 RX ORDER — ONDANSETRON 2 MG/ML
4 INJECTION INTRAMUSCULAR; INTRAVENOUS EVERY 6 HOURS PRN
Qty: 100 ML | Refills: 0
Start: 2017-10-26 | End: 2019-05-07

## 2017-10-26 RX ORDER — PANTOPRAZOLE SODIUM 40 MG/10ML
40 INJECTION, POWDER, LYOPHILIZED, FOR SOLUTION INTRAVENOUS EVERY 12 HOURS SCHEDULED
Qty: 60 VIAL | Refills: 0
Start: 2017-10-26 | End: 2019-05-07

## 2017-10-26 RX ORDER — POTASSIUM CHLORIDE 7.45 MG/ML
10 INJECTION INTRAVENOUS
Status: DISCONTINUED | OUTPATIENT
Start: 2017-10-26 | End: 2017-10-26 | Stop reason: HOSPADM

## 2017-10-26 RX ORDER — L.ACID,CASEI/B.ANIMAL/S.THERMO 16B CELL
1 CAPSULE ORAL DAILY
Qty: 100 CAPSULE | Refills: 0
Start: 2017-10-26 | End: 2019-05-07

## 2017-10-26 RX ADMIN — Medication 10 ML: at 08:22

## 2017-10-26 RX ADMIN — HYDROMORPHONE HYDROCHLORIDE 2 MG: 1 INJECTION, SOLUTION INTRAMUSCULAR; INTRAVENOUS; SUBCUTANEOUS at 04:12

## 2017-10-26 RX ADMIN — INSULIN DETEMIR 22 UNITS: 100 INJECTION, SOLUTION SUBCUTANEOUS at 08:23

## 2017-10-26 RX ADMIN — PROMETHAZINE HYDROCHLORIDE 12.5 MG: 25 INJECTION INTRAMUSCULAR; INTRAVENOUS at 20:33

## 2017-10-26 RX ADMIN — PROMETHAZINE HYDROCHLORIDE 12.5 MG: 25 INJECTION INTRAMUSCULAR; INTRAVENOUS at 15:36

## 2017-10-26 RX ADMIN — HYDROMORPHONE HYDROCHLORIDE 2 MG: 1 INJECTION, SOLUTION INTRAMUSCULAR; INTRAVENOUS; SUBCUTANEOUS at 00:41

## 2017-10-26 RX ADMIN — HYDROMORPHONE HYDROCHLORIDE 2 MG: 1 INJECTION, SOLUTION INTRAMUSCULAR; INTRAVENOUS; SUBCUTANEOUS at 06:57

## 2017-10-26 RX ADMIN — ENOXAPARIN SODIUM 40 MG: 40 INJECTION SUBCUTANEOUS at 20:33

## 2017-10-26 RX ADMIN — INSULIN ASPART 5 UNITS: 100 INJECTION, SOLUTION INTRAVENOUS; SUBCUTANEOUS at 08:22

## 2017-10-26 RX ADMIN — PROMETHAZINE HYDROCHLORIDE 12.5 MG: 25 INJECTION INTRAMUSCULAR; INTRAVENOUS at 11:48

## 2017-10-26 RX ADMIN — PANTOPRAZOLE SODIUM 40 MG: 40 INJECTION, POWDER, FOR SOLUTION INTRAVENOUS at 20:33

## 2017-10-26 RX ADMIN — PROMETHAZINE HYDROCHLORIDE 12.5 MG: 25 INJECTION INTRAMUSCULAR; INTRAVENOUS at 00:46

## 2017-10-26 RX ADMIN — HYDROMORPHONE HYDROCHLORIDE 2 MG: 1 INJECTION, SOLUTION INTRAMUSCULAR; INTRAVENOUS; SUBCUTANEOUS at 09:46

## 2017-10-26 RX ADMIN — METOPROLOL TARTRATE 100 MG: 100 TABLET, FILM COATED ORAL at 08:21

## 2017-10-26 RX ADMIN — PANTOPRAZOLE SODIUM 40 MG: 40 INJECTION, POWDER, FOR SOLUTION INTRAVENOUS at 08:22

## 2017-10-26 RX ADMIN — HYDROMORPHONE HYDROCHLORIDE 2 MG: 2 INJECTION INTRAMUSCULAR; INTRAVENOUS; SUBCUTANEOUS at 20:46

## 2017-10-26 RX ADMIN — HYDROMORPHONE HYDROCHLORIDE 2 MG: 1 INJECTION, SOLUTION INTRAMUSCULAR; INTRAVENOUS; SUBCUTANEOUS at 15:08

## 2017-10-26 RX ADMIN — INSULIN DETEMIR 22 UNITS: 100 INJECTION, SOLUTION SUBCUTANEOUS at 20:38

## 2017-10-26 RX ADMIN — HYDROMORPHONE HYDROCHLORIDE 2 MG: 2 INJECTION INTRAMUSCULAR; INTRAVENOUS; SUBCUTANEOUS at 17:57

## 2017-10-26 RX ADMIN — Medication 1 CAPSULE: at 08:22

## 2017-10-26 RX ADMIN — AMLODIPINE BESYLATE 10 MG: 10 TABLET ORAL at 08:21

## 2017-10-26 RX ADMIN — POTASSIUM CHLORIDE 10 MEQ: 7.46 INJECTION, SOLUTION INTRAVENOUS at 17:22

## 2017-10-26 RX ADMIN — VENLAFAXINE HYDROCHLORIDE 75 MG: 75 CAPSULE, EXTENDED RELEASE ORAL at 08:19

## 2017-10-26 RX ADMIN — MAGNESIUM SULFATE HEPTAHYDRATE 4 G: 40 INJECTION, SOLUTION INTRAVENOUS at 03:29

## 2017-10-26 RX ADMIN — PROMETHAZINE HYDROCHLORIDE 12.5 MG: 25 INJECTION INTRAMUSCULAR; INTRAVENOUS at 05:43

## 2017-10-26 RX ADMIN — LISINOPRIL 20 MG: 10 TABLET ORAL at 08:21

## 2017-10-26 RX ADMIN — Medication 10 ML: at 20:37

## 2017-10-26 RX ADMIN — POTASSIUM CHLORIDE 10 MEQ: 7.46 INJECTION, SOLUTION INTRAVENOUS at 18:42

## 2017-10-26 NOTE — DISCHARGE SUMMARY
Date of Admission:   10/10/2017 12:22 PM    Date of Discharge:  10/22/2017      Discharge Diagnosis:   Active Problems:  1) abdominal pain  2) recurrent pancreatitis-MRCP October 11 revealed no obvious cyst or pancreatic duct obstructions. Follow-up CT scan October 18 with 11 x 7 mid body pseudocyst.  3) intractable nausea and vomiting-anti-emetics  4) diabetes mellitus type 2  5) DVT prophylaxis.  - Subcutaneous Lovenox  6) leukocytosis  7) colitis/enteritis  8) hypomagnesemia-replace as needed.   9) hypokalemia-replace as needed   10) intractable pain-changed to morphine on October 13. - Improved with Dilaudid October 22.   11) 11 x 6.8 cm pseudocyst in the pancreas-Discussed with [facility] hospitalist Dr. Daniel  (October 22) who discussed with gastrologist-Dr. Rosenthal.  They do not feel patient needs to be transferred at this time.  They recommend post duodenal feedings, allowing time for maturation of the pseudocyst and later draining.  If patient's symptoms persist despite post duodenal feedings and no obvious relief than the recommended transfer to [facility] for  endoscopic ultrasound drainage.  Drainage of an  unmature pseudocyst will have higher risks and they recommended transfer to [facility] if continued symptoms or any evidence of hemorrhage.  -Discussed with [facility] October 25.  Dr. Yefri Avila from the medical team has agreed to accept the patient when bed becomes available.  Gastroenterologist from  recommended continued plans for post pancreas feedings.             Presenting Problem/History of Present Illness  See History and Physical for Presenting Problems / Illnesses.       Hospital Course  Patient is a 47 y.o. female presented with pancreatitis.  During her hospital course is been treated with TPN and lipids.  She's had multiple episodes to attempt feeding which have resulted in increasing pain, nausea and vomiting.  She's had a recent CT scan  which is consistent with a large pseudocyst..  Gastroenterologist as Dr. Benitez has been following along with the patient.  He's been making recommendations in regards to her treatment.  Gastrology has replaced post-pancreas feeding tube on October 25 and she has been started on tube feeding.  She is currently tolerating.  She is having bowel movements.  She continues with intermittent severe pain.  The remainder of her hospital course is significant for continued debility related to her abdominal pain.    Procedures Performed  Procedure(s):  ESOPHAGOGASTRODUODENOSCOPY with nasal jejunostomy tube placement       Consults:   Consults     Date and Time Order Name Status Description    10/22/2017 1000 Inpatient Consult to Gastroenterology      10/11/2017 0648 Inpatient Consult to Gastroenterology Completed     10/10/2017 1704 Internal Medicine (Eugenio/Yani/Julián)      9/25/2017 0738 Inpatient Consult to Gastroenterology Completed           Condition on Discharge:    Poor    Vital Signs  Temp:  [97.6 °F (36.4 °C)-99.8 °F (37.7 °C)] 99.8 °F (37.7 °C)  Heart Rate:  [] 116  Resp:  [18-20] 18  BP: ()/(41-85) 120/71    Physical Exam:     General: Alert talkative white female in mild pain distress with position changes  HEENT; pupils are reactive, nostrils patent, oral mucosa is moist.  CHEST: Lungs are clear bilaterally, no respiratory distress, no fremitus, no wheezing.  HEART:RRR, no murmurs gallops or rubs.  ABD: Tender epigastrium , mild distention, bowel sounds positive  Musculoskeletal-no palpable bony abnormalities  Neurological: No focal deficits appreciated.  Psychiatric: Alert and oriented ×3      Discharge Disposition  Another Health Care Institution Not Defined    Discharge Medications   Rossy Boone   Home Medication Instructions CORINE:[id number]    Printed on:10/26/17 0714   Medication Information                      aluminum-magnesium hydroxide-simethicone (MAALOX MAX) 400-400-40 MG/5ML  "suspension  Take 30 mL by mouth Every 4 (Four) Hours As Needed for Indigestion or Heartburn.             amLODIPine-benazepril (LOTREL) 10-20 MG per capsule  Take 1 capsule by mouth Daily.             enoxaparin (LOVENOX) 40 MG/0.4ML solution syringe  Inject 0.4 mL under the skin Every Night.             HYDROmorphone (DILAUDID) 2 MG/ML injection  Infuse 1 mL into a venous catheter Every 3 (Three) Hours As Needed (pain).             insulin aspart (novoLOG) 100 UNIT/ML injection  Inject 0-14 Units under the skin 4 (Four) Times a Day Before Meals & at Bedtime.             insulin detemir (LEVEMIR) 100 UNIT/ML injection  Inject 22 Units under the skin Every 12 (Twelve) Hours.             Insulin Syringe-Needle U-100 (BD INSULIN SYRINGE ULTRAFINE) 31G X 5/16\" 0.3 ML misc  Use as directed with Levemir.             metoprolol tartrate (LOPRESSOR) 100 MG tablet  Take 1 tablet by mouth 2 (Two) Times a Day.             ondansetron (ZOFRAN) 4 MG/2ML injection  Infuse 2 mL into a venous catheter Every 6 (Six) Hours As Needed for Nausea or Vomiting.             ONETOUCH DELICA LANCETS 33G misc  Use to test blood sugar.             pancrelipase, Lip-Prot-Amyl, (CREON) 17824 units capsule delayed-release particles capsule  Take 2 capsules by mouth 3 (Three) Times a Day With Meals.             pantoprazole (PROTONIX) 40 MG injection  Infuse 10 mL into a venous catheter Every 12 (Twelve) Hours. Indications: Gastrointestinal (GI) Bleed, pancreatitis             Probiotic Product (RISAQUAD-2) capsule capsule  Take 1 capsule by mouth Daily.             sodium chloride 0.9 % solution 50 mL with promethazine 25 MG/ML solution 12.5 mg  Infuse 12.5 mg into a venous catheter Every 4 (Four) Hours As Needed (nausea).             traMADol (ULTRAM) 50 MG tablet  Take 50 mg by mouth Every 6 (Six) Hours As Needed for Moderate Pain .             venlafaxine XR (EFFEXOR XR) 75 MG 24 hr capsule  Take 1 capsule by mouth Daily.           "       Discharge Diet:  Tube feeding    Activity at Discharge:  as tolerates    Follow-up Appointments follow up with Dr. Gallego after  Hospital visit/day.       Seven Gallego MD  10/26/17  7:14 AM    Time: Discharge 33 min

## 2017-10-26 NOTE — PLAN OF CARE
Problem: Patient Care Overview (Adult)  Goal: Plan of Care Review  Outcome: Ongoing (interventions implemented as appropriate)  Goal: Adult Individualization and Mutuality  Outcome: Ongoing (interventions implemented as appropriate)  Goal: Discharge Needs Assessment  Outcome: Ongoing (interventions implemented as appropriate)    Problem: Pancreatitis, Acute/Chronic (Adult)  Goal: Signs and Symptoms of Listed Potential Problems Will be Absent or Manageable (Pancreatitis, Acute/Chronic)  Outcome: Ongoing (interventions implemented as appropriate)    Problem: Diabetes, Type 2 (Adult)  Goal: Signs and Symptoms of Listed Potential Problems Will be Absent or Manageable (Diabetes, Type 2)  Outcome: Ongoing (interventions implemented as appropriate)    Problem: Pain, Acute (Adult)  Goal: Identify Related Risk Factors and Signs and Symptoms  Outcome: Ongoing (interventions implemented as appropriate)  Goal: Acceptable Pain Control/Comfort Level  Outcome: Ongoing (interventions implemented as appropriate)

## 2017-10-26 NOTE — PROGRESS NOTES
Nutrition Services    Patient Name:  Rossy Boone  YOB: 1970  MRN: [id number]  Admit Date:  10/10/2017    Since TPN dc'd and no IVF at present, will recommend: adding h2o flush of 25 ml/hr per pump to meet fluid needs.  RD will follow.    Electronically signed by:  Oneida Montesinos RD  10/26/17 10:58 AM

## 2017-10-26 NOTE — PROGRESS NOTES
10/26/17      Rossy Boone is a 47 y.o. female who is seen today for follow up of pancreatitis    HPI  The patient was seen for a follow-up visit.  She had a nasal jejunal feeding tube placed yesterday.  She is tolerating feedings well.  Although she is still having nausea, vomiting, and abdominal pain, she states that it is not as severe today.  Patient's lipase is 56, amylase is 285, and alkaline phosphatase is 169.    Past medical history, social history, and family history remain unchanged since initial consultation.    REVIEW OF SYSTEMS  Nausea, vomiting, abdominal pain, fatigue; all other systems are negative.    CURRENT MEDICATION    Current Facility-Administered Medications:   •  aluminum-magnesium hydroxide-simethicone (MAALOX MAX) 400-400-40 MG/5ML suspension 30 mL, 30 mL, Oral, Q4H PRN, Kel Becker MD, 30 mL at 10/25/17 1828  •  amLODIPine (NORVASC) tablet 10 mg, 10 mg, Oral, Q24H, Seven Gallego MD, 10 mg at 10/26/17 0821  •  dextrose (D50W) solution 25 g, 25 g, Intravenous, Q15 Min PRN, Kel Becker MD  •  dextrose (GLUTOSE) oral gel 15 g, 15 g, Oral, Q15 Min PRN, Kel Becker MD  •  enoxaparin (LOVENOX) syringe 40 mg, 40 mg, Subcutaneous, Nightly, Kel Becker MD, 40 mg at 10/25/17 2040  •  glucagon (human recombinant) (GLUCAGEN DIAGNOSTIC) injection 1 mg, 1 mg, Subcutaneous, Q15 Min PRN, Kel Becker MD  •  HYDROmorphone (DILAUDID) injection 2 mg, 2 mg, Intravenous, Q3H PRN, Kel Becker MD  •  insulin aspart (novoLOG) injection 0-14 Units, 0-14 Units, Subcutaneous, 4x Daily AC & at Bedtime, Kait Rao [facility], 5 Units at 10/26/17 0822  •  insulin detemir (LEVEMIR) injection 22 Units, 22 Units, Subcutaneous, Q12H, Kel Becker MD, 22 Units at 10/26/17 0823  •  lisinopril (PRINIVIL,ZESTRIL) tablet 20 mg, 20 mg, Oral, Q24H, Seven Gallego MD, 20 mg at 10/26/17 0821  •  Magnesium Sulfate 2 gram Bolus, followed by 8 gram infusion (total Mg dose 10 grams)- Mg less than or  equal to 1mg/dL, 2 g, Intravenous, PRN **OR** Magnesium Sulfate 6 gram Infusion (2 gm x 3) -Mg 1.1 -1.5 mg/dL, 2 g, Intravenous, PRN **OR** magnesium sulfate 4 gram infusion- Mg 1.6-1.9 mg/dL, 4 g, Intravenous, PRN, Seven Gallego MD  •  metoprolol tartrate (LOPRESSOR) tablet 100 mg, 100 mg, Oral, Q12H, Seven Gallego MD, 100 mg at 10/26/17 0821  •  morphine injection 2 mg, 2 mg, Intravenous, Daily PRN, Nico Fung MD  •  [DISCONTINUED] Adult [employer] TPN, , Intravenous, Q24H (TPN), Last Rate: 30 mL/hr at 10/25/17 1833 **AND** [DISCONTINUED] fat emulsion (INTRALIPID,LIPOSYN) 20 % infusion 50 g, 250 mL, Intravenous, Once per day on Mon Wed Fri, Last Rate: 20.8 mL/hr at 10/13/17 1849, 50 g at 10/13/17 1849 **AND** [START ON 10/27/2017] multiple vitamin 10 mL, trace elements Cr-Cu-Mn-Zn 5 mL in sodium chloride 0.9 % 500 mL IVPB, , Intravenous, Once per day on Mon Wed Fri, Seven Gallego MD  •  ondansetron (ZOFRAN) injection 4 mg, 4 mg, Intravenous, Q6H PRN, Kel Becker MD, 4 mg at 10/23/17 1850  •  pancrelipase (Lip-Prot-Amyl) (CREON) capsule 24,000 units of lipase, 24,000 units of lipase, Oral, TID With Meals, Seven Gallego MD, Stopped at 10/25/17 1159  •  pantoprazole (PROTONIX) injection 40 mg, 40 mg, Intravenous, Q12H, Nico Fung MD, 40 mg at 10/26/17 0822  •  Pharmacy to Dose TPN, , Does not apply, Continuous PRN, Seven Gallego MD, 65 mg at 10/13/17 1148  •  potassium chloride 10 mEq in 100 mL IVPB, 10 mEq, Intravenous, Q1H PRN, Seven Gallego MD, Last Rate: 100 mL/hr at 10/13/17 1442, 10 mEq at 10/13/17 1442  •  promethazine (PHENERGAN) 12.5 mg in sodium chloride 0.9 % 50 mL, 12.5 mg, Intravenous, Q4H PRN, Nico Fung MD, 12.5 mg at 10/26/17 1536  •  Risaquad-2 capsule 1 capsule, 1 capsule, Oral, Daily, Kait Rao [facility], 1 capsule at 10/26/17 0822  •  sodium chloride 0.9 % flush 10 mL, 10 mL, Intracatheter, Q12H, Seven Gallego MD, 10 mL at 10/26/17 0822  •   sodium chloride 0.9 % flush 10 mL, 10 mL, Intracatheter, PRN, Seven Gallego MD, 10 mL at 10/14/17 0631  •  sodium chloride 0.9 % flush 10 mL, 10 mL, Intracatheter, Q12H, Seven Gallego MD, 10 mL at 10/26/17 0822  •  sodium chloride 0.9 % flush 10 mL, 10 mL, Intracatheter, PRN, Seven Gallego MD, 10 mL at 10/21/17 0604  •  traMADol (ULTRAM) tablet 50 mg, 50 mg, Oral, Q6H PRN, Seven Gallego MD  •  venlafaxine XR (EFFEXOR-XR) 24 hr capsule 75 mg, 75 mg, Oral, Daily, Seven Gallego MD, 75 mg at 10/26/17 0819    ALLERGIES  Hydrocodone-acetaminophen; Hydromorphone; Oxycodone-acetaminophen; and Penicillins     VITAL SIGNS  Vital Signs (last 24 hours)       10/25 0700  -  10/26 0659 10/26 0700  -  10/26 1608   Most Recent    Temp (°F) 97.6 -  99.8      99.8     99.8 (37.7)    Heart Rate 83 -  (!)123      116     116    Resp 18 -  20      18     18    BP (!)82/41 -  145/83      120/71     120/71    SpO2 (%) 94 -  99      94     94            PHYSICAL EXAM  General:  Appearance alert, pleasant, interactive and cooperative  Head:  normocephalic, without obvious abnormality and atraumatic  Eyes:  lids and lashes normal, conjunctivae and sclerae normal, no icterus, no pallor, corneas clear and PERRLA  Ears:  ears appear intact with no abnormalities noted  Nose:  nares normal, septum midline, mucosa normal and no drainage  Throat:  no oral lesions, no thrush and oral mucosa moist  Lungs:  clear to auscultation, respirations regular, respirations even and respirations unlabored  Heart:  regular rhythm & normal rate, normal S1, S2, no murmur, no gallop, no rub and no click  Abdomen:  Diffuse tenderness; normal bowel sounds, no masses, no hepatomegaly, no splenomegaly,  no guarding and no rebound tenderness  Extremities:  moves extremities well, no edema, no cyanosis and no redness  Skin:  no bleeding, bruising or rash, color normal, no lesions noted and temperature normal  Neurologic:  Mental Status orientated to  person, place, time and situation, alertness alert, awake and arousable, orientation time, date, person, place, city and president and mood/affect:  normal  Cranial Nerves:  cranial nerves 2 - 12 grossly intact as examined, Speech normal content and execution, Sensory intact, Motor strength is equal in upper and lower extremities, Reflexes normal and symmetric      LABS   Lab Results (last 24 hours)     Procedure Component Value Units Date/Time    POC Glucose Fingerstick [[id number]]  (Abnormal) Collected:  10/25/17 1637    Specimen:  Blood Updated:  10/25/17 1644     Glucose 138 (H) mg/dL     Narrative:       Meter: [id number] : [id number] kenny amy    POC Glucose Fingerstick [[id number]]  (Abnormal) Collected:  10/25/17 2023    Specimen:  Blood Updated:  10/25/17 2029     Glucose 153 (H) mg/dL     Narrative:       Meter: [id number] : [id number] camden dulce    POC Glucose Fingerstick [[id number]]  (Abnormal) Collected:  10/25/17 2318    Specimen:  Blood Updated:  10/25/17 2323     Glucose 140 (H) mg/dL     Narrative:       Meter: [id number] : [id number] camden dulce    CBC & Differential [[id number]] Collected:  10/26/17 0057    Specimen:  Blood Updated:  10/26/17 0224    Narrative:       The following orders were created for panel order CBC & Differential.  Procedure                               Abnormality         Status                     ---------                               -----------         ------                     CBC Auto Differential[[id number]]        Abnormal            Final result                 Please view results for these tests on the individual orders.    CBC Auto Differential [[id number]]  (Abnormal) Collected:  10/26/17 0057    Specimen:  Blood Updated:  10/26/17 0224     WBC 8.02 10*3/mm3      RBC 4.25 10*6/mm3      Hemoglobin 11.2 (L) g/dL      Hematocrit 36.3 (L) %      MCV 85.4 fL      MCH 26.4 (L) pg      MCHC 30.9 (L) g/dL      RDW 14.0 %      RDW-SD 42.9 fl       MPV 11.4 (H) fL      Platelets 347 10*3/mm3      Neutrophil % 54.6 %      Lymphocyte % 34.0 %      Monocyte % 6.2 %      Eosinophil % 4.2 %      Basophil % 0.6 %      Immature Grans % 0.4 %      Neutrophils, Absolute 4.37 10*3/mm3      Lymphocytes, Absolute 2.73 10*3/mm3      Monocytes, Absolute 0.50 10*3/mm3      Eosinophils, Absolute 0.34 10*3/mm3      Basophils, Absolute 0.05 10*3/mm3      Immature Grans, Absolute 0.03 10*3/mm3     Comprehensive Metabolic Panel [[id number]]  (Abnormal) Collected:  10/26/17 0057    Specimen:  Blood Updated:  10/26/17 0249     Glucose 134 (H) mg/dL      BUN 9 mg/dL      Creatinine 0.43 mg/dL      Sodium 137 mmol/L      Potassium 3.3 (L) mmol/L      Chloride 107 mmol/L      CO2 25.0 mmol/L      Calcium 9.0 mg/dL      Total Protein 6.0 g/dL      Albumin 3.40 (L) g/dL      ALT (SGPT) 13 U/L      AST (SGOT) 19 U/L      Alkaline Phosphatase 169 (H) U/L       Note New Reference Ranges        Total Bilirubin 0.2 mg/dL      eGFR Non African Amer >150 mL/min/1.73      Globulin 2.6 gm/dL      A/G Ratio 1.3 (L) g/dL      BUN/Creatinine Ratio 20.9     Anion Gap 5.0 mmol/L     Lipase [[id number]]  (Normal) Collected:  10/26/17 0057    Specimen:  Blood Updated:  10/26/17 0249     Lipase 56 U/L     Magnesium [[id number]]  (Normal) Collected:  10/26/17 0057    Specimen:  Blood Updated:  10/26/17 0249     Magnesium 1.7 mg/dL     Phosphorus [[id number]]  (Normal) Collected:  10/26/17 0057    Specimen:  Blood Updated:  10/26/17 0249     Phosphorus 4.1 mg/dL     Osmolality, Calculated [[id number]]  (Normal) Collected:  10/26/17 0057    Specimen:  Blood Updated:  10/26/17 0249     Osmolality Calc 274.5 mOsm/kg     Amylase [[id number]]  (Abnormal) Collected:  10/26/17 0057    Specimen:  Blood Updated:  10/26/17 0319     Amylase 285 (H) U/L     POC Glucose Fingerstick [[id number]]  (Abnormal) Collected:  10/26/17 0548    Specimen:  Blood Updated:  10/26/17 0554     Glucose 184 (H) mg/dL     Narrative:        Meter: [id number] : [id number] camden cardona    POC Glucose Fingerstick [[id number]]  (Abnormal) Collected:  10/26/17 0717    Specimen:  Blood Updated:  10/26/17 0724     Glucose 221 (H) mg/dL     Narrative:       Meter: [id number] : [id number] kenny reyes    POC Glucose Fingerstick [[id number]]  (Abnormal) Collected:  10/26/17 1133    Specimen:  Blood Updated:  10/26/17 1138     Glucose 146 (H) mg/dL     Narrative:       Meter: [id number] : [id number] mary Fairborn          IMAGING  Imaging Results (last 24 hours)     ** No results found for the last 24 hours. **            ASSESSMENT/PLAN  -Acute pancreatitis  -Diabetes mellitus, type II  -Pancreatic pseudocysts    Patient is tolerating nasal jejunal feedings well.  We will continue to follow.          Electronically signed by: DORETHA Linton

## 2017-10-26 NOTE — PROGRESS NOTES
Discharge Planning Assessment   [place]     Patient Name: Rossy Boone  MRN: [id number]  Today's Date: 10/26/2017    Admit Date: 10/10/2017       Discharge Plan       10/26/17 1329    Case Management/Social Work Plan    Plan Pt is awaiting for bed at [facility]. SS spoke to pt on this date who states she was approved for [employer] on 10-25-17. SS contacted [facility] per Crystal who states [employer] is active and will back date to 10-01-17. SS contacted  nurse, Paige and made aware. SS contacted spouse, Basim per request. SS to follow.              Expected Discharge Date and Time     Expected Discharge Date Expected Discharge Time    Oct 27, 2017                Linnea Russell

## 2018-01-01 NOTE — PLAN OF CARE
Problem: Patient Care Overview (Adult)  Goal: Plan of Care Review  Outcome: Ongoing (interventions implemented as appropriate)    10/24/17 1426   Coping/Psychosocial Response Interventions   Plan Of Care Reviewed With patient       Goal: Adult Individualization and Mutuality  Outcome: Ongoing (interventions implemented as appropriate)  Goal: Discharge Needs Assessment  Outcome: Ongoing (interventions implemented as appropriate)    Problem: Pancreatitis, Acute/Chronic (Adult)  Goal: Signs and Symptoms of Listed Potential Problems Will be Absent or Manageable (Pancreatitis, Acute/Chronic)  Outcome: Ongoing (interventions implemented as appropriate)    10/24/17 0134 10/24/17 1426   Pancreatitis, Acute/Chronic   Problems Assessed (Pancreatitis) all --    Problems Present (Pancreatitis) --  pain         Problem: Diabetes, Type 2 (Adult)  Goal: Signs and Symptoms of Listed Potential Problems Will be Absent or Manageable (Diabetes, Type 2)  Outcome: Ongoing (interventions implemented as appropriate)    10/24/17 1426   Diabetes, Type 2   Problems Assessed (Type 2 Diabetes) all   Problems Present (Type 2 Diabetes) impaired glycemic control         Problem: Pain, Acute (Adult)  Goal: Identify Related Risk Factors and Signs and Symptoms  Outcome: Ongoing (interventions implemented as appropriate)  Goal: Acceptable Pain Control/Comfort Level  Outcome: Ongoing (interventions implemented as appropriate)    10/24/17 1426   Pain, Acute (Adult)   Acceptable Pain Control/Comfort Level making progress toward outcome            7

## 2018-03-19 ENCOUNTER — LAB (OUTPATIENT)
Dept: LAB | Facility: HOSPITAL | Age: 48
End: 2018-03-19
Attending: INTERNAL MEDICINE

## 2018-03-19 ENCOUNTER — TRANSCRIBE ORDERS (OUTPATIENT)
Dept: ADMINISTRATIVE | Facility: HOSPITAL | Age: 48
End: 2018-03-19

## 2018-03-19 DIAGNOSIS — E78.5 HYPERLIPIDEMIA, UNSPECIFIED HYPERLIPIDEMIA TYPE: ICD-10-CM

## 2018-03-19 DIAGNOSIS — E55.9 VITAMIN D DEFICIENCY: ICD-10-CM

## 2018-03-19 DIAGNOSIS — R53.83 FATIGUE, UNSPECIFIED TYPE: ICD-10-CM

## 2018-03-19 DIAGNOSIS — E83.52 HYPERCALCEMIA: Primary | ICD-10-CM

## 2018-03-19 DIAGNOSIS — E83.52 HYPERCALCEMIA: ICD-10-CM

## 2018-03-19 DIAGNOSIS — R53.81 MALAISE: ICD-10-CM

## 2018-03-19 DIAGNOSIS — I10 ESSENTIAL HYPERTENSION, BENIGN: ICD-10-CM

## 2018-03-19 LAB
25(OH)D3 SERPL-MCNC: 20 NG/ML
ALBUMIN SERPL-MCNC: 4.5 G/DL (ref 3.5–5)
ALBUMIN UR-MCNC: 6.8 MG/L
ALBUMIN/GLOB SERPL: 1.4 G/DL (ref 1.5–2.5)
ALP SERPL-CCNC: 169 U/L (ref 35–104)
ALT SERPL W P-5'-P-CCNC: 30 U/L (ref 10–36)
AMYLASE SERPL-CCNC: 58 U/L (ref 28–100)
ANION GAP SERPL CALCULATED.3IONS-SCNC: 5.4 MMOL/L (ref 3.6–11.2)
AST SERPL-CCNC: 24 U/L (ref 10–30)
BASOPHILS # BLD AUTO: 0.04 10*3/MM3 (ref 0–0.3)
BASOPHILS NFR BLD AUTO: 0.3 % (ref 0–2)
BILIRUB SERPL-MCNC: 0.4 MG/DL (ref 0.2–1.8)
BUN BLD-MCNC: 10 MG/DL (ref 7–21)
BUN/CREAT SERPL: 14.9 (ref 7–25)
CALCIUM SPEC-SCNC: 10.1 MG/DL (ref 7.7–10)
CHLORIDE SERPL-SCNC: 105 MMOL/L (ref 99–112)
CHOLEST SERPL-MCNC: 176 MG/DL (ref 0–200)
CO2 SERPL-SCNC: 30.6 MMOL/L (ref 24.3–31.9)
CREAT BLD-MCNC: 0.67 MG/DL (ref 0.43–1.29)
DEPRECATED RDW RBC AUTO: 47.6 FL (ref 37–54)
EOSINOPHIL # BLD AUTO: 0.48 10*3/MM3 (ref 0–0.7)
EOSINOPHIL NFR BLD AUTO: 3.6 % (ref 0–5)
ERYTHROCYTE [DISTWIDTH] IN BLOOD BY AUTOMATED COUNT: 16.6 % (ref 11.5–14.5)
FOLATE SERPL-MCNC: 22.16 NG/ML (ref 5.4–20)
GFR SERPL CREATININE-BSD FRML MDRD: 94 ML/MIN/1.73
GLOBULIN UR ELPH-MCNC: 3.3 GM/DL
GLUCOSE BLD-MCNC: 131 MG/DL (ref 70–110)
HBA1C MFR BLD: 8.2 % (ref 4.5–5.7)
HCT VFR BLD AUTO: 45.3 % (ref 37–47)
HDLC SERPL-MCNC: 40 MG/DL (ref 60–100)
HGB BLD-MCNC: 14.4 G/DL (ref 12–16)
IMM GRANULOCYTES # BLD: 0.04 10*3/MM3 (ref 0–0.03)
IMM GRANULOCYTES NFR BLD: 0.3 % (ref 0–0.5)
IRON 24H UR-MRATE: 50 MCG/DL (ref 49–151)
LDLC SERPL CALC-MCNC: 108 MG/DL (ref 0–100)
LDLC/HDLC SERPL: 2.7 {RATIO}
LIPASE SERPL-CCNC: 48 U/L (ref 13–60)
LYMPHOCYTES # BLD AUTO: 4.45 10*3/MM3 (ref 1–3)
LYMPHOCYTES NFR BLD AUTO: 33.3 % (ref 21–51)
MCH RBC QN AUTO: 25 PG (ref 27–33)
MCHC RBC AUTO-ENTMCNC: 31.8 G/DL (ref 33–37)
MCV RBC AUTO: 78.8 FL (ref 80–94)
MONOCYTES # BLD AUTO: 0.48 10*3/MM3 (ref 0.1–0.9)
MONOCYTES NFR BLD AUTO: 3.6 % (ref 0–10)
NEUTROPHILS # BLD AUTO: 7.88 10*3/MM3 (ref 1.4–6.5)
NEUTROPHILS NFR BLD AUTO: 58.9 % (ref 30–70)
OSMOLALITY SERPL CALC.SUM OF ELEC: 282.1 MOSM/KG (ref 273–305)
PLATELET # BLD AUTO: 410 10*3/MM3 (ref 130–400)
PMV BLD AUTO: 10.6 FL (ref 6–10)
POTASSIUM BLD-SCNC: 3.9 MMOL/L (ref 3.5–5.3)
PROT SERPL-MCNC: 7.8 G/DL (ref 6–8)
RBC # BLD AUTO: 5.75 10*6/MM3 (ref 4.2–5.4)
SODIUM BLD-SCNC: 141 MMOL/L (ref 135–153)
TRIGL SERPL-MCNC: 140 MG/DL (ref 0–150)
VIT B12 BLD-MCNC: 539 PG/ML (ref 211–911)
VLDLC SERPL-MCNC: 28 MG/DL
WBC NRBC COR # BLD: 13.37 10*3/MM3 (ref 4.5–12.5)

## 2018-03-19 PROCEDURE — 82306 VITAMIN D 25 HYDROXY: CPT

## 2018-03-19 PROCEDURE — 84590 ASSAY OF VITAMIN A: CPT

## 2018-03-19 PROCEDURE — 85025 COMPLETE CBC W/AUTO DIFF WBC: CPT

## 2018-03-19 PROCEDURE — 83540 ASSAY OF IRON: CPT

## 2018-03-19 PROCEDURE — 82607 VITAMIN B-12: CPT

## 2018-03-19 PROCEDURE — 83036 HEMOGLOBIN GLYCOSYLATED A1C: CPT

## 2018-03-19 PROCEDURE — 84425 ASSAY OF VITAMIN B-1: CPT

## 2018-03-19 PROCEDURE — 82043 UR ALBUMIN QUANTITATIVE: CPT

## 2018-03-19 PROCEDURE — 84446 ASSAY OF VITAMIN E: CPT

## 2018-03-19 PROCEDURE — 82746 ASSAY OF FOLIC ACID SERUM: CPT

## 2018-03-19 PROCEDURE — 36415 COLL VENOUS BLD VENIPUNCTURE: CPT

## 2018-03-19 PROCEDURE — 80061 LIPID PANEL: CPT

## 2018-03-19 PROCEDURE — 82150 ASSAY OF AMYLASE: CPT

## 2018-03-19 PROCEDURE — 84681 ASSAY OF C-PEPTIDE: CPT

## 2018-03-19 PROCEDURE — 83690 ASSAY OF LIPASE: CPT

## 2018-03-19 PROCEDURE — 80053 COMPREHEN METABOLIC PANEL: CPT

## 2018-03-20 LAB — C PEPTIDE SERPL-MCNC: 2.7 NG/ML (ref 1.1–4.4)

## 2018-03-23 LAB — VIT B1 BLD-SCNC: 171.1 NMOL/L (ref 66.5–200)

## 2018-03-26 LAB
A-TOCOPHEROL VIT E SERPL-MCNC: 12.8 MG/L (ref 5.3–16.8)
VIT A SERPL-MCNC: 60 UG/DL (ref 20–65)

## 2019-03-09 ENCOUNTER — HOSPITAL ENCOUNTER (EMERGENCY)
Facility: HOSPITAL | Age: 49
Discharge: HOME | End: 2019-03-09
Attending: EMERGENCY MEDICINE | Admitting: EMERGENCY MEDICINE

## 2019-03-09 ENCOUNTER — APPOINTMENT (OUTPATIENT)
Dept: GENERAL RADIOLOGY | Facility: HOSPITAL | Age: 49
End: 2019-03-09

## 2019-03-09 VITALS
HEART RATE: 100 BPM | RESPIRATION RATE: 16 BRPM | SYSTOLIC BLOOD PRESSURE: 147 MMHG | DIASTOLIC BLOOD PRESSURE: 94 MMHG | WEIGHT: 122 LBS | TEMPERATURE: 99.2 F | OXYGEN SATURATION: 98 % | BODY MASS INDEX: 22.45 KG/M2 | HEIGHT: 62 IN

## 2019-03-09 DIAGNOSIS — H66.002 ACUTE SUPPURATIVE OTITIS MEDIA OF LEFT EAR WITHOUT SPONTANEOUS RUPTURE OF TYMPANIC MEMBRANE, RECURRENCE NOT SPECIFIED: ICD-10-CM

## 2019-03-09 DIAGNOSIS — J02.9 ACUTE PHARYNGITIS, UNSPECIFIED ETIOLOGY: Primary | ICD-10-CM

## 2019-03-09 LAB
ALBUMIN SERPL-MCNC: 4.1 G/DL (ref 3.5–5)
ALBUMIN/GLOB SERPL: 1.5 G/DL (ref 1.5–2.5)
ALP SERPL-CCNC: 197 U/L (ref 35–104)
ALT SERPL W P-5'-P-CCNC: 32 U/L (ref 10–36)
ANION GAP SERPL CALCULATED.3IONS-SCNC: 8.8 MMOL/L (ref 3.6–11.2)
AST SERPL-CCNC: 21 U/L (ref 10–30)
BACTERIA UR QL AUTO: ABNORMAL /HPF
BASOPHILS # BLD AUTO: 0.02 10*3/MM3 (ref 0–0.3)
BASOPHILS NFR BLD AUTO: 0.2 % (ref 0–2)
BILIRUB SERPL-MCNC: 0.5 MG/DL (ref 0.2–1.8)
BILIRUB UR QL STRIP: NEGATIVE
BUN BLD-MCNC: 8 MG/DL (ref 7–21)
BUN/CREAT SERPL: 13.3 (ref 7–25)
CALCIUM SPEC-SCNC: 9.5 MG/DL (ref 7.7–10)
CHLORIDE SERPL-SCNC: 98 MMOL/L (ref 99–112)
CLARITY UR: CLEAR
CO2 SERPL-SCNC: 27.2 MMOL/L (ref 24.3–31.9)
COLOR UR: YELLOW
CREAT BLD-MCNC: 0.6 MG/DL (ref 0.43–1.29)
D-LACTATE SERPL-SCNC: 1.4 MMOL/L (ref 0.5–2)
DEPRECATED RDW RBC AUTO: 38.6 FL (ref 37–54)
EOSINOPHIL # BLD AUTO: 0.19 10*3/MM3 (ref 0–0.7)
EOSINOPHIL NFR BLD AUTO: 1.6 % (ref 0–5)
ERYTHROCYTE [DISTWIDTH] IN BLOOD BY AUTOMATED COUNT: 13.1 % (ref 11.5–14.5)
FLUAV AG NPH QL: NEGATIVE
FLUBV AG NPH QL IA: NEGATIVE
GFR SERPL CREATININE-BSD FRML MDRD: 107 ML/MIN/1.73
GLOBULIN UR ELPH-MCNC: 2.8 GM/DL
GLUCOSE BLD-MCNC: 309 MG/DL (ref 70–110)
GLUCOSE UR STRIP-MCNC: ABNORMAL MG/DL
HCT VFR BLD AUTO: 42.5 % (ref 37–47)
HGB BLD-MCNC: 14.4 G/DL (ref 12–16)
HGB UR QL STRIP.AUTO: ABNORMAL
HYALINE CASTS UR QL AUTO: ABNORMAL /LPF
IMM GRANULOCYTES # BLD AUTO: 0.02 10*3/MM3 (ref 0–0.03)
IMM GRANULOCYTES NFR BLD AUTO: 0.2 % (ref 0–0.5)
KETONES UR QL STRIP: ABNORMAL
LEUKOCYTE ESTERASE UR QL STRIP.AUTO: NEGATIVE
LIPASE SERPL-CCNC: 36 U/L (ref 13–60)
LYMPHOCYTES # BLD AUTO: 1.95 10*3/MM3 (ref 1–3)
LYMPHOCYTES NFR BLD AUTO: 16.6 % (ref 21–51)
MCH RBC QN AUTO: 27.6 PG (ref 27–33)
MCHC RBC AUTO-ENTMCNC: 33.9 G/DL (ref 33–37)
MCV RBC AUTO: 81.6 FL (ref 80–94)
MONOCYTES # BLD AUTO: 0.51 10*3/MM3 (ref 0.1–0.9)
MONOCYTES NFR BLD AUTO: 4.3 % (ref 0–10)
NEUTROPHILS # BLD AUTO: 9.09 10*3/MM3 (ref 1.4–6.5)
NEUTROPHILS NFR BLD AUTO: 77.1 % (ref 30–70)
NITRITE UR QL STRIP: NEGATIVE
OSMOLALITY SERPL CALC.SUM OF ELEC: 278.3 MOSM/KG (ref 273–305)
PH UR STRIP.AUTO: 5.5 [PH] (ref 5–8)
PLATELET # BLD AUTO: 151 10*3/MM3 (ref 130–400)
PMV BLD AUTO: 11.1 FL (ref 6–10)
POTASSIUM BLD-SCNC: 4 MMOL/L (ref 3.5–5.3)
PROT SERPL-MCNC: 6.9 G/DL (ref 6–8)
PROT UR QL STRIP: NEGATIVE
RBC # BLD AUTO: 5.21 10*6/MM3 (ref 4.2–5.4)
RBC # UR: ABNORMAL /HPF
REF LAB TEST METHOD: ABNORMAL
S PYO AG THROAT QL: NEGATIVE
SODIUM BLD-SCNC: 134 MMOL/L (ref 135–153)
SP GR UR STRIP: >1.03 (ref 1–1.03)
SQUAMOUS #/AREA URNS HPF: ABNORMAL /HPF
UROBILINOGEN UR QL STRIP: ABNORMAL
WBC NRBC COR # BLD: 11.78 10*3/MM3 (ref 4.5–12.5)
WBC UR QL AUTO: ABNORMAL /HPF

## 2019-03-09 PROCEDURE — 81001 URINALYSIS AUTO W/SCOPE: CPT | Performed by: PHYSICIAN ASSISTANT

## 2019-03-09 PROCEDURE — 87081 CULTURE SCREEN ONLY: CPT | Performed by: PHYSICIAN ASSISTANT

## 2019-03-09 PROCEDURE — 85025 COMPLETE CBC W/AUTO DIFF WBC: CPT | Performed by: PHYSICIAN ASSISTANT

## 2019-03-09 PROCEDURE — 83605 ASSAY OF LACTIC ACID: CPT | Performed by: PHYSICIAN ASSISTANT

## 2019-03-09 PROCEDURE — 87804 INFLUENZA ASSAY W/OPTIC: CPT | Performed by: PHYSICIAN ASSISTANT

## 2019-03-09 PROCEDURE — 87040 BLOOD CULTURE FOR BACTERIA: CPT | Performed by: PHYSICIAN ASSISTANT

## 2019-03-09 PROCEDURE — 96361 HYDRATE IV INFUSION ADD-ON: CPT

## 2019-03-09 PROCEDURE — 83690 ASSAY OF LIPASE: CPT | Performed by: PHYSICIAN ASSISTANT

## 2019-03-09 PROCEDURE — 80053 COMPREHEN METABOLIC PANEL: CPT | Performed by: PHYSICIAN ASSISTANT

## 2019-03-09 PROCEDURE — 87880 STREP A ASSAY W/OPTIC: CPT | Performed by: PHYSICIAN ASSISTANT

## 2019-03-09 PROCEDURE — 25010000002 CEFTRIAXONE: Performed by: PHYSICIAN ASSISTANT

## 2019-03-09 PROCEDURE — 71046 X-RAY EXAM CHEST 2 VIEWS: CPT

## 2019-03-09 PROCEDURE — 96365 THER/PROPH/DIAG IV INF INIT: CPT

## 2019-03-09 PROCEDURE — 99284 EMERGENCY DEPT VISIT MOD MDM: CPT

## 2019-03-09 PROCEDURE — 71046 X-RAY EXAM CHEST 2 VIEWS: CPT | Performed by: RADIOLOGY

## 2019-03-09 PROCEDURE — 36415 COLL VENOUS BLD VENIPUNCTURE: CPT

## 2019-03-09 PROCEDURE — 96375 TX/PRO/DX INJ NEW DRUG ADDON: CPT

## 2019-03-09 PROCEDURE — 25010000002 PROMETHAZINE PER 50 MG: Performed by: PHYSICIAN ASSISTANT

## 2019-03-09 RX ORDER — CEFTRIAXONE 1 G/1
1000 INJECTION, POWDER, FOR SOLUTION INTRAMUSCULAR; INTRAVENOUS ONCE
Status: DISCONTINUED | OUTPATIENT
Start: 2019-03-09 | End: 2019-03-09

## 2019-03-09 RX ORDER — LIDOCAINE HYDROCHLORIDE 10 MG/ML
2.1 INJECTION, SOLUTION EPIDURAL; INFILTRATION; INTRACAUDAL; PERINEURAL ONCE
Status: DISCONTINUED | OUTPATIENT
Start: 2019-03-09 | End: 2019-03-09

## 2019-03-09 RX ORDER — PROMETHAZINE HYDROCHLORIDE 25 MG/ML
12.5 INJECTION, SOLUTION INTRAMUSCULAR; INTRAVENOUS ONCE
Status: COMPLETED | OUTPATIENT
Start: 2019-03-09 | End: 2019-03-09

## 2019-03-09 RX ORDER — CEFDINIR 300 MG/1
300 CAPSULE ORAL 2 TIMES DAILY
Qty: 20 CAPSULE | Refills: 0 | Status: SHIPPED | OUTPATIENT
Start: 2019-03-09 | End: 2019-05-07

## 2019-03-09 RX ORDER — SODIUM CHLORIDE 0.9 % (FLUSH) 0.9 %
10 SYRINGE (ML) INJECTION AS NEEDED
Status: DISCONTINUED | OUTPATIENT
Start: 2019-03-09 | End: 2019-03-09 | Stop reason: HOSPADM

## 2019-03-09 RX ADMIN — PROMETHAZINE HYDROCHLORIDE 12.5 MG: 25 INJECTION INTRAMUSCULAR; INTRAVENOUS at 15:39

## 2019-03-09 RX ADMIN — LIDOCAINE HYDROCHLORIDE 5 ML: 20 SOLUTION ORAL; TOPICAL at 16:18

## 2019-03-09 RX ADMIN — CEFTRIAXONE 1 G: 1 INJECTION, POWDER, FOR SOLUTION INTRAMUSCULAR; INTRAVENOUS at 19:48

## 2019-03-09 RX ADMIN — SODIUM CHLORIDE 1659 ML: 9 INJECTION, SOLUTION INTRAVENOUS at 14:52

## 2019-03-09 NOTE — ED PROVIDER NOTES
Subjective    This is a 48-year-old female that comes in with chief complaint fever, chills, sore throat, earache, abdominal pain for the past day.  Patient states has had subjective low-grade fever.  Denies any known sick contact.        History provided by:  Patient   used: No    URI   Presenting symptoms: congestion, cough, fatigue, rhinorrhea and sore throat    Severity:  Moderate  Onset quality:  Sudden  Duration:  2 days  Timing:  Intermittent  Progression:  Worsening  Chronicity:  New  Relieved by:  Nothing  Worsened by:  Nothing  Ineffective treatments:  None tried  Associated symptoms: myalgias    Associated symptoms: no arthralgias and no headaches    Risk factors: not elderly, no chronic kidney disease, no chronic respiratory disease, no immunosuppression, no recent travel and no sick contacts        Review of Systems   Constitutional: Positive for chills and fatigue.   HENT: Positive for congestion, rhinorrhea and sore throat.    Respiratory: Positive for cough and shortness of breath.    Cardiovascular: Negative.  Negative for chest pain and leg swelling.   Gastrointestinal: Negative.  Negative for abdominal pain, blood in stool and diarrhea.   Musculoskeletal: Positive for myalgias. Negative for arthralgias.   Neurological: Negative for headaches.   All other systems reviewed and are negative.      Past Medical History:   Diagnosis Date   • Diabetes mellitus (CMS/HCC)    • Hypertension    • Pancreatitis        Allergies   Allergen Reactions   • Hydrocodone-Acetaminophen    • Hydromorphone    • Oxycodone-Acetaminophen    • Penicillins        Past Surgical History:   Procedure Laterality Date   • APPENDECTOMY     •  SECTION     • CHOLECYSTECTOMY     • ENDOSCOPY N/A 10/25/2017    Procedure: ESOPHAGOGASTRODUODENOSCOPY with nasal jejunostomy tube placement;  Surgeon: Rudolph Benitez III, MD;  Location: [facility];  Service:    • HYSTERECTOMY         Family History   Problem  Relation Age of Onset   • Hypertension Mother    • Cancer Father    • Heart disease Father    • Hypertension Father    • Heart disease Brother    • Cancer Paternal Grandfather        Social History     Socioeconomic History   • Marital status:      Spouse name: Not on file   • Number of children: Not on file   • Years of education: Not on file   • Highest education level: Not on file   Tobacco Use   • Smoking status: Current Every Day Smoker     Packs/day: 0.50     Types: Cigarettes   Substance and Sexual Activity   • Alcohol use: No   • Drug use: No   • Sexual activity: Defer           Objective   Physical Exam   Constitutional: She is oriented to person, place, and time. She appears well-developed and well-nourished.  Non-toxic appearance. She does not appear ill. No distress.   HENT:   Head: Normocephalic and atraumatic.   Left Ear: Tympanic membrane is erythematous, retracted and bulging.   Mouth/Throat: Posterior oropharyngeal edema and posterior oropharyngeal erythema present. No oropharyngeal exudate.   Eyes: EOM are normal. Pupils are equal, round, and reactive to light. No scleral icterus.   Cardiovascular: Normal rate, regular rhythm, normal heart sounds and intact distal pulses. Exam reveals no gallop and no friction rub.   No murmur heard.  Pulmonary/Chest: Effort normal and breath sounds normal. No stridor. No respiratory distress. She has no wheezes. She has no rales.   Abdominal: Soft. Normal appearance and bowel sounds are normal. She exhibits no pulsatile liver, no fluid wave, no abdominal bruit, no ascites, no pulsatile midline mass and no mass. There is generalized tenderness. There is no rigidity, no rebound, no guarding, no CVA tenderness and negative Basurto's sign.   Neurological: She is alert and oriented to person, place, and time.   Skin: Skin is warm and dry. Capillary refill takes less than 2 seconds. No rash noted. She is not diaphoretic. No cyanosis or erythema.   Psychiatric: She  has a normal mood and affect. Her behavior is normal.   Nursing note and vitals reviewed.      Procedures           ED Course  ED Course as of Mar 09 1811   Sat Mar 09, 2019   1605 No definite acute findings.   []      ED Course User Index  [] Mason Ash PA-C                  [facility]      Final diagnoses:   Acute pharyngitis, unspecified etiology   Acute suppurative otitis media of left ear without spontaneous rupture of tympanic membrane, recurrence not specified            Mason Ash PA-C  03/09/19 1811

## 2019-03-11 LAB — BACTERIA SPEC AEROBE CULT: NORMAL

## 2019-03-14 LAB
BACTERIA SPEC AEROBE CULT: NORMAL
BACTERIA SPEC AEROBE CULT: NORMAL

## 2019-05-03 ENCOUNTER — HOSPITAL ENCOUNTER (EMERGENCY)
Facility: HOSPITAL | Age: 49
Discharge: HOME | End: 2019-05-03
Attending: EMERGENCY MEDICINE | Admitting: EMERGENCY MEDICINE

## 2019-05-03 VITALS
OXYGEN SATURATION: 98 % | WEIGHT: 120 LBS | DIASTOLIC BLOOD PRESSURE: 79 MMHG | RESPIRATION RATE: 18 BRPM | TEMPERATURE: 97.9 F | BODY MASS INDEX: 22.08 KG/M2 | HEART RATE: 108 BPM | HEIGHT: 62 IN | SYSTOLIC BLOOD PRESSURE: 124 MMHG

## 2019-05-03 DIAGNOSIS — L08.9 LACERATION OF FINGER WITH INFECTION, INITIAL ENCOUNTER: ICD-10-CM

## 2019-05-03 DIAGNOSIS — S61.213A LACERATION OF LEFT MIDDLE FINGER W/O FOREIGN BODY W/O DAMAGE TO NAIL, INITIAL ENCOUNTER: Primary | ICD-10-CM

## 2019-05-03 DIAGNOSIS — S61.219A LACERATION OF FINGER WITH INFECTION, INITIAL ENCOUNTER: ICD-10-CM

## 2019-05-03 PROCEDURE — 99283 EMERGENCY DEPT VISIT LOW MDM: CPT

## 2019-05-03 RX ORDER — IBUPROFEN 800 MG/1
800 TABLET ORAL EVERY 8 HOURS PRN
Qty: 60 TABLET | Refills: 0 | Status: ON HOLD | OUTPATIENT
Start: 2019-05-03 | End: 2019-05-07

## 2019-05-03 RX ORDER — SULFAMETHOXAZOLE AND TRIMETHOPRIM 800; 160 MG/1; MG/1
1 TABLET ORAL 2 TIMES DAILY
Qty: 20 TABLET | Refills: 0 | Status: ON HOLD | OUTPATIENT
Start: 2019-05-03 | End: 2019-05-07

## 2019-05-03 RX ORDER — SULFAMETHOXAZOLE AND TRIMETHOPRIM 800; 160 MG/1; MG/1
1 TABLET ORAL ONCE
Status: COMPLETED | OUTPATIENT
Start: 2019-05-03 | End: 2019-05-03

## 2019-05-03 RX ADMIN — SULFAMETHOXAZOLE AND TRIMETHOPRIM 160 MG: 800; 160 TABLET ORAL at 21:02

## 2019-05-04 NOTE — ED PROVIDER NOTES
Subjective   This is a 49-year-old female who comes in with chief complaint laceration to left middle phalanx.  Patient states this happened approximately 2 days ago to a .  States she has had increased pain, swelling to the left finger.  Denies any fever, chills, body aches.        History provided by:  Patient   used: No    Upper Extremity Issue   Location:  Finger  Finger location:  L middle finger  Injury: no    Pain details:     Quality:  Aching    Radiates to:  Does not radiate    Severity:  Moderate    Onset quality:  Sudden    Duration:  1 day    Timing:  Intermittent    Progression:  Worsening  Handedness:  Left-handed  Dislocation: no    Foreign body present:  No foreign bodies  Tetanus status:  Unknown  Prior injury to area:  No  Relieved by:  Nothing  Worsened by:  Nothing  Ineffective treatments:  None tried  Associated symptoms: decreased range of motion, stiffness and swelling    Risk factors: no concern for non-accidental trauma, no known bone disorder, no frequent fractures and no recent illness        Review of Systems   Constitutional: Negative.    Respiratory: Negative.  Negative for choking, chest tightness and shortness of breath.    Genitourinary: Negative.  Negative for dysuria, enuresis, flank pain and frequency.   Musculoskeletal: Positive for joint swelling, myalgias and stiffness.   Skin: Negative for rash.   All other systems reviewed and are negative.      Past Medical History:   Diagnosis Date   • Diabetes mellitus (CMS/HCC)    • Hypertension    • Pancreatitis        Allergies   Allergen Reactions   • Hydrocodone-Acetaminophen    • Hydromorphone    • Oxycodone-Acetaminophen    • Penicillins        Past Surgical History:   Procedure Laterality Date   • APPENDECTOMY     •  SECTION     • CHOLECYSTECTOMY     • ENDOSCOPY N/A 10/25/2017    Procedure: ESOPHAGOGASTRODUODENOSCOPY with nasal jejunostomy tube placement;  Surgeon: Rudolph Benitez III,  MD;  Location: [facility] OR;  Service:    • HYSTERECTOMY         Family History   Problem Relation Age of Onset   • Hypertension Mother    • Cancer Father    • Heart disease Father    • Hypertension Father    • Heart disease Brother    • Cancer Paternal Grandfather        Social History     Socioeconomic History   • Marital status:      Spouse name: Not on file   • Number of children: Not on file   • Years of education: Not on file   • Highest education level: Not on file   Tobacco Use   • Smoking status: Current Every Day Smoker     Packs/day: 0.50     Types: Cigarettes   Substance and Sexual Activity   • Alcohol use: No   • Drug use: No   • Sexual activity: Defer           Objective   Physical Exam   Constitutional: She is oriented to person, place, and time. She appears well-developed and well-nourished. No distress.   HENT:   Head: Normocephalic and atraumatic.   Right Ear: External ear normal.   Left Ear: External ear normal.   Nose: Nose normal.   Mouth/Throat: Oropharynx is clear and moist. No oropharyngeal exudate.   Eyes: Conjunctivae and EOM are normal. Pupils are equal, round, and reactive to light. Right eye exhibits no discharge. Left eye exhibits no discharge. No scleral icterus.   Neck: Normal range of motion. Neck supple. No tracheal deviation present. No thyromegaly present.   Cardiovascular: Normal rate, regular rhythm, normal heart sounds and intact distal pulses. Exam reveals no gallop and no friction rub.   No murmur heard.  Pulmonary/Chest: Effort normal and breath sounds normal. No stridor. No respiratory distress. She has no wheezes. She has no rales.   Abdominal: Soft. Bowel sounds are normal. She exhibits no distension. There is no tenderness. There is no rebound and no guarding.   Musculoskeletal: She exhibits edema and tenderness.        Left hand: She exhibits decreased range of motion, tenderness and laceration.        Hands:  .5 cm laceration. No discharged. Mild swelling and  tenderness. N/V intact.    Lymphadenopathy:     She has no cervical adenopathy.   Neurological: She is alert and oriented to person, place, and time. She displays normal reflexes. No cranial nerve deficit or sensory deficit. She exhibits normal muscle tone. Coordination normal.   Skin: Skin is warm and dry. Capillary refill takes less than 2 seconds. No rash noted. She is not diaphoretic. No erythema. No pallor.   Psychiatric: She has a normal mood and affect. Her behavior is normal. Judgment and thought content normal.   Nursing note and vitals reviewed.      Procedures           ED Course  ED Course as of May 03 2049   Fri May 03, 2019   2045 Patient does have laceration to left middle phalanx laceration. Wound is past the time that we would approximate. Will allow to heal with secondary intention.   [BH]      ED Course User Index  [] Mason Ash PA-C                  [facility]      Final diagnoses:   Laceration of left middle finger w/o foreign body w/o damage to nail, initial encounter   Laceration of finger with infection, initial encounter            Mason Ash PA-C  05/03/19 2049

## 2019-05-07 ENCOUNTER — APPOINTMENT (OUTPATIENT)
Dept: GENERAL RADIOLOGY | Facility: HOSPITAL | Age: 49
End: 2019-05-07

## 2019-05-07 ENCOUNTER — HOSPITAL ENCOUNTER (OUTPATIENT)
Facility: HOSPITAL | Age: 49
Discharge: HOME | End: 2019-05-09
Attending: EMERGENCY MEDICINE | Admitting: INTERNAL MEDICINE

## 2019-05-07 DIAGNOSIS — L08.9 WOUND INFECTION: Primary | ICD-10-CM

## 2019-05-07 DIAGNOSIS — E11.65 TYPE 2 DIABETES MELLITUS WITH HYPERGLYCEMIA, WITHOUT LONG-TERM CURRENT USE OF INSULIN (HCC): ICD-10-CM

## 2019-05-07 DIAGNOSIS — T14.8XXA WOUND INFECTION: Primary | ICD-10-CM

## 2019-05-07 LAB
A-A DO2: 52.7 MMHG (ref 0–300)
ACETONE BLD QL: NEGATIVE
ANION GAP SERPL CALCULATED.3IONS-SCNC: 13 MMOL/L
ATMOSPHERIC PRESS: 729 MMHG
BASE EXCESS BLDV CALC-SCNC: -1.2 MMOL/L
BASOPHILS # BLD AUTO: 0.01 10*3/MM3 (ref 0–0.2)
BASOPHILS NFR BLD AUTO: 0.1 % (ref 0–1.5)
BDY SITE: NORMAL
BODY TEMPERATURE: 98.6 C
BUN BLD-MCNC: 12 MG/DL (ref 6–20)
BUN/CREAT SERPL: 19 (ref 7–25)
CALCIUM SPEC-SCNC: 10.2 MG/DL (ref 8.6–10.5)
CHLORIDE SERPL-SCNC: 95 MMOL/L (ref 98–107)
CO2 SERPL-SCNC: 24 MMOL/L (ref 22–29)
CREAT BLD-MCNC: 0.63 MG/DL (ref 0.57–1)
CRP SERPL-MCNC: 7.07 MG/DL (ref 0–0.5)
D-LACTATE SERPL-SCNC: 1.6 MMOL/L (ref 0.5–2)
DEPRECATED RDW RBC AUTO: 40.8 FL (ref 37–54)
EOSINOPHIL # BLD AUTO: 0.13 10*3/MM3 (ref 0–0.4)
EOSINOPHIL NFR BLD AUTO: 1.1 % (ref 0.3–6.2)
ERYTHROCYTE [DISTWIDTH] IN BLOOD BY AUTOMATED COUNT: 13.6 % (ref 12.3–15.4)
GFR SERPL CREATININE-BSD FRML MDRD: 100 ML/MIN/1.73
GLUCOSE BLD-MCNC: 514 MG/DL (ref 65–99)
GLUCOSE BLDC GLUCOMTR-MCNC: 199 MG/DL (ref 70–130)
GLUCOSE BLDC GLUCOMTR-MCNC: 250 MG/DL (ref 70–130)
GLUCOSE BLDC GLUCOMTR-MCNC: 282 MG/DL (ref 70–130)
HCO3 BLDV-SCNC: 25.4 MMOL/L
HCT VFR BLD AUTO: 44.8 % (ref 34–46.6)
HGB BLD-MCNC: 14.6 G/DL (ref 12–15.9)
HGB BLDA-MCNC: 15 G/DL (ref 12–16)
HOLD SPECIMEN: NORMAL
HOLD SPECIMEN: NORMAL
HOROWITZ INDEX BLD+IHG-RTO: 21 %
IMM GRANULOCYTES # BLD AUTO: 0.03 10*3/MM3 (ref 0–0.05)
IMM GRANULOCYTES NFR BLD AUTO: 0.2 % (ref 0–0.5)
LYMPHOCYTES # BLD AUTO: 1.92 10*3/MM3 (ref 0.7–3.1)
LYMPHOCYTES NFR BLD AUTO: 15.9 % (ref 19.6–45.3)
MCH RBC QN AUTO: 27.3 PG (ref 26.6–33)
MCHC RBC AUTO-ENTMCNC: 32.6 G/DL (ref 31.5–35.7)
MCV RBC AUTO: 83.7 FL (ref 79–97)
MODALITY: NORMAL
MONOCYTES # BLD AUTO: 0.51 10*3/MM3 (ref 0.1–0.9)
MONOCYTES NFR BLD AUTO: 4.2 % (ref 5–12)
NEUTROPHILS # BLD AUTO: 9.51 10*3/MM3 (ref 1.7–7)
NEUTROPHILS NFR BLD AUTO: 78.5 % (ref 42.7–76)
PCO2 BLDV: 49.9 MM HG
PH BLDV: 7.33 PH UNITS
PLATELET # BLD AUTO: 171 10*3/MM3 (ref 140–450)
PMV BLD AUTO: 11.4 FL (ref 6–12)
PO2 BLDV: 30.8 MM HG
POTASSIUM BLD-SCNC: 4.3 MMOL/L (ref 3.5–5.2)
RBC # BLD AUTO: 5.35 10*6/MM3 (ref 3.77–5.28)
SAO2 % BLDCOV: 61.4 %
SODIUM BLD-SCNC: 132 MMOL/L (ref 136–145)
WBC NRBC COR # BLD: 12.11 10*3/MM3 (ref 3.4–10.8)
WHOLE BLOOD HOLD SPECIMEN: NORMAL
WHOLE BLOOD HOLD SPECIMEN: NORMAL

## 2019-05-07 PROCEDURE — 73130 X-RAY EXAM OF HAND: CPT

## 2019-05-07 PROCEDURE — 87205 SMEAR GRAM STAIN: CPT | Performed by: EMERGENCY MEDICINE

## 2019-05-07 PROCEDURE — 87147 CULTURE TYPE IMMUNOLOGIC: CPT | Performed by: EMERGENCY MEDICINE

## 2019-05-07 PROCEDURE — 25010000002 ENOXAPARIN PER 10 MG: Performed by: PHYSICIAN ASSISTANT

## 2019-05-07 PROCEDURE — 96366 THER/PROPH/DIAG IV INF ADDON: CPT

## 2019-05-07 PROCEDURE — 96361 HYDRATE IV INFUSION ADD-ON: CPT

## 2019-05-07 PROCEDURE — 99284 EMERGENCY DEPT VISIT MOD MDM: CPT

## 2019-05-07 PROCEDURE — 82805 BLOOD GASES W/O2 SATURATION: CPT | Performed by: EMERGENCY MEDICINE

## 2019-05-07 PROCEDURE — 96376 TX/PRO/DX INJ SAME DRUG ADON: CPT

## 2019-05-07 PROCEDURE — 25010000002 KETOROLAC TROMETHAMINE PER 15 MG: Performed by: EMERGENCY MEDICINE

## 2019-05-07 PROCEDURE — 96375 TX/PRO/DX INJ NEW DRUG ADDON: CPT

## 2019-05-07 PROCEDURE — 87070 CULTURE OTHR SPECIMN AEROBIC: CPT | Performed by: EMERGENCY MEDICINE

## 2019-05-07 PROCEDURE — 25010000002 VANCOMYCIN 5 G RECONSTITUTED SOLUTION 5,000 MG VIAL: Performed by: PHYSICIAN ASSISTANT

## 2019-05-07 PROCEDURE — 87040 BLOOD CULTURE FOR BACTERIA: CPT | Performed by: EMERGENCY MEDICINE

## 2019-05-07 PROCEDURE — 86140 C-REACTIVE PROTEIN: CPT | Performed by: EMERGENCY MEDICINE

## 2019-05-07 PROCEDURE — 63710000001 INSULIN REGULAR HUMAN PER 5 UNITS: Performed by: EMERGENCY MEDICINE

## 2019-05-07 PROCEDURE — G0378 HOSPITAL OBSERVATION PER HR: HCPCS

## 2019-05-07 PROCEDURE — 96367 TX/PROPH/DG ADDL SEQ IV INF: CPT

## 2019-05-07 PROCEDURE — 82962 GLUCOSE BLOOD TEST: CPT

## 2019-05-07 PROCEDURE — 63710000001 INSULIN ASPART PER 5 UNITS: Performed by: PHYSICIAN ASSISTANT

## 2019-05-07 PROCEDURE — 96372 THER/PROPH/DIAG INJ SC/IM: CPT

## 2019-05-07 PROCEDURE — 82009 KETONE BODYS QUAL: CPT | Performed by: EMERGENCY MEDICINE

## 2019-05-07 PROCEDURE — 83605 ASSAY OF LACTIC ACID: CPT | Performed by: EMERGENCY MEDICINE

## 2019-05-07 PROCEDURE — 96365 THER/PROPH/DIAG IV INF INIT: CPT

## 2019-05-07 PROCEDURE — 73130 X-RAY EXAM OF HAND: CPT | Performed by: RADIOLOGY

## 2019-05-07 PROCEDURE — 85025 COMPLETE CBC W/AUTO DIFF WBC: CPT | Performed by: EMERGENCY MEDICINE

## 2019-05-07 PROCEDURE — 80048 BASIC METABOLIC PNL TOTAL CA: CPT | Performed by: EMERGENCY MEDICINE

## 2019-05-07 PROCEDURE — 63710000001 DIPHENHYDRAMINE PER 50 MG: Performed by: INTERNAL MEDICINE

## 2019-05-07 PROCEDURE — 25010000002 CEFTRIAXONE: Performed by: PHYSICIAN ASSISTANT

## 2019-05-07 RX ORDER — DIPHENHYDRAMINE HCL 25 MG
25 CAPSULE ORAL NIGHTLY
Status: CANCELLED | OUTPATIENT
Start: 2019-05-07

## 2019-05-07 RX ORDER — NICOTINE POLACRILEX 4 MG
15 LOZENGE BUCCAL
Status: DISCONTINUED | OUTPATIENT
Start: 2019-05-07 | End: 2019-05-09 | Stop reason: HOSPADM

## 2019-05-07 RX ORDER — METOPROLOL TARTRATE 100 MG/1
100 TABLET ORAL 2 TIMES DAILY
COMMUNITY

## 2019-05-07 RX ORDER — MULTIVITAMIN
1 TABLET ORAL DAILY
Status: DISCONTINUED | OUTPATIENT
Start: 2019-05-08 | End: 2019-05-09 | Stop reason: HOSPADM

## 2019-05-07 RX ORDER — METOPROLOL TARTRATE 100 MG/1
100 TABLET ORAL EVERY 12 HOURS SCHEDULED
Status: DISCONTINUED | OUTPATIENT
Start: 2019-05-07 | End: 2019-05-09 | Stop reason: HOSPADM

## 2019-05-07 RX ORDER — MULTIVITAMIN
1 TABLET ORAL DAILY
Status: CANCELLED | OUTPATIENT
Start: 2019-05-07

## 2019-05-07 RX ORDER — SODIUM CHLORIDE 0.9 % (FLUSH) 0.9 %
3 SYRINGE (ML) INJECTION EVERY 12 HOURS SCHEDULED
Status: DISCONTINUED | OUTPATIENT
Start: 2019-05-07 | End: 2019-05-09 | Stop reason: HOSPADM

## 2019-05-07 RX ORDER — IBUPROFEN 600 MG/1
600 TABLET ORAL EVERY 4 HOURS PRN
Status: DISCONTINUED | OUTPATIENT
Start: 2019-05-07 | End: 2019-05-09 | Stop reason: HOSPADM

## 2019-05-07 RX ORDER — PHENOL 1.4 %
10 AEROSOL, SPRAY (ML) MUCOUS MEMBRANE NIGHTLY
COMMUNITY

## 2019-05-07 RX ORDER — DIPHENHYDRAMINE HCL 25 MG
25 CAPSULE ORAL NIGHTLY
COMMUNITY

## 2019-05-07 RX ORDER — ESOMEPRAZOLE MAGNESIUM 40 MG/1
40 CAPSULE, DELAYED RELEASE ORAL
COMMUNITY

## 2019-05-07 RX ORDER — KETOROLAC TROMETHAMINE 30 MG/ML
15 INJECTION, SOLUTION INTRAMUSCULAR; INTRAVENOUS ONCE
Status: COMPLETED | OUTPATIENT
Start: 2019-05-07 | End: 2019-05-07

## 2019-05-07 RX ORDER — ONDANSETRON 4 MG/1
4 TABLET, FILM COATED ORAL EVERY 6 HOURS PRN
Status: DISCONTINUED | OUTPATIENT
Start: 2019-05-07 | End: 2019-05-09 | Stop reason: HOSPADM

## 2019-05-07 RX ORDER — NITROGLYCERIN 0.4 MG/1
0.4 TABLET SUBLINGUAL
Status: DISCONTINUED | OUTPATIENT
Start: 2019-05-07 | End: 2019-05-09 | Stop reason: HOSPADM

## 2019-05-07 RX ORDER — CLINDAMYCIN PHOSPHATE 600 MG/50ML
600 INJECTION INTRAVENOUS ONCE
Status: COMPLETED | OUTPATIENT
Start: 2019-05-07 | End: 2019-05-07

## 2019-05-07 RX ORDER — CHOLECALCIFEROL (VITAMIN D3) 125 MCG
10 CAPSULE ORAL NIGHTLY
Status: CANCELLED | OUTPATIENT
Start: 2019-05-07

## 2019-05-07 RX ORDER — PROMETHAZINE HYDROCHLORIDE 25 MG/1
25 TABLET ORAL 2 TIMES DAILY PRN
Status: CANCELLED | OUTPATIENT
Start: 2019-05-07

## 2019-05-07 RX ORDER — SODIUM CHLORIDE 9 MG/ML
125 INJECTION, SOLUTION INTRAVENOUS CONTINUOUS
Status: DISCONTINUED | OUTPATIENT
Start: 2019-05-07 | End: 2019-05-09 | Stop reason: HOSPADM

## 2019-05-07 RX ORDER — SODIUM CHLORIDE 0.9 % (FLUSH) 0.9 %
3-10 SYRINGE (ML) INJECTION AS NEEDED
Status: DISCONTINUED | OUTPATIENT
Start: 2019-05-07 | End: 2019-05-09 | Stop reason: HOSPADM

## 2019-05-07 RX ORDER — PROMETHAZINE HYDROCHLORIDE 25 MG/1
25 TABLET ORAL 2 TIMES DAILY PRN
COMMUNITY

## 2019-05-07 RX ORDER — SENNA PLUS 8.6 MG/1
1 TABLET ORAL 2 TIMES DAILY
Status: DISCONTINUED | OUTPATIENT
Start: 2019-05-07 | End: 2019-05-09 | Stop reason: HOSPADM

## 2019-05-07 RX ORDER — MULTIVITAMIN
1 TABLET ORAL DAILY
COMMUNITY

## 2019-05-07 RX ORDER — METOPROLOL TARTRATE 100 MG/1
100 TABLET ORAL EVERY 12 HOURS SCHEDULED
Status: CANCELLED | OUTPATIENT
Start: 2019-05-07

## 2019-05-07 RX ORDER — PANTOPRAZOLE SODIUM 40 MG/1
40 TABLET, DELAYED RELEASE ORAL EVERY MORNING
Status: CANCELLED | OUTPATIENT
Start: 2019-05-08

## 2019-05-07 RX ORDER — METHADONE HYDROCHLORIDE 10 MG/1
10 TABLET ORAL 3 TIMES DAILY
Status: CANCELLED | OUTPATIENT
Start: 2019-05-07

## 2019-05-07 RX ORDER — SENNA PLUS 8.6 MG/1
1 TABLET ORAL 2 TIMES DAILY
Status: CANCELLED | OUTPATIENT
Start: 2019-05-07

## 2019-05-07 RX ORDER — DIPHENHYDRAMINE HCL 25 MG
25 CAPSULE ORAL NIGHTLY
Status: DISCONTINUED | OUTPATIENT
Start: 2019-05-07 | End: 2019-05-09 | Stop reason: HOSPADM

## 2019-05-07 RX ORDER — METHADONE HYDROCHLORIDE 10 MG/1
10 TABLET ORAL 3 TIMES DAILY
Status: DISCONTINUED | OUTPATIENT
Start: 2019-05-07 | End: 2019-05-09 | Stop reason: HOSPADM

## 2019-05-07 RX ORDER — PANTOPRAZOLE SODIUM 40 MG/1
40 TABLET, DELAYED RELEASE ORAL EVERY MORNING
Status: DISCONTINUED | OUTPATIENT
Start: 2019-05-08 | End: 2019-05-09 | Stop reason: HOSPADM

## 2019-05-07 RX ORDER — CHOLECALCIFEROL (VITAMIN D3) 125 MCG
10 CAPSULE ORAL NIGHTLY
Status: DISCONTINUED | OUTPATIENT
Start: 2019-05-07 | End: 2019-05-09 | Stop reason: HOSPADM

## 2019-05-07 RX ORDER — METHADONE HYDROCHLORIDE 10 MG/1
10 TABLET ORAL 3 TIMES DAILY
COMMUNITY

## 2019-05-07 RX ORDER — DEXTROSE MONOHYDRATE 25 G/50ML
25 INJECTION, SOLUTION INTRAVENOUS
Status: DISCONTINUED | OUTPATIENT
Start: 2019-05-07 | End: 2019-05-09 | Stop reason: HOSPADM

## 2019-05-07 RX ORDER — ONDANSETRON 2 MG/ML
4 INJECTION INTRAMUSCULAR; INTRAVENOUS EVERY 6 HOURS PRN
Status: DISCONTINUED | OUTPATIENT
Start: 2019-05-07 | End: 2019-05-09 | Stop reason: HOSPADM

## 2019-05-07 RX ORDER — SENNOSIDES 8.6 MG
1 TABLET ORAL 2 TIMES DAILY
COMMUNITY

## 2019-05-07 RX ADMIN — VANCOMYCIN HYDROCHLORIDE 1000 MG: 5 INJECTION, POWDER, LYOPHILIZED, FOR SOLUTION INTRAVENOUS at 19:22

## 2019-05-07 RX ADMIN — KETOROLAC TROMETHAMINE 15 MG: 30 INJECTION, SOLUTION INTRAMUSCULAR; INTRAVENOUS at 12:47

## 2019-05-07 RX ADMIN — KETOROLAC TROMETHAMINE 15 MG: 30 INJECTION, SOLUTION INTRAMUSCULAR; INTRAVENOUS at 16:08

## 2019-05-07 RX ADMIN — IBUPROFEN 600 MG: 800 TABLET, FILM COATED ORAL at 19:22

## 2019-05-07 RX ADMIN — METHADONE HYDROCHLORIDE 10 MG: 10 TABLET ORAL at 21:44

## 2019-05-07 RX ADMIN — DIPHENHYDRAMINE HYDROCHLORIDE 25 MG: 25 CAPSULE ORAL at 20:45

## 2019-05-07 RX ADMIN — HUMAN INSULIN 5 UNITS: 100 INJECTION, SOLUTION SUBCUTANEOUS at 14:25

## 2019-05-07 RX ADMIN — SODIUM CHLORIDE 125 ML/HR: 9 INJECTION, SOLUTION INTRAVENOUS at 12:29

## 2019-05-07 RX ADMIN — CLINDAMYCIN PHOSPHATE 600 MG: 600 INJECTION, SOLUTION INTRAVENOUS at 12:53

## 2019-05-07 RX ADMIN — Medication 10 MG: at 21:48

## 2019-05-07 RX ADMIN — METOPROLOL TARTRATE 100 MG: 100 TABLET, FILM COATED ORAL at 21:48

## 2019-05-07 RX ADMIN — CEFTRIAXONE 2 G: 2 INJECTION, POWDER, FOR SOLUTION INTRAMUSCULAR; INTRAVENOUS at 18:19

## 2019-05-07 RX ADMIN — SODIUM CHLORIDE 1000 ML: 9 INJECTION, SOLUTION INTRAVENOUS at 12:28

## 2019-05-07 RX ADMIN — METRONIDAZOLE 500 MG: 500 INJECTION, SOLUTION INTRAVENOUS at 21:48

## 2019-05-07 RX ADMIN — INSULIN ASPART 6 UNITS: 100 INJECTION, SOLUTION INTRAVENOUS; SUBCUTANEOUS at 21:49

## 2019-05-07 RX ADMIN — ENOXAPARIN SODIUM 40 MG: 40 INJECTION SUBCUTANEOUS at 21:47

## 2019-05-07 NOTE — PLAN OF CARE
Problem: Skin and Soft Tissue Infection (Adult)  Goal: Signs and Symptoms of Listed Potential Problems Will be Absent, Minimized or Managed (Skin and Soft Tissue Infection)  Outcome: Ongoing (interventions implemented as appropriate)      Problem: Patient Care Overview  Goal: Plan of Care Review  Outcome: Ongoing (interventions implemented as appropriate)    Goal: Individualization and Mutuality  Outcome: Ongoing (interventions implemented as appropriate)    Goal: Discharge Needs Assessment  Outcome: Ongoing (interventions implemented as appropriate)    Goal: Interprofessional Rounds/Family Conf  Outcome: Ongoing (interventions implemented as appropriate)

## 2019-05-07 NOTE — ED NOTES
Patient took her home dose of Methadone 10 mg, and 25 mg tablet of Phenergan per Dr Julian Mcdowell, Tiff Herrera, RN  05/07/19 3067

## 2019-05-07 NOTE — ED PROVIDER NOTES
Subjective   Patient is a 49-year-old female who states that she cut her left third finger while cutting fabric on May 1.  She was seen here on May 3, given a prescription for Bactrim DS, did not fill the prescription.  She states that she had some Flagyl from an old prescription at home, has been taking it.  She complains of worsening pain, redness, swelling of her left third finger.  She denies fever, chills, chest pain, shortness of breath, vomiting.  She denies other symptoms or other complaints.            Review of Systems   Constitutional: Negative for chills, diaphoresis and fever.   HENT: Negative for ear pain, sore throat and trouble swallowing.    Eyes: Negative for photophobia and pain.   Respiratory: Negative for shortness of breath, wheezing and stridor.    Cardiovascular: Negative for chest pain and palpitations.   Gastrointestinal: Negative for abdominal distention, abdominal pain, blood in stool, diarrhea, nausea and vomiting.   Endocrine: Negative for polydipsia and polyphagia.   Genitourinary: Negative for difficulty urinating and flank pain.   Musculoskeletal: Negative for back pain, neck pain and neck stiffness.   Skin: Negative for color change and pallor.   Neurological: Negative for seizures, syncope and speech difficulty.   Psychiatric/Behavioral: Negative for confusion.   All other systems reviewed and are negative.      Past Medical History:   Diagnosis Date   • Diabetes mellitus (CMS/HCC)    • Hypertension    • Pancreatitis        Allergies   Allergen Reactions   • Hydrocodone-Acetaminophen    • Hydromorphone    • Oxycodone-Acetaminophen    • Penicillins        Past Surgical History:   Procedure Laterality Date   • APPENDECTOMY     •  SECTION     • CHOLECYSTECTOMY     • ENDOSCOPY N/A 10/25/2017    Procedure: ESOPHAGOGASTRODUODENOSCOPY with nasal jejunostomy tube placement;  Surgeon: Rudolph Benitez III, MD;  Location: [facility];  Service:    • HYSTERECTOMY         Family  History   Problem Relation Age of Onset   • Hypertension Mother    • Cancer Father    • Heart disease Father    • Hypertension Father    • Heart disease Brother    • Cancer Paternal Grandfather        Social History     Socioeconomic History   • Marital status:      Spouse name: Not on file   • Number of children: Not on file   • Years of education: Not on file   • Highest education level: Not on file   Tobacco Use   • Smoking status: Current Every Day Smoker     Packs/day: 0.50     Types: Cigarettes   Substance and Sexual Activity   • Alcohol use: No   • Drug use: No   • Sexual activity: Defer           Objective   Physical Exam   Constitutional: She is oriented to person, place, and time. She appears well-developed and well-nourished. No distress.   HENT:   Head: Normocephalic and atraumatic.   Eyes: EOM are normal. Pupils are equal, round, and reactive to light. No scleral icterus.   Neck: Normal range of motion. Neck supple. No neck rigidity. No tracheal deviation present.   Cardiovascular: Normal rate, regular rhythm and intact distal pulses.   Pulmonary/Chest: Effort normal and breath sounds normal. No respiratory distress. She exhibits no tenderness.   Abdominal: Soft. Bowel sounds are normal. There is no tenderness. There is no rebound and no guarding.   Musculoskeletal:   Left third finger has a small laceration on the dorsum of the PIP joint.  There is swelling and redness here.  This extends somewhat onto the dorsum of the hand.  Neurovascular is intact.   Neurological: She is alert and oriented to person, place, and time. She has normal strength. No sensory deficit. She exhibits normal muscle tone. Coordination normal. GCS eye subscore is 4. GCS verbal subscore is 5. GCS motor subscore is 6.   Skin: Skin is warm and dry. Capillary refill takes less than 2 seconds. She is not diaphoretic. No cyanosis. No pallor.   Psychiatric: She has a normal mood and affect. Her behavior is normal.   Nursing note  and vitals reviewed.      Procedures  XR Hand 3+ View Left   Final Result   Marked soft tissue edema involving the third digit. Favor cellulitis. No   evidence of fracture or dislocation.       This report was finalized on 5/7/2019 1:46 PM by Dr. Mike Tolbert MD.            Results for orders placed or performed during the hospital encounter of 05/07/19   Basic Metabolic Panel   Result Value Ref Range    Glucose 514 (C) 65 - 99 mg/dL    BUN 12 6 - 20 mg/dL    Creatinine 0.63 0.57 - 1.00 mg/dL    Sodium 132 (L) 136 - 145 mmol/L    Potassium 4.3 3.5 - 5.2 mmol/L    Chloride 95 (L) 98 - 107 mmol/L    CO2 24.0 22.0 - 29.0 mmol/L    Calcium 10.2 8.6 - 10.5 mg/dL    eGFR Non African Amer 100 >60 mL/min/1.73    BUN/Creatinine Ratio 19.0 7.0 - 25.0    Anion Gap 13.0 mmol/L   Lactic Acid, Plasma   Result Value Ref Range    Lactate 1.6 0.5 - 2.0 mmol/L   C-reactive Protein   Result Value Ref Range    C-Reactive Protein 7.07 (H) 0.00 - 0.50 mg/dL   CBC Auto Differential   Result Value Ref Range    WBC 12.11 (H) 3.40 - 10.80 10*3/mm3    RBC 5.35 (H) 3.77 - 5.28 10*6/mm3    Hemoglobin 14.6 12.0 - 15.9 g/dL    Hematocrit 44.8 34.0 - 46.6 %    MCV 83.7 79.0 - 97.0 fL    MCH 27.3 26.6 - 33.0 pg    MCHC 32.6 31.5 - 35.7 g/dL    RDW 13.6 12.3 - 15.4 %    RDW-SD 40.8 37.0 - 54.0 fl    MPV 11.4 6.0 - 12.0 fL    Platelets 171 140 - 450 10*3/mm3    Neutrophil % 78.5 (H) 42.7 - 76.0 %    Lymphocyte % 15.9 (L) 19.6 - 45.3 %    Monocyte % 4.2 (L) 5.0 - 12.0 %    Eosinophil % 1.1 0.3 - 6.2 %    Basophil % 0.1 0.0 - 1.5 %    Immature Grans % 0.2 0.0 - 0.5 %    Neutrophils, Absolute 9.51 (H) 1.70 - 7.00 10*3/mm3    Lymphocytes, Absolute 1.92 0.70 - 3.10 10*3/mm3    Monocytes, Absolute 0.51 0.10 - 0.90 10*3/mm3    Eosinophils, Absolute 0.13 0.00 - 0.40 10*3/mm3    Basophils, Absolute 0.01 0.00 - 0.20 10*3/mm3    Immature Grans, Absolute 0.03 0.00 - 0.05 10*3/mm3   Acetone   Result Value Ref Range    Acetone Negative Negative   Blood Gas,  Venous   Result Value Ref Range    Site Venous     pH, Venous 7.325 pH Units    pCO2, Venous 49.9 mm Hg    pO2, Venous 30.8 mm Hg    HCO3, Venous 25.4 mmol/L    Base Excess, Venous -1.2 mmol/L    O2 Saturation, Venous 61.4 %    Hemoglobin, Blood Gas 15.0 12 - 16 g/dL    A-a Gradiant 52.7 0.0 - 300.0 mmHg    Temperature 98.6 C    Barometric Pressure for Blood Gas 729 mmHg    Modality Room Air     FIO2 21 %   POC Glucose Once   Result Value Ref Range    Glucose 250 (H) 70 - 130 mg/dL   Light Blue Top   Result Value Ref Range    Extra Tube hold for add-on    Green Top (Gel)   Result Value Ref Range    Extra Tube Hold for add-ons.    Lavender Top   Result Value Ref Range    Extra Tube hold for add-on    Gold Top - SST   Result Value Ref Range    Extra Tube Hold for add-ons.                 ED Course  ED Course as of May 07 1607   Tue May 07, 2019   1550 Patient is doing well.  Her  is now arrived.  She has told me that previously she was on insulin, but she was taken off of it and has not been on any sort of diabetes medication for several months.  The medication list that is on her chart is not up-to-date.  Initially the patient was quite adamant about going home and being treated as an outpatient; I have advised the patient and her  that I really feel that she should at least stay in the hospital overnight for continued IV antibiotics and to make sure that her blood sugar gets under control.  She does agree to an overnight stay.  I discussed the case with Aliyah Moss, and she is admitting patient to the observation unit under Dr. Armijo.  She will put in a consult for Dr. Thapa as well.  [CM]      ED Course User Index  [CM] Yoshi Jordan MD                  [facility]      Final diagnoses:   Wound infection   Type 2 diabetes mellitus with hyperglycemia, without long-term current use of insulin (CMS/[facility])             Please note that portions of this note were completed with a voice recognition  program. Efforts were made to edit the dictations, but occasionally words are mistranscribed.       Yoshi Jordan MD  05/07/19 5126

## 2019-05-07 NOTE — ED NOTES
Patient states that she injured middle finger of left hand last week while she was cutting fabric. States that she came into ED two days ago and was sent home with a prescription for Bactrim. States that she was unable to afford the Bactrim so she began taking Flagyl that she had at home. States that since then the affected finger has gotten worse. Reports increased pain, swelling, redness and warmth to the site.      Tiff Mcdowell RN  05/07/19 1300

## 2019-05-08 ENCOUNTER — ANESTHESIA (OUTPATIENT)
Dept: PERIOP | Facility: HOSPITAL | Age: 49
End: 2019-05-08

## 2019-05-08 ENCOUNTER — ANESTHESIA EVENT (OUTPATIENT)
Dept: PERIOP | Facility: HOSPITAL | Age: 49
End: 2019-05-08

## 2019-05-08 LAB
ALBUMIN SERPL-MCNC: 3.33 G/DL (ref 3.5–5.2)
ALBUMIN/GLOB SERPL: 1.2 G/DL
ALP SERPL-CCNC: 116 U/L (ref 39–117)
ALT SERPL W P-5'-P-CCNC: 9 U/L (ref 1–33)
ANION GAP SERPL CALCULATED.3IONS-SCNC: 10.6 MMOL/L
AST SERPL-CCNC: 14 U/L (ref 1–32)
BASOPHILS # BLD AUTO: 0.02 10*3/MM3 (ref 0–0.2)
BASOPHILS NFR BLD AUTO: 0.2 % (ref 0–1.5)
BILIRUB SERPL-MCNC: 0.3 MG/DL (ref 0.2–1.2)
BUN BLD-MCNC: 7 MG/DL (ref 6–20)
BUN/CREAT SERPL: 13.7 (ref 7–25)
CALCIUM SPEC-SCNC: 8.4 MG/DL (ref 8.6–10.5)
CHLORIDE SERPL-SCNC: 105 MMOL/L (ref 98–107)
CHOLEST SERPL-MCNC: 110 MG/DL (ref 0–200)
CO2 SERPL-SCNC: 23.4 MMOL/L (ref 22–29)
CREAT BLD-MCNC: 0.51 MG/DL (ref 0.57–1)
DEPRECATED RDW RBC AUTO: 40.8 FL (ref 37–54)
EOSINOPHIL # BLD AUTO: 0.17 10*3/MM3 (ref 0–0.4)
EOSINOPHIL NFR BLD AUTO: 1.3 % (ref 0.3–6.2)
ERYTHROCYTE [DISTWIDTH] IN BLOOD BY AUTOMATED COUNT: 13.6 % (ref 12.3–15.4)
GFR SERPL CREATININE-BSD FRML MDRD: 128 ML/MIN/1.73
GLOBULIN UR ELPH-MCNC: 2.9 GM/DL
GLUCOSE BLD-MCNC: 104 MG/DL (ref 65–99)
GLUCOSE BLDC GLUCOMTR-MCNC: 102 MG/DL (ref 70–130)
GLUCOSE BLDC GLUCOMTR-MCNC: 232 MG/DL (ref 70–130)
GLUCOSE BLDC GLUCOMTR-MCNC: 245 MG/DL (ref 70–130)
HBA1C MFR BLD: 17 % (ref 4.8–5.6)
HCT VFR BLD AUTO: 39.8 % (ref 34–46.6)
HDLC SERPL-MCNC: 25 MG/DL (ref 40–60)
HGB BLD-MCNC: 13 G/DL (ref 12–15.9)
IMM GRANULOCYTES # BLD AUTO: 0.03 10*3/MM3 (ref 0–0.05)
IMM GRANULOCYTES NFR BLD AUTO: 0.2 % (ref 0–0.5)
LDLC SERPL CALC-MCNC: 51 MG/DL (ref 0–100)
LDLC/HDLC SERPL: 2.06 {RATIO}
LYMPHOCYTES # BLD AUTO: 3.18 10*3/MM3 (ref 0.7–3.1)
LYMPHOCYTES NFR BLD AUTO: 24.9 % (ref 19.6–45.3)
MCH RBC QN AUTO: 27.4 PG (ref 26.6–33)
MCHC RBC AUTO-ENTMCNC: 32.7 G/DL (ref 31.5–35.7)
MCV RBC AUTO: 83.8 FL (ref 79–97)
MONOCYTES # BLD AUTO: 0.74 10*3/MM3 (ref 0.1–0.9)
MONOCYTES NFR BLD AUTO: 5.8 % (ref 5–12)
NEUTROPHILS # BLD AUTO: 8.63 10*3/MM3 (ref 1.7–7)
NEUTROPHILS NFR BLD AUTO: 67.6 % (ref 42.7–76)
PLATELET # BLD AUTO: 187 10*3/MM3 (ref 140–450)
PMV BLD AUTO: 11.5 FL (ref 6–12)
POTASSIUM BLD-SCNC: 3.6 MMOL/L (ref 3.5–5.2)
PROT SERPL-MCNC: 6.2 G/DL (ref 6–8.5)
RBC # BLD AUTO: 4.75 10*6/MM3 (ref 3.77–5.28)
SODIUM BLD-SCNC: 139 MMOL/L (ref 136–145)
TRIGL SERPL-MCNC: 168 MG/DL (ref 0–150)
VLDLC SERPL-MCNC: 33.6 MG/DL
WBC NRBC COR # BLD: 12.77 10*3/MM3 (ref 3.4–10.8)

## 2019-05-08 PROCEDURE — 25010000002 MORPHINE PER 10 MG: Performed by: ANESTHESIOLOGY

## 2019-05-08 PROCEDURE — 94799 UNLISTED PULMONARY SVC/PX: CPT

## 2019-05-08 PROCEDURE — 85025 COMPLETE CBC W/AUTO DIFF WBC: CPT | Performed by: PHYSICIAN ASSISTANT

## 2019-05-08 PROCEDURE — 87070 CULTURE OTHR SPECIMN AEROBIC: CPT | Performed by: ORTHOPAEDIC SURGERY

## 2019-05-08 PROCEDURE — G0378 HOSPITAL OBSERVATION PER HR: HCPCS

## 2019-05-08 PROCEDURE — 96366 THER/PROPH/DIAG IV INF ADDON: CPT

## 2019-05-08 PROCEDURE — 63710000001 DIPHENHYDRAMINE PER 50 MG: Performed by: INTERNAL MEDICINE

## 2019-05-08 PROCEDURE — 87147 CULTURE TYPE IMMUNOLOGIC: CPT | Performed by: ORTHOPAEDIC SURGERY

## 2019-05-08 PROCEDURE — 80061 LIPID PANEL: CPT | Performed by: PHYSICIAN ASSISTANT

## 2019-05-08 PROCEDURE — 25010000002 LEVOFLOXACIN PER 250 MG: Performed by: INTERNAL MEDICINE

## 2019-05-08 PROCEDURE — 93010 ELECTROCARDIOGRAM REPORT: CPT | Performed by: INTERNAL MEDICINE

## 2019-05-08 PROCEDURE — 25010000002 ONDANSETRON PER 1 MG: Performed by: NURSE ANESTHETIST, CERTIFIED REGISTERED

## 2019-05-08 PROCEDURE — 87176 TISSUE HOMOGENIZATION CULTR: CPT | Performed by: ORTHOPAEDIC SURGERY

## 2019-05-08 PROCEDURE — 82962 GLUCOSE BLOOD TEST: CPT

## 2019-05-08 PROCEDURE — 25010000002 FENTANYL CITRATE (PF) 100 MCG/2ML SOLUTION: Performed by: NURSE ANESTHETIST, CERTIFIED REGISTERED

## 2019-05-08 PROCEDURE — 96376 TX/PRO/DX INJ SAME DRUG ADON: CPT

## 2019-05-08 PROCEDURE — 25010000002 PROPOFOL 10 MG/ML EMULSION: Performed by: NURSE ANESTHETIST, CERTIFIED REGISTERED

## 2019-05-08 PROCEDURE — 93005 ELECTROCARDIOGRAM TRACING: CPT | Performed by: INTERNAL MEDICINE

## 2019-05-08 PROCEDURE — 63710000001 INSULIN ASPART PER 5 UNITS: Performed by: PHYSICIAN ASSISTANT

## 2019-05-08 PROCEDURE — 87205 SMEAR GRAM STAIN: CPT | Performed by: ORTHOPAEDIC SURGERY

## 2019-05-08 PROCEDURE — 25010000002 KETOROLAC TROMETHAMINE PER 15 MG: Performed by: NURSE PRACTITIONER

## 2019-05-08 PROCEDURE — 25010000002 ONDANSETRON PER 1 MG: Performed by: PHYSICIAN ASSISTANT

## 2019-05-08 PROCEDURE — 25010000002 MIDAZOLAM PER 1 MG: Performed by: NURSE ANESTHETIST, CERTIFIED REGISTERED

## 2019-05-08 PROCEDURE — 83036 HEMOGLOBIN GLYCOSYLATED A1C: CPT | Performed by: NURSE PRACTITIONER

## 2019-05-08 PROCEDURE — 25010000002 KETOROLAC TROMETHAMINE PER 15 MG: Performed by: PHYSICIAN ASSISTANT

## 2019-05-08 PROCEDURE — 96375 TX/PRO/DX INJ NEW DRUG ADDON: CPT

## 2019-05-08 PROCEDURE — 80053 COMPREHEN METABOLIC PANEL: CPT | Performed by: PHYSICIAN ASSISTANT

## 2019-05-08 RX ORDER — MIDAZOLAM HYDROCHLORIDE 1 MG/ML
INJECTION INTRAMUSCULAR; INTRAVENOUS AS NEEDED
Status: DISCONTINUED | OUTPATIENT
Start: 2019-05-08 | End: 2019-05-08 | Stop reason: SURG

## 2019-05-08 RX ORDER — FENTANYL CITRATE 50 UG/ML
50 INJECTION, SOLUTION INTRAMUSCULAR; INTRAVENOUS
Status: DISCONTINUED | OUTPATIENT
Start: 2019-05-08 | End: 2019-05-08 | Stop reason: HOSPADM

## 2019-05-08 RX ORDER — KETOROLAC TROMETHAMINE 30 MG/ML
30 INJECTION, SOLUTION INTRAMUSCULAR; INTRAVENOUS EVERY 6 HOURS PRN
Status: DISCONTINUED | OUTPATIENT
Start: 2019-05-08 | End: 2019-05-09 | Stop reason: HOSPADM

## 2019-05-08 RX ORDER — FAMOTIDINE 10 MG/ML
INJECTION, SOLUTION INTRAVENOUS AS NEEDED
Status: DISCONTINUED | OUTPATIENT
Start: 2019-05-08 | End: 2019-05-08 | Stop reason: SURG

## 2019-05-08 RX ORDER — LIDOCAINE HYDROCHLORIDE 20 MG/ML
INJECTION, SOLUTION INFILTRATION; PERINEURAL AS NEEDED
Status: DISCONTINUED | OUTPATIENT
Start: 2019-05-08 | End: 2019-05-08 | Stop reason: SURG

## 2019-05-08 RX ORDER — L.ACID,PARA/B.BIFIDUM/S.THERM 8B CELL
1 CAPSULE ORAL DAILY
Status: DISCONTINUED | OUTPATIENT
Start: 2019-05-08 | End: 2019-05-09 | Stop reason: HOSPADM

## 2019-05-08 RX ORDER — CLINDAMYCIN PHOSPHATE 900 MG/50ML
900 INJECTION INTRAVENOUS EVERY 8 HOURS
Status: DISCONTINUED | OUTPATIENT
Start: 2019-05-08 | End: 2019-05-09 | Stop reason: HOSPADM

## 2019-05-08 RX ORDER — IPRATROPIUM BROMIDE AND ALBUTEROL SULFATE 2.5; .5 MG/3ML; MG/3ML
3 SOLUTION RESPIRATORY (INHALATION) ONCE AS NEEDED
Status: DISCONTINUED | OUTPATIENT
Start: 2019-05-08 | End: 2019-05-08 | Stop reason: HOSPADM

## 2019-05-08 RX ORDER — PROPOFOL 10 MG/ML
VIAL (ML) INTRAVENOUS AS NEEDED
Status: DISCONTINUED | OUTPATIENT
Start: 2019-05-08 | End: 2019-05-08 | Stop reason: SURG

## 2019-05-08 RX ORDER — ONDANSETRON 2 MG/ML
4 INJECTION INTRAMUSCULAR; INTRAVENOUS ONCE AS NEEDED
Status: DISCONTINUED | OUTPATIENT
Start: 2019-05-08 | End: 2019-05-08 | Stop reason: HOSPADM

## 2019-05-08 RX ORDER — ONDANSETRON 2 MG/ML
INJECTION INTRAMUSCULAR; INTRAVENOUS AS NEEDED
Status: DISCONTINUED | OUTPATIENT
Start: 2019-05-08 | End: 2019-05-08 | Stop reason: SURG

## 2019-05-08 RX ORDER — MORPHINE SULFATE 2 MG/ML
INJECTION, SOLUTION INTRAMUSCULAR; INTRAVENOUS AS NEEDED
Status: DISCONTINUED | OUTPATIENT
Start: 2019-05-08 | End: 2019-05-08 | Stop reason: SURG

## 2019-05-08 RX ORDER — LEVOFLOXACIN 5 MG/ML
500 INJECTION, SOLUTION INTRAVENOUS EVERY 24 HOURS
Status: DISCONTINUED | OUTPATIENT
Start: 2019-05-08 | End: 2019-05-08

## 2019-05-08 RX ORDER — SODIUM CHLORIDE 0.9 % (FLUSH) 0.9 %
3 SYRINGE (ML) INJECTION EVERY 12 HOURS SCHEDULED
Status: DISCONTINUED | OUTPATIENT
Start: 2019-05-08 | End: 2019-05-08

## 2019-05-08 RX ORDER — MORPHINE SULFATE 4 MG/ML
4 INJECTION, SOLUTION INTRAMUSCULAR; INTRAVENOUS ONCE
Status: COMPLETED | OUTPATIENT
Start: 2019-05-08 | End: 2019-05-08

## 2019-05-08 RX ORDER — KETOROLAC TROMETHAMINE 30 MG/ML
30 INJECTION, SOLUTION INTRAMUSCULAR; INTRAVENOUS ONCE
Status: COMPLETED | OUTPATIENT
Start: 2019-05-08 | End: 2019-05-08

## 2019-05-08 RX ORDER — LEVOFLOXACIN 5 MG/ML
500 INJECTION, SOLUTION INTRAVENOUS EVERY 24 HOURS
Status: DISCONTINUED | OUTPATIENT
Start: 2019-05-08 | End: 2019-05-09 | Stop reason: HOSPADM

## 2019-05-08 RX ORDER — MEPERIDINE HYDROCHLORIDE 25 MG/ML
12.5 INJECTION INTRAMUSCULAR; INTRAVENOUS; SUBCUTANEOUS
Status: DISCONTINUED | OUTPATIENT
Start: 2019-05-08 | End: 2019-05-08 | Stop reason: HOSPADM

## 2019-05-08 RX ORDER — SODIUM CHLORIDE, SODIUM LACTATE, POTASSIUM CHLORIDE, CALCIUM CHLORIDE 600; 310; 30; 20 MG/100ML; MG/100ML; MG/100ML; MG/100ML
125 INJECTION, SOLUTION INTRAVENOUS CONTINUOUS
Status: DISCONTINUED | OUTPATIENT
Start: 2019-05-08 | End: 2019-05-08

## 2019-05-08 RX ORDER — SODIUM CHLORIDE 0.9 % (FLUSH) 0.9 %
3-10 SYRINGE (ML) INJECTION AS NEEDED
Status: DISCONTINUED | OUTPATIENT
Start: 2019-05-08 | End: 2019-05-08

## 2019-05-08 RX ORDER — FENTANYL CITRATE 50 UG/ML
INJECTION, SOLUTION INTRAMUSCULAR; INTRAVENOUS AS NEEDED
Status: DISCONTINUED | OUTPATIENT
Start: 2019-05-08 | End: 2019-05-08 | Stop reason: SURG

## 2019-05-08 RX ADMIN — INSULIN ASPART 4 UNITS: 100 INJECTION, SOLUTION INTRAVENOUS; SUBCUTANEOUS at 12:22

## 2019-05-08 RX ADMIN — MIDAZOLAM HYDROCHLORIDE 2 MG: 1 INJECTION, SOLUTION INTRAMUSCULAR; INTRAVENOUS at 15:36

## 2019-05-08 RX ADMIN — LEVOFLOXACIN 500 MG: 5 INJECTION, SOLUTION INTRAVENOUS at 13:31

## 2019-05-08 RX ADMIN — Medication 1 TABLET: at 08:12

## 2019-05-08 RX ADMIN — SODIUM CHLORIDE 125 ML/HR: 9 INJECTION, SOLUTION INTRAVENOUS at 12:30

## 2019-05-08 RX ADMIN — FENTANYL CITRATE 50 MCG: 50 INJECTION INTRAMUSCULAR; INTRAVENOUS at 16:41

## 2019-05-08 RX ADMIN — METRONIDAZOLE 500 MG: 500 INJECTION, SOLUTION INTRAVENOUS at 02:27

## 2019-05-08 RX ADMIN — METHADONE HYDROCHLORIDE 10 MG: 10 TABLET ORAL at 08:12

## 2019-05-08 RX ADMIN — Medication 10 MG: at 20:16

## 2019-05-08 RX ADMIN — INSULIN ASPART 4 UNITS: 100 INJECTION, SOLUTION INTRAVENOUS; SUBCUTANEOUS at 08:12

## 2019-05-08 RX ADMIN — FENTANYL CITRATE 100 MCG: 50 INJECTION INTRAMUSCULAR; INTRAVENOUS at 16:11

## 2019-05-08 RX ADMIN — METOPROLOL TARTRATE 100 MG: 100 TABLET, FILM COATED ORAL at 20:16

## 2019-05-08 RX ADMIN — ONDANSETRON 4 MG: 2 INJECTION, SOLUTION INTRAMUSCULAR; INTRAVENOUS at 18:38

## 2019-05-08 RX ADMIN — LIDOCAINE HYDROCHLORIDE 40 MG: 20 INJECTION, SOLUTION INFILTRATION; PERINEURAL at 15:36

## 2019-05-08 RX ADMIN — ONDANSETRON 4 MG: 2 INJECTION, SOLUTION INTRAMUSCULAR; INTRAVENOUS at 23:13

## 2019-05-08 RX ADMIN — CLINDAMYCIN PHOSPHATE 900 MG: 900 INJECTION, SOLUTION INTRAVENOUS at 12:23

## 2019-05-08 RX ADMIN — FAMOTIDINE 20 MG: 10 INJECTION, SOLUTION INTRAVENOUS at 15:36

## 2019-05-08 RX ADMIN — PROPOFOL 150 MG: 10 INJECTION, EMULSION INTRAVENOUS at 15:41

## 2019-05-08 RX ADMIN — SENNOSIDES 1 TABLET: 8.6 TABLET, FILM COATED ORAL at 08:12

## 2019-05-08 RX ADMIN — SODIUM CHLORIDE, POTASSIUM CHLORIDE, SODIUM LACTATE AND CALCIUM CHLORIDE 125 ML/HR: 600; 310; 30; 20 INJECTION, SOLUTION INTRAVENOUS at 14:53

## 2019-05-08 RX ADMIN — SODIUM CHLORIDE 125 ML/HR: 9 INJECTION, SOLUTION INTRAVENOUS at 18:38

## 2019-05-08 RX ADMIN — KETOROLAC TROMETHAMINE 30 MG: 30 INJECTION, SOLUTION INTRAMUSCULAR; INTRAVENOUS at 02:26

## 2019-05-08 RX ADMIN — MORPHINE SULFATE 4 MG: 4 INJECTION INTRAVENOUS at 14:52

## 2019-05-08 RX ADMIN — SENNOSIDES 1 TABLET: 8.6 TABLET, FILM COATED ORAL at 20:16

## 2019-05-08 RX ADMIN — KETOROLAC TROMETHAMINE 30 MG: 30 INJECTION, SOLUTION INTRAMUSCULAR; INTRAVENOUS at 08:21

## 2019-05-08 RX ADMIN — DIPHENHYDRAMINE HYDROCHLORIDE 25 MG: 25 CAPSULE ORAL at 20:16

## 2019-05-08 RX ADMIN — METOPROLOL TARTRATE 100 MG: 100 TABLET, FILM COATED ORAL at 08:12

## 2019-05-08 RX ADMIN — ONDANSETRON 4 MG: 2 INJECTION, SOLUTION INTRAMUSCULAR; INTRAVENOUS at 08:21

## 2019-05-08 RX ADMIN — ONDANSETRON 4 MG: 2 INJECTION, SOLUTION INTRAMUSCULAR; INTRAVENOUS at 15:36

## 2019-05-08 RX ADMIN — MORPHINE SULFATE 10 MG: 2 INJECTION, SOLUTION INTRAMUSCULAR; INTRAVENOUS at 16:54

## 2019-05-08 RX ADMIN — FENTANYL CITRATE 50 MCG: 50 INJECTION INTRAMUSCULAR; INTRAVENOUS at 16:48

## 2019-05-08 RX ADMIN — KETOROLAC TROMETHAMINE 30 MG: 30 INJECTION, SOLUTION INTRAMUSCULAR; INTRAVENOUS at 23:13

## 2019-05-08 RX ADMIN — SODIUM CHLORIDE, PRESERVATIVE FREE 3 ML: 5 INJECTION INTRAVENOUS at 20:16

## 2019-05-08 RX ADMIN — SODIUM CHLORIDE, PRESERVATIVE FREE 3 ML: 5 INJECTION INTRAVENOUS at 08:14

## 2019-05-08 RX ADMIN — FENTANYL CITRATE 100 MCG: 50 INJECTION INTRAMUSCULAR; INTRAVENOUS at 15:36

## 2019-05-08 RX ADMIN — IBUPROFEN 600 MG: 800 TABLET, FILM COATED ORAL at 05:07

## 2019-05-08 RX ADMIN — CLINDAMYCIN PHOSPHATE 900 MG: 900 INJECTION, SOLUTION INTRAVENOUS at 20:13

## 2019-05-08 NOTE — PLAN OF CARE
Problem: Skin and Soft Tissue Infection (Adult)  Goal: Signs and Symptoms of Listed Potential Problems Will be Absent, Minimized or Managed (Skin and Soft Tissue Infection)  Outcome: Ongoing (interventions implemented as appropriate)    Goal: Signs and Symptoms of Listed Potential Problems Will be Absent, Minimized or Managed (Skin and Soft Tissue Infection)  Outcome: Ongoing (interventions implemented as appropriate)      Problem: Patient Care Overview  Goal: Plan of Care Review  Outcome: Ongoing (interventions implemented as appropriate)    Goal: Individualization and Mutuality  Outcome: Ongoing (interventions implemented as appropriate)    Goal: Discharge Needs Assessment  Outcome: Ongoing (interventions implemented as appropriate)    Goal: Interprofessional Rounds/Family Conf  Outcome: Ongoing (interventions implemented as appropriate)      Problem: Pain, Chronic (Adult)  Goal: Identify Related Risk Factors and Signs and Symptoms  Outcome: Ongoing (interventions implemented as appropriate)    Goal: Acceptable Pain/Comfort Level and Functional Ability  Outcome: Ongoing (interventions implemented as appropriate)

## 2019-05-08 NOTE — CONSULTS
Patient Care Team:  Seven Gallego MD as PCP - General (Internal Medicine)    Chief complaint left hand 3rd finger laceration    Subjective     History of Present Illness   49 year old female complains of cutting her hand on some fabric cutters on 19. She presented to the ER on 5-3-19 and placed on antibiotics. Patient states her symptoms worsened and she presented back to the ER. She was admitted for further evaluation. She complains of pain and swelling. Denies n/v fever or chills.     Review of Systems   Constitutional: no fever, chills and night sweats. No appetite change or unexpected weight change. No fatigue.  Eyes: no eye drainage, itching or redness.  HEENT: no mouth sores, dysphagia or nose bleed.  Respiratory: no for shortness of breath or cough.  Cardiovascular: no chest pain, no palpitations.  Gastrointestinal: no nausea, vomiting or diarrhea.   Genitourinary: no dysuria or polyuria.  Hematologic/lymphatic: no lymph node abnormalities, no easy bruising or easy bleeding.  Musculoskeletal: no muscle or joint pain.  Skin: See HPI  Neurological: no loss of consciousness, no seizure, no headache.  Behavioral/Psych: no depression or suicidal ideation.  Endocrine: no hot flashes.          Past Medical History:   Diagnosis Date   • Diabetes mellitus (CMS/HCC)    • Hypertension    • Pancreatitis      Past Surgical History:   Procedure Laterality Date   • APPENDECTOMY     •  SECTION     • CHOLECYSTECTOMY     • ENDOSCOPY N/A 10/25/2017    Procedure: ESOPHAGOGASTRODUODENOSCOPY with nasal jejunostomy tube placement;  Surgeon: Rudolph Benitez III, MD;  Location: [facility] OR;  Service:    • HYSTERECTOMY       Family History   Problem Relation Age of Onset   • Hypertension Mother    • Cancer Father    • Heart disease Father    • Hypertension Father    • Heart disease Brother    • Cancer Paternal Grandfather      Social History     Tobacco Use   • Smoking status: Current Every Day Smoker      Packs/day: 0.50     Types: Cigarettes   Substance Use Topics   • Alcohol use: No   • Drug use: No     Medications Prior to Admission   Medication Sig Dispense Refill Last Dose   • diphenhydrAMINE (BENADRYL) 25 mg capsule Take 25 mg by mouth Every Night.   5/6/2019 at Unknown time   • esomeprazole (nexIUM) 40 MG capsule Take 40 mg by mouth Every Morning Before Breakfast.   5/7/2019 at Unknown time   • Melatonin 10 MG tablet Take 10 mg by mouth Every Night.   5/6/2019 at Unknown time   • methadone (DOLOPHINE) 10 MG tablet Take 10 mg by mouth 3 (Three) Times a Day,   5/7/2019 at 1500   • metoprolol tartrate (LOPRESSOR) 100 MG tablet Take 100 mg by mouth 2 (Two) Times a Day.   5/7/2019 at Unknown time   • multivitamin (DAILY JOSE ALFREDO) tablet tablet Take 1 tablet by mouth Daily.   5/7/2019 at Unknown time   • promethazine (PHENERGAN) 25 MG tablet Take 25 mg by mouth 2 (Two) Times a Day As Needed for Nausea or Vomiting.   5/7/2019 at 1500   • senna (SENOKOT) 8.6 MG tablet tablet Take 1 tablet by mouth 2 (Two) Times a Day.   5/7/2019 at Unknown time     Allergies:  Vancomycin; Hydrocodone-acetaminophen; Hydromorphone; Oxycodone-acetaminophen; and Penicillins    Objective      Vital Signs  Temp:  [97.8 °F (36.6 °C)-99.3 °F (37.4 °C)] 98.9 °F (37.2 °C)  Heart Rate:  [] 92  Resp:  [18] 18  BP: (104-173)/(55-99) 144/66    Physical Exam   Constitutional:  Well-developed and well-nourished.  No respiratory distress.      HENT:  Head: Normocephalic and atraumatic.  Mouth:  Moist mucous membranes.    Eyes:  Conjunctivae and EOM are normal.  No scleral icterus.  Neck:  Neck supple. No adenopathy.  Cardiovascular:  Normal rate, regular rhythm.  Pulmonary/Chest:  No respiratory distress, no wheezes.  Abdominal:  Soft.  Bowel sounds are normal.    Musculoskeletal:  No extensor lag.   Neurological:  Alert and oriented to person, place, and time.  No facial droop.  No slurred speech.   Skin:  Left third finger with significant edema  and erythema extending to the palmar and dorsal aspect of the left hand. No drainage noted at this time. Open wound noted to the dorsal aspect of the finger. Edema noted at the PIP joint.   Psychiatric:  Normal mood and affect.  Behavior is normal.          Results Review:   I reviewed the patient's new clinical results.      Assessment/Plan       Wound infection      Assessment & Plan   Left 3rd phalanx infected wound.  Plan: Will keep the patient npo and plan for OR this afternoon per Dr. Thapa. Patient verbalizes understanding and wishes to proceed. Will continue antibiotics as written.     I discussed the patients findings and my recommendations with patient    Milana Parra, APRN  05/08/19  9:55 AM    Time: 15 mins

## 2019-05-08 NOTE — SIGNIFICANT NOTE
Dressings: STRIP PACKING W IODOFORM 1/4 (1), DRSNG PETROLTM XEROFORM 4X4IN (1), SPNG GZ WOVN 4X4IN 12PLY 10/BX STRL (1), PAD CAST SOF ROL STRL 3IN LF (1), BNDG ELAS ELITE V/CLOSE 3IN 5YD LF STRL (1)   PACKING SOAKED IN POVIDONE-IODINE 10%

## 2019-05-08 NOTE — PLAN OF CARE
Problem: Pain, Chronic (Adult)  Goal: Identify Related Risk Factors and Signs and Symptoms  Outcome: Ongoing (interventions implemented as appropriate)   05/07/19 2057   Pain, Chronic (Adult)   Related Risk Factors (Chronic Pain) disease process   Signs and Symptoms (Chronic Pain) verbalization of pain/discomfort for a prolonged time period     Goal: Acceptable Pain/Comfort Level and Functional Ability  Outcome: Ongoing (interventions implemented as appropriate)

## 2019-05-08 NOTE — PLAN OF CARE
Problem: Skin and Soft Tissue Infection (Adult)  Goal: Signs and Symptoms of Listed Potential Problems Will be Absent, Minimized or Managed (Skin and Soft Tissue Infection)  Outcome: Ongoing (interventions implemented as appropriate)   05/07/19 2056   Goal/Outcome Evaluation   Problems Assessed (Skin and Soft Tissue Infection) all   Problems Present (Skin/Soft Tissue Inf) pain     Goal: Signs and Symptoms of Listed Potential Problems Will be Absent, Minimized or Managed (Skin and Soft Tissue Infection)  Outcome: Ongoing (interventions implemented as appropriate)      Problem: Patient Care Overview  Goal: Plan of Care Review  Outcome: Ongoing (interventions implemented as appropriate)    Goal: Individualization and Mutuality  Outcome: Ongoing (interventions implemented as appropriate)

## 2019-05-08 NOTE — PHARMACY PATIENT ASSISTANCE
Pharmacy was consulted to evaluate any possible patient assistance that could be provided to the patient. The patient has been approved for [facility] for the prescriptions she receives upon this discharge. The patient had requested a prescription for a blood glucose meter, test strips and lancets if the provider feels this is appropriate for her at discharge.     Thank you,  Jewels Peace. Jamila, [facility]  05/08/19  2:11 PM

## 2019-05-08 NOTE — ANESTHESIA PROCEDURE NOTES
Airway  Urgency: elective    Airway not difficult    General Information and Staff    Patient location during procedure: OR  CRNA: Dori Perry CRNA    Indications and Patient Condition  Indications for airway management: airway protection    Preoxygenated: yes  MILS maintained throughout  Mask difficulty assessment: 0 - not attempted    Final Airway Details  Final airway type: supraglottic airway      Successful airway: unique  Size 4    Number of attempts at approach: 1

## 2019-05-08 NOTE — ANESTHESIA POSTPROCEDURE EVALUATION
Patient: Rossy Boone    Procedure Summary     Date:  05/08/19 Room / Location:  [facility] OR 04 /  [facility] OR    Anesthesia Start:  1536 Anesthesia Stop:  1629    Procedure:  INCISION AND DRAINAGE UPPER EXTREMITY, excisional debridement 3rd phalanx (Left Arm Upper) Diagnosis:       Wound infection      (Wound infection [T14.8XXA, L08.9])    Surgeon:  Eric Thapa MD Provider:  Shamir Corona MD    Anesthesia Type:  general ASA Status:  3          Anesthesia Type: general  Last vitals  BP   140/85 (05/08/19 1656)   Temp   98 °F (36.7 °C) (05/08/19 1631)   Pulse   86 (05/08/19 1656)   Resp   16 (05/08/19 1656)     SpO2   100 % (05/08/19 1656)     Post Anesthesia Care and Evaluation    Patient location during evaluation: PACU  Patient participation: complete - patient participated  Level of consciousness: awake and alert  Pain score: 1  Pain management: adequate  Airway patency: patent  Anesthetic complications: No anesthetic complications  PONV Status: controlled  Cardiovascular status: acceptable  Respiratory status: acceptable  Hydration status: acceptable

## 2019-05-08 NOTE — ANESTHESIA PREPROCEDURE EVALUATION
Anesthesia Evaluation     Patient summary reviewed and Nursing notes reviewed   no history of anesthetic complications:  NPO Solid Status: > 8 hours  NPO Liquid Status: > 8 hours           Airway   Mallampati: II  TM distance: >3 FB  Neck ROM: full  no difficulty expected  Dental - normal exam   (+) poor dentition    Pulmonary - normal exam   (+) a smoker Current Abstained day of surgery,   (-) asthma  Cardiovascular - normal exam  Exercise tolerance: good (4-7 METS)    NYHA Classification: II  Patient on routine beta blocker and Beta blocker given within 24 hours of surgery    (+) hypertension,   (-) past MI, dysrhythmias, angina, CHF      Neuro/Psych- negative ROS  (-) seizures, CVA  GI/Hepatic/Renal/Endo    (+)  GERD,  diabetes mellitus,   (-) liver disease, no renal disease    Musculoskeletal (-) negative ROS    Abdominal  - normal exam    Bowel sounds: normal.   Substance History - negative use     OB/GYN negative ob/gyn ROS         Other - negative ROS                     Anesthesia Plan    ASA 3     general     intravenous induction   Anesthetic plan, all risks, benefits, and alternatives have been provided, discussed and informed consent has been obtained with: patient.  Use of blood products discussed with patient  Consented to blood products.

## 2019-05-09 VITALS
RESPIRATION RATE: 18 BRPM | HEIGHT: 62 IN | HEART RATE: 68 BPM | DIASTOLIC BLOOD PRESSURE: 56 MMHG | BODY MASS INDEX: 22.45 KG/M2 | SYSTOLIC BLOOD PRESSURE: 105 MMHG | TEMPERATURE: 98.7 F | WEIGHT: 122 LBS | OXYGEN SATURATION: 95 %

## 2019-05-09 LAB
ALBUMIN SERPL-MCNC: 3.12 G/DL (ref 3.5–5.2)
ALBUMIN/GLOB SERPL: 0.9 G/DL
ALP SERPL-CCNC: 147 U/L (ref 39–117)
ALT SERPL W P-5'-P-CCNC: 15 U/L (ref 1–33)
ANION GAP SERPL CALCULATED.3IONS-SCNC: 10.2 MMOL/L
AST SERPL-CCNC: 26 U/L (ref 1–32)
BACTERIA SPEC AEROBE CULT: ABNORMAL
BASOPHILS # BLD AUTO: 0.01 10*3/MM3 (ref 0–0.2)
BASOPHILS NFR BLD AUTO: 0.1 % (ref 0–1.5)
BILIRUB SERPL-MCNC: 0.3 MG/DL (ref 0.2–1.2)
BUN BLD-MCNC: 7 MG/DL (ref 6–20)
BUN/CREAT SERPL: 13 (ref 7–25)
CALCIUM SPEC-SCNC: 8.2 MG/DL (ref 8.6–10.5)
CHLORIDE SERPL-SCNC: 107 MMOL/L (ref 98–107)
CO2 SERPL-SCNC: 22.8 MMOL/L (ref 22–29)
CREAT BLD-MCNC: 0.54 MG/DL (ref 0.57–1)
CRP SERPL-MCNC: 9.78 MG/DL (ref 0–0.5)
DEPRECATED RDW RBC AUTO: 42.1 FL (ref 37–54)
EOSINOPHIL # BLD AUTO: 0.12 10*3/MM3 (ref 0–0.4)
EOSINOPHIL NFR BLD AUTO: 1.2 % (ref 0.3–6.2)
ERYTHROCYTE [DISTWIDTH] IN BLOOD BY AUTOMATED COUNT: 13.7 % (ref 12.3–15.4)
GFR SERPL CREATININE-BSD FRML MDRD: 120 ML/MIN/1.73
GLOBULIN UR ELPH-MCNC: 3.4 GM/DL
GLUCOSE BLD-MCNC: 250 MG/DL (ref 65–99)
GLUCOSE BLDC GLUCOMTR-MCNC: 120 MG/DL (ref 70–130)
GLUCOSE BLDC GLUCOMTR-MCNC: 180 MG/DL (ref 70–130)
GLUCOSE BLDC GLUCOMTR-MCNC: 277 MG/DL (ref 70–130)
GLUCOSE BLDC GLUCOMTR-MCNC: 283 MG/DL (ref 70–130)
GRAM STN SPEC: ABNORMAL
GRAM STN SPEC: ABNORMAL
HCT VFR BLD AUTO: 40.2 % (ref 34–46.6)
HGB BLD-MCNC: 12.8 G/DL (ref 12–15.9)
IMM GRANULOCYTES # BLD AUTO: 0.02 10*3/MM3 (ref 0–0.05)
IMM GRANULOCYTES NFR BLD AUTO: 0.2 % (ref 0–0.5)
LYMPHOCYTES # BLD AUTO: 2 10*3/MM3 (ref 0.7–3.1)
LYMPHOCYTES NFR BLD AUTO: 20.5 % (ref 19.6–45.3)
MCH RBC QN AUTO: 27.1 PG (ref 26.6–33)
MCHC RBC AUTO-ENTMCNC: 31.8 G/DL (ref 31.5–35.7)
MCV RBC AUTO: 85 FL (ref 79–97)
MONOCYTES # BLD AUTO: 0.53 10*3/MM3 (ref 0.1–0.9)
MONOCYTES NFR BLD AUTO: 5.4 % (ref 5–12)
NEUTROPHILS # BLD AUTO: 7.07 10*3/MM3 (ref 1.7–7)
NEUTROPHILS NFR BLD AUTO: 72.6 % (ref 42.7–76)
PLATELET # BLD AUTO: 179 10*3/MM3 (ref 140–450)
PMV BLD AUTO: 11.2 FL (ref 6–12)
POTASSIUM BLD-SCNC: 3.9 MMOL/L (ref 3.5–5.2)
PROT SERPL-MCNC: 6.5 G/DL (ref 6–8.5)
RBC # BLD AUTO: 4.73 10*6/MM3 (ref 3.77–5.28)
SODIUM BLD-SCNC: 140 MMOL/L (ref 136–145)
STREP GROUPING: ABNORMAL
WBC NRBC COR # BLD: 9.75 10*3/MM3 (ref 3.4–10.8)

## 2019-05-09 PROCEDURE — 25010000002 LEVOFLOXACIN PER 250 MG: Performed by: INTERNAL MEDICINE

## 2019-05-09 PROCEDURE — 63710000001 INSULIN ASPART PER 5 UNITS: Performed by: PHYSICIAN ASSISTANT

## 2019-05-09 PROCEDURE — 25010000002 PROMETHAZINE PER 50 MG: Performed by: NURSE PRACTITIONER

## 2019-05-09 PROCEDURE — 80053 COMPREHEN METABOLIC PANEL: CPT | Performed by: PHYSICIAN ASSISTANT

## 2019-05-09 PROCEDURE — 25010000002 KETOROLAC TROMETHAMINE PER 15 MG: Performed by: PHYSICIAN ASSISTANT

## 2019-05-09 PROCEDURE — G0378 HOSPITAL OBSERVATION PER HR: HCPCS

## 2019-05-09 PROCEDURE — 85025 COMPLETE CBC W/AUTO DIFF WBC: CPT | Performed by: PHYSICIAN ASSISTANT

## 2019-05-09 PROCEDURE — 82962 GLUCOSE BLOOD TEST: CPT

## 2019-05-09 PROCEDURE — G0108 DIAB MANAGE TRN  PER INDIV: HCPCS

## 2019-05-09 PROCEDURE — 86140 C-REACTIVE PROTEIN: CPT | Performed by: PHYSICIAN ASSISTANT

## 2019-05-09 PROCEDURE — 25010000002 DIPHENHYDRAMINE PER 50 MG: Performed by: NURSE PRACTITIONER

## 2019-05-09 PROCEDURE — 25010000002 PROCHLORPERAZINE 10 MG/2ML SOLUTION: Performed by: NURSE PRACTITIONER

## 2019-05-09 PROCEDURE — 25010000002 ONDANSETRON PER 1 MG: Performed by: PHYSICIAN ASSISTANT

## 2019-05-09 RX ORDER — LEVOFLOXACIN 500 MG/1
500 TABLET, FILM COATED ORAL DAILY
Qty: 42 TABLET | Refills: 0 | Status: SHIPPED | OUTPATIENT
Start: 2019-05-09 | End: 2019-06-20

## 2019-05-09 RX ORDER — L.ACID,PARA/B.BIFIDUM/S.THERM 8B CELL
1 CAPSULE ORAL DAILY
Qty: 14 CAPSULE | Refills: 0 | Status: SHIPPED | OUTPATIENT
Start: 2019-05-09

## 2019-05-09 RX ORDER — DIPHENHYDRAMINE HYDROCHLORIDE 50 MG/ML
25 INJECTION INTRAMUSCULAR; INTRAVENOUS EVERY 6 HOURS PRN
Status: DISCONTINUED | OUTPATIENT
Start: 2019-05-09 | End: 2019-05-09 | Stop reason: HOSPADM

## 2019-05-09 RX ORDER — ONDANSETRON 4 MG/1
4 TABLET, FILM COATED ORAL EVERY 6 HOURS PRN
Qty: 60 TABLET | Refills: 0 | Status: SHIPPED | OUTPATIENT
Start: 2019-05-09

## 2019-05-09 RX ORDER — CLINDAMYCIN HYDROCHLORIDE 300 MG/1
300 CAPSULE ORAL 4 TIMES DAILY
Qty: 56 CAPSULE | Refills: 0 | Status: SHIPPED | OUTPATIENT
Start: 2019-05-09 | End: 2019-05-23

## 2019-05-09 RX ADMIN — PANTOPRAZOLE SODIUM 40 MG: 40 TABLET, DELAYED RELEASE ORAL at 06:06

## 2019-05-09 RX ADMIN — PROCHLORPERAZINE EDISYLATE 5 MG: 5 INJECTION INTRAMUSCULAR; INTRAVENOUS at 14:35

## 2019-05-09 RX ADMIN — SODIUM CHLORIDE 125 ML/HR: 9 INJECTION, SOLUTION INTRAVENOUS at 11:02

## 2019-05-09 RX ADMIN — Medication 1 TABLET: at 08:50

## 2019-05-09 RX ADMIN — DIPHENHYDRAMINE HYDROCHLORIDE 25 MG: 50 INJECTION INTRAMUSCULAR; INTRAVENOUS at 14:52

## 2019-05-09 RX ADMIN — SODIUM CHLORIDE 125 ML/HR: 9 INJECTION, SOLUTION INTRAVENOUS at 04:08

## 2019-05-09 RX ADMIN — CLINDAMYCIN PHOSPHATE 900 MG: 900 INJECTION, SOLUTION INTRAVENOUS at 04:08

## 2019-05-09 RX ADMIN — METHADONE HYDROCHLORIDE 10 MG: 10 TABLET ORAL at 08:50

## 2019-05-09 RX ADMIN — SODIUM CHLORIDE, PRESERVATIVE FREE 3 ML: 5 INJECTION INTRAVENOUS at 08:52

## 2019-05-09 RX ADMIN — Medication 1 CAPSULE: at 08:50

## 2019-05-09 RX ADMIN — INSULIN ASPART 6 UNITS: 100 INJECTION, SOLUTION INTRAVENOUS; SUBCUTANEOUS at 11:44

## 2019-05-09 RX ADMIN — INSULIN ASPART 6 UNITS: 100 INJECTION, SOLUTION INTRAVENOUS; SUBCUTANEOUS at 07:36

## 2019-05-09 RX ADMIN — KETOROLAC TROMETHAMINE 30 MG: 30 INJECTION, SOLUTION INTRAMUSCULAR; INTRAVENOUS at 04:53

## 2019-05-09 RX ADMIN — METHADONE HYDROCHLORIDE 10 MG: 10 TABLET ORAL at 00:24

## 2019-05-09 RX ADMIN — METHADONE HYDROCHLORIDE 10 MG: 10 TABLET ORAL at 16:45

## 2019-05-09 RX ADMIN — PROMETHAZINE HYDROCHLORIDE 12.5 MG: 25 INJECTION INTRAMUSCULAR; INTRAVENOUS at 14:51

## 2019-05-09 RX ADMIN — CLINDAMYCIN PHOSPHATE 900 MG: 900 INJECTION, SOLUTION INTRAVENOUS at 11:03

## 2019-05-09 RX ADMIN — ONDANSETRON 4 MG: 2 INJECTION, SOLUTION INTRAMUSCULAR; INTRAVENOUS at 11:02

## 2019-05-09 RX ADMIN — ONDANSETRON 4 MG: 2 INJECTION, SOLUTION INTRAMUSCULAR; INTRAVENOUS at 04:53

## 2019-05-09 RX ADMIN — LEVOFLOXACIN 500 MG: 5 INJECTION, SOLUTION INTRAVENOUS at 11:03

## 2019-05-09 RX ADMIN — METOPROLOL TARTRATE 100 MG: 100 TABLET, FILM COATED ORAL at 08:50

## 2019-05-09 RX ADMIN — KETOROLAC TROMETHAMINE 30 MG: 30 INJECTION, SOLUTION INTRAMUSCULAR; INTRAVENOUS at 11:02

## 2019-05-09 RX ADMIN — SENNOSIDES 1 TABLET: 8.6 TABLET, FILM COATED ORAL at 08:50

## 2019-05-09 NOTE — NURSING NOTE
Spoke to Sabra Shankar regarding infusion /wound clinic, pt has a appointment at 2 pm on 5-10-19, Sabra Shankar to walk orders down in the am

## 2019-05-09 NOTE — PROGRESS NOTES
LOS: 0 days   Patient Care Team:  Seven Gallego MD as PCP - General (Internal Medicine)    Post Op Day 1      Procedure(s):  INCISION AND DRAINAGE UPPER EXTREMITY, excisional debridement 3rd phalanx     Subjective   Patient resting quietly in bed. No new issues overnight. Complains of pain this am. Denies fever or chills.   Interval History:     Patient Complaints: pain left hand  Patient Denies:  Cough, fever or chills  History taken from: patient RN    Review of Systems:    All systems were reviewed and negative except for:  Musculoskeletal: positive for  joint pain and joint stiffness    Objective     Vital Signs  Temp:  [97.2 °F (36.2 °C)-99 °F (37.2 °C)] 99 °F (37.2 °C)  Heart Rate:  [66-93] 76  Resp:  [9-22] 18  BP: (104-158)/(59-99) 138/79    Physical Exam:   General Appearance: alert, appears stated age and cooperative  Head: normocephalic, without obvious abnormality and atraumatic  Lungs: clear to auscultation, respirations regular, respirations even and respirations unlabored  Heart: regular rhythm & normal rate, normal S1, S2, no murmur, no gallop, no rub and no click  Extremities: dressing removed. Stitches intact. Positive bloody drainge noted with edema of the finger. cap refill less than 3 secs. Light touch sensation intact.      Results Review:     I reviewed the patient's new clinical results.    Medication Review: yes    Assessment/Plan       Wound infection      Plan: Continue wound care and abx as written. Will continue to follow.     Plan for disposition:Where: home    Milana Herrera Aida, APRN  05/09/19  2:56 PM      Time: 15 mins

## 2019-05-09 NOTE — NURSING NOTE
Pt has been nausea and vomiting multiple times, Deidra Pressley notified , IV medications administered, pt, took off dressing on left hand , states itching , pt had clindamycin antibiotic last. States she does not want that antibiotic anymore. Will let MD know. And add to list of allergies . Pt given IV phenergan and IV benadryl at this time, surgical wound dressed at this time also. Discussed pt coming to infusion wound clinic daily and would be private pay, or the other option would be for significant other to do dressing change. Pt states does not think he could handle doing the dressing change, but would let me know as soon as possible what she would decided. I notified Aditi .

## 2019-05-09 NOTE — NURSING NOTE
Pt states will use the infusion wound clinic, pt does know that it will be private pay. Aditi notified of pt's decision and Deidra Pressley notified of pt's decision as she was on the floor at the time pt made her choice, will call clinic and set up appointment for pt along with instructions for dressing change.

## 2019-05-09 NOTE — CONSULTS
Patient resting comfortably in bed, alert and oriented.  Made patient aware of current HgB A1C of 17. Counseled patient on how hyperglycemia will compromise her wound healing.  Patient reports she has been on no diabetes medications at home.  Counseled patient on diabetes basic handout which discusses exactly what diabetes is and how it affects the body.  Counseled patient on complications of hyperglycemia and ways to prevent it.  Counseled patient on hypoglycemia and treatment by using the rule of 15.  Counseled patient on the importance of checking blood glucose levels frequently and keeping a log to take to all MD appointments.  Counseled patient on how to care for themselves during sick day.  Stressed the importance of healthy eating with a limit of carbohydrates to 180 per day.  Counseled on importance of complying with medications as ordered by provider.  Counseled patient to seek medical attention if blood glucose above 300.  Counseled on importance of daily exercise. Patient verbalizes understanding of all information given. Encouraged patient to attend an outpatient diabetes smart class or attend diabetes support  group.  Left time and dates of classes with patient along with my name and contact information, will continue to monitor patient as needed.

## 2019-05-09 NOTE — PROGRESS NOTES
Discharge Planning Assessment   [place]     Patient Name: Rossy Boone  MRN: [id number]  Today's Date: 5/9/2019    Admit Date: 5/7/2019    Discharge Plan     Row Name 05/09/19 1538       Plan    Plan SS spoke with Deidra Pressley who states pt will need daily wound care. Pt does not have insurance. SS contacted the infusion clinic per Ev who states pt could come to the clinic and would be billed as self pay. Pt is going to talk with her  about him being able to do the wound care versus coming to the infusion clinic daily. SS will follow.      Aditi Gan

## 2019-05-09 NOTE — DISCHARGE SUMMARY
DISCHARGE SUMMARY        Patient Identification:  Name:  Rossy Boone  Age:  49 y.o.  Sex:  female  :  1970  MRN:  [id number]  Visit Number:  [id number]    Date of Admission: 2019  Date of Discharge:  2019    PCP: Seven Gallego MD    Discharging Provider: SHARON Mccann      Discharge Diagnoses       1. Hand cellulitis      Consults/Procedures     Consults:   Consults     Date and Time Order Name Status Description    2019 1716 Inpatient Orthopedic Surgery Consult Completed     2019 1554 IP General Consult (Use specialty-specific consult if known)            Procedures Performed:  Procedure(s):  INCISION AND DRAINAGE UPPER EXTREMITY, excisional debridement 3rd phalanx       History of Presenting Illness     The patient is a 49 year old female who recently presented to the ED on 19 after cutter her left third finger while cutting fabric. See admission history and physical for further details.      Hospital Course     Patient was admitted to the Observation unit for further monitoring and evaluation. Dr. Booth from orthopedic surgery was consulted for evaluation of cellulitis and performed an I&D on 19. Wound cultures finalized as Group A Streptococcus pyogenes. Patient was initiated on clindamycin and Levaquin for treatment, due to questionable allergic reaction to Vancomycin and Rocephin. Case discussed with Dr. Thapa' patient stable for discharge with outpatient follow up in 1 week. Patient will require daily packing to the wound. Patient's antibiotic therapy was de-escalated to PO Levaquin for 6 weeks and Clindamycin for 2 weeks in the setting of septic arthritis. Patient to have weekly EKGs for QTC monitoring while on Levaquin. Patient to have weekly CBC and BMP while on antibiotic therapy. Patient to follow up with PCP in 1 week and Dr. Thapa in 1 week. Patient to have dressing changes at outpatient infusion clinic daily.     Discharge Vitals/Physical Examination      Vital Signs:  Temp:  [97.2 °F (36.2 °C)-99 °F (37.2 °C)] 99 °F (37.2 °C)  Heart Rate:  [66-93] 76  Resp:  [9-22] 18  BP: (104-158)/(59-99) 138/79  Mean Arterial Pressure (Non-Invasive) for the past 24 hrs (Last 3 readings):   Noninvasive MAP (mmHg)   05/09/19 1208 99   05/09/19 0740 77   05/09/19 0500 82     SpO2 Percentage    05/09/19 0500 05/09/19 0740 05/09/19 1208   SpO2: 97% 96% 98%     SpO2:  [94 %-100 %] 98 %  on  Flow (L/min):  [2-4] 2;   Device (Oxygen Therapy): room air    Body mass index is 22.31 kg/m².  Wt Readings from Last 3 Encounters:   05/08/19 55.3 kg (122 lb)   05/03/19 54.4 kg (120 lb)   03/09/19 55.3 kg (122 lb)         Physical Exam:    Constitutional:  Well-developed and well-nourished.  No respiratory distress.      HENT:  Head: Normocephalic and atraumatic.  Mouth:  Moist mucous membranes.    Eyes:  Conjunctivae and EOM are normal.  No scleral icterus.  Neck:  Neck supple.  No JVD present.    Cardiovascular:  Normal rate, regular rhythm and normal heart sounds with no murmur. No edema.  Pulmonary/Chest:  No respiratory distress, no wheezes, no crackles, with normal breath sounds and good air movement.  Abdominal:  Soft.  Bowel sounds are normal.  No distension and no tenderness.   Musculoskeletal: Left hand in surgical dressing and splint.  Neurological:  Alert and oriented to person, place, and time.  No facial droop.  No slurred speech.   Skin:  Skin is warm and dry.  No rash noted.  No pallor.   Psychiatric:  Normal mood and affect.  Behavior is normal.      Pertinent Laboratory/Radiology Results     Pertinent Laboratory Results:          Results from last 7 days   Lab Units 05/08/19  0255   CHOLESTEROL mg/dL 110   TRIGLYCERIDES mg/dL 168*   HDL CHOL mg/dL 25*   LDL CHOL mg/dL 51       Results from last 7 days   Lab Units 05/09/19  0317 05/08/19  0255 05/07/19  1222   CRP mg/dL 9.78*  --  7.07*   LACTATE mmol/L  --   --  1.6   WBC 10*3/mm3 9.75 12.77* 12.11*   HEMOGLOBIN g/dL 12.8  13.0 14.6   HEMATOCRIT % 40.2 39.8 44.8   MCV fL 85.0 83.8 83.7   MCHC g/dL 31.8 32.7 32.6   PLATELETS 10*3/mm3 179 187 171     Results from last 7 days   Lab Units 05/09/19  0317 05/08/19  0255 05/07/19  1222   SODIUM mmol/L 140 139 132*   POTASSIUM mmol/L 3.9 3.6 4.3   CHLORIDE mmol/L 107 105 95*   CO2 mmol/L 22.8 23.4 24.0   BUN mg/dL 7 7 12   CREATININE mg/dL 0.54* 0.51* 0.63   EGFR IF NONAFRICN AM mL/min/1.73 120 128 100   CALCIUM mg/dL 8.2* 8.4* 10.2   GLUCOSE mg/dL 250* 104* 514*   ALBUMIN g/dL 3.12* 3.33*  --    BILIRUBIN mg/dL 0.3 0.3  --    ALK PHOS U/L 147* 116  --    AST (SGOT) U/L 26 14  --    ALT (SGPT) U/L 15 9  --    Estimated Creatinine Clearance: 110 mL/min (A) (by C-G formula based on SCr of 0.54 mg/dL (L)).  No results found for: AMMONIA    Hemoglobin A1C   Date/Time Value Ref Range Status   05/08/2019 0255 17.00 (H) 4.80 - 5.60 % Final     Glucose   Date/Time Value Ref Range Status   05/09/2019 1042 283 (H) 70 - 130 mg/dL Final   05/09/2019 0619 277 (H) 70 - 130 mg/dL Final   05/08/2019 2011 120 70 - 130 mg/dL Final   05/08/2019 1752 102 70 - 130 mg/dL Final   05/08/2019 1122 245 (H) 70 - 130 mg/dL Final   05/08/2019 0714 232 (H) 70 - 130 mg/dL Final   05/07/2019 2012 282 (H) 70 - 130 mg/dL Final   05/07/2019 1649 199 (H) 70 - 130 mg/dL Final     Lab Results   Component Value Date    HGBA1C 17.00 (H) 05/08/2019     Lab Results   Component Value Date    TSH 1.897 10/07/2017       Blood Culture   Date Value Ref Range Status   05/07/2019 No growth at 2 days  Preliminary   05/07/2019 No growth at 2 days  Preliminary        Wound Culture   Date Value Ref Range Status   05/08/2019 Scant growth (1+) Streptococcus pyogenes (Group A) (A)  Preliminary     Comment:     This organism is considered to be universally susceptible to penicillin.  No further antibiotic testing will be performed.   05/08/2019 Rare Normal Skin Magi  Preliminary   05/07/2019 Scant growth (1+) Streptococcus pyogenes (Group A) (A)   Final     Comment:     This organism is considered to be universally susceptible to penicillin.  No further antibiotic testing will be performed.           Pain Management Panel     There is no flowsheet data to display.          Pertinent Radiology Results:  Imaging Results (all)     Procedure Component Value Units Date/Time    XR Hand 3+ View Left [[id number]] Collected:  05/07/19 1345     Updated:  05/07/19 1357    Narrative:       EXAMINATION: XR HAND 3+ VW LEFT-      CLINICAL INDICATION:3rd finger wound infection     COMPARISON: None      TECHNIQUE: 3 views left hand     FINDINGS:   Fairly extensive soft tissue edema is noted involving the third digit.  No pathologic periosteal reaction is seen. No destructive bony changes  identified. No evidence of fracture. No radio opaque foreign body is  noted.       Impression:       Marked soft tissue edema involving the third digit. Favor cellulitis. No  evidence of fracture or dislocation.     This report was finalized on 5/7/2019 1:46 PM by Dr. Mike Tolbert MD.             Test Results Pending at Discharge:   Order Current Status    Blood Culture - Blood, Arm, Right Preliminary result    Blood Culture - Blood, Arm, Right Preliminary result    Tissue / Bone Culture - Tissue, Hand, Digit Left Preliminary result    Wound Culture - Swab, Hand, Digit Left Preliminary result          Discharge Disposition/Discharge Medications/Discharge Appointments     Discharge Disposition:   Home or Self Care    Condition at Discharge:  Stable, much improved with no issues today     Code Status While Inpatient:  Code Status and Medical Interventions:   Ordered at: 05/07/19 1170     Code Status:    CPR     Medical Interventions (Level of Support Prior to Arrest):    Full       Discharge Medications:     Discharge Medications      New Medications      Instructions Start Date   clindamycin 300 MG capsule  Commonly known as:  CLEOCIN   300 mg, Oral, 4 Times Daily      levoFLOXacin 500 MG  tablet  Commonly known as:  LEVAQUIN   500 mg, Oral, Daily      ondansetron 4 MG tablet  Commonly known as:  ZOFRAN   4 mg, Oral, Every 6 Hours PRN         Continue These Medications      Instructions Start Date   diphenhydrAMINE 25 mg capsule  Commonly known as:  BENADRYL   25 mg, Oral, Nightly      esomeprazole 40 MG capsule  Commonly known as:  nexIUM   40 mg, Oral, Every Morning Before Breakfast      Melatonin 10 MG tablet   10 mg, Oral, Nightly      methadone 10 MG tablet  Commonly known as:  DOLOPHINE   10 mg, Oral, 3 Times Daily      metoprolol tartrate 100 MG tablet  Commonly known as:  LOPRESSOR   100 mg, Oral, 2 Times Daily      multivitamin tablet tablet   1 tablet, Oral, Daily      promethazine 25 MG tablet  Commonly known as:  PHENERGAN   25 mg, Oral, 2 Times Daily PRN      senna 8.6 MG tablet tablet  Commonly known as:  SENOKOT   1 tablet, Oral, 2 Times Daily             Discharge Diet:  Consistent Carbohydrate    Discharge Activity:  As tolerated      Discharge Appointments:      Additional Instructions for the Follow-ups that You Need to Schedule     Basic Metabolic Panel    May 16, 2019 (Approximate)      CBC & Differential    May 16, 2019 (Approximate)      Manual Differential:  No           Follow-up Information     Seven Gallego MD Follow up in 1 week(s).    Specialty:  Internal Medicine  Contact information:  [address] [place] [address]  [phone]             Eric Thapa MD Follow up in 1 week(s).    Specialty:  Orthopedic Surgery  Contact information:  [address] [place] [id number] [phone]                   Additional Instructions for the Follow-ups that You Need to Schedule     Basic Metabolic Panel    May 16, 2019 (Approximate)      CBC & Differential    May 16, 2019 (Approximate)      Manual Differential:  No                   SHARON Mccann  05/09/19  3:13 PM          Please note that this discharge summary required more than 30  minutes to complete.

## 2019-05-09 NOTE — PROGRESS NOTES
Discharge Planning Assessment   [facility]     Patient Name: Rossy Boone  MRN: [id number]  Today's Date: 5/9/2019    Admit Date: 5/7/2019    Discharge Plan     Row Name 05/09/19 1614       Plan    Patient/Family in Agreement with Plan  yes    Final Discharge Disposition Code  01 - home or self-care    Final Note Pt is being discharged home on this date. Pt has decided to come to the infusion clinic daily to recieve her wound care. SS contacted the infusion clinic per Keily and made her aware. SS faxed order to the infusion clinic. SS provided RNYaquelin with the number to the infusion clinic. Yaquelin to call the infusion clinic and give them wound care orders and provide pt with the time she needs to be at the infusion clinic daily. No other needs identified.        Aditi Gan

## 2019-05-10 ENCOUNTER — HOSPITAL ENCOUNTER (OUTPATIENT)
Dept: INFUSION THERAPY | Facility: HOSPITAL | Age: 49
Setting detail: INFUSION SERIES
Discharge: HOME | End: 2019-05-10

## 2019-05-10 ENCOUNTER — TRANSCRIBE ORDERS (OUTPATIENT)
Dept: INFUSION THERAPY | Facility: HOSPITAL | Age: 49
End: 2019-05-10

## 2019-05-10 VITALS
TEMPERATURE: 98.6 F | HEART RATE: 96 BPM | DIASTOLIC BLOOD PRESSURE: 88 MMHG | SYSTOLIC BLOOD PRESSURE: 159 MMHG | RESPIRATION RATE: 16 BRPM

## 2019-05-10 DIAGNOSIS — T14.8XXA OPEN WOUND: ICD-10-CM

## 2019-05-10 DIAGNOSIS — T14.8XXA INFECTED WOUND: ICD-10-CM

## 2019-05-10 DIAGNOSIS — L08.9 INFECTED WOUND: ICD-10-CM

## 2019-05-10 DIAGNOSIS — T14.8XXA OPEN WOUND: Primary | ICD-10-CM

## 2019-05-10 LAB
BACTERIA SPEC AEROBE CULT: ABNORMAL
GRAM STN SPEC: ABNORMAL

## 2019-05-10 PROCEDURE — 97602 WOUND(S) CARE NON-SELECTIVE: CPT

## 2019-05-10 NOTE — PROGRESS NOTES
Case Management/Social Work    Patient Name:  Rossy Boone  YOB: 1970  MRN: [id number]  Admit Date:  5/7/2019    SS spoke with the Infusion Clinic who requests an order with what the exact dressing change is. SS spoke with SHARON Mccann who filled out and signed a paper prescription addressing the would care orders. SS faxed the prescription to the Infusion Clinic at [phone].     No other needs identified.     Electronically signed by:  Aditi Gan  05/10/19 4:32 PM

## 2019-05-10 NOTE — CODE DOCUMENTATION
"Pt stated \"I am suppose to be getting IV antibiotics also.\"  Called Deidra HERRERA for clarification who stated pt's was started on oral Levaquin & Clindimycin instead of IV form. Notified pt who voices she is taking it as prescribed.  "

## 2019-05-11 ENCOUNTER — HOSPITAL ENCOUNTER (OUTPATIENT)
Dept: INFUSION THERAPY | Facility: HOSPITAL | Age: 49
Setting detail: INFUSION SERIES
Discharge: HOME | End: 2019-05-11

## 2019-05-11 VITALS
SYSTOLIC BLOOD PRESSURE: 157 MMHG | HEART RATE: 84 BPM | OXYGEN SATURATION: 18 % | DIASTOLIC BLOOD PRESSURE: 80 MMHG | TEMPERATURE: 97.5 F

## 2019-05-11 DIAGNOSIS — T14.8XXA INFECTED WOUND: Primary | ICD-10-CM

## 2019-05-11 DIAGNOSIS — L08.9 INFECTED WOUND: Primary | ICD-10-CM

## 2019-05-11 PROCEDURE — 97602 WOUND(S) CARE NON-SELECTIVE: CPT

## 2019-05-12 ENCOUNTER — HOSPITAL ENCOUNTER (OUTPATIENT)
Dept: INFUSION THERAPY | Facility: HOSPITAL | Age: 49
Setting detail: INFUSION SERIES
Discharge: HOME | End: 2019-05-12

## 2019-05-12 VITALS
RESPIRATION RATE: 18 BRPM | DIASTOLIC BLOOD PRESSURE: 77 MMHG | SYSTOLIC BLOOD PRESSURE: 313 MMHG | HEART RATE: 76 BPM | TEMPERATURE: 98.6 F

## 2019-05-12 DIAGNOSIS — T14.8XXA INFECTED WOUND: Primary | ICD-10-CM

## 2019-05-12 DIAGNOSIS — L08.9 INFECTED WOUND: Primary | ICD-10-CM

## 2019-05-12 LAB
BACTERIA SPEC AEROBE CULT: NORMAL
BACTERIA SPEC AEROBE CULT: NORMAL

## 2019-05-12 PROCEDURE — 97602 WOUND(S) CARE NON-SELECTIVE: CPT

## 2019-05-13 ENCOUNTER — HOSPITAL ENCOUNTER (OUTPATIENT)
Dept: INFUSION THERAPY | Facility: HOSPITAL | Age: 49
Setting detail: INFUSION SERIES
Discharge: HOME | End: 2019-05-13

## 2019-05-13 VITALS
TEMPERATURE: 98.9 F | RESPIRATION RATE: 18 BRPM | SYSTOLIC BLOOD PRESSURE: 148 MMHG | HEART RATE: 83 BPM | DIASTOLIC BLOOD PRESSURE: 81 MMHG

## 2019-05-13 DIAGNOSIS — T14.8XXA WOUND INFECTION: ICD-10-CM

## 2019-05-13 DIAGNOSIS — L08.9 WOUND INFECTION: ICD-10-CM

## 2019-05-13 PROCEDURE — 97602 WOUND(S) CARE NON-SELECTIVE: CPT

## 2019-05-14 ENCOUNTER — HOSPITAL ENCOUNTER (OUTPATIENT)
Dept: INFUSION THERAPY | Facility: HOSPITAL | Age: 49
Setting detail: INFUSION SERIES
Discharge: HOME | End: 2019-05-14

## 2019-05-14 ENCOUNTER — APPOINTMENT (OUTPATIENT)
Dept: INFUSION THERAPY | Facility: HOSPITAL | Age: 49
End: 2019-05-14

## 2019-05-14 VITALS
RESPIRATION RATE: 20 BRPM | SYSTOLIC BLOOD PRESSURE: 154 MMHG | HEART RATE: 79 BPM | TEMPERATURE: 98.3 F | DIASTOLIC BLOOD PRESSURE: 76 MMHG

## 2019-05-14 DIAGNOSIS — L08.9 WOUND INFECTION: ICD-10-CM

## 2019-05-14 DIAGNOSIS — T14.8XXA WOUND INFECTION: ICD-10-CM

## 2019-05-14 PROCEDURE — G0463 HOSPITAL OUTPT CLINIC VISIT: HCPCS

## 2019-05-14 PROCEDURE — 97602 WOUND(S) CARE NON-SELECTIVE: CPT

## 2019-05-15 ENCOUNTER — HOSPITAL ENCOUNTER (OUTPATIENT)
Dept: INFUSION THERAPY | Facility: HOSPITAL | Age: 49
Setting detail: INFUSION SERIES
Discharge: HOME | End: 2019-05-15

## 2019-05-15 VITALS
RESPIRATION RATE: 20 BRPM | DIASTOLIC BLOOD PRESSURE: 81 MMHG | HEART RATE: 95 BPM | SYSTOLIC BLOOD PRESSURE: 144 MMHG | TEMPERATURE: 98.1 F

## 2019-05-15 DIAGNOSIS — L08.9 WOUND INFECTION: ICD-10-CM

## 2019-05-15 DIAGNOSIS — T14.8XXA WOUND INFECTION: ICD-10-CM

## 2019-05-15 PROCEDURE — G0463 HOSPITAL OUTPT CLINIC VISIT: HCPCS

## 2019-05-15 PROCEDURE — 97602 WOUND(S) CARE NON-SELECTIVE: CPT

## 2019-05-16 ENCOUNTER — APPOINTMENT (OUTPATIENT)
Dept: INFUSION THERAPY | Facility: HOSPITAL | Age: 49
End: 2019-05-16

## 2019-05-17 ENCOUNTER — APPOINTMENT (OUTPATIENT)
Dept: INFUSION THERAPY | Facility: HOSPITAL | Age: 49
End: 2019-05-17

## 2019-05-17 ENCOUNTER — HOSPITAL ENCOUNTER (OUTPATIENT)
Dept: INFUSION THERAPY | Facility: HOSPITAL | Age: 49
Setting detail: INFUSION SERIES
Discharge: HOME | End: 2019-05-17

## 2019-05-17 VITALS
SYSTOLIC BLOOD PRESSURE: 124 MMHG | TEMPERATURE: 98.4 F | HEART RATE: 79 BPM | RESPIRATION RATE: 18 BRPM | DIASTOLIC BLOOD PRESSURE: 70 MMHG

## 2019-05-17 DIAGNOSIS — L08.9 INFECTED WOUND: Primary | ICD-10-CM

## 2019-05-17 DIAGNOSIS — T14.8XXA INFECTED WOUND: Primary | ICD-10-CM

## 2019-05-17 PROCEDURE — G0463 HOSPITAL OUTPT CLINIC VISIT: HCPCS

## 2019-05-18 ENCOUNTER — HOSPITAL ENCOUNTER (OUTPATIENT)
Dept: INFUSION THERAPY | Facility: HOSPITAL | Age: 49
Setting detail: INFUSION SERIES
Discharge: HOME | End: 2019-05-18

## 2019-05-18 VITALS
DIASTOLIC BLOOD PRESSURE: 64 MMHG | SYSTOLIC BLOOD PRESSURE: 124 MMHG | RESPIRATION RATE: 20 BRPM | TEMPERATURE: 98.1 F | HEART RATE: 80 BPM

## 2019-05-18 DIAGNOSIS — L08.9 WOUND INFECTION: ICD-10-CM

## 2019-05-18 DIAGNOSIS — T14.8XXA WOUND INFECTION: ICD-10-CM

## 2019-05-18 PROCEDURE — G0463 HOSPITAL OUTPT CLINIC VISIT: HCPCS

## 2019-05-19 ENCOUNTER — HOSPITAL ENCOUNTER (OUTPATIENT)
Dept: INFUSION THERAPY | Facility: HOSPITAL | Age: 49
Setting detail: INFUSION SERIES
Discharge: HOME | End: 2019-05-19

## 2019-05-19 VITALS
SYSTOLIC BLOOD PRESSURE: 118 MMHG | TEMPERATURE: 98.3 F | HEART RATE: 78 BPM | RESPIRATION RATE: 20 BRPM | DIASTOLIC BLOOD PRESSURE: 62 MMHG

## 2019-05-19 DIAGNOSIS — L08.9 WOUND INFECTION: ICD-10-CM

## 2019-05-19 DIAGNOSIS — T14.8XXA WOUND INFECTION: ICD-10-CM

## 2019-05-19 PROCEDURE — G0463 HOSPITAL OUTPT CLINIC VISIT: HCPCS

## 2019-05-20 ENCOUNTER — APPOINTMENT (OUTPATIENT)
Dept: INFUSION THERAPY | Facility: HOSPITAL | Age: 49
End: 2019-05-20

## 2019-05-20 ENCOUNTER — HOSPITAL ENCOUNTER (OUTPATIENT)
Dept: INFUSION THERAPY | Facility: HOSPITAL | Age: 49
Setting detail: INFUSION SERIES
Discharge: HOME | End: 2019-05-20

## 2019-05-20 VITALS
RESPIRATION RATE: 18 BRPM | SYSTOLIC BLOOD PRESSURE: 105 MMHG | TEMPERATURE: 98 F | DIASTOLIC BLOOD PRESSURE: 56 MMHG | HEART RATE: 80 BPM

## 2019-05-20 DIAGNOSIS — T14.8XXA WOUND INFECTION: ICD-10-CM

## 2019-05-20 DIAGNOSIS — L08.9 WOUND INFECTION: ICD-10-CM

## 2019-05-20 PROCEDURE — G0463 HOSPITAL OUTPT CLINIC VISIT: HCPCS

## 2019-05-21 ENCOUNTER — HOSPITAL ENCOUNTER (OUTPATIENT)
Dept: INFUSION THERAPY | Facility: HOSPITAL | Age: 49
Setting detail: INFUSION SERIES
Discharge: HOME | End: 2019-05-21

## 2019-05-21 VITALS — RESPIRATION RATE: 20 BRPM | HEART RATE: 82 BPM | DIASTOLIC BLOOD PRESSURE: 51 MMHG | SYSTOLIC BLOOD PRESSURE: 108 MMHG

## 2019-05-21 DIAGNOSIS — T14.8XXA WOUND INFECTION: ICD-10-CM

## 2019-05-21 DIAGNOSIS — L08.9 WOUND INFECTION: ICD-10-CM

## 2019-05-21 PROCEDURE — G0463 HOSPITAL OUTPT CLINIC VISIT: HCPCS

## 2019-05-22 ENCOUNTER — HOSPITAL ENCOUNTER (OUTPATIENT)
Dept: INFUSION THERAPY | Facility: HOSPITAL | Age: 49
Setting detail: INFUSION SERIES
Discharge: HOME | End: 2019-05-22

## 2019-05-22 DIAGNOSIS — T14.8XXA WOUND INFECTION: ICD-10-CM

## 2019-05-22 DIAGNOSIS — L08.9 WOUND INFECTION: ICD-10-CM

## 2019-05-22 PROCEDURE — G0463 HOSPITAL OUTPT CLINIC VISIT: HCPCS

## 2019-05-23 ENCOUNTER — APPOINTMENT (OUTPATIENT)
Dept: INFUSION THERAPY | Facility: HOSPITAL | Age: 49
End: 2019-05-23

## 2019-05-24 ENCOUNTER — APPOINTMENT (OUTPATIENT)
Dept: INFUSION THERAPY | Facility: HOSPITAL | Age: 49
End: 2019-05-24

## 2019-05-25 ENCOUNTER — APPOINTMENT (OUTPATIENT)
Dept: INFUSION THERAPY | Facility: HOSPITAL | Age: 49
End: 2019-05-25

## 2019-05-26 ENCOUNTER — APPOINTMENT (OUTPATIENT)
Dept: INFUSION THERAPY | Facility: HOSPITAL | Age: 49
End: 2019-05-26

## 2019-05-27 ENCOUNTER — APPOINTMENT (OUTPATIENT)
Dept: INFUSION THERAPY | Facility: HOSPITAL | Age: 49
End: 2019-05-27

## 2019-05-28 ENCOUNTER — APPOINTMENT (OUTPATIENT)
Dept: INFUSION THERAPY | Facility: HOSPITAL | Age: 49
End: 2019-05-28

## 2019-05-29 ENCOUNTER — APPOINTMENT (OUTPATIENT)
Dept: INFUSION THERAPY | Facility: HOSPITAL | Age: 49
End: 2019-05-29

## 2021-03-18 ENCOUNTER — BULK ORDERING (OUTPATIENT)
Dept: CASE MANAGEMENT | Facility: OTHER | Age: 51
End: 2021-03-18

## 2021-03-18 DIAGNOSIS — Z23 IMMUNIZATION DUE: ICD-10-CM

## 2023-01-08 NOTE — PROGRESS NOTES
PROGRESS NOTE         Patient Identification:  Name:  Rossy Boone  Age:  49 y.o.  Sex:  female  :  1970  MRN:  [id number]  Visit Number:  [id number]  Primary Care Provider:  Seven Gallego MD      ----------------------------------------------------------------------------------------------------------------------  Subjective       Chief Complaints:    Hand Injury        Interval History:      The patient is awake and alert today. She continues to be in pain with her left middle finger. Toradol 30 mg IV was ordered. Persistent leukocytosis with elevated CRP of 7.07. Hemoglobin A1C is elevated at 17.00. Diabetes educator consulted. Case discussed with Dr. Thapa who is planning I&D for this afternoon. Culture showing growth of strep pyogenes. The patient was reported to have a reaction overnight with hives. She had received Vancomycin and Rocephin so we are unsure of which caused the reaction.     Review of Systems:     Constitutional: no fever, chills and night sweats. No appetite change or unexpected weight change. No fatigue.  Eyes: no eye drainage, itching or redness.  HEENT: no mouth sores, dysphagia or nose bleed.  Respiratory: no for shortness of breath, cough or production of sputum.  Cardiovascular: no chest pain, no palpitations, no orthopnea.  Gastrointestinal: no nausea, vomiting or diarrhea. No abdominal pain, hematemesis or rectal bleeding.  Genitourinary: no dysuria or polyuria.  Hematologic/lymphatic: no lymph node abnormalities, no easy bruising or easy bleeding.  Musculoskeletal: no muscle or joint pain.  Skin: See HPI  Neurological: no loss of consciousness, no seizure, no headache.  Behavioral/Psych: no depression or suicidal ideation.  Endocrine: no hot flashes.  Immunologic: negative.    ----------------------------------------------------------------------------------------------------------------------      Objective       Current Hospital Meds:    clindamycin 900 mg  Intravenous Q8H   diphenhydrAMINE 25 mg Oral Nightly   insulin aspart 0-9 Units Subcutaneous 4x Daily AC & at Bedtime   melatonin 10 mg Oral Nightly   methadone 10 mg Oral TID   metoprolol tartrate 100 mg Oral Q12H   multivitamin 1 tablet Oral Daily   pantoprazole 40 mg Oral QAM   senna 1 tablet Oral BID   sodium chloride 3 mL Intravenous Q12H       Pharmacy Consult     sodium chloride 125 mL/hr Last Rate: 125 mL/hr (05/08/19 0828)     ----------------------------------------------------------------------------------------------------------------------    Vital Signs:  Temp:  [97.8 °F (36.6 °C)-99.3 °F (37.4 °C)] 98.9 °F (37.2 °C)  Heart Rate:  [] 92  Resp:  [18] 18  BP: (104-173)/(55-99) 144/66  Mean Arterial Pressure (Non-Invasive) for the past 24 hrs (Last 3 readings):   Noninvasive MAP (mmHg)   05/08/19 0809 91   05/08/19 0439 78   05/07/19 2355 75     SpO2 Percentage    05/08/19 0118 05/08/19 0439 05/08/19 0809   SpO2: 97% 96% 97%     SpO2:  [94 %-100 %] 97 %  on   ;   Device (Oxygen Therapy): room air    Body mass index is 22.33 kg/m².  Wt Readings from Last 3 Encounters:   05/07/19 55.4 kg (122 lb 1.6 oz)   05/03/19 54.4 kg (120 lb)   03/09/19 55.3 kg (122 lb)        Intake/Output Summary (Last 24 hours) at 5/8/2019 1028  Last data filed at 5/8/2019 1019  Gross per 24 hour   Intake 300 ml   Output --   Net 300 ml     NPO Diet  ----------------------------------------------------------------------------------------------------------------------    Physical Exam:     Constitutional:  Well-developed and well-nourished.  No respiratory distress.      HENT:  Head: Normocephalic and atraumatic.  Mouth:  Moist mucous membranes.    Eyes:  Conjunctivae and EOM are normal.  No scleral icterus.  Neck:  Neck supple.  No JVD present.    Cardiovascular:  Normal rate, regular rhythm and normal heart sounds with no murmur. No edema.  Pulmonary/Chest:  No respiratory distress, no wheezes, no crackles, with normal breath  sounds and good air movement.  Abdominal:  Soft.  Bowel sounds are normal.  No distension and no tenderness.   Musculoskeletal:  No edema, no tenderness, and no deformity.  No swelling or redness of joints.  Neurological:  Alert and oriented to person, place, and time.  No facial droop.  No slurred speech.   Skin:  Left third finger with significant edema and erythema extending to the palmar and dorsal aspect of the left hand. Mild drainage noted at this time.   Psychiatric:  Normal mood and affect.  Behavior is normal.    ----------------------------------------------------------------------------------------------------------------------  I have personally reviewed the EKG/Telemetry strips   ----------------------------------------------------------------------------------------------------------------------        Results from last 7 days   Lab Units 05/08/19  0255   CHOLESTEROL mg/dL 110   TRIGLYCERIDES mg/dL 168*   HDL CHOL mg/dL 25*   LDL CHOL mg/dL 51       Results from last 7 days   Lab Units 05/08/19 0255 05/07/19  1222   CRP mg/dL  --  7.07*   LACTATE mmol/L  --  1.6   WBC 10*3/mm3 12.77* 12.11*   HEMOGLOBIN g/dL 13.0 14.6   HEMATOCRIT % 39.8 44.8   MCV fL 83.8 83.7   MCHC g/dL 32.7 32.6   PLATELETS 10*3/mm3 187 171     Results from last 7 days   Lab Units 05/08/19 0255 05/07/19  1222   SODIUM mmol/L 139 132*   POTASSIUM mmol/L 3.6 4.3   CHLORIDE mmol/L 105 95*   CO2 mmol/L 23.4 24.0   BUN mg/dL 7 12   CREATININE mg/dL 0.51* 0.63   EGFR IF NONAFRICN AM mL/min/1.73 128 100   CALCIUM mg/dL 8.4* 10.2   GLUCOSE mg/dL 104* 514*   ALBUMIN g/dL 3.33*  --    BILIRUBIN mg/dL 0.3  --    ALK PHOS U/L 116  --    AST (SGOT) U/L 14  --    ALT (SGPT) U/L 9  --    Estimated Creatinine Clearance: 116.7 mL/min (A) (by C-G formula based on SCr of 0.51 mg/dL (L)).  No results found for: AMMONIA    Hemoglobin A1C   Date/Time Value Ref Range Status   05/08/2019 0255 17.00 (H) 4.80 - 5.60 % Final     Glucose   Date/Time Value  Ref Range Status   05/08/2019 0714 232 (H) 70 - 130 mg/dL Final   05/07/2019 2012 282 (H) 70 - 130 mg/dL Final   05/07/2019 1649 199 (H) 70 - 130 mg/dL Final   05/07/2019 1540 250 (H) 70 - 130 mg/dL Final     Lab Results   Component Value Date    HGBA1C 17.00 (H) 05/08/2019     Lab Results   Component Value Date    TSH 1.897 10/07/2017             Wound Culture   Date Value Ref Range Status   05/07/2019 Scant growth (1+) Streptococcus pyogenes (Group A) (A)  Preliminary     Comment:     This organism is considered to be universally susceptible to penicillin.  No further antibiotic testing will be performed.           Pain Management Panel     There is no flowsheet data to display.          I have personally reviewed the above laboratory results.   ----------------------------------------------------------------------------------------------------------------------  Imaging Results (last 24 hours)     Procedure Component Value Units Date/Time    XR Hand 3+ View Left [[id number]] Collected:  05/07/19 1345     Updated:  05/07/19 1354    Narrative:       EXAMINATION: XR HAND 3+ VW LEFT-      CLINICAL INDICATION:3rd finger wound infection     COMPARISON: None      TECHNIQUE: 3 views left hand     FINDINGS:   Fairly extensive soft tissue edema is noted involving the third digit.  No pathologic periosteal reaction is seen. No destructive bony changes  identified. No evidence of fracture. No radio opaque foreign body is  noted.       Impression:       Marked soft tissue edema involving the third digit. Favor cellulitis. No  evidence of fracture or dislocation.     This report was finalized on 5/7/2019 1:46 PM by Dr. Mike Tolbert MD.           I have personally reviewed the above radiology results.   ----------------------------------------------------------------------------------------------------------------------    Assessment/Plan       Assessment/Plan     ASSESSMENT:      1. Hand cellulitis     PLAN:     The patient  is awake and alert today. She continues to be in pain with her left middle finger. Toradol 30 mg IV was ordered. Persistent leukocytosis with elevated CRP of 7.07. Hemoglobin A1C is elevated at 17.00. Diabetes educator consulted. Case discussed with Dr. Thapa who is planning I&D for this afternoon. Culture showing growth of strep pyogenes. The patient was reported to have a reaction overnight with hives. She had received Vancomycin and Rocephin so we are unsure of which caused the reaction. Based on cultures antibiotic therapy was changed to Clindamycin 900 mg IV q 8 hours and Levaquin 500 mg IV daily if QTc is okay. STAT EKG ordered and case discussed with pharmacy. Hope to de-escalate to oral Clindamycin and Levaquin tomorrow.     Continues to be on telemetry.     Labs in am.     Case management consulted as patient has financial hardship and may not be able to afford antibiotic therapy as outpatient.      DVT prophylaxis initiated with Lovenox and held for surgery. SCDs placed for now.      Placed on sliding scale for uncontrolled diabetes with improvement today and consulted diabetes educator.           Code Status:   Code Status and Medical Interventions:   Ordered at: 05/07/19 1656     Code Status:    CPR     Medical Interventions (Level of Support Prior to Arrest):    Full       Aliyah Moss PA-C  05/08/19  10:28 AM     independent

## 2023-02-28 NOTE — PROGRESS NOTES
Discharge Planning Assessment   [facility]     Patient Name: Rossy Boone  MRN: [id number]  Today's Date: 5/8/2019    Admit Date: 5/7/2019    Discharge Needs Assessment     Row Name 05/08/19 1140       Living Environment    Lives With  spouse;grandchild(meghna) Pt lives at home with her spouse, Basim and two grandchildren.     Current Living Arrangements  home/apartment/condo    Primary Care Provided by  self    Quality of Family Relationships  supportive;involved;helpful    Able to Return to Prior Arrangements  yes       Resource/Environmental Concerns    Resource/Environmental Concerns  financial Pt states she does not currently have insurance and does not qualify for Medicaid. SS contacted financial counseling who is going to speak with pt regarding her hospital bill. Pt reports she is having trouble affording her medications. SS contacted Pharm       Transition Planning    Patient/Family Anticipates Transition to  home    Transportation Anticipated  family or friend will provide       Discharge Needs Assessment    Equipment Currently Used at Home  none    Equipment Needed After Discharge  none    Outpatient/Agency/Support Group Needs  -- Pt does not utilize home health services.        Discharge Plan     Row Name 05/08/19 1140       Plan    Plan  Pt lives at home with her spouse and grandchildren and plans to return home at discharge. Pt does not utilize home health services or DME. Pt's PCP is Dr. Gallego. Pt does not have a POA or living will. Pt utilizes [facility] for prescriptions. Pt has issues affording her medications due to not having any insurance. SS contacted Pharmacy who is going to look into helping her. Pt's family to provide transportation at discharge. SS will follow and assist as needed with discharge planning.     Patient/Family in Agreement with Plan  yes     Demographic Summary     Row Name 05/08/19 1138       General Information    Admission Type  observation    Referral Source  nursing     Reason for Consult  financial concerns    Preferred Language  English     Used During This Interaction  chelly Gan     No

## 2024-10-23 ENCOUNTER — TRANSCRIBE ORDERS (OUTPATIENT)
Dept: ADMINISTRATIVE | Facility: HOSPITAL | Age: 54
End: 2024-10-23

## 2024-10-23 DIAGNOSIS — K86.1 OTHER CHRONIC PANCREATITIS: Primary | ICD-10-CM

## 2024-11-29 ENCOUNTER — HOSPITAL ENCOUNTER (OUTPATIENT)
Dept: CT IMAGING | Facility: HOSPITAL | Age: 54
Discharge: HOME | End: 2024-11-29
Payer: COMMERCIAL

## 2024-11-29 DIAGNOSIS — K86.1 OTHER CHRONIC PANCREATITIS: ICD-10-CM

## 2024-11-29 PROCEDURE — 25510000001 IOPAMIDOL 61 % SOLUTION: Performed by: INTERNAL MEDICINE

## 2024-11-29 PROCEDURE — 74178 CT ABD&PLV WO CNTR FLWD CNTR: CPT

## 2024-11-29 RX ORDER — IOPAMIDOL 612 MG/ML
100 INJECTION, SOLUTION INTRAVASCULAR
Status: COMPLETED | OUTPATIENT
Start: 2024-11-29 | End: 2024-11-29

## 2024-11-29 RX ADMIN — IOPAMIDOL 80 ML: 612 INJECTION, SOLUTION INTRAVENOUS at 08:21

## 2025-01-24 ENCOUNTER — OFFICE VISIT (OUTPATIENT)
Dept: ENDOCRINOLOGY | Facility: CLINIC | Age: 55
End: 2025-01-24
Payer: COMMERCIAL

## 2025-01-24 ENCOUNTER — SPECIALTY PHARMACY (OUTPATIENT)
Dept: PHARMACY | Facility: HOSPITAL | Age: 55
End: 2025-01-24
Payer: COMMERCIAL

## 2025-01-24 VITALS
HEART RATE: 80 BPM | OXYGEN SATURATION: 97 % | DIASTOLIC BLOOD PRESSURE: 79 MMHG | SYSTOLIC BLOOD PRESSURE: 167 MMHG | BODY MASS INDEX: 27.51 KG/M2 | WEIGHT: 150.4 LBS

## 2025-01-24 DIAGNOSIS — E78.2 MIXED HYPERLIPIDEMIA: ICD-10-CM

## 2025-01-24 DIAGNOSIS — E10.65 TYPE 1 DIABETES MELLITUS WITH HYPERGLYCEMIA: Primary | ICD-10-CM

## 2025-01-24 PROBLEM — E78.5 HYPERLIPIDEMIA: Status: ACTIVE | Noted: 2025-01-24

## 2025-01-24 LAB
ALBUMIN SERPL-MCNC: 4.3 G/DL (ref 3.5–5.2)
ALBUMIN UR-MCNC: 5.9 MG/DL
ALBUMIN/GLOB SERPL: 1.4 G/DL
ALP SERPL-CCNC: 110 U/L (ref 39–117)
ALT SERPL W P-5'-P-CCNC: 25 U/L (ref 1–33)
ANION GAP SERPL CALCULATED.3IONS-SCNC: 11.1 MMOL/L (ref 5–15)
AST SERPL-CCNC: 28 U/L (ref 1–32)
BILIRUB SERPL-MCNC: 0.4 MG/DL (ref 0–1.2)
BUN SERPL-MCNC: 8 MG/DL (ref 6–20)
BUN/CREAT SERPL: 9.5 (ref 7–25)
CALCIUM SPEC-SCNC: 9.7 MG/DL (ref 8.6–10.5)
CHLORIDE SERPL-SCNC: 107 MMOL/L (ref 98–107)
CO2 SERPL-SCNC: 23.9 MMOL/L (ref 22–29)
CREAT SERPL-MCNC: 0.84 MG/DL (ref 0.57–1)
CREAT UR-MCNC: 182.7 MG/DL
EGFRCR SERPLBLD CKD-EPI 2021: 82.7 ML/MIN/1.73
EXPIRATION DATE: ABNORMAL
EXPIRATION DATE: ABNORMAL
GLOBULIN UR ELPH-MCNC: 3.1 GM/DL
GLUCOSE BLDC GLUCOMTR-MCNC: 137 MG/DL (ref 70–130)
GLUCOSE SERPL-MCNC: 43 MG/DL (ref 65–99)
HBA1C MFR BLD: 8.6 % (ref 4.5–5.7)
Lab: ABNORMAL
Lab: ABNORMAL
MICROALBUMIN/CREAT UR: 32.3 MG/G (ref 0–29)
POTASSIUM SERPL-SCNC: 3.9 MMOL/L (ref 3.5–5.2)
PROT SERPL-MCNC: 7.4 G/DL (ref 6–8.5)
SODIUM SERPL-SCNC: 142 MMOL/L (ref 136–145)
TSH SERPL DL<=0.05 MIU/L-ACNC: 1.85 UIU/ML (ref 0.27–4.2)

## 2025-01-24 PROCEDURE — 80053 COMPREHEN METABOLIC PANEL: CPT

## 2025-01-24 PROCEDURE — 82570 ASSAY OF URINE CREATININE: CPT

## 2025-01-24 PROCEDURE — 84681 ASSAY OF C-PEPTIDE: CPT

## 2025-01-24 PROCEDURE — 84443 ASSAY THYROID STIM HORMONE: CPT

## 2025-01-24 PROCEDURE — 82043 UR ALBUMIN QUANTITATIVE: CPT

## 2025-01-24 RX ORDER — GABAPENTIN 300 MG/1
1 CAPSULE ORAL 2 TIMES DAILY
COMMUNITY
Start: 2024-10-17

## 2025-01-24 RX ORDER — ACYCLOVIR 800 MG/1
1 TABLET ORAL
COMMUNITY
Start: 2025-01-16 | End: 2025-01-24

## 2025-01-24 RX ORDER — INSULIN GLARGINE 100 [IU]/ML
70 INJECTION, SOLUTION SUBCUTANEOUS EVERY MORNING
COMMUNITY
Start: 2025-01-06

## 2025-01-24 RX ORDER — MECLIZINE HYDROCHLORIDE 25 MG/1
25 TABLET ORAL NIGHTLY
COMMUNITY
Start: 2025-01-06

## 2025-01-24 RX ORDER — ALBUTEROL SULFATE 90 UG/1
2 INHALANT RESPIRATORY (INHALATION) EVERY 4 HOURS PRN
COMMUNITY
Start: 2024-11-26

## 2025-01-24 RX ORDER — INSULIN GLULISINE 100 [IU]/ML
50 INJECTION, SOLUTION SUBCUTANEOUS
COMMUNITY
Start: 2025-01-07 | End: 2025-01-24

## 2025-01-24 RX ORDER — INSULIN LISPRO 100 [IU]/ML
INJECTION, SOLUTION INTRAVENOUS; SUBCUTANEOUS
COMMUNITY
End: 2025-01-24

## 2025-01-24 RX ORDER — EZETIMIBE 10 MG/1
10 TABLET ORAL DAILY
Qty: 90 TABLET | Refills: 1 | Status: SHIPPED | OUTPATIENT
Start: 2025-01-24

## 2025-01-24 RX ORDER — VENLAFAXINE 112.5 MG/1
225 TABLET, EXTENDED RELEASE ORAL DAILY
COMMUNITY

## 2025-01-24 RX ORDER — INSULIN LISPRO 100 [IU]/ML
INJECTION, SOLUTION INTRAVENOUS; SUBCUTANEOUS
Qty: 10 ML | Refills: 3 | Status: SHIPPED | OUTPATIENT
Start: 2025-01-24

## 2025-01-24 RX ORDER — OMEGA-3-ACID ETHYL ESTERS 1 G/1
2 CAPSULE, LIQUID FILLED ORAL 2 TIMES DAILY
Qty: 120 CAPSULE | Refills: 3 | Status: SHIPPED | OUTPATIENT
Start: 2025-01-24

## 2025-01-24 RX ORDER — GLUCAGON 1 MG
1 KIT INJECTION AS NEEDED
Qty: 1 EACH | Refills: 5 | Status: SHIPPED | OUTPATIENT
Start: 2025-01-24

## 2025-01-24 RX ORDER — INSULIN PMP CART,AUT,G6/7,CNTR
1 EACH SUBCUTANEOUS EVERY OTHER DAY
Qty: 1 KIT | Refills: 0 | Status: SHIPPED | OUTPATIENT
Start: 2025-01-24

## 2025-01-24 RX ORDER — ACYCLOVIR 400 MG/1
1 TABLET ORAL
Qty: 9 EACH | Refills: 2 | Status: SHIPPED | OUTPATIENT
Start: 2025-01-24

## 2025-01-24 RX ORDER — NICOTINE 21 MG/24HR
1 PATCH, TRANSDERMAL 24 HOURS TRANSDERMAL DAILY
COMMUNITY
Start: 2024-11-26

## 2025-01-24 RX ORDER — OMEPRAZOLE 40 MG/1
40 CAPSULE, DELAYED RELEASE ORAL 2 TIMES DAILY
COMMUNITY

## 2025-01-24 RX ORDER — GLUCAGON INJECTION, SOLUTION 1 MG/.2ML
1 INJECTION, SOLUTION SUBCUTANEOUS AS NEEDED
Qty: 0.4 ML | Refills: 5 | Status: SHIPPED | OUTPATIENT
Start: 2025-01-24 | End: 2025-01-24

## 2025-01-24 RX ORDER — FENOFIBRATE 145 MG/1
145 TABLET, COATED ORAL DAILY
Qty: 90 TABLET | Refills: 1 | Status: SHIPPED | OUTPATIENT
Start: 2025-01-24

## 2025-01-24 NOTE — ASSESSMENT & PLAN NOTE
-Most recent lipid panel 9/17/24 showed total cholesterol elevated at 294, triglycerides elevated at 406, LDL elevated at 183.  -Patient reports she was started on a statin in September and gained 20 lbs. She does not want to take statins at this time.  -Will prescribe Lovaza, fenofibrate, and Zetia to help control.    -Discussed diet and exercise as well.

## 2025-01-24 NOTE — PATIENT INSTRUCTIONS
01/24/25    Rossyfélix Pimentelris 1970     Your A1C is:   Lab Results   Component Value Date    HGBA1C 8.6 (A) 01/24/2025    HGBA1C 9.2 (H) 09/17/2024    HGBA1C 17.00 (H) 05/08/2019       ADA General Goals: A1c: < 7%                                                  Fasting/before meal glucose: 70- 150 mg/dL                                    2 Hour after meal glucoses: < 180 mg/dL                                        Bedtime glucose:120-180                Lantus is long acting insulin that lasts 24 hours. It is taken at bedtime every night and should not be skipped unless instructed by your doctor         Starting Basal Insulin Dose: 80  units before bedtime           Change insulin doses every 3 days based on the following:    If before breakfast blood sugar readings are consistently higher than 150 for 3 days, raise insulin dose by 2 units.    If after 2 weeks, before meal blood sugars are not lower than 150, call the office.    If you are having frequent blood sugars lower than 70, call the office.    SHARON Easley

## 2025-01-24 NOTE — ASSESSMENT & PLAN NOTE
-Diabetes is not controlled with A1c 8.6.  -CGM data reviewed from the last two weeks shows average glucose 218 with glucose variability of 30.4%. In target range 27%, high 41%, very high 31%, low 1%, very low 0%.   -Pattern of fasting hyperglycemia with postprandial hyperglycemia  -Patient would like to do OmniPod insulin pump.  OmniPod intro kit ordered and brought over from pharmacy during office visit today.  Will set up in person training with the rep.  -In the meantime, until OmniPod is initiated increase Lantus to 80 units once daily.  Instructed patient to increase Lantus by 2 units every 3 days if fasting blood glucose is greater than 150  -Continue Humalog 30 units 3 times daily with meals  -Will order Dexcom G7 to work with OmniPod  -Discussed dietary and exercise guidelines with patient. 150 g carbs per day and 150 minutes of exercise per week. Increase protein with complex carbs. Avoid foods high in refined sugars and sugary drinks.   -Discussed the importance of yearly eye exams.  -Discussed Glucagon use and appropriate timing for this.   -S/S hypoglycemia reviewed with Rule of 15's advised.  -Discussed long term risks of untreated/undertreated diabetes including retinopathy, neuropathy, nephropathy, amputations, hospitalizations, MI, CVA.

## 2025-01-24 NOTE — PROGRESS NOTES
Specialty Pharmacy Patient Management Program  Endocrinology Initial Assessment       Rossy Boone is a 54 y.o. female with Type 1 Diabetes seen by an Endocrinology provider and enrolled in the Endocrinology Patient Management program offered by [facility].  An initial outreach was conducted, including assessment of therapy appropriateness and specialty medication education for Insulin. The patient was introduced to services offered by [facility], including: regular assessments, refill coordination, curbside pick-up or mail order delivery options, prior authorization maintenance, and financial assistance programs as applicable. The patient was also provided with contact information for the pharmacy team.     Patient is currently taking Lantus 70 units daily and Humalog 30 units three times daily with meals (if glucose is > 200 before she eats).      Patient checks BG via Freestyle Benson 3 sensor with readings being average of 215. Patient reports 3 events of low BG <70 within the last month.  Of note, the pt states that if her BG is less than 150, she starts having hypoglycemic symptoms.  Notified provider Ruby HERRERA of this information    Complications include: h/o Type 1 diabetes, hyperlipidemia, wound infection, pancreatitis    Patient denies personal/family history of thyroid cancer, reports history of pancreatitis (reports necrotizing pancreatitis in 2017), and denies issues with recurrent UTI/yeast infections.     In the past, the patient has tried:     Drug Dose Reason for Discontinuation Notes   Metformin  Lack of efficacy                                                            Insurance Coverage & Financial Support  [employer]     Relevant Past Medical History and Comorbidities  Relevant medical history and concomitant health conditions were discussed with the patient. The patient's chart has been reviewed for relevant past medical  history and comorbid health conditions and updated as necessary.   Past Medical History:   Diagnosis Date    Abscess     left 3rd finger    Diabetes mellitus     Hyperlipidemia 1/24/2025    Hypertension     Pancreatitis      Social History     Socioeconomic History    Marital status:    Tobacco Use    Smoking status: Every Day     Current packs/day: 0.50     Average packs/day: 0.5 packs/day for 31.0 years (15.5 ttl pk-yrs)     Types: Cigarettes    Smokeless tobacco: Never   Substance and Sexual Activity    Alcohol use: No    Drug use: No    Sexual activity: Defer       Problem list reviewed by Emiliano Lozano RPH on 1/27/2025 at 12:21 PM    Allergies  Known allergies and reactions were discussed with the patient. The patient's chart has been reviewed for  allergy information and updated as necessary.   Allergies   Allergen Reactions    Hydrocodone-Acetaminophen Shortness Of Breath    Vancomycin Hives    Hydromorphone Unknown - Low Severity     Pt unsure of reaction    Oxycodone-Acetaminophen Unknown - Low Severity     Pt unsure of reaction    Penicillins Hives       Allergies reviewed by Emiliano Lozano RPH on 1/24/2025 at  8:28 AM  Allergies reviewed by Emiliano Lozano RPH on 1/27/2025 at 12:21 PM    Relevant Laboratory Values  A1C Last 3 Results          9/17/2024    19:02 1/24/2025    08:27   HGBA1C Last 3 Results   Hemoglobin A1C 9.2     8.6       Details          This result is from an external source.             Lab Results   Component Value Date    HGBA1C 8.6 (A) 01/24/2025     Lab Results   Component Value Date    GLUCOSE 43 (C) 01/24/2025    CALCIUM 9.7 01/24/2025     01/24/2025    K 3.9 01/24/2025    CO2 23.9 01/24/2025     01/24/2025    BUN 8 01/24/2025    CREATININE 0.84 01/24/2025    EGFRIFNONA 120 05/09/2019    BCR 9.5 01/24/2025    ANIONGAP 11.1 01/24/2025     Lab Results   Component Value Date    CHOL 110 05/08/2019    CHLPL 102 10/16/2017    TRIG 168 (H) 05/08/2019    HDL 25 (L)  05/08/2019    LDL 51 05/08/2019     Microalbumin          1/24/2025    09:55   Microalbumin   Microalbumin, Urine 5.9        Current Medication List  This medication list has been reviewed with the patient and evaluated for any interactions or necessary modifications/recommendations, and updated to include all prescription medications, OTC medications, and supplements the patient is currently taking.  This list reflects what is contained in the patient's profile, which has also been marked as reviewed to communicate to other providers it is the most up to date version of the patient's current medication therapy.     Current Outpatient Medications:     albuterol sulfate  (90 Base) MCG/ACT inhaler, Inhale 2 puffs Every 4 (Four) Hours As Needed., Disp: , Rfl:     gabapentin (NEURONTIN) 300 MG capsule, Take 1 capsule by mouth 2 (Two) Times a Day., Disp: , Rfl:     Lantus 100 UNIT/ML injection, Inject 70 Units under the skin into the appropriate area as directed Every Morning., Disp: , Rfl:     meclizine (ANTIVERT) 25 MG tablet, Take 1 tablet by mouth Every Night., Disp: , Rfl:     Melatonin 10 MG tablet, Take 1 tablet by mouth Every Night., Disp: , Rfl:     metoprolol tartrate (LOPRESSOR) 50 MG tablet, Take 1 tablet by mouth 2 (Two) Times a Day., Disp: , Rfl:     multivitamin (DAILY JOSE ALFREDO) tablet tablet, Take 1 tablet by mouth Daily., Disp: , Rfl:     nicotine (NICODERM CQ) 21 MG/24HR patch, Place 1 patch on the skin as directed by provider Daily., Disp: , Rfl:     omeprazole (priLOSEC) 40 MG capsule, Take 1 capsule by mouth 2 (Two) Times a Day., Disp: , Rfl:     promethazine (PHENERGAN) 25 MG tablet, Take 1 tablet by mouth 2 (Two) Times a Day As Needed for Nausea or Vomiting., Disp: , Rfl:     Venlafaxine Besylate .5 MG tablet sustained-release 24 hour, Take 225 mg by mouth Daily., Disp: , Rfl:     Continuous Glucose Sensor (Dexcom G7 Sensor) misc, Use 1 each Every 10 (Ten) Days., Disp: 9 each, Rfl: 2     ezetimibe (Zetia) 10 MG tablet, Take 1 tablet by mouth Daily., Disp: 90 tablet, Rfl: 1    fenofibrate (Tricor) 145 MG tablet, Take 1 tablet by mouth Daily., Disp: 90 tablet, Rfl: 1    Glucagon HCl (Glucagon Emergency) 1 MG/ML reconstituted solution, Inject 1 mL as directed As Needed (for hypoglycemic emergency)., Disp: 1 each, Rfl: 5    Insulin Disposable Pump (Omnipod 5 [id number] Intro Gen 5) kit, Apply 1 each topically Every Other Day., Disp: 1 kit, Rfl: 0    Insulin Lispro (HumaLOG) 100 UNIT/ML injection, For use in insulin pump, , Disp: 10 mL, Rfl: 3    omega-3 acid ethyl esters (Lovaza) 1 g capsule, Take 2 capsules by mouth 2 (Two) Times a Day., Disp: 120 capsule, Rfl: 3    Medicines reviewed by Emiliano Lozano [facility] on 1/24/2025 at  8:36 AM    Drug Interactions  -Beta blockers may enhance the hypoglycemic effect of insulins     Recommended Medications Assessment  Aspirin -  Not Taking Currently  Statin -  Not Taking Currently (Pt reports she is intolerant)  ACEi/ARB - Not Taking Currently      Initial Education Provided for Specialty Medication  The patient has been provided with the following education and any applicable administration techniques (i.e. self-injection) have been demonstrated for the therapies indicated. All questions and concerns have been addressed prior to the patient receiving the medication, and the patient has verbalized understanding of the education and any materials provided.  Additional patient education shall be provided and documented upon request by the patient, provider or payer.      Dexcom G7 Patient Education  Educated patient on using Dexcom G7 device to monitor BG:      Sensors:  Educated on application process: clean area with alcohol beforehand. Unscrew the lid on the applicator to expose the sensor. Press the applicator against the skin on the back of the upper arm and press the button to apply the sensor.   Must be changed every 10 days. One month supply will include  "three sensors.  Sensor is placed on the back of upper arm. Sensor placement is important and you will want to change your insertion site with each sensor.    Using the same site too often might not allow the skin to heal, causing scarring or skin irritation.  Choose a site:  At least 3 inches from any insulin pump infusion set or injection site  Away from waistbands, scarring, tattoos, irritation, and bones  Unlikely to be bumped, pushed, or laid on while sleeping  Once the sensor is placed, press \"start new sensor\" in gloria on smart phone or on .   Each new sensor will take 1 hour to warm up and begin providing blood sugar readings.      /Gloria on Smart Phone:  Will be used to alert you when blood sugar is low or high and to obtain blood sugar readings.     Omnipod 5 Insulin Pump)  The Omnipod is used to deliver insulin in patients who require insulin therapy.  Rapid acting (U-100) insulin in the pump  Never use insulin that is cloudy  Omnipod 5 connects to the Dexcom G6 (a continuous glucose monitor)   Automatically adjusts insulin delivery.  This device is tubeless  Will need to provide a bolus of insulin for meals  Pods need to be changed at least every 3 days  May be changed more often depending on the amount of insulin a person requires  Each pod holds 200 units of insulin  You will receive training from Omnipod  After training your endocrinology provider will manage your therapy and adjust rates as necessary        Patient is interested in starting omnipod therapy at this time.    Brought in sample omnipod to demonstrate the size of the product and the features of the omnipod.  Patient verbalized understanding of counseling points and all questions have been answered at this time.   Patient will reach out to the clinic should any questions or concerns arise.       Omnipod will reach out to patient to coordinate training session before patient initiates therapy with the omnipod 5.         Omnipod " placement:            Instructions on how to fill the omnipod 5:      Adherence and Self-Administration  Adherence related to the patient's specialty therapy was discussed with the patient. The Adherence segment of this outreach has been reviewed and updated.     Barriers to Patient Adherence and/or Self-Administration: none   Methods for Supporting Patient Adherence and/or Self-Administration: none    Vaccination Status:   Recommended Prevnar-20 and flu vaccines    Smoker?  Yes--Pt states that she has nicotine patches at home    Goals of Therapy  Goals related to the patient's specialty therapy were discussed with the patient. The Patient Goals segment of this outreach has been reviewed and updated.    Goals Addressed Today        Specialty Pharmacy General Goal      A1c < 7--Pt not currently at goal as A1c = 8.6%              Reassessment Plan & Follow-Up  Patient's diabetes is uncontrolled with A1C of 8.6%.  Medication Therapy Changes:  Per Ruby HERRERA:   Continue Lantus and Humalog until pt is transitioned to OmniPod insulin Pump.  OmniPod intro kit was ordered via telephone order from provider Ruby HERRERA and was brought to pt during visit by pharmacy.  Humalog dose in the meantime is 30 units tid with meals, and Lantus dose will be increased in the meantime to 80 units once daily.  Pt was instructed by provider to increase Lantus by 2 units every 3 days if fasting blood glucose is greater than 150.  PharmD provided training on Dexcom G7 and OmniPod during today's visit.  Pt will be switching from Freestyle Benson sensor to Dexcom G7 for compatibility with OmniPod.  OmniPod rep to contact pt to do full in-person training.  Related Plans, Therapy Recommendations or Therapy Problems to Be Addressed:   Recommended that lipid panel be drawn as the pt had abnormal lipid panel results from 9/17/24.  Lab not ordered, however provider started the pt on zetia, fenofibrate, and Lovaza at this time.  Pt  declines statin at this time due to not tolerating in the past.  Recommended Prevnar-20 and flu vaccines.  Instructed pt to notify office once her Omnipod training is complete.  Of note, recommended that pt contact her pcp regarding Omeprazole dose as she reports that she takes 40mg po bid, and max recommended dose per day is 40mg.  Patient denies issues with affordability or adherence at this time.       Patient does not wish to use [facility] at this time due to: loyalty to retail pharmacy.    Attestation  I attest the patient was actively involved in and has agreed to the above plan of care. If the prescribed therapy is at any point deemed not appropriate based on the current or future assessments, a consultation will be initiated with the patient's specialty care provider to determine the best course of action. The revised plan of therapy will be documented along with any required assessments and/or additional patient education provided..    Emiliano Lozano [facility]  01/27/25  12:30 EST

## 2025-01-24 NOTE — PROGRESS NOTES
Chief Complaint   Patient presents with    initial visit     T1DM       HPI   Rossy Boone is a 54 y.o. female who presents to the clinic today for initial evaluation of type 2 diabetes. Diabetes was diagnosed at age 37. A1c today is 8.6. She also has a history of chronic pancreatitis that started in her 30s. She last had necrotizing pancreatitis in 2017.  She checks her glucose with freestyle rudy 3 CGM.  She is currently taking Lantus 70 units daily and Humalog 30 units 3 times daily with meals if blood sugars are greater than 200 before she eats.  She reports fasting blood sugars around 200s with postprandial spikes.  She denies any hypoglycemia.  She reports she is unable to tolerate statins.  She eats fish, chicken, vegetables and drinks water, milk and regular soda. She is interested in insulin pump therapy.    Past medications: none  Diabetic complications: none  Last optho exam: upcoming appt Monday  Last microalbumin: today  Last foot exam: today  Statin: can't tolerate   Lipid panel: UTD  ACE/ARB: no     The following portions of the patient's history were reviewed and updated as appropriate: allergies, current medications, past family history, past medical history, past social history, past surgical history, and problem list.    /79 (BP Location: Right arm, Patient Position: Sitting, Cuff Size: Adult)   Pulse 80   Wt 68.2 kg (150 lb 6.4 oz)   SpO2 97%   BMI 27.51 kg/m²    Past Medical History:   Diagnosis Date    Abscess     left 3rd finger    Diabetes mellitus     Hyperlipidemia 2025    Hypertension     Pancreatitis      Past Surgical History:   Procedure Laterality Date    APPENDECTOMY       SECTION      CHOLECYSTECTOMY      ENDOSCOPY N/A 10/25/2017    Procedure: ESOPHAGOGASTRODUODENOSCOPY with nasal jejunostomy tube placement;  Surgeon: Rudolph Benitez III, MD;  Location: [facility];  Service:     HYSTERECTOMY      INCISION AND DRAINAGE ARM Left 2019    Procedure:  INCISION AND DRAINAGE UPPER EXTREMITY, excisional debridement 3rd phalanx;  Surgeon: Eric Thapa MD;  Location: [facility] OR;  Service: Orthopedics      Family History   Problem Relation Age of Onset    Hypertension Mother     Cancer Father     Heart disease Father     Hypertension Father     Heart disease Brother     Cancer Paternal Grandfather       Social History     Socioeconomic History    Marital status:    Tobacco Use    Smoking status: Every Day     Current packs/day: 0.50     Average packs/day: 0.5 packs/day for 31.0 years (15.5 ttl pk-yrs)     Types: Cigarettes    Smokeless tobacco: Never   Substance and Sexual Activity    Alcohol use: No    Drug use: No    Sexual activity: Defer      Allergies   Allergen Reactions    Hydrocodone-Acetaminophen Shortness Of Breath    Vancomycin Hives    Hydromorphone Unknown - Low Severity     Pt unsure of reaction    Oxycodone-Acetaminophen Unknown - Low Severity     Pt unsure of reaction    Penicillins Hives      Current Outpatient Medications on File Prior to Visit   Medication Sig Dispense Refill    albuterol sulfate  (90 Base) MCG/ACT inhaler Inhale 2 puffs Every 4 (Four) Hours As Needed.      gabapentin (NEURONTIN) 300 MG capsule Take 1 capsule by mouth 2 (Two) Times a Day.      Lantus 100 UNIT/ML injection Inject 70 Units under the skin into the appropriate area as directed Every Morning.      meclizine (ANTIVERT) 25 MG tablet Take 1 tablet by mouth Every Night.      [DISCONTINUED] Apidra SoloStar 100 UNIT/ML solution pen-injector Inject 0.5 mL under the skin into the appropriate area as directed.      [DISCONTINUED] Continuous Glucose Sensor (FreeStyle Benson 3 Sensor) misc Apply 1 each topically Every 14 (Fourteen) Days.      Melatonin 10 MG tablet Take 1 tablet by mouth Every Night.      metoprolol tartrate (LOPRESSOR) 50 MG tablet Take 1 tablet by mouth 2 (Two) Times a Day.      multivitamin (DAILY JOSE ALFREDO) tablet tablet Take 1 tablet by mouth  Daily.      promethazine (PHENERGAN) 25 MG tablet Take 1 tablet by mouth 2 (Two) Times a Day As Needed for Nausea or Vomiting.      Venlafaxine Besylate .5 MG tablet sustained-release 24 hour Take 225 mg by mouth Daily.      [DISCONTINUED] diphenhydrAMINE (BENADRYL) 25 mg capsule Take 25 mg by mouth Every Night.      [DISCONTINUED] esomeprazole (nexIUM) 40 MG capsule Take 40 mg by mouth Every Morning Before Breakfast.      [DISCONTINUED] lactobacillus acidophilus (RISAQUAD) capsule capsule Take 1 capsule by mouth Daily. 14 capsule 0    [DISCONTINUED] methadone (DOLOPHINE) 10 MG tablet Take 10 mg by mouth 3 (Three) Times a Day,      [DISCONTINUED] ondansetron (ZOFRAN) 4 MG tablet Take 1 tablet by mouth Every 6 (Six) Hours As Needed for Nausea or Vomiting. 60 tablet 0    [DISCONTINUED] senna (SENOKOT) 8.6 MG tablet tablet Take 1 tablet by mouth 2 (Two) Times a Day.       No current facility-administered medications on file prior to visit.      Review of Systems   Constitutional:  Positive for fatigue and unexpected weight gain.   Gastrointestinal:  Negative for abdominal pain, constipation and diarrhea.   Endocrine: Negative for polydipsia, polyphagia and polyuria.   Psychiatric/Behavioral:  Positive for sleep disturbance.       Physical Exam  Vitals reviewed.   Constitutional:       Appearance: Normal appearance.   Eyes:      Extraocular Movements: Extraocular movements intact.   Cardiovascular:      Rate and Rhythm: Normal rate.      Pulses:           Dorsalis pedis pulses are 2+ on the right side and 2+ on the left side.        Posterior tibial pulses are 2+ on the right side and 2+ on the left side.   Pulmonary:      Effort: Pulmonary effort is normal.   Feet:      Right foot:      Protective Sensation: 6 sites tested.  6 sites sensed.      Skin integrity: Dry skin present.      Toenail Condition: Right toenails are normal.      Left foot:      Protective Sensation: 6 sites tested.  6 sites sensed.      Skin  "integrity: Dry skin present.      Toenail Condition: Left toenails are normal.      Comments: Diabetic Foot Exam Performed and Monofilament Test Performed     Skin:     General: Skin is warm.   Neurological:      General: No focal deficit present.      Mental Status: She is alert and oriented to person, place, and time.   Psychiatric:         Mood and Affect: Mood normal.         Behavior: Behavior normal.         Thought Content: Thought content normal.         Judgment: Judgment normal.       Labs/Imaging  CMP:  Lab Results   Component Value Date    BUN 7 05/09/2019    CREATININE 0.54 (L) 05/09/2019    EGFR 111 07/21/2014    BCR 13.0 05/09/2019     05/09/2019    K 3.9 05/09/2019    CO2 22.8 05/09/2019    CALCIUM 8.2 (L) 05/09/2019    ALBUMIN 3.12 (L) 05/09/2019    AGRATIO 0.9 05/09/2019    BILITOT 0.3 05/09/2019    ALKPHOS 147 (H) 05/09/2019    AST 26 05/09/2019    ALT 15 05/09/2019     Lipid Panel:  Lab Results   Component Value Date    CHOL 110 05/08/2019    TRIG 168 (H) 05/08/2019    HDL 25 (L) 05/08/2019    VLDL 33.6 05/08/2019    LDL 51 05/08/2019     HbA1c:  Lab Results   Component Value Date    HGBA1C 8.6 (A) 01/24/2025    HGBA1C 9.2 (H) 09/17/2024    HGBA1C 17.00 (H) 05/08/2019     Microalbumin:  No results found for: \"MALBCRERATIO\"  TSH:  Lab Results   Component Value Date    TSH 1.897 10/07/2017       Assessment and Plan  Diagnoses and all orders for this visit:    1. Type 1 diabetes mellitus with hyperglycemia (Primary)  Assessment & Plan:  -Diabetes is not controlled with A1c 8.6.  -CGM data reviewed from the last two weeks shows average glucose 218 with glucose variability of 30.4%. In target range 27%, high 41%, very high 31%, low 1%, very low 0%.   -Pattern of fasting hyperglycemia with postprandial hyperglycemia  -Patient would like to do OmniPod insulin pump.  OmniPod intro kit ordered and brought over from pharmacy during office visit today.  Will set up in person training with the " rep.  -In the meantime, until OmniPod is initiated increase Lantus to 80 units once daily.  Instructed patient to increase Lantus by 2 units every 3 days if fasting blood glucose is greater than 150  -Continue Humalog 30 units 3 times daily with meals  -Will order Dexcom G7 to work with OmniPod  -Discussed dietary and exercise guidelines with patient. 150 g carbs per day and 150 minutes of exercise per week. Increase protein with complex carbs. Avoid foods high in refined sugars and sugary drinks.   -Discussed the importance of yearly eye exams.  -Discussed Glucagon use and appropriate timing for this.   -S/S hypoglycemia reviewed with Rule of 15's advised.  -Discussed long term risks of untreated/undertreated diabetes including retinopathy, neuropathy, nephropathy, amputations, hospitalizations, MI, CVA.      Orders:  -     POC Glycosylated Hemoglobin (Hb A1C)  -     POC Glucose, Blood  -     C-Peptide  -     Comprehensive Metabolic Panel  -     TSH  -     Microalbumin / Creatinine Urine Ratio - Urine, Clean Catch  -     Insulin Lispro (HumaLOG) 100 UNIT/ML injection; For use in insulin pump,   Dispense: 10 mL; Refill: 3  -     Continuous Glucose Sensor (Dexcom G7 Sensor) misc; Use 1 each Every 10 (Ten) Days.  Dispense: 9 each; Refill: 2  -     Glucagon HCl (Glucagon Emergency) 1 MG/ML reconstituted solution; Inject 1 mL as directed As Needed (for hypoglycemic emergency).  Dispense: 1 each; Refill: 5    2. Mixed hyperlipidemia  Assessment & Plan:  -Most recent lipid panel 9/17/24 showed total cholesterol elevated at 294, triglycerides elevated at 406, LDL elevated at 183.  -Patient reports she was started on a statin in September and gained 20 lbs. She does not want to take statins at this time.  -Will prescribe Lovaza, fenofibrate, and Zetia to help control.    -Discussed diet and exercise as well.    Orders:  -     ezetimibe (Zetia) 10 MG tablet; Take 1 tablet by mouth Daily.  Dispense: 90 tablet; Refill:  1  -     fenofibrate (Tricor) 145 MG tablet; Take 1 tablet by mouth Daily.  Dispense: 90 tablet; Refill: 1  -     omega-3 acid ethyl esters (Lovaza) 1 g capsule; Take 2 capsules by mouth 2 (Two) Times a Day.  Dispense: 120 capsule; Refill: 3    Other orders  -     Discontinue: Glucagon (Gvoke HypoPen 2-Pack) 1 MG/0.2ML solution auto-injector; Inject 1 mg under the skin into the appropriate area as directed As Needed (for hypoglycemic emergency).  Dispense: 0.4 mL; Refill: 5      Return in about 6 weeks (around 3/7/2025). The patient was instructed to contact the clinic with any interval questions or concerns.    Please note that portions of this document were completed with a voice recognition program. Efforts were made to edit the dictations, but occasionally words are mis-transcribed.  This document has been electronically signed by SHARON Easley  January 24, 2025 16:12 EST

## 2025-01-26 LAB — C PEPTIDE SERPL-MCNC: 0.4 NG/ML (ref 1.1–4.4)

## 2025-02-05 ENCOUNTER — OFFICE VISIT (OUTPATIENT)
Dept: GASTROENTEROLOGY | Facility: CLINIC | Age: 55
End: 2025-02-05
Payer: COMMERCIAL

## 2025-02-05 VITALS
DIASTOLIC BLOOD PRESSURE: 90 MMHG | SYSTOLIC BLOOD PRESSURE: 182 MMHG | WEIGHT: 149 LBS | BODY MASS INDEX: 27.42 KG/M2 | HEIGHT: 62 IN | HEART RATE: 101 BPM

## 2025-02-05 DIAGNOSIS — K21.9 GASTROESOPHAGEAL REFLUX DISEASE, UNSPECIFIED WHETHER ESOPHAGITIS PRESENT: ICD-10-CM

## 2025-02-05 DIAGNOSIS — K86.1 CHRONIC PANCREATITIS, UNSPECIFIED PANCREATITIS TYPE: Primary | ICD-10-CM

## 2025-02-05 DIAGNOSIS — K59.04 CHRONIC IDIOPATHIC CONSTIPATION: ICD-10-CM

## 2025-02-05 DIAGNOSIS — Z12.11 ENCOUNTER FOR SCREENING FOR MALIGNANT NEOPLASM OF COLON: ICD-10-CM

## 2025-02-05 DIAGNOSIS — K76.0 METABOLIC DYSFUNCTION-ASSOCIATED STEATOTIC LIVER DISEASE (MASLD): ICD-10-CM

## 2025-02-05 LAB
ALBUMIN SERPL-MCNC: 4.7 G/DL (ref 3.5–5.2)
ALBUMIN/GLOB SERPL: 1.6 G/DL
ALP SERPL-CCNC: 83 U/L (ref 39–117)
ALT SERPL W P-5'-P-CCNC: 24 U/L (ref 1–33)
ANION GAP SERPL CALCULATED.3IONS-SCNC: 9 MMOL/L (ref 5–15)
AST SERPL-CCNC: 24 U/L (ref 1–32)
BASOPHILS # BLD AUTO: 0.06 10*3/MM3 (ref 0–0.2)
BASOPHILS NFR BLD AUTO: 0.7 % (ref 0–1.5)
BILIRUB SERPL-MCNC: 0.3 MG/DL (ref 0–1.2)
BUN SERPL-MCNC: 13 MG/DL (ref 6–20)
BUN/CREAT SERPL: 12.6 (ref 7–25)
CALCIUM SPEC-SCNC: 10 MG/DL (ref 8.6–10.5)
CHLORIDE SERPL-SCNC: 105 MMOL/L (ref 98–107)
CO2 SERPL-SCNC: 26 MMOL/L (ref 22–29)
CREAT SERPL-MCNC: 1.03 MG/DL (ref 0.57–1)
DEPRECATED RDW RBC AUTO: 42.4 FL (ref 37–54)
EGFRCR SERPLBLD CKD-EPI 2021: 64.7 ML/MIN/1.73
EOSINOPHIL # BLD AUTO: 0.32 10*3/MM3 (ref 0–0.4)
EOSINOPHIL NFR BLD AUTO: 3.9 % (ref 0.3–6.2)
ERYTHROCYTE [DISTWIDTH] IN BLOOD BY AUTOMATED COUNT: 14.3 % (ref 12.3–15.4)
GLOBULIN UR ELPH-MCNC: 2.9 GM/DL
GLUCOSE SERPL-MCNC: 85 MG/DL (ref 65–99)
HCT VFR BLD AUTO: 41.3 % (ref 34–46.6)
HGB BLD-MCNC: 14 G/DL (ref 12–15.9)
IMM GRANULOCYTES # BLD AUTO: 0.03 10*3/MM3 (ref 0–0.05)
IMM GRANULOCYTES NFR BLD AUTO: 0.4 % (ref 0–0.5)
LYMPHOCYTES # BLD AUTO: 2.24 10*3/MM3 (ref 0.7–3.1)
LYMPHOCYTES NFR BLD AUTO: 27 % (ref 19.6–45.3)
MCH RBC QN AUTO: 28.2 PG (ref 26.6–33)
MCHC RBC AUTO-ENTMCNC: 33.9 G/DL (ref 31.5–35.7)
MCV RBC AUTO: 83.3 FL (ref 79–97)
MONOCYTES # BLD AUTO: 0.36 10*3/MM3 (ref 0.1–0.9)
MONOCYTES NFR BLD AUTO: 4.3 % (ref 5–12)
NEUTROPHILS NFR BLD AUTO: 5.28 10*3/MM3 (ref 1.7–7)
NEUTROPHILS NFR BLD AUTO: 63.7 % (ref 42.7–76)
NRBC BLD AUTO-RTO: 0 /100 WBC (ref 0–0.2)
PLATELET # BLD AUTO: 219 10*3/MM3 (ref 140–450)
PMV BLD AUTO: 11.7 FL (ref 6–12)
POTASSIUM SERPL-SCNC: 3.9 MMOL/L (ref 3.5–5.2)
PROT SERPL-MCNC: 7.6 G/DL (ref 6–8.5)
RBC # BLD AUTO: 4.96 10*6/MM3 (ref 3.77–5.28)
SODIUM SERPL-SCNC: 140 MMOL/L (ref 136–145)
WBC NRBC COR # BLD AUTO: 8.29 10*3/MM3 (ref 3.4–10.8)

## 2025-02-05 PROCEDURE — 82977 ASSAY OF GGT: CPT | Performed by: NURSE PRACTITIONER

## 2025-02-05 PROCEDURE — 83883 ASSAY NEPHELOMETRY NOT SPEC: CPT | Performed by: NURSE PRACTITIONER

## 2025-02-05 PROCEDURE — 83010 ASSAY OF HAPTOGLOBIN QUANT: CPT | Performed by: NURSE PRACTITIONER

## 2025-02-05 PROCEDURE — 80053 COMPREHEN METABOLIC PANEL: CPT | Performed by: NURSE PRACTITIONER

## 2025-02-05 PROCEDURE — 85025 COMPLETE CBC W/AUTO DIFF WBC: CPT | Performed by: NURSE PRACTITIONER

## 2025-02-05 PROCEDURE — 82465 ASSAY BLD/SERUM CHOLESTEROL: CPT | Performed by: NURSE PRACTITIONER

## 2025-02-05 PROCEDURE — 82787 IGG 1 2 3 OR 4 EACH: CPT | Performed by: NURSE PRACTITIONER

## 2025-02-05 PROCEDURE — 82172 ASSAY OF APOLIPOPROTEIN: CPT | Performed by: NURSE PRACTITIONER

## 2025-02-05 PROCEDURE — 84478 ASSAY OF TRIGLYCERIDES: CPT | Performed by: NURSE PRACTITIONER

## 2025-02-05 RX ORDER — POLYETHYLENE GLYCOL 3350 17 G/17G
510 POWDER, FOR SOLUTION ORAL TAKE AS DIRECTED
Qty: 510 G | Refills: 0 | Status: SHIPPED | OUTPATIENT
Start: 2025-02-05

## 2025-02-05 RX ORDER — BISACODYL 5 MG/1
20 TABLET, DELAYED RELEASE ORAL ONCE
Qty: 4 TABLET | Refills: 0 | Status: SHIPPED | OUTPATIENT
Start: 2025-02-05 | End: 2025-02-05

## 2025-02-05 NOTE — PROGRESS NOTES
DATE OF CONSULTATION:  2/5/2025    REASON FOR REFERRAL: Chronic pancreatitis, GERD, MASLD    REFERRING PHYSICIAN:  Seven Gallego MD    CHIEF COMPLAINT:  Chief Complaint   Patient presents with    Pancreatitis     HISTORY OF PRESENT ILLNESS:   Rossy Boone is a very pleasant 54 y.o. female with history of GERD, hypertension, hyperlipidemia, DM type II, MASLD and chronic pancreatitis who is being seen today at the request of Seven Gallego MD for evaluation and treatment of chronic pancreatitis, GERD and MASLD. Ms. Boone reports having she began having difficulty with pancreatitis ~14 years ago.  She reports following with her PCP routinely with blood testing.  Of note, she is s/p cholecystectomy. She denies history of alcohol abuse. She reports taking fish oil, tricor and zetia for hyperlipidemia.  She reports her maternal aunt had pancreatitis as well. She reports she has been doing well in this regard and has not had any recent hospitalizations.  She had CT abdomen pelvis with and without contrast on 11/29/2024 which showed chronic pancreatitis.  Also noted diffuse fatty infiltration of the liver with focal hypodense defects in the inferior right hepatic lobe that reflects sequela of previous scarring from prior pancreatitis episodes.  She had splenomegaly measuring 15.5 cm.  She currently denies having epigastric pain. Denies difficulty with diarrhea.  She is currently having~3 BMs per week with stool type IV on [place] stool scale.  She has intermittent abdominal bloating and incomplete evacuation of her colon.  She denies melena, hematochezia or rectal bleeding.  She has never had a screening colonoscopy.  She is without family history of colon cancer.  She reports having mild, intermittent nausea without vomiting. She has history of GERD which is controlled with omeprazole 40 mg PO daily. She denies having dysphagia. Reports recent weight gain. She follows with endocrinology for management of diabetes  and is on insulin.  She has no other complaints today.    PAST MEDICAL HISTORY:  Past Medical History:   Diagnosis Date    Abscess     left 3rd finger    Diabetes mellitus     Hyperlipidemia 2025    Hypertension     Pancreatitis        PAST SURGICAL HISTORY:  Past Surgical History:   Procedure Laterality Date    APPENDECTOMY       SECTION      CHOLECYSTECTOMY      ENDOSCOPY N/A 10/25/2017    Procedure: ESOPHAGOGASTRODUODENOSCOPY with nasal jejunostomy tube placement;  Surgeon: Rudolph Benitez III, MD;  Location: [facility] OR;  Service:     HYSTERECTOMY      INCISION AND DRAINAGE ARM Left 2019    Procedure: INCISION AND DRAINAGE UPPER EXTREMITY, excisional debridement 3rd phalanx;  Surgeon: Eric Thapa MD;  Location: [facility] OR;  Service: Orthopedics       FAMILY HISTORY:  Family History   Problem Relation Age of Onset    Hypertension Mother     Cancer Father     Heart disease Father     Hypertension Father     No Known Problems Sister     Heart disease Brother     No Known Problems Maternal Aunt     No Known Problems Maternal Uncle     No Known Problems Paternal Aunt     No Known Problems Paternal Uncle     No Known Problems Maternal Grandmother     No Known Problems Maternal Grandfather     No Known Problems Paternal Grandmother     Cancer Paternal Grandfather     No Known Problems Other        SOCIAL HISTORY:  Social History     Socioeconomic History    Marital status:    Tobacco Use    Smoking status: Every Day     Current packs/day: 0.50     Average packs/day: 0.5 packs/day for 31.0 years (15.5 ttl pk-yrs)     Types: Cigarettes    Smokeless tobacco: Never   Substance and Sexual Activity    Alcohol use: No    Drug use: No    Sexual activity: Defer     MEDICATIONS:  The current medication list was reviewed in the EMR    Current Outpatient Medications:     Continuous Glucose Sensor (Dexcom G7 Sensor) misc, Use 1 each Every 10 (Ten) Days., Disp: 9 each, Rfl: 2    ezetimibe (Zetia) 10  MG tablet, Take 1 tablet by mouth Daily., Disp: 90 tablet, Rfl: 1    fenofibrate (Tricor) 145 MG tablet, Take 1 tablet by mouth Daily., Disp: 90 tablet, Rfl: 1    gabapentin (NEURONTIN) 300 MG capsule, Take 1 capsule by mouth 2 (Two) Times a Day., Disp: , Rfl:     Glucagon HCl (Glucagon Emergency) 1 MG/ML reconstituted solution, Inject 1 mL as directed As Needed (for hypoglycemic emergency)., Disp: 1 each, Rfl: 5    Insulin Disposable Pump (Omnipod 5 SceR1Y4 Intro Gen 5) kit, Apply 1 each topically Every Other Day., Disp: 1 kit, Rfl: 0    Insulin Lispro (HumaLOG) 100 UNIT/ML injection, For use in insulin pump, , Disp: 10 mL, Rfl: 3    Lantus 100 UNIT/ML injection, Inject 70 Units under the skin into the appropriate area as directed Every Morning., Disp: , Rfl:     meclizine (ANTIVERT) 25 MG tablet, Take 1 tablet by mouth Every Night., Disp: , Rfl:     Melatonin 10 MG tablet, Take 1 tablet by mouth Every Night., Disp: , Rfl:     metoprolol tartrate (LOPRESSOR) 50 MG tablet, Take 1 tablet by mouth 2 (Two) Times a Day., Disp: , Rfl:     multivitamin (DAILY JOSE ALFREDO) tablet tablet, Take 1 tablet by mouth Daily., Disp: , Rfl:     nicotine (NICODERM CQ) 21 MG/24HR patch, Place 1 patch on the skin as directed by provider Daily., Disp: , Rfl:     omega-3 acid ethyl esters (Lovaza) 1 g capsule, Take 2 capsules by mouth 2 (Two) Times a Day., Disp: 120 capsule, Rfl: 3    omeprazole (priLOSEC) 40 MG capsule, Take 1 capsule by mouth 2 (Two) Times a Day., Disp: , Rfl:     promethazine (PHENERGAN) 25 MG tablet, Take 1 tablet by mouth 2 (Two) Times a Day As Needed for Nausea or Vomiting., Disp: , Rfl:     Venlafaxine Besylate .5 MG tablet sustained-release 24 hour, Take 225 mg by mouth Daily., Disp: , Rfl:     ALLERGIES:    Allergies   Allergen Reactions    Hydrocodone-Acetaminophen Shortness Of Breath    Vancomycin Hives    Hydromorphone Unknown - Low Severity     Pt unsure of reaction    Oxycodone-Acetaminophen Unknown -  "Low Severity     Pt unsure of reaction    Penicillins Hives     REVIEW OF SYSTEMS:    A comprehensive 14 point review of systems was performed.  Significant findings as mentioned above.  All other systems reviewed and are negative.        Physical Exam   Vital Signs: BP (!) 182/90 (BP Location: Left arm, Patient Position: Sitting, Cuff Size: Large Adult) Comment: patient has not taken her BP medicine today  Pulse 101   Ht 157.5 cm (62\")   Wt 67.6 kg (149 lb)   BMI 27.25 kg/m²    General: Well developed, well nourished, alert and oriented x 3, in no acute distress.   Head: ATNC   Eyes: PERRL, No evidence of conjunctivitis.   Nose: No nasal discharge.   Mouth: Oral mucosal membranes moist. No oral ulceration or hemorrhages.   Neck: Neck supple. No thyromegaly. No JVD.   Lungs: Clear in all fields to A&P without rales, rhonchi or wheezing.   Heart: . Regular rate and rhythm. No murmurs, rubs, or gallops.   Abdomen: Soft. Bowel sounds are normoactive. Nontender with palpation.   Extremities: No cyanosis or edema.   Neurologic: Grossly non-focal exam    IMAGING:     Narrative & Impression   EXAM:    CT Abdomen and Pelvis Without and With Intravenous Contrast     EXAM DATE:    11/29/2024 7:57 AM     CLINICAL HISTORY:    K86.1; K86.1-Other chronic pancreatitis     TECHNIQUE:    Axial computed tomography images of the abdomen and pelvis without and  with intravenous contrast.  Sagittal and coronal reformatted images were  created and reviewed.  This CT exam was performed using one or more of  the following dose reduction techniques:  automated exposure control,  adjustment of the mA and/or kV according to patient size, and/or use of  iterative reconstruction technique.     COMPARISON:    10/18/2017     FINDINGS:    Lung bases:  Unremarkable.  No mass.  No consolidation.      ABDOMEN:    Liver:  Unremarkable.  No mass.    Gallbladder and bile ducts:  Cholecystectomy.  No ductal dilation.    Pancreas:  Scarring with some " degree of mesenteric retraction is noted  extending from the head of pancreas region compatible with sequela of  chronic pancreatitis. No CT evidence of acute pancreatitis.   Calcifications of the pancreatic head indicative of chronic  pancreatitis.  At the level of the splenic vein-SMV confluence and lower  portal vein, there is extensive collateral vessel formation likely due  to chronic partial obstruction of the portal venous system at this  level. Also related to sequela of prior and chronic pancreatitis.   Diffuse fatty infiltration of liver with focal hypodense defects in the  inferior right hepatic lobe that reflect sequela of previous scarring  from prior pancreatitis episodes.  No ductal dilation.    Spleen:  Splenomegaly is noted with the spleen measuring approximately  15.5 cm.    Adrenals:  Unremarkable.  No mass.    Kidneys and ureters:  Unremarkable.  No solid mass.  No obstructing  stones.  No hydronephrosis.    Stomach and bowel:  Moderate volume fecal retention in the colon  consistent with constipation.  No obstruction.  No mucosal thickening.      PELVIS:    Appendix:  Appendectomy.    Bladder:  Unremarkable.  No mass.  No stones.    Reproductive:  Hysterectomy.      ABDOMEN and PELVIS:    Intraperitoneal space:  Unremarkable.  No free air.  No significant  ascites noted.    Bones/joints:  Mild degenerative changes lumbar spine.  No acute  fracture.    Soft tissues:  Unremarkable.    Vasculature:  Advanced atherosclerosis aortoiliac vasculature without  evidence of aneurysm.    Lymph nodes:  Unremarkable.  No enlarged lymph nodes.     IMPRESSION:  1.  Scarring with some degree of mesenteric retraction is noted  extending from the head of pancreas region compatible with sequela of  chronic pancreatitis. No CT evidence of acute pancreatitis.  2.  Calcifications of the pancreatic head indicative of chronic  pancreatitis.  3.  At the level of the splenic vein-SMV confluence and lower portal  vein,  there is extensive collateral vessel formation likely due to  chronic partial obstruction of the portal venous system at this level.  Also related to sequela of prior and chronic pancreatitis.  4.  Diffuse fatty infiltration of liver with focal hypodense defects in  the inferior right hepatic lobe that reflect sequela of previous  scarring from prior pancreatitis episodes.  5.  Splenomegaly is noted with the spleen measuring approximately 15.5  cm.  6.  Hysterectomy.  7.  Moderate volume fecal retention in the colon consistent with  constipation.  8.  Cholecystectomy.  9.  Appendectomy.     This report was finalized on 11/29/2024 8:57 AM by Dr. Mike Tolbert MD.         RECENT LABS:  Lab Results   Component Value Date    WBC 12.7 (H) 09/17/2024    HGB 14.4 09/17/2024    HCT 44.2 09/17/2024    MCV 85 09/17/2024    RDW 13.8 09/17/2024     09/17/2024    NEUTRORELPCT 72.6 05/09/2019    LYMPHORELPCT 20.5 05/09/2019    MONORELPCT 5.4 05/09/2019    EOSRELPCT 1.2 05/09/2019    BASORELPCT 0.1 05/09/2019    NEUTROABS 7.07 (H) 05/09/2019    LYMPHSABS 2.00 05/09/2019       Lab Results   Component Value Date     01/24/2025    K 3.9 01/24/2025    CO2 23.9 01/24/2025     01/24/2025    BUN 8 01/24/2025    CREATININE 0.84 01/24/2025    EGFRIFNONA 120 05/09/2019    GLUCOSE 43 (C) 01/24/2025    CALCIUM 9.7 01/24/2025    ALKPHOS 110 01/24/2025    AST 28 01/24/2025    ALT 25 01/24/2025    BILITOT 0.4 01/24/2025    ALBUMIN 4.3 01/24/2025    PROTEINTOT 7.4 01/24/2025    MG 1.7 10/26/2017    PHOS 4.1 10/26/2017     ASSESSMENT & PLAN:  Rossy Boone is a very pleasant 54 y.o. female with    1.  Chronic pancreatitis:  -As above, this has been an issue for the past~14 years.   -She is s/p cholecystectomy. She denies history of alcohol abuse. She is taking fish oil, tricor and zetia for hyperlipidemia.  She reports her maternal aunt had pancreatitis as well.   -At present, clinically she is doing well in this regard.  She  is currently without epigastric pain or diarrhea.  She has not had recent acute pancreatitis or hospitalization.    - She underwent CT abdomen pelvis with and without contrast on 11/29/2024 which showed chronic pancreatitis.  Also noted diffuse fatty infiltration of the liver with focal hypodense defects in the inferior right hepatic lobe that reflects sequela of previous scarring from prior pancreatitis episodes.  She had splenomegaly measuring 15.5 cm.    -Discussed with patient, Creon not currently indicated.  Recommended low-fat diet and to consider eating 4-6 smaller meals per day to help ease digestion.  Recommended she push oral hydration with water.  Avoid fried, greasy or fatty foods.  -Will repeat CBC and CMP today.  Will also check IgG4 to evaluate for possible underlying autoimmune pancreatitis.    2.MASLD:  -Noted on CT imaging above. Her LFTs have been normal as well as platelets.   -Will obtain CBC and CMP today.  Will also check Maki FibroSure.  Will plan to repeat ultrasound of the liver with next follow-up.  -Recommended tight control of the patient's blood sugar, cholesterol, triglycerides and weight as this is the best (and essentially only) way to intervene and prevent ongoing liver damage as much as possible. Discussed patient's potential risk factors and that, if not well controlled, hepatic steatosis could progress to cirrhosis.  -Patient understands the importance of following a healthy low-fat diet and was encouraged to incorporate exercise into lifestyle.  -Patient was encouraged to continue to follow with PCP routinely and work on the issues discussed above.    3.  Constipation:  -Recommended patient start MiraLAX 1 capful daily as needed.  Screening colonoscopy was also recommended which she has not had done.  After discussing risks/benefits, patient is agreeable to proceed.  Will schedule.    4. GERD:  -Currently well controlled with omeprazole 40 mg PO daily. WIll monitor.     -Patient  will return to clinic following colonoscopy.    The patient was in agreement with the plan and all questions were answered to her satisfaction.     Thank you so much for allowing us to participate in the care of Rossy Pimentelris . Please do not hesitate to contact us with any questions or concerns.       Electronically Signed by: SHARON Carrington , February 5, 2025 09:31 EST       CC:   MD Julián Donato Steven E, MD

## 2025-02-06 ENCOUNTER — PATIENT ROUNDING (BHMG ONLY) (OUTPATIENT)
Dept: GASTROENTEROLOGY | Facility: CLINIC | Age: 55
End: 2025-02-06
Payer: COMMERCIAL

## 2025-02-07 LAB — IGG4 SER-MCNC: 21 MG/DL (ref 2–96)

## 2025-02-08 LAB
A2 MACROGLOB SERPL-MCNC: 201 MG/DL (ref 110–276)
ALT SERPL W P-5'-P-CCNC: 26 IU/L (ref 0–40)
APO A-I SERPL-MCNC: 105 MG/DL (ref 116–209)
AST SERPL W P-5'-P-CCNC: 24 IU/L (ref 0–40)
BILIRUB SERPL-MCNC: 0.2 MG/DL (ref 0–1.2)
CHOLEST SERPL-MCNC: 191 MG/DL (ref 100–199)
FIBROSIS SCORING:: ABNORMAL
FIBROSIS STAGE SERPL QL: ABNORMAL
GGT SERPL-CCNC: 97 IU/L (ref 0–60)
GLUCOSE SERPL-MCNC: 90 MG/DL (ref 70–99)
HAPTOGLOB SERPL-MCNC: 154 MG/DL (ref 33–346)
LABORATORY COMMENT REPORT: ABNORMAL
LIVER FIBR SCORE SERPL CALC.FIBROSURE: 0.23 (ref 0–0.21)
LIVER STEATOSIS GRADE SERPL QL: ABNORMAL
LIVER STEATOSIS SCORE SERPL: 0.76 (ref 0–0.4)
NASH GRADE SERPL QL: ABNORMAL
NASH INTERPRETATION SERPL-IMP: ABNORMAL
NASH SCORE SERPL: 0.39 (ref 0–0.25)
NASH SCORING: ABNORMAL
STEATOSIS SCORING: ABNORMAL
TEST PERFORMANCE INFO SPEC: ABNORMAL
TEST PERFORMANCE INFO SPEC: ABNORMAL
TRIGL SERPL-MCNC: 196 MG/DL (ref 0–149)

## 2025-02-10 ENCOUNTER — TELEPHONE (OUTPATIENT)
Dept: GASTROENTEROLOGY | Facility: CLINIC | Age: 55
End: 2025-02-10
Payer: COMMERCIAL

## 2025-02-10 NOTE — TELEPHONE ENCOUNTER
I  spoke to patient, notified her that per Ruby, Please let patient know that labs from last week including CBC showed that her liver function tests are  normal.  She had a very mildly elevated creatinine.  Please make sure she is hydrating well with water.  Maki FibroSure showed moderate to severe fatty liver with mild fibrosis but not suggestive of cirrhosis.  As discussed in clinic, we will plan to repeat ultrasound liver with next follow-up. IgG4 was negative, therefore, no evidence of autoimmune pancreatitis. Please keep follow up as scheduled. Patient voiced understanding

## 2025-02-10 NOTE — TELEPHONE ENCOUNTER
Please let patient know that labs from last week including CBC showed that her liver function tests are  normal.  She had a very mildly elevated creatinine.  Please make sure she is hydrating well with water.  Maki FibroSure showed moderate to severe fatty liver with mild fibrosis but not suggestive of cirrhosis.  As discussed in clinic, we will plan to repeat ultrasound liver with next follow-up. IgG4 was negative, therefore, no evidence of autoimmune pancreatitis. Please keep follow up as scheduled.

## 2025-02-11 DIAGNOSIS — E10.65 TYPE 1 DIABETES MELLITUS WITH HYPERGLYCEMIA: ICD-10-CM

## 2025-02-11 RX ORDER — INSULIN GLULISINE 100 [IU]/ML
INJECTION, SOLUTION SUBCUTANEOUS
Qty: 30 ML | Refills: 5 | Status: SHIPPED | OUTPATIENT
Start: 2025-02-11

## 2025-02-11 RX ORDER — ACYCLOVIR 400 MG/1
1 TABLET ORAL
Qty: 9 EACH | Refills: 2 | Status: SHIPPED | OUTPATIENT
Start: 2025-02-11

## 2025-02-13 ENCOUNTER — SPECIALTY PHARMACY (OUTPATIENT)
Dept: PHARMACY | Facility: HOSPITAL | Age: 55
End: 2025-02-13
Payer: COMMERCIAL

## 2025-02-13 NOTE — PROGRESS NOTES
Olivia Jensen, this patient saw Ruby but was about to start her Apidra in Omnipod today and called with a question.  She stated that she was about to fill up the Omnipod with the Apidra and was under the impression that she needed to fill it with 100 units. She was concerned as this wouldn't last the length that she wears the pod.  Please advise on the amount she should fill the pod with and I will let her know.  Pt stated she would be wearing the pod for three days.    Emiliano Lozano, [facility]  02/13/25  11:34 EST

## 2025-02-13 NOTE — PROGRESS NOTES
She needs to fill the pump with 200 units of insulin to fill up the pump.  The prescription just says 100u/day for filling purposes.

## 2025-02-13 NOTE — PROGRESS NOTES
Pt notified of information from Terrie HERRERA.    Emiliano Lozano, [facility]  02/13/25  12:37 EST

## 2025-02-26 ENCOUNTER — HOSPITAL ENCOUNTER (OUTPATIENT)
Dept: RESPIRATORY THERAPY | Facility: HOSPITAL | Age: 55
Discharge: HOME | End: 2025-02-26
Admitting: INTERNAL MEDICINE
Payer: COMMERCIAL

## 2025-02-26 ENCOUNTER — TRANSCRIBE ORDERS (OUTPATIENT)
Dept: ADMINISTRATIVE | Facility: HOSPITAL | Age: 55
End: 2025-02-26
Payer: COMMERCIAL

## 2025-02-26 DIAGNOSIS — R00.2 PALPITATIONS: Primary | ICD-10-CM

## 2025-02-26 DIAGNOSIS — R00.2 PALPITATIONS: ICD-10-CM

## 2025-02-26 PROCEDURE — 93225 XTRNL ECG REC<48 HRS REC: CPT

## 2025-02-27 ENCOUNTER — TRANSCRIBE ORDERS (OUTPATIENT)
Dept: ADMINISTRATIVE | Facility: HOSPITAL | Age: 55
End: 2025-02-27
Payer: COMMERCIAL

## 2025-02-27 DIAGNOSIS — Z12.31 VISIT FOR SCREENING MAMMOGRAM: Primary | ICD-10-CM

## 2025-03-07 ENCOUNTER — OFFICE VISIT (OUTPATIENT)
Dept: ENDOCRINOLOGY | Facility: CLINIC | Age: 55
End: 2025-03-07
Payer: COMMERCIAL

## 2025-03-07 VITALS
BODY MASS INDEX: 26.78 KG/M2 | WEIGHT: 146.4 LBS | OXYGEN SATURATION: 98 % | SYSTOLIC BLOOD PRESSURE: 136 MMHG | DIASTOLIC BLOOD PRESSURE: 67 MMHG | HEART RATE: 82 BPM

## 2025-03-07 DIAGNOSIS — E10.65 TYPE 1 DIABETES MELLITUS WITH HYPERGLYCEMIA: Primary | Chronic | ICD-10-CM

## 2025-03-07 PROCEDURE — 3052F HG A1C>EQUAL 8.0%<EQUAL 9.0%: CPT

## 2025-03-07 PROCEDURE — 1159F MED LIST DOCD IN RCRD: CPT

## 2025-03-07 PROCEDURE — 99214 OFFICE O/P EST MOD 30 MIN: CPT

## 2025-03-07 PROCEDURE — 95251 CONT GLUC MNTR ANALYSIS I&R: CPT

## 2025-03-07 PROCEDURE — 1160F RVW MEDS BY RX/DR IN RCRD: CPT

## 2025-03-07 RX ORDER — INSULIN PMP CART,AUT,G6/7,CNTR
1 EACH SUBCUTANEOUS EVERY OTHER DAY
Qty: 15 EACH | Refills: 3 | Status: SHIPPED | OUTPATIENT
Start: 2025-03-07

## 2025-03-07 NOTE — PROGRESS NOTES
Chief Complaint   Patient presents with    Diabetes     F/u, last A1c 25 value 8.6       HPI  Rossy Boone is a 54 y.o. female who presents to the clinic today for follow-up of type 2 diabetes. Diabetes was diagnosed at age 37. Last A1c on 2025 was 8.6.  She was recently started on OmniPod 5 insulin pump.  She reports taking her OmniPod off last night because she ran out of insulin and pods.  She was using Humalog in her insulin pump and developed a red area under her skin that spread.  Due to this, her primary care prescribed Apidra for her pump. She has been using this in her OmniPod and states it is working well.  He does report some postprandial spikes followed by low blood sugars with her pump.    History: history of chronic pancreatitis that started in her 30s. She last had necrotizing pancreatitis in 2017.    Past medications: none  Diabetic complications: none  Last optho exam: UTD  Last microalbumin: UTD  Last foot exam: UTD  Statin: can't tolerate   Lipid panel: UTD  ACE/ARB: no     The following portions of the patient's history were reviewed and updated as appropriate: allergies, current medications, past family history, past medical history, past social history, past surgical history, and problem list.    /67 (BP Location: Right arm, Patient Position: Sitting, Cuff Size: Adult)   Pulse 82   Wt 66.4 kg (146 lb 6.4 oz)   SpO2 98%   BMI 26.78 kg/m²    Past Medical History:   Diagnosis Date    Abscess     left 3rd finger    Diabetes mellitus     Diabetes mellitus type I     Hyperlipidemia 2025    Hypertension     Pancreatitis      Past Surgical History:   Procedure Laterality Date    APPENDECTOMY       SECTION      CHOLECYSTECTOMY      ENDOSCOPY N/A 10/25/2017    Procedure: ESOPHAGOGASTRODUODENOSCOPY with nasal jejunostomy tube placement;  Surgeon: Rudolph Benitez III, MD;  Location: [facility];  Service:     HYSTERECTOMY      INCISION AND DRAINAGE ARM Left 2019     Procedure: INCISION AND DRAINAGE UPPER EXTREMITY, excisional debridement 3rd phalanx;  Surgeon: Eric Thapa MD;  Location: [facility];  Service: Orthopedics      Family History   Problem Relation Age of Onset    Hypertension Mother             Cancer Father         Prostate    Heart disease Father     Hypertension Father             No Known Problems Sister     Heart disease Brother     No Known Problems Maternal Aunt     No Known Problems Maternal Uncle     No Known Problems Paternal Aunt     No Known Problems Paternal Uncle     No Known Problems Maternal Grandmother     Diabetes Maternal Grandfather             No Known Problems Paternal Grandmother     Cancer Paternal Grandfather         Throat    No Known Problems Other     Obesity Sister       Social History     Socioeconomic History    Marital status:    Tobacco Use    Smoking status: Every Day     Current packs/day: 0.50     Average packs/day: 0.5 packs/day for 31.0 years (15.5 ttl pk-yrs)     Types: Cigarettes    Smokeless tobacco: Never   Vaping Use    Vaping status: Never Used   Substance and Sexual Activity    Alcohol use: No    Drug use: No    Sexual activity: Not Currently     Partners: Male     Birth control/protection: None      Allergies   Allergen Reactions    Hydrocodone-Acetaminophen Shortness Of Breath    Vancomycin Hives    Egg Solids, Whole Nausea And Vomiting and Unknown - Low Severity    Hydromorphone Unknown - Low Severity     Pt unsure of reaction    Oxycodone-Acetaminophen Unknown - Low Severity     Pt unsure of reaction    Penicillins Hives      Current Outpatient Medications on File Prior to Visit   Medication Sig Dispense Refill    Continuous Glucose Sensor (Dexcom G7 Sensor) misc Use 1 each Every 10 (Ten) Days. 9 each 2    ezetimibe (Zetia) 10 MG tablet Take 1 tablet by mouth Daily. 90 tablet 1    fenofibrate (Tricor) 145 MG tablet Take 1 tablet by mouth Daily. 90 tablet 1    gabapentin  (NEURONTIN) 300 MG capsule Take 1 capsule by mouth 2 (Two) Times a Day.      Glucagon HCl (Glucagon Emergency) 1 MG/ML reconstituted solution Inject 1 mL as directed As Needed (for hypoglycemic emergency). 1 each 5    Insulin Disposable Pump (Omnipod 5 [id number] Intro Gen 5) kit Apply 1 each topically Every Other Day. 1 kit 0    insulin glulisine (Apidra) 100 UNIT/ML injection For use in insulin pump.  30 mL 5    Insulin Lispro (HumaLOG) 100 UNIT/ML injection For use in insulin pump,  10 mL 3    Lantus 100 UNIT/ML injection Inject 70 Units under the skin into the appropriate area as directed Every Morning.      meclizine (ANTIVERT) 25 MG tablet Take 1 tablet by mouth Every Night.      Melatonin 10 MG tablet Take 1 tablet by mouth Every Night.      metoprolol tartrate (LOPRESSOR) 50 MG tablet Take 1 tablet by mouth 2 (Two) Times a Day.      multivitamin (DAILY JOSE ALFREDO) tablet tablet Take 1 tablet by mouth Daily.      omega-3 acid ethyl esters (Lovaza) 1 g capsule Take 2 capsules by mouth 2 (Two) Times a Day. 120 capsule 3    omeprazole (priLOSEC) 40 MG capsule Take 1 capsule by mouth 2 (Two) Times a Day.      promethazine (PHENERGAN) 25 MG tablet Take 1 tablet by mouth 2 (Two) Times a Day As Needed for Nausea or Vomiting.      Venlafaxine Besylate .5 MG tablet sustained-release 24 hour Take 225 mg by mouth Daily.      nicotine (NICODERM CQ) 21 MG/24HR patch Place 1 patch on the skin as directed by provider Daily. (Patient not taking: Reported on 3/7/2025)      polyethylene glycol (MIRALAX) 17 GM/SCOOP powder Take 510 g by mouth Take As Directed. Place 15 cap fulls of powder in 32 oz of liquid of choice at 4:00 and 10:00 PM (Patient not taking: Reported on 3/7/2025) 510 g 0     No current facility-administered medications on file prior to visit.      Review of Systems   Constitutional:  Positive for fatigue and unexpected weight gain.   Gastrointestinal:  Negative for abdominal pain, nausea and  vomiting.   Endocrine: Negative for polydipsia, polyphagia and polyuria.      Physical Exam  Vitals reviewed.   Constitutional:       Appearance: Normal appearance.   Eyes:      Extraocular Movements: Extraocular movements intact.   Cardiovascular:      Rate and Rhythm: Normal rate.   Pulmonary:      Effort: Pulmonary effort is normal.   Skin:     General: Skin is warm.   Neurological:      General: No focal deficit present.      Mental Status: She is alert and oriented to person, place, and time.   Psychiatric:         Mood and Affect: Mood normal.         Behavior: Behavior normal.         Thought Content: Thought content normal.         Judgment: Judgment normal.       Labs/Imaging  CMP:  Lab Results   Component Value Date    BUN 13 02/05/2025    CREATININE 1.03 (H) 02/05/2025    EGFR 64.7 02/05/2025    BCR 12.6 02/05/2025     02/05/2025    K 3.9 02/05/2025    CO2 26.0 02/05/2025    CALCIUM 10.0 02/05/2025    ALBUMIN 4.7 02/05/2025    AGRATIO 1.6 02/05/2025    BILITOT 0.3 02/05/2025    ALKPHOS 83 02/05/2025    AST 24 02/05/2025    ALT 24 02/05/2025     Lipid Panel:  Lab Results   Component Value Date    CHOL 110 05/08/2019    TRIG 196 (H) 02/05/2025    HDL 25 (L) 05/08/2019    VLDL 33.6 05/08/2019    LDL 51 05/08/2019     HbA1c:  Lab Results   Component Value Date    HGBA1C 8.6 (A) 01/24/2025    HGBA1C 9.2 (H) 09/17/2024    HGBA1C 17.00 (H) 05/08/2019     Microalbumin:  Lab Results   Component Value Date    MALBCRERATIO 32.3 (H) 01/24/2025     TSH:  Lab Results   Component Value Date    TSH 1.850 01/24/2025       Assessment and Plan  Diagnoses and all orders for this visit:    1. Type 1 diabetes mellitus with hyperglycemia (Primary)  Assessment & Plan:  -Diabetes is not controlled with A1c 8.6.   -CGM data reviewed from the last two weeks shows average glucose 194 with standard deviation of 61. In target range 43%, high 38%, very high 18%, low 1%, very low 0%.  -Pattern of: post prandial hyperglycemia with  some hypoglycemia   -Adjusted settings on Omnipod insulin pump.  Changed carb ratio from 1: 150 to 1:15, turned extended bolus off, active insulin time changed to 3 hours, correction thresholds changed to 110, target ranges changed to 110.  See scanned report attached.  Continue OmniPod 5 with Apidra insulin and Dexcom G7 CGM.  -Discussed dietary and exercise guidelines with patient. 150 g carbs per day and 150 minutes of exercise per week. Increase protein with complex carbs. Avoid foods high in refined sugars and sugary drinks.   -Discussed the importance of yearly eye exams.  -Discussed Glucagon use and appropriate timing for this.   -S/S hypoglycemia reviewed with Rule of 15's advised.  -Discussed long term risks of untreated/undertreated diabetes including retinopathy, neuropathy, nephropathy, amputations, hospitalizations, MI, CVA.      Orders:  -     Insulin Disposable Pump (Omnipod 5 [id number] Pods Gen 5) misc; Use 1 each Every Other Day.  Dispense: 15 each; Refill: 3      Return in about 4 weeks (around 4/4/2025). The patient was instructed to contact the clinic with any interval questions or concerns.    Please note that portions of this document were completed with a voice recognition program. Efforts were made to edit the dictations, but occasionally words are mis-transcribed.  This document has been electronically signed by SHARON Easley  March 10, 2025 08:46 EDT

## 2025-03-09 LAB
CV ZIO BASELINE AVG BPM: 93
CV ZIO BASELINE BPM HIGH: 149
CV ZIO BASELINE BPM LOW: 56

## 2025-03-10 NOTE — ASSESSMENT & PLAN NOTE
-Diabetes is not controlled with A1c 8.6.   -CGM data reviewed from the last two weeks shows average glucose 194 with standard deviation of 61. In target range 43%, high 38%, very high 18%, low 1%, very low 0%.  -Pattern of: post prandial hyperglycemia with some hypoglycemia   -Adjusted settings on Omnipod insulin pump.  Changed carb ratio from 1: 150 to 1:15, turned extended bolus off, active insulin time changed to 3 hours, correction thresholds changed to 110, target ranges changed to 110.  See scanned report attached.  Continue OmniPod 5 with Apidra insulin and Dexcom G7 CGM.  -Discussed dietary and exercise guidelines with patient. 150 g carbs per day and 150 minutes of exercise per week. Increase protein with complex carbs. Avoid foods high in refined sugars and sugary drinks.   -Discussed the importance of yearly eye exams.  -Discussed Glucagon use and appropriate timing for this.   -S/S hypoglycemia reviewed with Rule of 15's advised.  -Discussed long term risks of untreated/undertreated diabetes including retinopathy, neuropathy, nephropathy, amputations, hospitalizations, MI, CVA.

## 2025-03-18 ENCOUNTER — HOSPITAL ENCOUNTER (OUTPATIENT)
Dept: MAMMOGRAPHY | Facility: HOSPITAL | Age: 55
Discharge: HOME | End: 2025-03-18
Admitting: INTERNAL MEDICINE
Payer: COMMERCIAL

## 2025-03-18 DIAGNOSIS — Z12.31 VISIT FOR SCREENING MAMMOGRAM: ICD-10-CM

## 2025-03-18 PROCEDURE — 77063 BREAST TOMOSYNTHESIS BI: CPT

## 2025-03-18 PROCEDURE — 77067 SCR MAMMO BI INCL CAD: CPT

## 2025-04-07 ENCOUNTER — OFFICE VISIT (OUTPATIENT)
Dept: ENDOCRINOLOGY | Facility: CLINIC | Age: 55
End: 2025-04-07
Payer: COMMERCIAL

## 2025-04-07 VITALS
DIASTOLIC BLOOD PRESSURE: 69 MMHG | OXYGEN SATURATION: 96 % | SYSTOLIC BLOOD PRESSURE: 138 MMHG | WEIGHT: 149 LBS | HEART RATE: 84 BPM | BODY MASS INDEX: 27.25 KG/M2

## 2025-04-07 DIAGNOSIS — E10.65 TYPE 1 DIABETES MELLITUS WITH HYPERGLYCEMIA: Primary | ICD-10-CM

## 2025-04-07 LAB
EXPIRATION DATE: ABNORMAL
HBA1C MFR BLD: 8.9 % (ref 4.5–5.7)
Lab: ABNORMAL

## 2025-04-07 RX ORDER — INSULIN PMP CART,AUT,G6/7,CNTR
1 EACH SUBCUTANEOUS DAILY
Qty: 30 EACH | Refills: 2 | Status: SHIPPED | OUTPATIENT
Start: 2025-04-07

## 2025-04-07 NOTE — ASSESSMENT & PLAN NOTE
-Diabetes is not controlled with A1c 8.9.   -CGM data reviewed from the last two weeks shows average glucose 182 with standard deviation of 60. In target range 49%, high 36%, very high 14%, low 1%, very low 0%.  -Pattern of: some fating hyperglycemia and pot prandial hyperglycemia.   -Continue OmniPod 5 insulin pump with the pager insulin and Dexcom G7 CGM  -Increased basal rate from 12 AM to 6 AM from 3 units/hour to 3.1 units/hour.  Changed basal rate from 6AM to 12AM from 3 units/hour to 3.2 units/hour.  -Changed correction from 50 to 40  -Changed carb ratio from 15 to 12. See OmniPod scanned report attached.  -Discussed dietary and exercise guidelines with patient. 150 g carbs per day and 150 minutes of exercise per week. Increase protein with complex carbs. Avoid foods high in refined sugars and sugary drinks.   -Discussed the importance of yearly eye exams.  -Discussed Glucagon use and appropriate timing for this.   -S/S hypoglycemia reviewed with Rule of 15's advised.  -Discussed long term risks of untreated/undertreated diabetes including retinopathy, neuropathy, nephropathy, amputations, hospitalizations, MI, CVA.

## 2025-04-07 NOTE — PROGRESS NOTES
Chief Complaint   Patient presents with    type 1 diabetes mellitus with hyperglycemia     F/u       HPI  Rossy Boone is a 54 y.o. female who presents to the clinic today for follow-up of type 2 diabetes. Diabetes was diagnosed at age 37.  A1c today is 8.9.  She is currently on OmniPod 5 insulin pump with Apidra insulin and Dexcom G7 CGM.  Glucose is averaging 180s.  She is entering carbs at mealtime, sometimes carbs are entered late.  She forgets to enter carbs sometimes as well.  She denies any hypoglycemia.    History: history of chronic pancreatitis that started in her 30s. She last had necrotizing pancreatitis in 2017.    Past medications: Humalog   Diabetic complications: none  Last optho exam: UTD  Last microalbumin: UTD  Last foot exam: UTD  Statin: can't tolerate   Lipid panel: UTD  ACE/ARB: no     The following portions of the patient's history were reviewed and updated as appropriate: allergies, current medications, past family history, past medical history, past social history, past surgical history, and problem list.    /69 (BP Location: Right arm, Patient Position: Sitting, Cuff Size: Adult)   Pulse 84   Wt 67.6 kg (149 lb)   SpO2 96%   BMI 27.25 kg/m²    Past Medical History:   Diagnosis Date    Abscess     left 3rd finger    Diabetes mellitus     Diabetes mellitus type I     GERD (gastroesophageal reflux disease)     Many yrs.  I take omeprazole x2 40mg    Hyperlipidemia 2025    Hypertension     Pancreatitis      Past Surgical History:   Procedure Laterality Date    ABDOMINAL SURGERY   ?    Gallblader removed    APPENDECTOMY       SECTION      CHOLECYSTECTOMY      ENDOSCOPY N/A 10/25/2017    Procedure: ESOPHAGOGASTRODUODENOSCOPY with nasal jejunostomy tube placement;  Surgeon: Rudolph Benitez III, MD;  Location: [facility];  Service:     HYSTERECTOMY  ?     Don't  remember    INCISION AND DRAINAGE ARM Left 2019    Procedure: INCISION AND DRAINAGE UPPER  EXTREMITY, excisional debridement 3rd phalanx;  Surgeon: Eric Thapa MD;  Location: [facility];  Service: Orthopedics    UPPER GASTROINTESTINAL ENDOSCOPY  ?    Don't remember      Family History   Problem Relation Age of Onset    Hypertension Mother             Cancer Father         Prostate    Heart disease Father     Hypertension Father             No Known Problems Sister     Obesity Sister     Crohn's disease Sister     Heart disease Brother     Crohn's disease Brother     No Known Problems Maternal Grandmother     No Known Problems Paternal Grandmother     No Known Problems Maternal Aunt     No Known Problems Paternal Aunt     Diabetes Maternal Grandfather             Cancer Paternal Grandfather         Throat    Esophageal cancer Paternal Grandfather     No Known Problems Maternal Uncle     No Known Problems Paternal Uncle     No Known Problems Other     Pancreatitis Maternal Aunt     Breast cancer Neg Hx       Social History     Socioeconomic History    Marital status:    Tobacco Use    Smoking status: Every Day     Current packs/day: 0.50     Average packs/day: 0.5 packs/day for 31.0 years (15.5 ttl pk-yrs)     Types: Cigarettes     Passive exposure: Current    Smokeless tobacco: Never   Vaping Use    Vaping status: Never Used   Substance and Sexual Activity    Alcohol use: No    Drug use: No    Sexual activity: Not Currently     Partners: Male     Birth control/protection: None      Allergies   Allergen Reactions    Hydrocodone-Acetaminophen Shortness Of Breath    Vancomycin Hives     Can tolerate, causes red man syndrome    Egg Solids, Whole Nausea And Vomiting and Unknown - Low Severity    Hydromorphone Unknown - Low Severity     Pt unsure of reaction    Oxycodone-Acetaminophen Unknown - Low Severity     Pt unsure of reaction    Penicillins Hives      Current Outpatient Medications on File Prior to Visit   Medication Sig Dispense Refill    Continuous Glucose Sensor  (Dexcom G7 Sensor) misc Use 1 each Every 10 (Ten) Days. 9 each 2    ezetimibe (Zetia) 10 MG tablet Take 1 tablet by mouth Daily. 90 tablet 1    fenofibrate (Tricor) 145 MG tablet Take 1 tablet by mouth Daily. 90 tablet 1    gabapentin (NEURONTIN) 300 MG capsule Take 1 capsule by mouth 2 (Two) Times a Day.      Glucagon HCl (Glucagon Emergency) 1 MG/ML reconstituted solution Inject 1 mL as directed As Needed (for hypoglycemic emergency). 1 each 5    insulin glulisine (Apidra) 100 UNIT/ML injection For use in insulin pump.  30 mL 5    meclizine (ANTIVERT) 25 MG tablet Take 1 tablet by mouth Every Night.      Melatonin 10 MG tablet Take 1 tablet by mouth Every Night.      metoprolol tartrate (LOPRESSOR) 50 MG tablet Take 1 tablet by mouth 2 (Two) Times a Day.      multivitamin (DAILY JOSE ALFREDO) tablet tablet Take 1 tablet by mouth Daily.      omega-3 acid ethyl esters (Lovaza) 1 g capsule Take 2 capsules by mouth 2 (Two) Times a Day. 120 capsule 3    omeprazole (priLOSEC) 40 MG capsule Take 1 capsule by mouth 2 (Two) Times a Day.      polyethylene glycol (MIRALAX) 17 GM/SCOOP powder Take 510 g by mouth Take As Directed. Place 15 cap fulls of powder in 32 oz of liquid of choice at 4:00 and 10:00  g 0    promethazine (PHENERGAN) 25 MG tablet Take 1 tablet by mouth 2 (Two) Times a Day As Needed for Nausea or Vomiting.      Venlafaxine Besylate .5 MG tablet sustained-release 24 hour Take 225 mg by mouth Daily.      [DISCONTINUED] Insulin Disposable Pump (Omnipod 5 [id number] Intro Gen 5) kit Apply 1 each topically Every Other Day. 1 kit 0    [DISCONTINUED] Insulin Disposable Pump (Omnipod 5 [id number] Pods Gen 5) misc Use 1 each Every Other Day. 15 each 3    [DISCONTINUED] Insulin Lispro (HumaLOG) 100 UNIT/ML injection For use in insulin pump,  10 mL 3    Lantus 100 UNIT/ML injection Inject 70 Units under the skin into the appropriate area as directed Every Morning. (Patient not taking: Reported on 4/7/2025)       nicotine (NICODERM CQ) 21 MG/24HR patch Place 1 patch on the skin as directed by provider Daily. (Patient not taking: Reported on 4/7/2025)       No current facility-administered medications on file prior to visit.      Review of Systems   Constitutional:  Positive for fatigue.   Gastrointestinal:  Negative for abdominal pain, nausea and vomiting.   Endocrine: Negative for polydipsia, polyphagia and polyuria.   Psychiatric/Behavioral:  Positive for sleep disturbance.       Physical Exam  Vitals reviewed.   Constitutional:       Appearance: Normal appearance.   Eyes:      Extraocular Movements: Extraocular movements intact.   Cardiovascular:      Rate and Rhythm: Normal rate.   Pulmonary:      Effort: Pulmonary effort is normal.   Skin:     General: Skin is warm.   Neurological:      General: No focal deficit present.      Mental Status: She is alert and oriented to person, place, and time.   Psychiatric:         Mood and Affect: Mood normal.         Behavior: Behavior normal.         Thought Content: Thought content normal.         Judgment: Judgment normal.       Labs/Imaging  CMP:  Lab Results   Component Value Date    BUN 13 02/05/2025    CREATININE 1.03 (H) 02/05/2025    EGFR 64.7 02/05/2025    BCR 12.6 02/05/2025     02/05/2025    K 3.9 02/05/2025    CO2 26.0 02/05/2025    CALCIUM 10.0 02/05/2025    ALBUMIN 4.7 02/05/2025    AGRATIO 1.6 02/05/2025    BILITOT 0.3 02/05/2025    ALKPHOS 83 02/05/2025    AST 24 02/05/2025    ALT 24 02/05/2025     Lipid Panel:  Lab Results   Component Value Date    CHOL 110 05/08/2019    TRIG 196 (H) 02/05/2025    HDL 25 (L) 05/08/2019    VLDL 33.6 05/08/2019    LDL 51 05/08/2019     HbA1c:  Lab Results   Component Value Date    HGBA1C 8.9 (A) 04/07/2025    HGBA1C 8.6 (A) 01/24/2025    HGBA1C 9.2 (H) 09/17/2024     Microalbumin:  Lab Results   Component Value Date    MALBCRERATIO 32.3 (H) 01/24/2025     TSH:  Lab Results   Component Value Date    TSH 1.850 01/24/2025        Assessment and Plan  Diagnoses and all orders for this visit:    1. Type 1 diabetes mellitus with hyperglycemia (Primary)  Assessment & Plan:  -Diabetes is not controlled with A1c 8.9.   -CGM data reviewed from the last two weeks shows average glucose 182 with standard deviation of 60. In target range 49%, high 36%, very high 14%, low 1%, very low 0%.  -Pattern of: some fating hyperglycemia and pot prandial hyperglycemia.   -Continue OmniPod 5 insulin pump with the pager insulin and Dexcom G7 CGM  -Increased basal rate from 12 AM to 6 AM from 3 units/hour to 3.1 units/hour.  Changed basal rate from 6AM to 12AM from 3 units/hour to 3.2 units/hour.  -Changed correction from 50 to 40  -Changed carb ratio from 15 to 12. See OmniPod scanned report attached.  -Discussed dietary and exercise guidelines with patient. 150 g carbs per day and 150 minutes of exercise per week. Increase protein with complex carbs. Avoid foods high in refined sugars and sugary drinks.   -Discussed the importance of yearly eye exams.  -Discussed Glucagon use and appropriate timing for this.   -S/S hypoglycemia reviewed with Rule of 15's advised.  -Discussed long term risks of untreated/undertreated diabetes including retinopathy, neuropathy, nephropathy, amputations, hospitalizations, MI, CVA.      Orders:  -     POC Glycosylated Hemoglobin (Hb A1C)  -     Insulin Disposable Pump (Omnipod 5 [id number] Pods Gen 5) misc; Use 1 each Daily.  Dispense: 30 each; Refill: 2      Return in about 3 months (around 7/7/2025). The patient was instructed to contact the clinic with any interval questions or concerns.    Please note that portions of this document were completed with a voice recognition program. Efforts were made to edit the dictations, but occasionally words are mis-transcribed.  This document has been electronically signed by SHARON Easley  April 7, 2025 10:48 EDT

## 2025-04-16 ENCOUNTER — HOSPITAL ENCOUNTER (OUTPATIENT)
Dept: MAMMOGRAPHY | Facility: HOSPITAL | Age: 55
Discharge: HOME | End: 2025-04-16
Admitting: RADIOLOGY
Payer: COMMERCIAL

## 2025-04-16 DIAGNOSIS — R92.8 ABNORMAL MAMMOGRAM OF LEFT BREAST: ICD-10-CM

## 2025-04-16 PROCEDURE — 77065 DX MAMMO INCL CAD UNI: CPT

## 2025-04-16 PROCEDURE — 77065 DX MAMMO INCL CAD UNI: CPT | Performed by: RADIOLOGY

## 2025-04-17 ENCOUNTER — ANESTHESIA EVENT (OUTPATIENT)
Dept: PERIOP | Facility: HOSPITAL | Age: 55
End: 2025-04-17
Payer: COMMERCIAL

## 2025-04-17 ENCOUNTER — ANESTHESIA (OUTPATIENT)
Dept: PERIOP | Facility: HOSPITAL | Age: 55
End: 2025-04-17
Payer: COMMERCIAL

## 2025-04-17 ENCOUNTER — HOSPITAL ENCOUNTER (OUTPATIENT)
Facility: HOSPITAL | Age: 55
Setting detail: HOSPITAL OUTPATIENT SURGERY
Discharge: HOME | End: 2025-04-17
Attending: INTERNAL MEDICINE | Admitting: INTERNAL MEDICINE
Payer: COMMERCIAL

## 2025-04-17 VITALS
RESPIRATION RATE: 18 BRPM | DIASTOLIC BLOOD PRESSURE: 59 MMHG | BODY MASS INDEX: 27.6 KG/M2 | SYSTOLIC BLOOD PRESSURE: 107 MMHG | TEMPERATURE: 97.3 F | HEIGHT: 62 IN | OXYGEN SATURATION: 94 % | HEART RATE: 71 BPM | WEIGHT: 150 LBS

## 2025-04-17 DIAGNOSIS — K59.04 CHRONIC IDIOPATHIC CONSTIPATION: ICD-10-CM

## 2025-04-17 DIAGNOSIS — Z12.11 ENCOUNTER FOR SCREENING FOR MALIGNANT NEOPLASM OF COLON: ICD-10-CM

## 2025-04-17 LAB — GLUCOSE BLDC GLUCOMTR-MCNC: 121 MG/DL (ref 70–130)

## 2025-04-17 PROCEDURE — 25810000003 LACTATED RINGERS PER 1000 ML: Performed by: NURSE ANESTHETIST, CERTIFIED REGISTERED

## 2025-04-17 PROCEDURE — 45385 COLONOSCOPY W/LESION REMOVAL: CPT | Performed by: INTERNAL MEDICINE

## 2025-04-17 PROCEDURE — 25010000002 PROPOFOL 200 MG/20ML EMULSION: Performed by: NURSE ANESTHETIST, CERTIFIED REGISTERED

## 2025-04-17 PROCEDURE — 25010000002 MIDAZOLAM PER 1 MG: Performed by: NURSE ANESTHETIST, CERTIFIED REGISTERED

## 2025-04-17 RX ORDER — KETOROLAC TROMETHAMINE 30 MG/ML
30 INJECTION, SOLUTION INTRAMUSCULAR; INTRAVENOUS EVERY 6 HOURS PRN
Status: DISCONTINUED | OUTPATIENT
Start: 2025-04-17 | End: 2025-04-17 | Stop reason: HOSPADM

## 2025-04-17 RX ORDER — PROPOFOL 10 MG/ML
INJECTION, EMULSION INTRAVENOUS AS NEEDED
Status: DISCONTINUED | OUTPATIENT
Start: 2025-04-17 | End: 2025-04-17 | Stop reason: SURG

## 2025-04-17 RX ORDER — DOCUSATE CALCIUM 240 MG
240 CAPSULE ORAL 2 TIMES DAILY
COMMUNITY
Start: 2025-02-26

## 2025-04-17 RX ORDER — SODIUM CHLORIDE, SODIUM LACTATE, POTASSIUM CHLORIDE, CALCIUM CHLORIDE 600; 310; 30; 20 MG/100ML; MG/100ML; MG/100ML; MG/100ML
INJECTION, SOLUTION INTRAVENOUS CONTINUOUS PRN
Status: DISCONTINUED | OUTPATIENT
Start: 2025-04-17 | End: 2025-04-17 | Stop reason: SURG

## 2025-04-17 RX ORDER — FENTANYL CITRATE 50 UG/ML
50 INJECTION, SOLUTION INTRAMUSCULAR; INTRAVENOUS
Status: DISCONTINUED | OUTPATIENT
Start: 2025-04-17 | End: 2025-04-17 | Stop reason: HOSPADM

## 2025-04-17 RX ORDER — EPHEDRINE SULFATE/0.9% NACL/PF 25 MG/5 ML
SYRINGE (ML) INTRAVENOUS AS NEEDED
Status: DISCONTINUED | OUTPATIENT
Start: 2025-04-17 | End: 2025-04-17 | Stop reason: SURG

## 2025-04-17 RX ORDER — LORATADINE ORAL 5 MG/5ML
5 SOLUTION ORAL DAILY
COMMUNITY

## 2025-04-17 RX ORDER — PHENYLEPHRINE HCL IN 0.9% NACL 1 MG/10 ML
SYRINGE (ML) INTRAVENOUS AS NEEDED
Status: DISCONTINUED | OUTPATIENT
Start: 2025-04-17 | End: 2025-04-17 | Stop reason: SURG

## 2025-04-17 RX ORDER — MIDAZOLAM HYDROCHLORIDE 1 MG/ML
INJECTION, SOLUTION INTRAMUSCULAR; INTRAVENOUS AS NEEDED
Status: DISCONTINUED | OUTPATIENT
Start: 2025-04-17 | End: 2025-04-17 | Stop reason: SURG

## 2025-04-17 RX ORDER — IPRATROPIUM BROMIDE AND ALBUTEROL SULFATE 2.5; .5 MG/3ML; MG/3ML
3 SOLUTION RESPIRATORY (INHALATION) ONCE AS NEEDED
Status: DISCONTINUED | OUTPATIENT
Start: 2025-04-17 | End: 2025-04-17 | Stop reason: HOSPADM

## 2025-04-17 RX ORDER — ONDANSETRON 2 MG/ML
4 INJECTION INTRAMUSCULAR; INTRAVENOUS AS NEEDED
Status: DISCONTINUED | OUTPATIENT
Start: 2025-04-17 | End: 2025-04-17 | Stop reason: HOSPADM

## 2025-04-17 RX ORDER — VENLAFAXINE HYDROCHLORIDE 225 MG/1
225 TABLET, EXTENDED RELEASE ORAL
COMMUNITY
Start: 2025-01-06

## 2025-04-17 RX ORDER — IBUPROFEN 800 MG/1
800 TABLET, FILM COATED ORAL EVERY 6 HOURS PRN
COMMUNITY

## 2025-04-17 RX ADMIN — PROPOFOL 50 MG: 10 INJECTION, EMULSION INTRAVENOUS at 09:55

## 2025-04-17 RX ADMIN — Medication 250 MCG: at 09:59

## 2025-04-17 RX ADMIN — EPHEDRINE SULFATE 10 MG: 5 INJECTION INTRAVENOUS at 09:49

## 2025-04-17 RX ADMIN — PROPOFOL 100 MG: 10 INJECTION, EMULSION INTRAVENOUS at 09:43

## 2025-04-17 RX ADMIN — MIDAZOLAM HYDROCHLORIDE 2 MG: 1 INJECTION, SOLUTION INTRAMUSCULAR; INTRAVENOUS at 09:34

## 2025-04-17 RX ADMIN — PROPOFOL 50 MG: 10 INJECTION, EMULSION INTRAVENOUS at 10:01

## 2025-04-17 RX ADMIN — EPHEDRINE SULFATE 15 MG: 5 INJECTION INTRAVENOUS at 09:55

## 2025-04-17 RX ADMIN — PROPOFOL 100 MG: 10 INJECTION, EMULSION INTRAVENOUS at 09:49

## 2025-04-17 RX ADMIN — SODIUM CHLORIDE, POTASSIUM CHLORIDE, SODIUM LACTATE AND CALCIUM CHLORIDE: 600; 310; 30; 20 INJECTION, SOLUTION INTRAVENOUS at 09:34

## 2025-04-17 RX ADMIN — PROPOFOL 100 MG: 10 INJECTION, EMULSION INTRAVENOUS at 09:37

## 2025-04-17 NOTE — ANESTHESIA PREPROCEDURE EVALUATION
Anesthesia Evaluation     Patient summary reviewed and Nursing notes reviewed   no history of anesthetic complications:   NPO Solid Status: > 8 hours  NPO Liquid Status: > 8 hours           Airway   Mallampati: II  TM distance: >3 FB  Neck ROM: full  no difficulty expected  Dental    (+) poor dentition and upper dentures    Pulmonary - normal exam    breath sounds clear to auscultation  (+) a smoker Current, Abstained day of surgery,  (-) asthma  Cardiovascular - normal exam  Exercise tolerance: good (4-7 METS)    NYHA Classification: II  Patient on routine beta blocker and Beta blocker given within 24 hours of surgery  Rhythm: regular  Rate: normal    (+) hypertension, hyperlipidemia  (-) past MI, dysrhythmias, angina, CHF      Neuro/Psych- negative ROS  (-) seizures, CVA  GI/Hepatic/Renal/Endo    (+) obesity, GERD, diabetes mellitus  (-) liver disease, no renal disease    Musculoskeletal (-) negative ROS    Abdominal  - normal exam    Abdomen: soft.   Substance History - negative use     OB/GYN negative ob/gyn ROS         Other - negative ROS                     Anesthesia Plan    ASA 3     general     intravenous induction     Anesthetic plan, risks, benefits, and alternatives have been provided, discussed and informed consent has been obtained with: patient.  Pre-procedure education provided  Use of blood products discussed with  Consented to blood products.    Plan discussed with CRNA.

## 2025-04-17 NOTE — H&P
[facility]    Patient Identification:  Name:  Rossy Boone  Age:  55 y.o.  Sex:  female  :  1970  MRN:  [id number]   Visit Number:  [id number]  Primary Care Physician:  Seven Gallego MD     Chief complaint: Screening colonoscopy    History of presenting illness: Ms. Boone is a 55 y.o. female who presents today for screening colonoscopy.  This will be her first colonoscopy.  She has mild constipation which is currently controlled with over the counter stool softeners. She denies having abdominal pain or unusual weight loss.She denies family history of colon cancer.  She has no other complaints today.    Review of Systems   Constitutional: Negative.    HENT: Negative.     Eyes: Negative.    Respiratory: Negative.     Cardiovascular: Negative.    Gastrointestinal:  Positive for constipation.   Endocrine: Negative.    Genitourinary: Negative.    Musculoskeletal: Negative.    Allergic/Immunologic: Negative.    Neurological: Negative.    Hematological: Negative.    Psychiatric/Behavioral: Negative.          Past Medical History:   Diagnosis Date    Abscess     left 3rd finger    Diabetes mellitus     Diabetes mellitus type I     GERD (gastroesophageal reflux disease)     Many yrs.  I take omeprazole x2 40mg    Hyperlipidemia 2025    Hypertension     Pancreatitis      Past Surgical History:   Procedure Laterality Date    ABDOMINAL SURGERY   ?    Gallblader removed    APPENDECTOMY       SECTION      CHOLECYSTECTOMY      ENDOSCOPY N/A 10/25/2017    Procedure: ESOPHAGOGASTRODUODENOSCOPY with nasal jejunostomy tube placement;  Surgeon: Rudolph Benitez III, MD;  Location: [facility];  Service:     HYSTERECTOMY  ?     Don't  remember    INCISION AND DRAINAGE ARM Left 2019    Procedure: INCISION AND DRAINAGE UPPER EXTREMITY, excisional debridement 3rd phalanx;  Surgeon: Eric Thapa MD;  Location: [facility];  Service: Orthopedics     UPPER GASTROINTESTINAL ENDOSCOPY  ?    Don't remember     Family History   Problem Relation Age of Onset    Hypertension Mother             Cancer Father         Prostate    Heart disease Father     Hypertension Father             No Known Problems Sister     Obesity Sister     Crohn's disease Sister     Heart disease Brother     Crohn's disease Brother     No Known Problems Maternal Grandmother     No Known Problems Paternal Grandmother     No Known Problems Maternal Aunt     No Known Problems Paternal Aunt     Diabetes Maternal Grandfather             Cancer Paternal Grandfather         Throat    Esophageal cancer Paternal Grandfather     No Known Problems Maternal Uncle     No Known Problems Paternal Uncle     No Known Problems Other     Pancreatitis Maternal Aunt     Breast cancer Neg Hx      Social History     Socioeconomic History    Marital status:    Tobacco Use    Smoking status: Every Day     Current packs/day: 0.50     Average packs/day: 0.5 packs/day for 31.0 years (15.5 ttl pk-yrs)     Types: Cigarettes     Passive exposure: Current    Smokeless tobacco: Never   Vaping Use    Vaping status: Never Used   Substance and Sexual Activity    Alcohol use: No    Drug use: No    Sexual activity: Not Currently     Partners: Male     Birth control/protection: None       Allergies:  Hydrocodone-acetaminophen; Vancomycin; Egg solids, whole; Hydromorphone; Oxycodone-acetaminophen; and Penicillins    Prior to Admission Medications       Prescriptions Last Dose Informant Patient Reported? Taking?    Continuous Glucose Sensor (Dexcom G7 Sensor) misc   No No    Use 1 each Every 10 (Ten) Days.    ezetimibe (Zetia) 10 MG tablet   No No    Take 1 tablet by mouth Daily.    fenofibrate (Tricor) 145 MG tablet   No No    Take 1 tablet by mouth Daily.    gabapentin (NEURONTIN) 300 MG capsule   Yes No    Take 1 capsule by mouth 2 (Two) Times a Day.    Glucagon HCl (Glucagon Emergency) 1 MG/ML  "reconstituted solution   No No    Inject 1 mL as directed As Needed (for hypoglycemic emergency).    Insulin Disposable Pump (Omnipod 5 [id number] Pods Gen 5) misc   No No    Use 1 each Daily.    insulin glulisine (Apidra) 100 UNIT/ML injection   No No    For use in insulin pump.     Lantus 100 UNIT/ML injection   Yes No    Inject 70 Units under the skin into the appropriate area as directed Every Morning.    Patient not taking:  Reported on 4/7/2025    meclizine (ANTIVERT) 25 MG tablet   Yes No    Take 1 tablet by mouth Every Night.    Melatonin 10 MG tablet  Pharmacy Yes No    Take 1 tablet by mouth Every Night.    metoprolol tartrate (LOPRESSOR) 50 MG tablet  Self Yes No    Take 1 tablet by mouth 2 (Two) Times a Day.    multivitamin (DAILY JOSE ALFREDO) tablet tablet  Self Yes No    Take 1 tablet by mouth Daily.    nicotine (NICODERM CQ) 21 MG/24HR patch   Yes No    Place 1 patch on the skin as directed by provider Daily.    Patient not taking:  Reported on 4/7/2025    omega-3 acid ethyl esters (Lovaza) 1 g capsule   No No    Take 2 capsules by mouth 2 (Two) Times a Day.    omeprazole (priLOSEC) 40 MG capsule   Yes No    Take 1 capsule by mouth 2 (Two) Times a Day.    polyethylene glycol (MIRALAX) 17 GM/SCOOP powder   No No    Take 510 g by mouth Take As Directed. Place 15 cap fulls of powder in 32 oz of liquid of choice at 4:00 and 10:00 PM    promethazine (PHENERGAN) 25 MG tablet  Pharmacy Yes No    Take 1 tablet by mouth 2 (Two) Times a Day As Needed for Nausea or Vomiting.    Venlafaxine Besylate .5 MG tablet sustained-release 24 hour   Yes No    Take 225 mg by mouth Daily.        Vital Signs:     Vitals:    04/17/25 0902   BP: 143/74   BP Location: Left arm   Patient Position: Sitting   Pulse: 67   Resp: 16   Temp: 97 °F (36.1 °C)   TempSrc: Temporal   SpO2: 98%   Weight: 68 kg (150 lb)   Height: 157.5 cm (62\")         Physical Exam:  Constitutional:  Alert and oriented. Well developed and well nourished, " in no acute distress.  HENT:  Head: Normocephalic and atraumatic.  Mouth:  Moist mucous membranes.  OP clear, mmm  Eyes:  Conjunctivae and EOM are normal.  Pupils are equal, round, and reactive to light.  No scleral icterus.  Neck:  Neck supple.  No JVD present.    Cardiovascular:  RRR, no MRG.  Pulmonary/Chest:  CTAB, unlabored.   Abdominal:  Soft.  Bowel sounds are normal.  No distension and no tenderness.   Musculoskeletal:  No edema, no tenderness, and no deformity.   Neurological:  MS as above, grossly nonfocal exam     Assessment and Plan:  Proceed with screening colonoscopy.     Ruby Doran, APRN  04/17/25  08:34 EDT

## 2025-04-17 NOTE — ANESTHESIA POSTPROCEDURE EVALUATION
Patient: Rossy Boone    Procedure Summary       Date: 04/17/25 Room / Location: [facility] OR  /  [facility] OR    Anesthesia Start: 0934 Anesthesia Stop: 1001    Procedure: COLONOSCOPY Diagnosis:       Encounter for screening for malignant neoplasm of colon      Chronic idiopathic constipation      (Encounter for screening for malignant neoplasm of colon [Z12.11])      (Chronic idiopathic constipation [K59.04])    Surgeons: Estefani Goodrich MD Provider: John Mccormack MD    Anesthesia Type: general ASA Status: 3            Anesthesia Type: general    Vitals  Vitals Value Taken Time   /59 04/17/25 10:25   Temp 97.3 °F (36.3 °C) 04/17/25 10:03   Pulse 77 04/17/25 10:27   Resp 18 04/17/25 10:25   SpO2 97 % 04/17/25 10:27   Vitals shown include unfiled device data.        Post Anesthesia Care and Evaluation    Patient location during evaluation: PACU  Patient participation: complete - patient participated  Level of consciousness: awake  Pain score: 0  Pain management: satisfactory to patient    Airway patency: patent  Anesthetic complications: No anesthetic complications  PONV Status: none  Cardiovascular status: hemodynamically stable  Respiratory status: nasal cannula  Hydration status: acceptable

## 2025-04-18 LAB — REF LAB TEST METHOD: NORMAL

## 2025-05-14 ENCOUNTER — TRANSCRIBE ORDERS (OUTPATIENT)
Dept: ADMINISTRATIVE | Facility: HOSPITAL | Age: 55
End: 2025-05-14
Payer: COMMERCIAL

## 2025-05-14 DIAGNOSIS — K86.1 IDIOPATHIC CHRONIC PANCREATITIS: Primary | ICD-10-CM

## 2025-06-10 ENCOUNTER — TELEPHONE (OUTPATIENT)
Dept: ENDOCRINOLOGY | Facility: CLINIC | Age: 55
End: 2025-06-10

## 2025-06-10 NOTE — TELEPHONE ENCOUNTER
Caller: OMNIPOD    Relationship:     What was the call regarding: GERMAN FROM [employer] CALLED STATING PT REACHED OUT NEEDING A REFILL ON PT PODS . PLEASE ADVISE.

## 2025-06-11 DIAGNOSIS — E10.65 TYPE 1 DIABETES MELLITUS WITH HYPERGLYCEMIA: ICD-10-CM

## 2025-06-11 RX ORDER — INSULIN PMP CART,AUT,G6/7,CNTR
1 EACH SUBCUTANEOUS DAILY
Qty: 30 EACH | Refills: 2 | Status: SHIPPED | OUTPATIENT
Start: 2025-06-11

## 2025-07-07 ENCOUNTER — OFFICE VISIT (OUTPATIENT)
Dept: ENDOCRINOLOGY | Facility: CLINIC | Age: 55
End: 2025-07-07
Payer: COMMERCIAL

## 2025-07-07 VITALS
OXYGEN SATURATION: 95 % | SYSTOLIC BLOOD PRESSURE: 118 MMHG | HEART RATE: 87 BPM | DIASTOLIC BLOOD PRESSURE: 61 MMHG | WEIGHT: 143.2 LBS | BODY MASS INDEX: 26.19 KG/M2

## 2025-07-07 DIAGNOSIS — E10.65 TYPE 1 DIABETES MELLITUS WITH HYPERGLYCEMIA: Primary | ICD-10-CM

## 2025-07-07 LAB
EXPIRATION DATE: ABNORMAL
HBA1C MFR BLD: 8 % (ref 4.5–5.7)
Lab: ABNORMAL

## 2025-07-07 PROCEDURE — 95251 CONT GLUC MNTR ANALYSIS I&R: CPT

## 2025-07-07 PROCEDURE — 3052F HG A1C>EQUAL 8.0%<EQUAL 9.0%: CPT

## 2025-07-07 PROCEDURE — 1159F MED LIST DOCD IN RCRD: CPT

## 2025-07-07 PROCEDURE — 1160F RVW MEDS BY RX/DR IN RCRD: CPT

## 2025-07-07 PROCEDURE — 99214 OFFICE O/P EST MOD 30 MIN: CPT

## 2025-07-07 PROCEDURE — 83036 HEMOGLOBIN GLYCOSYLATED A1C: CPT

## 2025-07-07 NOTE — PROGRESS NOTES
Chief Complaint   Patient presents with    Diabetes     F/u       HPI  Rossy Boone is a 55 y.o. female who presents to the clinic today for follow-up of type 2 diabetes. Diabetes was diagnosed at age 37.  A1c today is 8.0.  She is currently using OmniPod 5 insulin pump with Apidra insulin and Dexcom G7 CGM.  Glucose is averaging 175.  She typically eats a carb heavy lunch and dinner which spikes her glucose.  She does not enter carbs regularly.  When she does enter carbs they are late and this is causing some postprandial hypoglycemia at times.  She is able to treat hypoglycemia herself and denies any severe episodes.    History: history of chronic pancreatitis that started in her 30s. She last had necrotizing pancreatitis in 2017.    Past medications: Humalog   Diabetic complications: none  Last optho exam: UTD  Last microalbumin: UTD  Last foot exam: UTD  Statin: can't tolerate   Lipid panel: UTD  ACE/ARB: no     The following portions of the patient's history were reviewed and updated as appropriate: allergies, current medications, past family history, past medical history, past social history, past surgical history, and problem list.    /61 (BP Location: Right arm, Patient Position: Sitting, Cuff Size: Adult)   Pulse 87   Wt 65 kg (143 lb 3.2 oz)   SpO2 95%   BMI 26.19 kg/m²    Past Medical History:   Diagnosis Date    Abscess     left 3rd finger    Diabetes mellitus     Diabetes mellitus type I     GERD (gastroesophageal reflux disease)     Many yrs.  I take omeprazole x2 40mg    Hyperlipidemia 2025    Hypertension     Pancreatitis      Past Surgical History:   Procedure Laterality Date    ABDOMINAL SURGERY   ?    Gallblader removed    APPENDECTOMY       SECTION      CHOLECYSTECTOMY      COLONOSCOPY N/A 2025    Procedure: COLONOSCOPY;  Surgeon: Estefani Goodrich MD;  Location: [facility];  Service: Gastroenterology;  Laterality: N/A;    ENDOSCOPY N/A 10/25/2017     Procedure: ESOPHAGOGASTRODUODENOSCOPY with nasal jejunostomy tube placement;  Surgeon: Rudolph Benitez III, MD;  Location: [facility] OR;  Service:     HYSTERECTOMY  ?     Don't  remember    INCISION AND DRAINAGE ARM Left 2019    Procedure: INCISION AND DRAINAGE UPPER EXTREMITY, excisional debridement 3rd phalanx;  Surgeon: Eric Thapa MD;  Location: [facility] OR;  Service: Orthopedics    UPPER GASTROINTESTINAL ENDOSCOPY  ?    Don't remember      Family History   Problem Relation Age of Onset    Hypertension Mother             Cancer Father         Prostate    Heart disease Father     Hypertension Father             No Known Problems Sister     Obesity Sister     Crohn's disease Sister     Heart disease Brother     Crohn's disease Brother     No Known Problems Maternal Grandmother     No Known Problems Paternal Grandmother     No Known Problems Maternal Aunt     No Known Problems Paternal Aunt     Diabetes Maternal Grandfather             Cancer Paternal Grandfather         Throat    Esophageal cancer Paternal Grandfather     No Known Problems Maternal Uncle     No Known Problems Paternal Uncle     No Known Problems Other     Pancreatitis Maternal Aunt     Breast cancer Neg Hx       Social History     Socioeconomic History    Marital status:    Tobacco Use    Smoking status: Every Day     Current packs/day: 0.50     Average packs/day: 0.5 packs/day for 31.0 years (15.5 ttl pk-yrs)     Types: Cigarettes     Passive exposure: Current    Smokeless tobacco: Never   Vaping Use    Vaping status: Never Used   Substance and Sexual Activity    Alcohol use: No    Drug use: No    Sexual activity: Not Currently     Partners: Male     Birth control/protection: None      Allergies   Allergen Reactions    Hydrocodone-Acetaminophen Shortness Of Breath    Vancomycin Hives     Can tolerate, causes red man syndrome    Egg Solids, Whole Nausea And Vomiting and Unknown - Low Severity     Hydromorphone Unknown - Low Severity     Pt unsure of reaction    Oxycodone-Acetaminophen Unknown - Low Severity     Pt unsure of reaction    Penicillins Hives      Current Outpatient Medications on File Prior to Visit   Medication Sig Dispense Refill    Continuous Glucose Sensor (Dexcom G7 Sensor) misc Use 1 each Every 10 (Ten) Days. 9 each 2    docusate calcium (SURFAK) 240 MG capsule Take 1 capsule by mouth 2 (Two) Times a Day.      fenofibrate (Tricor) 145 MG tablet Take 1 tablet by mouth Daily. 90 tablet 1    gabapentin (NEURONTIN) 300 MG capsule Take 1 capsule by mouth 2 (Two) Times a Day.      Glucagon HCl (Glucagon Emergency) 1 MG/ML reconstituted solution Inject 1 mL as directed As Needed (for hypoglycemic emergency). 1 each 5    ibuprofen (ADVIL,MOTRIN) 800 MG tablet Take 1 tablet by mouth Every 6 (Six) Hours As Needed.      Insulin Disposable Pump (Omnipod 5 [id number] Pods Gen 5) misc Use 1 each Daily. 30 each 2    insulin glulisine (Apidra) 100 UNIT/ML injection For use in insulin pump.  30 mL 5    Loratadine (CLARITIN) 5 MG/5ML solution Take 5 mL by mouth Daily.      meclizine (ANTIVERT) 25 MG tablet Take 1 tablet by mouth Every Night.      Melatonin 10 MG tablet Take 1 tablet by mouth Every Night.      metoprolol tartrate (LOPRESSOR) 50 MG tablet Take 1 tablet by mouth 2 (Two) Times a Day.      multivitamin (DAILY JOSE ALFREDO) tablet tablet Take 1 tablet by mouth Daily.      omega-3 acid ethyl esters (Lovaza) 1 g capsule Take 2 capsules by mouth 2 (Two) Times a Day. 120 capsule 3    omeprazole (priLOSEC) 40 MG capsule Take 1 capsule by mouth 2 (Two) Times a Day.      promethazine (PHENERGAN) 25 MG tablet Take 1 tablet by mouth 2 (Two) Times a Day As Needed for Nausea or Vomiting.      venlafaxine 225 MG tablet sustained-release 24 hour 24 hr tablet Take 1 tablet by mouth Daily With Breakfast.       No current facility-administered medications on file prior to visit.      Review of Systems    Constitutional:  Positive for fatigue.   Gastrointestinal:  Negative for abdominal pain, nausea and vomiting.   Endocrine: Negative for polydipsia, polyphagia and polyuria.   Psychiatric/Behavioral:  Positive for sleep disturbance.       Physical Exam  Vitals reviewed.   Constitutional:       Appearance: Normal appearance.   Eyes:      Extraocular Movements: Extraocular movements intact.   Cardiovascular:      Rate and Rhythm: Normal rate.   Pulmonary:      Effort: Pulmonary effort is normal.   Skin:     General: Skin is warm.   Neurological:      General: No focal deficit present.      Mental Status: She is alert and oriented to person, place, and time.   Psychiatric:         Mood and Affect: Mood normal.         Behavior: Behavior normal.         Thought Content: Thought content normal.         Judgment: Judgment normal.       Labs/Imaging  CMP:  Lab Results   Component Value Date    BUN 13 02/05/2025    CREATININE 1.03 (H) 02/05/2025    EGFR 64.7 02/05/2025    BCR 12.6 02/05/2025     02/05/2025    K 3.9 02/05/2025    CO2 26.0 02/05/2025    CALCIUM 10.0 02/05/2025    ALBUMIN 4.7 02/05/2025    AGRATIO 1.6 02/05/2025    BILITOT 0.3 02/05/2025    ALKPHOS 83 02/05/2025    AST 24 02/05/2025    ALT 24 02/05/2025     Lipid Panel:  Lab Results   Component Value Date    CHOL 110 05/08/2019    TRIG 196 (H) 02/05/2025    HDL 25 (L) 05/08/2019    VLDL 33.6 05/08/2019    LDL 51 05/08/2019     HbA1c:  Lab Results   Component Value Date    HGBA1C 8.0 (A) 07/07/2025    HGBA1C 8.9 (A) 04/07/2025    HGBA1C 8.6 (A) 01/24/2025     Microalbumin:  Lab Results   Component Value Date    MALBCRERATIO 32.3 (H) 01/24/2025     TSH:  Lab Results   Component Value Date    TSH 1.850 01/24/2025       Assessment and Plan  Diagnoses and all orders for this visit:    1. Type 1 diabetes mellitus with hyperglycemia (Primary)  Assessment & Plan:  -Diabetes is improving with A1c 8.0.   -CGM data reviewed from the last two weeks shows average  glucose 175 with standard deviation of 63. In target range 54%, high 35%, very high 9%, low 1%, very low 1%.  Pattern of: Prandial hyperglycemia with some hypoglycemia likely due high carb meals and late boluses  -Continue OmniPod 5 insulin pump with the pager insulin and Dexcom G7 CGM.  Increased 6 AM basal rate to 3.4 units/h.  Created 12P to 12A correction factor of 30, created 12P-12A carb ratio of 10.  No other settings changed.  See scanned OmniPod report attached.  -Also discussed switching to beta bionic ilet pump.  The patient has been on OmniPod since approximately February/March of this year without much improvement in her A1c.  Discussed dietary changes as well to help with this.  Gave her a brochure today.  She will let us know if she would like to switch.  -Discussed dietary and exercise guidelines with patient. 150 g carbs per day and 150 minutes of exercise per week. Increase protein with complex carbs. Avoid foods high in refined sugars and sugary drinks.   -Discussed the importance of yearly eye exams.  -Discussed Glucagon use and appropriate timing for this.   -S/S hypoglycemia reviewed with Rule of 15's advised.  -Discussed long term risks of untreated/undertreated diabetes including retinopathy, neuropathy, nephropathy, amputations, hospitalizations, MI, CVA.      Orders:  -     POC Glycosylated Hemoglobin (Hb A1C)      Return in about 3 months (around 10/7/2025). The patient was instructed to contact the clinic with any interval questions or concerns.    Please note that portions of this document were completed with a voice recognition program. Efforts were made to edit the dictations, but occasionally words are mis-transcribed.  This document has been electronically signed by SHARON Easley  July 7, 2025 16:30 EDT

## 2025-07-07 NOTE — ASSESSMENT & PLAN NOTE
-Diabetes is improving with A1c 8.0.   -CGM data reviewed from the last two weeks shows average glucose 175 with standard deviation of 63. In target range 54%, high 35%, very high 9%, low 1%, very low 1%.  Pattern of: Prandial hyperglycemia with some hypoglycemia likely due high carb meals and late boluses  -Continue OmniPod 5 insulin pump with the pager insulin and Dexcom G7 CGM.  Increased 6 AM basal rate to 3.4 units/h.  Created 12P to 12A correction factor of 30, created 12P-12A carb ratio of 10.  No other settings changed.  See scanned OmniPod report attached.  -Also discussed switching to beta bionic ilet pump.  The patient has been on OmniPod since approximately February/March of this year without much improvement in her A1c.  Discussed dietary changes as well to help with this.  Gave her a brochure today.  She will let us know if she would like to switch.  -Discussed dietary and exercise guidelines with patient. 150 g carbs per day and 150 minutes of exercise per week. Increase protein with complex carbs. Avoid foods high in refined sugars and sugary drinks.   -Discussed the importance of yearly eye exams.  -Discussed Glucagon use and appropriate timing for this.   -S/S hypoglycemia reviewed with Rule of 15's advised.  -Discussed long term risks of untreated/undertreated diabetes including retinopathy, neuropathy, nephropathy, amputations, hospitalizations, MI, CVA.

## (undated) DEVICE — Device

## (undated) DEVICE — CONN Y IRR DISP 1P/U

## (undated) DEVICE — ENDOGATOR AUXILIARY WATER JET CONNECTOR: Brand: ENDOGATOR

## (undated) DEVICE — Device: Brand: DEFENDO AIR/WATER/SUCTION AND BIOPSY VALVE

## (undated) DEVICE — THE BITE BLOCK MAXI, LATEX FREE STRAP IS USED TO PROTECT THE ENDOSCOPE INSERTION TUBE FROM BEING BITTEN BY THE PATIENT.

## (undated) DEVICE — UNDERCAST PADDING: Brand: DEROYAL

## (undated) DEVICE — GOWN,REINF,POLY,ECL,PP SLV,XL: Brand: MEDLINE

## (undated) DEVICE — PK EXTREM UPPR 70

## (undated) DEVICE — STRIP PACKING W IODOFORM 1/4

## (undated) DEVICE — ENCORE® LATEX MICRO SIZE 7.5, STERILE LATEX POWDER-FREE SURGICAL GLOVE: Brand: ENCORE

## (undated) DEVICE — SINGLE PORT MANIFOLD: Brand: NEPTUNE 2

## (undated) DEVICE — TUBING, SUCTION, 1/4" X 20', STRAIGHT: Brand: MEDLINE INDUSTRIES, INC.

## (undated) DEVICE — SPNG GZ WOVN 4X4IN 12PLY 10/BX STRL

## (undated) DEVICE — OCCLUSIVE GAUZE PATCH,3% BISMUTH TRIBROMOPHENATE IN PETROLATUM BLEND: Brand: XEROFORM

## (undated) DEVICE — BNDG ELAS CO-FLEX SLF ADHR 4IN5YD LF STRL

## (undated) DEVICE — HOLDER: Brand: DEROYAL

## (undated) DEVICE — BNDG ELAS ELITE V/CLOSE 3IN 5YD LF STRL

## (undated) DEVICE — SNAR POLYP CAPTIFLX MICRO OVL 13MM 240CM

## (undated) DEVICE — ENDOGATOR TUBING FOR ENDOGATOR EGP-100 IRRIGATION PUMP,OLYMPUS OFP PUMP, OLYMPUS AFU-100 PUMP AND ERBE EIP2 PUMP: Brand: ENDOGATOR

## (undated) DEVICE — POLYP TRAP: Brand: TRAPEASE®

## (undated) DEVICE — SYR LUERLOK 30CC

## (undated) DEVICE — NASO-JEJUNAL FEEDING TUBE: Brand: KANGAROO

## (undated) DEVICE — DEFENDO AIR WATER SUCTION AND BIOPSY VALVE KIT FOR  OLYMPUS: Brand: DEFENDO AIR/WATER/SUCTION AND BIOPSY VALVE